# Patient Record
Sex: MALE | Race: WHITE | NOT HISPANIC OR LATINO | Employment: OTHER | URBAN - METROPOLITAN AREA
[De-identification: names, ages, dates, MRNs, and addresses within clinical notes are randomized per-mention and may not be internally consistent; named-entity substitution may affect disease eponyms.]

---

## 2018-01-17 NOTE — PROGRESS NOTES
Assessment    1  Well adult exam (V70 0) (Z00 00)    Plan  Encounter for screening colonoscopy    · 1 - Andra Riddle MD, Lyssa Acuna (Gastroenterology) Physician Referral  Consult  Status: Active   Requested for: [id number]  Care Summary provided  : Yes  Health Maintenance    · (1) COMPREHENSIVE METABOLIC PANEL; Status:Active; Requested for:[id number];    · (1) LIPID PANEL, FASTING; Status:Active; Requested for:[id number];     Yearly exam recommended No blood work needed since it was very good one year ago  Chief Complaint  CPE 71 yo      History of Present Illness  HPI: Yearly PE for pt   No new complaints  Pt is very active  Review of Systems    Constitutional: No fever or chills, feels well, no tiredness, no recent weight gain or weight loss  Eyes: No complaints of eye pain, no red eyes, no discharge from eyes, no itchy eyes  ENT: no complaints of earache, no hearing loss, no nosebleeds, no nasal discharge, no sore throat, no hoarseness  Cardiovascular: No complaints of slow heart rate, no fast heart rate, no chest pain, no palpitations, no leg claudication, no lower extremity  Respiratory: No complaints of shortness of breath, no wheezing, no cough, no SOB on exertion, no orthopnea or PND  Gastrointestinal: No complaints of abdominal pain, no constipation, no nausea or vomiting, no diarrhea or bloody stools  Genitourinary: No complaints of dysuria, no incontinence, no hesitancy, no nocturia, no genital lesion, no testicular pain  Musculoskeletal: No complaints of arthralgia, no myalgias, no joint swelling or stiffness, no limb pain or swelling  Neurological: No compliants of headache, no confusion, no convulsions, no numbness or tingling, no dizziness or fainting, no limb weakness, no difficulty walking  Psychiatric: Is not suicidal, no sleep disturbances, no anxiety or depression, no change in personality, no emotional problems  Active Problems    1  Bursitis (727 3) (M71 9)   2   Encounter for screening colonoscopy (V76 51) (Z12 11)   3  Need for influenza vaccination (V04 81) (Z23)   4  Need for prophylactic vaccination and inoculation against chickenpox (V05 4) (Z23)   5  Screening for cardiovascular condition (V81 2) (Z13 6)   6  Screening for diabetes mellitus (V77 1) (Z13 1)   7  Skin lesion (709 9) (L98 9)   8  Wears glasses (V49 89) (Z97 3)    Past Medical History    · History of anemia (V12 3) (Z86 2)   · History of fatigue (V13 89) (Z87 898)   · History of Internal Hemorrhoids (455 0)   · History of Laceration Of Nose (873 20)   · Left inguinal hernia (550 90) (K40 90)   · History of Skin lesion (709 9) (L98 9)    Family History  Mother    · Family history of Heart disease  Father    · No pertinent family history  Family History    · Family history of No Significant Family History    Social History    · Being A Social Drinker   ·    · Never A Smoker    Current Meds   1  Doxycycline Hyclate TABS; Therapy: (Recorded:07Qle2063) to Recorded    Allergies    1  No Known Drug Allergies    2  No Known Environmental Allergies   3  No Known Food Allergies    Vitals   Recorded: 80QEW8160 73:86OM   Systolic 204   Diastolic 62   Heart Rate 57   Temperature 96 1 F   O2 Saturation 100   Height 6 ft 2 in   Weight 201 lb    BMI Calculated 25 81   BSA Calculated 2 18     Physical Exam    Constitutional   General appearance: No acute distress, well appearing and well nourished  Head and Face   Head and face: Normal     Palpation of the face and sinuses: No sinus tenderness  Ears, Nose, Mouth, and Throat   External inspection of ears and nose: Normal     Otoscopic examination: Tympanic membranes translucent with normal light reflex  Canals patent without erythema  Hearing: Normal     Nasal mucosa, septum, and turbinates: Normal without edema or erythema  Lips, teeth, and gums: Normal, good dentition  Oropharynx: Normal with no erythema, edema, exudate or lesions      Neck   Neck: Supple, symmetric, trachea midline, no masses  Thyroid: Normal, no thyromegaly  Pulmonary   Respiratory effort: No increased work of breathing or signs of respiratory distress  Auscultation of lungs: Clear to auscultation  Cardiovascular   Auscultation of heart: Normal rate and rhythm, normal S1 and S2, no murmurs  Examination of extremities for edema and/or varicosities: Normal     Chest   Chest: Normal     Lymphatic   Palpation of lymph nodes in neck: No lymphadenopathy  Palpation of lymph nodes in axillae: No lymphadenopathy  Musculoskeletal   Gait and station: Normal     Inspection/palpation of digits and nails: Normal without clubbing or cyanosis  Inspection/palpation of joints, bones, and muscles: Normal     Range of motion: Normal     Stability: Normal     Skin   Skin and subcutaneous tissue: Normal without rashes or lesions  Palpation of skin and subcutaneous tissue: Normal turgor  Neurologic   Cranial nerves: Cranial nerves 2-12 intact  Cortical function: Normal mental status  Reflexes: 2+ and symmetric  Sensation: No sensory loss  Coordination: Normal finger to nose and heel to shin  Psychiatric   Judgment and insight: Normal     Orientation to person, place and time: Normal     Recent and remote memory: Intact      Mood and affect: Normal        Signatures   Electronically signed by : BARBIE Duncan ; Jul 17 2016  8:59PM EST                       (Author)

## 2018-04-05 ENCOUNTER — APPOINTMENT (OUTPATIENT)
Dept: RADIOLOGY | Facility: OTHER | Age: 73
End: 2018-04-05
Payer: MEDICARE

## 2018-04-05 VITALS
HEART RATE: 67 BPM | WEIGHT: 209 LBS | HEIGHT: 74 IN | DIASTOLIC BLOOD PRESSURE: 62 MMHG | SYSTOLIC BLOOD PRESSURE: 121 MMHG | BODY MASS INDEX: 26.82 KG/M2

## 2018-04-05 DIAGNOSIS — M54.5 LOW BACK PAIN, UNSPECIFIED BACK PAIN LATERALITY, UNSPECIFIED CHRONICITY, WITH SCIATICA PRESENCE UNSPECIFIED: ICD-10-CM

## 2018-04-05 DIAGNOSIS — M62.9 HAMSTRING TIGHTNESS OF BOTH LOWER EXTREMITIES: ICD-10-CM

## 2018-04-05 DIAGNOSIS — M54.5 LOW BACK PAIN, UNSPECIFIED BACK PAIN LATERALITY, UNSPECIFIED CHRONICITY, WITH SCIATICA PRESENCE UNSPECIFIED: Primary | ICD-10-CM

## 2018-04-05 DIAGNOSIS — M76.32 IT BAND SYNDROME, LEFT: ICD-10-CM

## 2018-04-05 PROCEDURE — 72114 X-RAY EXAM L-S SPINE BENDING: CPT

## 2018-04-05 PROCEDURE — 99203 OFFICE O/P NEW LOW 30 MIN: CPT | Performed by: INTERNAL MEDICINE

## 2018-04-05 RX ORDER — DOXYCYCLINE HYCLATE 150 MG/1
20 TABLET ORAL DAILY
COMMUNITY
End: 2018-12-07

## 2018-04-05 NOTE — PROGRESS NOTES
Assessment/Plan:  Assessment/Plan   Diagnoses and all orders for this visit:    Low back pain, unspecified back pain laterality, unspecified chronicity, with sciatica presence unspecified  -     XR spine lumbar complete w bending minimum 6 views; Future  -     Ambulatory referral to Physical Therapy; Future    Hamstring tightness of both lower extremities  -     Ambulatory referral to Physical Therapy; Future    It band syndrome, left  -     Ambulatory referral to Physical Therapy; Future      We discussed with Liya Wyatt that today's physical exam is significant for extreme bilateral hamstring tightness as well as significant left IT band tightness  His proximal lateral thigh pain is most likely the result of chronic TFL tightness and hamstring tightness putting pressure on the low back and lumbar spine  He is being provided a prescription for physical therapy to address the aforementioned issues  We have also 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 and provided them instruction on aggressive hamstring stretching techniques that he should perform regularly  He will be seen for follow-up in 2-3 months for re-evaluation  If for any reason his symptoms should fail to improve, or worsen, he can contact the office to schedule a sooner appointment  Subjective:   Patient ID: Blayne Mg is a 71 y o  male  Liya Wyatt is a pleasant 71-year-old male  from [place] who presents today for initial evaluation of left-sided hip and thigh pain x2 years  He reports that his pain is mostly lateral, achy/intense, 6/10 pain at its worst   The pain is most bothersome after approximately 10-15 minutes of standing, and is alleviated with sitting or kneeling  In the last 6 weeks, he reports that his pain has also begun to manifest in his left gluteal region when he is seated for long periods of time, as with the car ride  He denies any specific incident of injury   He denies any bruising, swelling, numbness, tingling, or instability  He has tried various OTC NSAID and acetaminophen based medications, none of which have provided significant relief  The following portions of the patient's history were reviewed and updated as appropriate: allergies, current medications, past family history, past medical history, past social history, past surgical history and problem list     History reviewed  No pertinent past medical history  Past Surgical History:   Procedure Laterality Date    HERNIA REPAIR         Family History   Problem Relation Age of Onset    No Known Problems Mother     No Known Problems Sister     No Known Problems Brother        Review of Systems   Constitutional: Negative  HENT: Negative  Eyes: Negative  Respiratory: Negative  Cardiovascular: Negative  Gastrointestinal: Negative  Endocrine: Negative  Genitourinary: Negative  Musculoskeletal:        As noted in HPI/PE   Skin: Negative  Allergic/Immunologic: Negative  Neurological: Negative  Hematological: Negative  Psychiatric/Behavioral: Negative  Objective:    Vitals:    04/05/18 0827   BP: 121/62   Patient Position: Sitting   Pulse: 67   Weight: 94 8 kg (209 lb)   Height: 6' 2" (1 88 m)       Left Hip Exam     Tenderness   The patient is experiencing no tenderness  Range of Motion   Extension: 10   Flexion: 120   Internal Rotation: 20   External Rotation: 80   Abduction: 35     Muscle Strength   Abduction: 5/5   Adduction: 5/5   Flexion: 5/5     Tests   AL: negative  Nani: positive    Other   Erythema: absent  Scars: absent  Sensation: normal  Pulse: present    Comments:  No obvious deformity noted  Presents with significant hamstring tightness, bilaterally      -circumduction  - FADIR  -logroll  -piriformis  -supine SLR      Back Exam     Range of Motion   Extension: 20 (Lumbar spine very rigid, does not reproduce pain)   Flexion: 50 (Hardly any motion from lumbar segment, does not reproduce any pain)   Lateral Bend Right: normal   Lateral Bend Left: normal   Back rotation right: Minimal    Back rotation left: Minimal      Muscle Strength   Back normal muscle strength: Significant right and left hamstring tightness  Right Quadriceps:  5/5   Left Quadriceps:  5/5   Right Hamstrings:  5/5   Left Hamstrings:  5/5     Tests   Right straight leg raise test: Patient could not reach full testing position  Left straight leg raise test: Patient could not reach full testing position  Reflexes   Patellar: normal  Achilles: normal    Other   Sensation: normal  Erythema: no back redness  Scars: absent    Comments:  Patient presents with no obvious deformity  Palpable muscle spasm and bilateral paralumbar musculature  Ranjanatamika Bloom  -lumbar extension              Physical Exam   Constitutional: He is oriented to person, place, and time  He appears well-developed and well-nourished  HENT:   Right Ear: External ear normal    Left Ear: External ear normal    Nose: Nose normal    Eyes: Conjunctivae and EOM are normal  Pupils are equal, round, and reactive to light  Neck: Normal range of motion  Cardiovascular: Intact distal pulses  Pulmonary/Chest: Effort normal    Musculoskeletal: Normal range of motion  Neurological: He is alert and oriented to person, place, and time  Skin: Skin is warm and dry  Psychiatric: He has a normal mood and affect  His behavior is normal  Judgment and thought content normal        Radiographic series taken 4/5/2018 of the lumbar spine is significant for L1-L2 vertebral auto fusion, as well as L5-S1 disc space narrowing  There are also other minor arthritic changes of the lumbar spine        Scribe Attestation    I,:   Omar Lomeli am acting as a scribe while in the presence of the attending physician :        I,:   Abimbola Le MD personally performed the services described in this documentation    as scribed in my presence :

## 2018-04-05 NOTE — PATIENT INSTRUCTIONS
Thank you for enrolling in [address] [facility]  Please follow the instructions below to securely access your online medical record  [employer] allows you to send messages to your doctor, view your test results, renew your prescriptions, schedule appointments, and more  How Do I Sign Up? 1  In your Internet browser, go to [email or web address]  REPLACE WITH REAL URL com   2  Click on the Sign Up Now link in the New User? box    3  Enter your [employer] Activation Code exactly as it appears below  You will not need to use this code after you have completed the sign-up process  If you do not sign up before the expiration date, you must request a new code  [employer] Activation Code: [email or web address]  Expires: 4/8/2018  9:06 AM    4  Enter the last four digits of your Social Security Number and your Date of Birth as indicated and click Next  You will be taken to the next sign-up page  5  Create a [employer] username  Think of one that is secure and easy to remember  6  Create a [employer] password  You can change your password at any time  7  Choose a security question, enter your answer, and click Next  This can be used to access [employer] if you forget your password  8  Select your communication preference  Enter a valid e-mail address if you would like to receive e-mail notifications when new information is available in [facility]  9  Click Sign In  You can now view your medical record  Additional Information  If you have [email or web address] [employer] or call [phone] to talk to our [address] staff  Remember, [employer] is NOT to be used for urgent needs  For medical emergencies, dial 911

## 2018-04-13 ENCOUNTER — EVALUATION (OUTPATIENT)
Dept: PHYSICAL THERAPY | Facility: REHABILITATION | Age: 73
End: 2018-04-13
Payer: MEDICARE

## 2018-04-13 DIAGNOSIS — M76.32 IT BAND SYNDROME, LEFT: ICD-10-CM

## 2018-04-13 DIAGNOSIS — M62.9 HAMSTRING TIGHTNESS OF BOTH LOWER EXTREMITIES: ICD-10-CM

## 2018-04-13 DIAGNOSIS — M54.5 CHRONIC LEFT-SIDED LOW BACK PAIN, WITH SCIATICA PRESENCE UNSPECIFIED: Primary | ICD-10-CM

## 2018-04-13 DIAGNOSIS — G89.29 CHRONIC LEFT-SIDED LOW BACK PAIN, WITH SCIATICA PRESENCE UNSPECIFIED: Primary | ICD-10-CM

## 2018-04-13 PROCEDURE — 97110 THERAPEUTIC EXERCISES: CPT | Performed by: PHYSICAL THERAPIST

## 2018-04-13 PROCEDURE — G8979 MOBILITY GOAL STATUS: HCPCS | Performed by: PHYSICAL THERAPIST

## 2018-04-13 PROCEDURE — 97161 PT EVAL LOW COMPLEX 20 MIN: CPT | Performed by: PHYSICAL THERAPIST

## 2018-04-13 PROCEDURE — G8978 MOBILITY CURRENT STATUS: HCPCS | Performed by: PHYSICAL THERAPIST

## 2018-04-13 NOTE — PROGRESS NOTES
PT Evaluation     Today's date: 2018  Patient name: Reginald Valencia  : 1945  MRN: [id number]  Referring provider: Sameer Chapman MD  Dx:   Encounter Diagnosis     ICD-10-CM    1  Chronic left-sided low back pain, with sciatica presence unspecified M54 5     G89 29    2  Hamstring tightness of both lower extremities M62 9 Ambulatory referral to Physical Therapy   3  It band syndrome, left M76 32 Ambulatory referral to Physical Therapy                  Assessment  Impairments: abnormal coordination, abnormal or restricted ROM, activity intolerance, impaired physical strength, lacks appropriate home exercise program and pain with function  Functional limitations: Patient reports to evaluation with the following functional impairments: walking up and down hills, prolonged standing, prolonged sitting  Assessment details: Reginald Valencia is a 71y o  year old male who presents to IE with:   Chronic left-sided low back pain, with sciatica presence unspecified  (primary encounter diagnosis)  Hamstring tightness of both lower extremities  It band syndrome, left    Cindy Fe presents with the impairments as listed above and would benefit from Physical Therapy to address these impairments and to maximize function  Understanding of Dx/Px/POC: good   Prognosis: good  Prognosis details: Cindy Miramontes prognosis may be affected by the following: chronic pain    Goals  Short-Term Goals:   1  Patient's ROM will increase by 25% in 4 weeks  2  Patient's hamstring will improve to 55 degrees in 4 weeks (currently at 40 degrees)    Long-Term Goals:   1  Patient will be able to ambulate up and down hills with minimal to no pain at time of discharge  2  Patient independent with HEP at time of discharge  3  Patient will be able to stand for prolonged periods of time with minimal to no pain at time of discharge       Plan  Patient would benefit from: PT eval and skilled PT  Planned modality interventions: electrical stimulation/Pakistani stimulation, cryotherapy and thermotherapy: hydrocollator packs  Planned therapy interventions: body mechanics training, breathing training, home exercise program, IADL retraining, joint mobilization, manual therapy, motor coordination training, neuromuscular re-education, patient education, postural training, strengthening, stretching, therapeutic activities, therapeutic exercise and work reintegration  Frequency: 2x week  Duration in visits: 12  Duration in weeks: 6  Treatment plan discussed with: patient  Plan details: Thank you for referring Will Bolivar to Physical Therapy at [place] [address] and for the opportunity to coordinate care  Subjective Evaluation    History of Present Illness  Mechanism of injury: Zenia Hair presents to  with low back pain  Patient reports pain began about 2 years ago while he was standing watching a parade  He reports pain "was killing me" in the left leg, "it hurt just so bad " He notes after he performed an isometric on L LE pain subsided  He reports "after awhile it just disappeared and it didn't bother me the whole summer or the fall " He reports pain came back with prolonged standing, and reported relief with sitting or kneeling  He reports relief of pain with walking or jogging when pain would come back  He noted this past winter the pain in L LE became worse, noting pain would increase with less time of standing  He denies any N/T at this time in LE , saddle anesthesia, or loss of control of bowel and bladder  He reports no pain at time when running or when doing yoga  He reports he began to feel pain into L lateral ankle as well  He also noticed pain when prolonged sitting  He reports increased pain with prolonged standing, walking up and down hills, and prolonged sitting while driving  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   He reports he has been working with the  at the high school, noticing pain has improved since ATC has begun working on him  Recurrent probem    Quality of life: good    Pain  Current pain ratin  At best pain ratin  At worst pain ratin  Location: L LE   Quality: dull ache, sharp and discomfort  Relieving factors: change in position  Aggravating factors: standing, sitting and walking  Progression: no change      Diagnostic Tests  Abnormal x-ray: "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  SI joints appear fused "   Treatments  Current treatment: physical therapy  Patient Goals  Patient goals for therapy: decreased pain, increased motion, increased strength, independence with ADLs/IADLs and return to sport/leisure activities          Objective     Special Questions  Negative for night pain, bladder dysfunction and bowel dysfunction    Postural Observations  Seated posture: fair  Standing posture: fair    Additional Postural Observation Details  Patient demonstrates following posture: Increased thoracic kyphosis   Cervical protrusion  Rounded shoulders, increased IR      Palpation     Additional Palpation Details  No palpable tenderness at this time during IE   He does report tenderness at times along L lateral ankle  Tenderness     Lumbar Spine  No tenderness in the spinous process  Left Hip   No tenderness in the PSIS  Neurological Testing     Sensation     Lumbar   Left   Intact: light touch    Right   Intact: light touch    Reflexes   Left   Patellar (L4): normal (2+)  Achilles (S1): normal (2+)    Right   Patellar (L4): normal (2+)  Achilles (S1): normal (2+)    Additional Neurological Details  Patient denies N/T in L LE and R LE at this time  Active Range of Motion     Additional Active Range of Motion Details  Lumbar flexion: 50%  Lumbar extension: 25%  L Lateral flexion: 50%  R lateral flexion: 50%  L lateral rotation: 50%  R lateral rotation: 50%  No pain with ROM at this time, significant tightness observed with all ROM, most limited with lumbar flexion and extension  Strength/Myotome Testing     Left Hip   Planes of Motion   Flexion: 5  Extension: 4+  Abduction: 5    Right Hip   Planes of Motion   Flexion: 5  Extension: 4+  Abduction: 5    Left Knee   Flexion: 5  Extension: 5    Right Knee   Flexion: 5  Extension: 5    Left Ankle/Foot   Dorsiflexion: 5  Plantar flexion: 5  Great toe extension: 5    Right Ankle/Foot   Dorsiflexion: 5  Plantar flexion: 5  Great toe extension: 5    Tests       Thoracic   Positive slump  Lumbar   Positive slumped  Left   Positive passive SLR  Right   Positive passive SLR  Left Knee   Positive peroneal nerve tension  Additional Tests Details  Patient demonstrating significant hamstring tightness bilaterally    + Tripod sign observed at this time with bilateral knee extension  + slump, patient unable to achieve full  Knee extension when performing slump bilaterally     General Comments     Lumbar Comments  Gait: unremarkable for antalgia at this time         Flowsheet Rows    Flowsheet Row Most Recent Value   PT/OT G-Codes   Current Score  49   Projected Score  59   FOTO information reviewed  Yes   Assessment Type  Evaluation   G code set  Mobility: Walking & Moving Around   Mobility: Walking and Moving Around Current Status ()  CK   Mobility: Walking and Moving Around Goal Status ()  CJ          Precautions: chronic pain  Access code: [email or web address]  * Indicates part of HEP     Daily Treatment Diary     Manual              Bilateral hamstring stretching             L fibular nerve glides             Bilateral hip ROM- flexion, ER , hamstring                                           Exercise Diary              Abdominal bracing toe taps             LTR             Clamshells              Bridges with marching              Hamstring stretch seated* :20x3 ea            DLS dead bugs             Lateral walking/ monster walking              Piriformis stretch* :20x3 ea            Seated fibular nerve glides* 10 ea ITB stretch             pball hamstring triple threat             Elliptical                                                                                                                         Modalities

## 2018-04-18 ENCOUNTER — OFFICE VISIT (OUTPATIENT)
Dept: PHYSICAL THERAPY | Facility: REHABILITATION | Age: 73
End: 2018-04-18
Payer: MEDICARE

## 2018-04-18 DIAGNOSIS — M54.5 CHRONIC LEFT-SIDED LOW BACK PAIN, WITH SCIATICA PRESENCE UNSPECIFIED: Primary | ICD-10-CM

## 2018-04-18 DIAGNOSIS — M76.32 IT BAND SYNDROME, LEFT: ICD-10-CM

## 2018-04-18 DIAGNOSIS — G89.29 CHRONIC LEFT-SIDED LOW BACK PAIN, WITH SCIATICA PRESENCE UNSPECIFIED: Primary | ICD-10-CM

## 2018-04-18 DIAGNOSIS — M62.9 HAMSTRING TIGHTNESS OF BOTH LOWER EXTREMITIES: ICD-10-CM

## 2018-04-18 PROCEDURE — 97110 THERAPEUTIC EXERCISES: CPT | Performed by: PHYSICAL THERAPIST

## 2018-04-18 PROCEDURE — 97112 NEUROMUSCULAR REEDUCATION: CPT | Performed by: PHYSICAL THERAPIST

## 2018-04-18 PROCEDURE — 97140 MANUAL THERAPY 1/> REGIONS: CPT | Performed by: PHYSICAL THERAPIST

## 2018-04-18 NOTE — PROGRESS NOTES
Daily Note     Today's date: 2018  Patient name: Reginald Valencia  : 1945  MRN: [id number]  Referring provider: Sameer Chapman MD  Dx:   Encounter Diagnosis     ICD-10-CM    1  Chronic left-sided low back pain, with sciatica presence unspecified M54 5     G89 29    2  It band syndrome, left M76 32    3  Hamstring tightness of both lower extremities M62 9                   Subjective: Cindy Miramontes reports his L LE "doing okay today " He reports increased pain in L LE previous day while at track and field meet  He verbalizes compliance with HEP at this time, denying any pain or problems  Objective: See treatment diary below  Precautions: chronic pain  Access code: [email or web address]  * Indicates part of HEP     Daily Treatment Diary     Manual              Bilateral hamstring stretching  18 min total           L fibular nerve glides  performed           Bilateral hip ROM- flexion, ER , hamstring  performed                                         Exercise Diary              Abdominal bracing toe taps             LTR             Clamshells   Side plank OTB 2x10           Bridges with marching   2x10           Hamstring stretch seated* :20x3 ea :20x5 ea           DLS dead bugs             Lateral walking/ monster walking   OTB 3 laps ea           Piriformis stretch* :20x3 ea :20x5 ea           Seated fibular nerve glides* 10 ea Strap :05x10 ea           ITB stretch             pball hamstring triple threat             Elliptical  5 min                                                                                                                       Modalities                                                         Assessment: Tolerated treatment fair  Patient demonstrated fatigue post treatment, exhibited good technique with therapeutic exercises and would benefit from continued PT  Patient continues to demonstrate significant tightness in bilateral hamstrings at this time   Patient needing frequent VC for proper technique with exercises and core stabilization while doing exercises  Plan: Continue per plan of care  Progress treatment as tolerated

## 2018-04-20 ENCOUNTER — OFFICE VISIT (OUTPATIENT)
Dept: PHYSICAL THERAPY | Facility: REHABILITATION | Age: 73
End: 2018-04-20
Payer: MEDICARE

## 2018-04-20 DIAGNOSIS — M76.32 IT BAND SYNDROME, LEFT: ICD-10-CM

## 2018-04-20 DIAGNOSIS — M62.9 HAMSTRING TIGHTNESS OF BOTH LOWER EXTREMITIES: ICD-10-CM

## 2018-04-20 DIAGNOSIS — G89.29 CHRONIC LEFT-SIDED LOW BACK PAIN, WITH SCIATICA PRESENCE UNSPECIFIED: Primary | ICD-10-CM

## 2018-04-20 DIAGNOSIS — M54.5 CHRONIC LEFT-SIDED LOW BACK PAIN, WITH SCIATICA PRESENCE UNSPECIFIED: Primary | ICD-10-CM

## 2018-04-20 PROCEDURE — 97112 NEUROMUSCULAR REEDUCATION: CPT

## 2018-04-20 PROCEDURE — 97140 MANUAL THERAPY 1/> REGIONS: CPT

## 2018-04-20 PROCEDURE — 97110 THERAPEUTIC EXERCISES: CPT

## 2018-04-20 NOTE — PROGRESS NOTES
Daily Note     Today's date: 2018  Patient name: Melvi Levin  : 1945  MRN: [id number]  Referring provider: Rigoberto Calixto MD  Dx:   Encounter Diagnosis     ICD-10-CM    1  Chronic left-sided low back pain, with sciatica presence unspecified M54 5     G89 29    2  It band syndrome, left M76 32    3  Hamstring tightness of both lower extremities M62 9                   Subjective: Patient denies any pain prior to session today and reports no complaints after previous session just minimal soreness  He reports discomfort yesterday while sitting at the dentist stating "my hamstrings were killing me " He reports compliance with HEP  Objective: See treatment diary below  Precautions: chronic pain  Access code: [email or web address]  * Indicates part of HEP     Daily Treatment Diary     Manual             Bilateral hamstring stretching  18 min total 18 min total          L fibular nerve glides  performed performed          Bilateral hip ROM- flexion, ER , hamstring  performed performed                                        Exercise Diary             Abdominal bracing toe taps             LTR             Clamshells   Side plank OTB 2x10 Side plank OTB 3x10          Bridges with marching   2x10 3x10          Hamstring stretch seated* :20x3 ea :20x5 ea :20x5 ea          DLS dead bugs             Lateral walking/ monster walking   OTB 3 laps ea OTB 3 laps ea          Piriformis stretch* :20x3 ea :20x5 ea :20x5          Seated fibular nerve glides* 10 ea Strap :05x10 ea Strap :05x10 ea          ITB stretch             pball hamstring triple threat   20 ea          Elliptical  5 min 5 min                                                                                                                      Modalities                                                           Assessment: Tolerated treatment well   Patient demonstrated fatigue post treatment, exhibited good technique with therapeutic exercises and would benefit from continued PT Trialed pball hamstring triple threat this session where patient tolerated well stating "can really feel that one!"      Plan: Continue per plan of care  Progress treatment as tolerated

## 2018-04-25 ENCOUNTER — OFFICE VISIT (OUTPATIENT)
Dept: PHYSICAL THERAPY | Facility: REHABILITATION | Age: 73
End: 2018-04-25
Payer: MEDICARE

## 2018-04-25 DIAGNOSIS — M62.9 HAMSTRING TIGHTNESS OF BOTH LOWER EXTREMITIES: ICD-10-CM

## 2018-04-25 DIAGNOSIS — M76.32 IT BAND SYNDROME, LEFT: ICD-10-CM

## 2018-04-25 DIAGNOSIS — G89.29 CHRONIC LEFT-SIDED LOW BACK PAIN, WITH SCIATICA PRESENCE UNSPECIFIED: Primary | ICD-10-CM

## 2018-04-25 DIAGNOSIS — M54.5 CHRONIC LEFT-SIDED LOW BACK PAIN, WITH SCIATICA PRESENCE UNSPECIFIED: Primary | ICD-10-CM

## 2018-04-25 PROCEDURE — 97140 MANUAL THERAPY 1/> REGIONS: CPT

## 2018-04-25 PROCEDURE — 97110 THERAPEUTIC EXERCISES: CPT

## 2018-04-25 PROCEDURE — 97112 NEUROMUSCULAR REEDUCATION: CPT

## 2018-04-25 NOTE — PROGRESS NOTES
Daily Note     Today's date: 2018  Patient name: Radha Husain  : 1945  MRN: [id number]  Referring provider: Joycie Phoenix, MD  Dx:   Encounter Diagnosis     ICD-10-CM    1  Chronic left-sided low back pain, with sciatica presence unspecified M54 5     G89 29    2  It band syndrome, left M76 32    3  Hamstring tightness of both lower extremities M62 9                   Subjective: Patient continues to report occasional hamstring discomfort when sitting or driving  Patient reports being able to move and walk around more at track meet yesterday stating "it felt really good, I had no pain, moving around a lot " He denies any complaints after previous session         Objective: See treatment diary below  Precautions: chronic pain  Access code: [email or web address]  * Indicates part of HEP     Daily Treatment Diary     Manual            Bilateral hamstring stretching  18 min total 18 min total 15 min total         L fibular nerve glides  performed performed performed         Bilateral hip ROM- flexion, ER , hamstring  performed performed performed                                       Exercise Diary            Abdominal bracing toe taps             LTR             Clamshells   Side plank OTB 2x10 Side plank OTB 3x10 Side plank GTB 3x10         Bridges with marching   2x10 3x10 3x12         Hamstring stretch seated* :20x3 ea :20x5 ea :20x5 ea :20x5 ea         DLS dead bugs             Lateral walking/ monster walking, sharkies  OTB 3 laps ea OTB 3 laps ea GTB 3 laps, 30x         Piriformis stretch* :20x3 ea :20x5 ea :20x5 :20x5          Seated fibular nerve glides* 10 ea Strap :05x10 ea Strap :05x10 ea Strap :05x10 ea         ITB stretch    :15x5         pball hamstring triple threat   20 ea 20 ea         Elliptical  5 min 5 min 5 min         DLS Mini squats    87Y                                                                                                        Modalities Assessment: Tolerated treatment well  Patient demonstrated fatigue post treatment, exhibited good technique with therapeutic exercises and would benefit from continued PT Patient requesting education on proper squat form, constant cues required for proper technique  Plan: Continue per plan of care  Progress treatment as tolerated

## 2018-04-27 ENCOUNTER — OFFICE VISIT (OUTPATIENT)
Dept: PHYSICAL THERAPY | Facility: REHABILITATION | Age: 73
End: 2018-04-27
Payer: MEDICARE

## 2018-04-27 DIAGNOSIS — M62.9 HAMSTRING TIGHTNESS OF BOTH LOWER EXTREMITIES: ICD-10-CM

## 2018-04-27 DIAGNOSIS — M76.32 IT BAND SYNDROME, LEFT: ICD-10-CM

## 2018-04-27 DIAGNOSIS — G89.29 CHRONIC LEFT-SIDED LOW BACK PAIN, WITH SCIATICA PRESENCE UNSPECIFIED: Primary | ICD-10-CM

## 2018-04-27 DIAGNOSIS — M54.5 CHRONIC LEFT-SIDED LOW BACK PAIN, WITH SCIATICA PRESENCE UNSPECIFIED: Primary | ICD-10-CM

## 2018-04-27 PROCEDURE — 97112 NEUROMUSCULAR REEDUCATION: CPT | Performed by: PHYSICAL THERAPIST

## 2018-04-27 PROCEDURE — 97110 THERAPEUTIC EXERCISES: CPT | Performed by: PHYSICAL THERAPIST

## 2018-04-27 PROCEDURE — 97140 MANUAL THERAPY 1/> REGIONS: CPT | Performed by: PHYSICAL THERAPIST

## 2018-04-27 NOTE — PROGRESS NOTES
Daily Note     Today's date: 2018  Patient name: Caesar Carrero  : 1945  MRN: [id number]  Referring provider: Yesica Hubbard MD  Dx:   Encounter Diagnosis     ICD-10-CM    1  Chronic left-sided low back pain, with sciatica presence unspecified M54 5     G89 29    2  It band syndrome, left M76 32    3   Hamstring tightness of both lower extremities M62 9                   Subjective: Hernandez Cook reports his L LE "feeling pretty good, still hurts when I sit for too long, but I feel like it's better "         Objective: See treatment diary below  Precautions: chronic pain  Access code: [email or web address]  * Indicates part of HEP     Daily Treatment Diary     Manual           Bilateral hamstring stretching  18 min total 18 min total 15 min total 15 min total        L fibular nerve glides  performed performed performed performed        Bilateral hip ROM- flexion, ER , hamstring  performed performed performed performed                                      Exercise Diary           Abdominal bracing toe taps             LTR             Clamshells   Side plank OTB 2x10 Side plank OTB 3x10 Side plank GTB 3x10 Side plank GTB 3x10        Bridges with marching   2x10 3x10 3x12 3x12        Hamstring stretch seated* :20x3 ea :20x5 ea :20x5 ea :20x5 ea :20x5 ea        DLS dead bugs             Lateral walking/ monster walking, sharkies  OTB 3 laps ea OTB 3 laps ea GTB 3 laps, 30x GTB 3 laps, 30x        Piriformis stretch* :20x3 ea :20x5 ea :20x5 :20x5  :20x5        Seated fibular nerve glides* 10 ea Strap :05x10 ea Strap :05x10 ea Strap :05x10 ea Strap :05x10 ea        ITB stretch    :15x5 :15x5        pball hamstring triple threat   20 ea 20 ea 20 ea        Elliptical  5 min 5 min 5 min 5 min        DLS Mini squats    89E         Step downs     2x10 ea 6"                                                                                          Modalities Assessment: Tolerated treatment fair  Patient demonstrated fatigue post treatment, exhibited good technique with therapeutic exercises and would benefit from continued PT  Patient demonstrating improvements with manuals, less tightness observed with all manuals, most limited with bilateral hamstrings at this time  Added step downs today with fair tolerance, no increase in pain reported  Plan: Continue per plan of care  Progress treatment as tolerated          Patient taken through exercises by WOLF CORTES from 8:30-8:45 AM

## 2018-05-02 ENCOUNTER — OFFICE VISIT (OUTPATIENT)
Dept: PHYSICAL THERAPY | Facility: REHABILITATION | Age: 73
End: 2018-05-02
Payer: MEDICARE

## 2018-05-02 DIAGNOSIS — M54.5 CHRONIC LEFT-SIDED LOW BACK PAIN, WITH SCIATICA PRESENCE UNSPECIFIED: Primary | ICD-10-CM

## 2018-05-02 DIAGNOSIS — G89.29 CHRONIC LEFT-SIDED LOW BACK PAIN, WITH SCIATICA PRESENCE UNSPECIFIED: Primary | ICD-10-CM

## 2018-05-02 DIAGNOSIS — M62.9 HAMSTRING TIGHTNESS OF BOTH LOWER EXTREMITIES: ICD-10-CM

## 2018-05-02 DIAGNOSIS — M76.32 IT BAND SYNDROME, LEFT: ICD-10-CM

## 2018-05-02 PROCEDURE — 97112 NEUROMUSCULAR REEDUCATION: CPT

## 2018-05-02 PROCEDURE — 97140 MANUAL THERAPY 1/> REGIONS: CPT

## 2018-05-02 PROCEDURE — 97110 THERAPEUTIC EXERCISES: CPT

## 2018-05-02 NOTE — PROGRESS NOTES
Daily Note     Today's date: 2018  Patient name: Jackie Calvin  : 1945  MRN: [id number]  Referring provider: Mary Shepard MD  Dx:   Encounter Diagnosis     ICD-10-CM    1  Chronic left-sided low back pain, with sciatica presence unspecified M54 5     G89 29    2  It band syndrome, left M76 32    3  Hamstring tightness of both lower extremities M62 9                   Subjective: Patient reports "definitely better than what I was, but not all the way there yet " He reports having to sit occasionally at yesterdays track meet  He denies any complaints after previous session         Objective: See treatment diary below  Precautions: chronic pain  Access code: [email or web address]  * Indicates part of HEP     Daily Treatment Diary     Manual          Bilateral hamstring stretching  18 min total 18 min total 15 min total 15 min total 15 min total       L fibular nerve glides  performed performed performed performed performed       Bilateral hip ROM- flexion, ER , hamstring  performed performed performed performed performed                                     Exercise Diary          Abdominal bracing toe taps             LTR             Clamshells   Side plank OTB 2x10 Side plank OTB 3x10 Side plank GTB 3x10 Side plank GTB 3x10 Side plank GTB 3x10       Bridges with marching   2x10 3x10 3x12 3x12 3x12       Hamstring stretch seated* :20x3 ea :20x5 ea :20x5 ea :20x5 ea :20x5 ea :20x5 ea       DLS dead bugs             Lateral walking/ monster walking, sharkies  OTB 3 laps ea OTB 3 laps ea GTB 3 laps, 30x GTB 3 laps, 30x GTB 3 laps, 30x       Piriformis stretch* :20x3 ea :20x5 ea :20x5 :20x5  :20x5 :20x5       Seated fibular nerve glides* 10 ea Strap :05x10 ea Strap :05x10 ea Strap :05x10 ea Strap :05x10 ea Strap :05x10 ea       ITB stretch    :15x5 :15x5 :15x5       pball hamstring triple threat   20 ea 20 ea 20 ea 30 ea       Elliptical  5 min 5 min 5 min 5 min 5 min DLS Mini squats    85S         Step downs     2x10 ea 6" 3x10 ea 6''                                                                                         Modalities                                                         Assessment: Tolerated treatment well  Patient demonstrated fatigue post treatment, exhibited good technique with therapeutic exercises and would benefit from continued PT Patient continues to demonstrate bilateral hamstring tightness, improvements noted  Patient tolerated all TE performed today well, no increased discomfort noted, most difficulty with pball triple threat  Plan: Continue per plan of care  Progress treatment as tolerated

## 2018-05-04 ENCOUNTER — OFFICE VISIT (OUTPATIENT)
Dept: PHYSICAL THERAPY | Facility: REHABILITATION | Age: 73
End: 2018-05-04
Payer: MEDICARE

## 2018-05-04 DIAGNOSIS — M62.9 HAMSTRING TIGHTNESS OF BOTH LOWER EXTREMITIES: ICD-10-CM

## 2018-05-04 DIAGNOSIS — G89.29 CHRONIC LEFT-SIDED LOW BACK PAIN, WITH SCIATICA PRESENCE UNSPECIFIED: Primary | ICD-10-CM

## 2018-05-04 DIAGNOSIS — M54.5 CHRONIC LEFT-SIDED LOW BACK PAIN, WITH SCIATICA PRESENCE UNSPECIFIED: Primary | ICD-10-CM

## 2018-05-04 DIAGNOSIS — M76.32 IT BAND SYNDROME, LEFT: ICD-10-CM

## 2018-05-04 PROCEDURE — 97110 THERAPEUTIC EXERCISES: CPT | Performed by: PHYSICAL THERAPIST

## 2018-05-04 PROCEDURE — 97112 NEUROMUSCULAR REEDUCATION: CPT | Performed by: PHYSICAL THERAPIST

## 2018-05-04 PROCEDURE — 97140 MANUAL THERAPY 1/> REGIONS: CPT | Performed by: PHYSICAL THERAPIST

## 2018-05-04 NOTE — PROGRESS NOTES
Daily Note     Today's date: 2018  Patient name: Nava Jefferson  : 1945  MRN: [id number]  Referring provider: Yudith Lew MD  Dx:   Encounter Diagnosis     ICD-10-CM    1  Chronic left-sided low back pain, with sciatica presence unspecified M54 5     G89 29    2  It band syndrome, left M76 32    3  Hamstring tightness of both lower extremities M62 9                   Subjective: Luis Fernando Whitlock reports that his legs have been "getting looser" but continues to note pain left lower extremity  Objective: See treatment diary below      Assessment: Tolerated treatment well  Patient demonstrated fatigue post treatment, exhibited good technique with therapeutic exercises and would benefit from continued PT  Required cuing during monster walks to avoid hip IR, able to maintain 50% of time  Pt continues to be very restricted within HS flexibility  Plan: Continue per plan of care       Precautions: chronic pain  Access code: [email or web address]  * Indicates part of HEP     Daily Treatment Diary     Manual         Bilateral hamstring stretching  18 min total 18 min total 15 min total 15 min total 15 min total 15 min total      Bilateral              L fibular nerve glides  performed performed performed performed performed performed      Bilateral hip ROM- flexion, ER , hamstring  performed performed performed performed performed performed                                    Exercise Diary         Abdominal bracing toe taps             LTR             Clamshells   Side plank OTB 2x10 Side plank OTB 3x10 Side plank GTB 3x10 Side plank GTB 3x10 Side plank GTB 3x10 Side plank GTB 3x10      Bridges with marching   2x10 3x10 3x12 3x12 3x12 3x12      Hamstring stretch seated* :20x3 ea :20x5 ea :20x5 ea :20x5 ea :20x5 ea :20x5 ea :20x5ea      DLS dead bugs             Lateral walking/ monster walking, sharkies  OTB 3 laps ea OTB 3 laps ea GTB 3 laps, 30x GTB 3 laps, 30x GTB 3 laps, 30x GTB 3 laps, 30x      Piriformis stretch* :20x3 ea :20x5 ea :20x5 :20x5  :20x5 :20x5 :20x5      Seated fibular nerve glides* 10 ea Strap :05x10 ea Strap :05x10 ea Strap :05x10 ea Strap :05x10 ea Strap :05x10 ea Strap :05x10 ea      ITB stretch    :15x5 :15x5 :15x5 :15x5      pball hamstring triple threat   20 ea 20 ea 20 ea 30 ea 30ea      Elliptical  5 min 5 min 5 min 5 min 5 min 5 min      DLS Mini squats    25L         Step downs     2x10 ea 6" 3x10 ea 6'' np                                                                                        Modalities

## 2018-05-09 ENCOUNTER — OFFICE VISIT (OUTPATIENT)
Dept: PHYSICAL THERAPY | Facility: REHABILITATION | Age: 73
End: 2018-05-09
Payer: MEDICARE

## 2018-05-09 DIAGNOSIS — M76.32 IT BAND SYNDROME, LEFT: ICD-10-CM

## 2018-05-09 DIAGNOSIS — G89.29 CHRONIC LEFT-SIDED LOW BACK PAIN, WITH SCIATICA PRESENCE UNSPECIFIED: Primary | ICD-10-CM

## 2018-05-09 DIAGNOSIS — M54.5 CHRONIC LEFT-SIDED LOW BACK PAIN, WITH SCIATICA PRESENCE UNSPECIFIED: Primary | ICD-10-CM

## 2018-05-09 DIAGNOSIS — M62.9 HAMSTRING TIGHTNESS OF BOTH LOWER EXTREMITIES: ICD-10-CM

## 2018-05-09 PROCEDURE — 97110 THERAPEUTIC EXERCISES: CPT | Performed by: PHYSICAL THERAPIST

## 2018-05-09 PROCEDURE — 97112 NEUROMUSCULAR REEDUCATION: CPT | Performed by: PHYSICAL THERAPIST

## 2018-05-09 PROCEDURE — G8978 MOBILITY CURRENT STATUS: HCPCS | Performed by: PHYSICAL THERAPIST

## 2018-05-09 PROCEDURE — G8979 MOBILITY GOAL STATUS: HCPCS | Performed by: PHYSICAL THERAPIST

## 2018-05-09 PROCEDURE — 97140 MANUAL THERAPY 1/> REGIONS: CPT | Performed by: PHYSICAL THERAPIST

## 2018-05-09 NOTE — PROGRESS NOTES
PT Re-Evaluation     Today's date: 2018  Patient name: Nat Bourgeois  : 1945  MRN: [id number]  Referring provider: Rafael Cabrales MD  Dx:   Encounter Diagnosis     ICD-10-CM    1  Chronic left-sided low back pain, with sciatica presence unspecified M54 5     G89 29    2  It band syndrome, left M76 32    3  Hamstring tightness of both lower extremities M62 9        Start Time: 0755  Stop Time: 271  Total time in clinic (min): 55 minutes    Assessment  Impairments: activity intolerance and lacks appropriate home exercise program  Functional limitations: Patient reports to evaluation with the following functional impairments: walking up and down hills, prolonged standing, prolonged sitting  Assessment details: Nat Bourgeois is a 71y o  year old male who presents to IE with:   Chronic left-sided low back pain, with sciatica presence unspecified  (primary encounter diagnosis)  Hamstring tightness of both lower extremities  It band syndrome, left     Benjamin Abrams has made the following improvements since beginning PT:  decreased pain, increased ROM, increased strength, increased tolerance to activities  and increased core stabilization   Benjamin Abrams continues to present with the impairments as listed above  Patient would continue to benefit from skilled PT to address these deficits and to maximize function  Understanding of Dx/Px/POC: good   Prognosis: good  Prognosis details: Benjamin Abrams prognosis may be affected by the following: chronic pain    Goals  Short-Term Goals:   1  Patient's ROM will increase by 25% in 4 weeks- Met  2  Patient's hamstring will improve to 55 degrees in 4 weeks (currently at 40 degrees)- Met    Long-Term Goals:   1  Patient will be able to ambulate up and down hills with minimal to no pain at time of discharge- Partially Met  2  Patient independent with HEP at time of discharge- Partially Met  3   Patient will be able to stand for prolonged periods of time with minimal to no pain at time of discharge- Partially Met    Plan  Patient would benefit from: skilled PT  Planned modality interventions: electrical stimulation/Russian stimulation, cryotherapy and thermotherapy: hydrocollator packs  Planned therapy interventions: body mechanics training, breathing training, home exercise program, IADL retraining, joint mobilization, manual therapy, motor coordination training, neuromuscular re-education, patient education, postural training, strengthening, stretching, therapeutic activities, therapeutic exercise and work reintegration  Frequency: 2x week  Duration in visits: 1  Duration in weeks: 1  Treatment plan discussed with: patient  Plan details: Thank you for referring Yuri Steen to Physical Therapy at [place] [address] and for the opportunity to coordinate care  Subjective Evaluation    History of Present Illness  Mechanism of injury: Sushil Lane has been seen for total of 8 visits for OP PT for L hamstring strain, L ITB syndrome   Patient's global rating of change is " Quite a bit better (5) " Patient reports improvements with prolonged standing, sitting tolerance  Patient reports most difficulty with prolonged standing, prolonged driving in the car  Patient to be seen for OP PT for 1 more visit and will be discharged to [facility] on 18  He reports overall less pain and frequency of L LE pain  Recurrent probem    Quality of life: good    Pain  Current pain ratin  At best pain ratin  At worst pain ratin  Location: L LE   Quality: discomfort  Relieving factors: change in position  Aggravating factors: standing and sitting  Progression: no change      Diagnostic Tests  Abnormal x-ray: "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   SI joints appear fused "   Treatments  Current treatment: physical therapy  Patient Goals  Patient goals for therapy: decreased pain, increased motion, increased strength, independence with ADLs/IADLs and return to sport/leisure activities          Objective Special Questions  Negative for night pain, bladder dysfunction and bowel dysfunction    Postural Observations  Seated posture: fair  Standing posture: fair    Additional Postural Observation Details  Patient demonstrates following posture: Increased thoracic kyphosis   Cervical protrusion  Rounded shoulders, increased IR      Palpation     Additional Palpation Details  No palpable tenderness at this time during IE   He does report tenderness at times along L lateral ankle  Tenderness     Lumbar Spine  No tenderness in the spinous process  Left Hip   No tenderness in the PSIS  Neurological Testing     Sensation     Lumbar   Left   Intact: light touch    Right   Intact: light touch    Reflexes   Left   Patellar (L4): normal (2+)  Achilles (S1): normal (2+)    Right   Patellar (L4): normal (2+)  Achilles (S1): normal (2+)    Additional Neurological Details  Patient denies N/T in L LE and R LE at this time  Active Range of Motion     Additional Active Range of Motion Details  Lumbar flexion: 75%  Lumbar extension: 50%  L Lateral flexion: 75%  R lateral flexion: 75%  L lateral rotation: 75%  R lateral rotation: 75%  No pain with ROM at this time, significant tightness observed with all ROM, most limited with lumbar flexion and extension  Strength/Myotome Testing     Left Hip   Planes of Motion   Flexion: 5  Extension: 5  Abduction: 5    Right Hip   Planes of Motion   Flexion: 5  Extension: 5  Abduction: 5    Left Knee   Flexion: 5  Extension: 5    Right Knee   Flexion: 5  Extension: 5    Left Ankle/Foot   Dorsiflexion: 5  Plantar flexion: 5  Great toe extension: 5    Right Ankle/Foot   Dorsiflexion: 5  Plantar flexion: 5  Great toe extension: 5    Tests       Thoracic   Positive slump  Lumbar   Positive slumped  Left   Positive passive SLR  Right   Positive passive SLR  Left Knee   Positive peroneal nerve tension       Additional Tests Details  Patient demonstrating significant hamstring tightness bilaterally    + Tripod sign observed at this time with bilateral knee extension  General Comments     Lumbar Comments  Gait: unremarkable for antalgia at this time         Flowsheet Rows      Most Recent Value   PT/OT G-Codes   Current Score  62   Projected Score  64   FOTO information reviewed  Yes   Assessment Type  Re-evaluation   G code set  Mobility: Walking & Moving Around   Mobility: Walking and Moving Around Current Status ()  CJ   Mobility: Walking and Moving Around Goal Status ()  CJ          Precautions: chronic pain  Access code: [email or web address]  * Indicates part of HEP     Daily Treatment Diary     Manual   4/18 4/20 4/25 4/27 5/2 5/4 5/9     Bilateral hamstring stretching  18 min total 18 min total 15 min total 15 min total 15 min total 15 min total 15 min total     Bilateral              L fibular nerve glides  performed performed performed performed performed performed performed     Bilateral hip ROM- flexion, ER , hamstring  performed performed performed performed performed performed performed                                   Exercise Diary   4/18 4/20 4/25 4/27 5/2 5/4 5/9     Abdominal bracing toe taps             LTR             Clamshells   Side plank OTB 2x10 Side plank OTB 3x10 Side plank GTB 3x10 Side plank GTB 3x10 Side plank GTB 3x10 Side plank GTB 3x10 Side plank BTB 3x10     Bridges with marching   2x10 3x10 3x12 3x12 3x12 3x12 3x12     Hamstring stretch seated* :20x3 ea :20x5 ea :20x5 ea :20x5 ea :20x5 ea :20x5 ea :20x5ea :20x5 ea     DLS dead bugs             Lateral walking/ monster walking, sharkies  OTB 3 laps ea OTB 3 laps ea GTB 3 laps, 30x GTB 3 laps, 30x GTB 3 laps, 30x GTB 3 laps, 30x BTB 3 laps     Piriformis stretch* :20x3 ea :20x5 ea :20x5 :20x5  :20x5 :20x5 :20x5 :20x5     Seated fibular nerve glides* 10 ea Strap :05x10 ea Strap :05x10 ea Strap :05x10 ea Strap :05x10 ea Strap :05x10 ea Strap :05x10 ea Strap :05x10 ea     ITB stretch :15x5 :15x5 :15x5 :15x5 :20x5     pball hamstring triple threat   20 ea 20 ea 20 ea 30 ea 30ea 30 ea     Elliptical  5 min 5 min 5 min 5 min 5 min 5 min 5 min     DLS Mini squats    25W         Step downs     2x10 ea 6" 3x10 ea 6'' np                                                                                        Modalities

## 2018-05-11 ENCOUNTER — OFFICE VISIT (OUTPATIENT)
Dept: PHYSICAL THERAPY | Facility: REHABILITATION | Age: 73
End: 2018-05-11
Payer: MEDICARE

## 2018-05-11 DIAGNOSIS — M54.5 CHRONIC LEFT-SIDED LOW BACK PAIN, WITH SCIATICA PRESENCE UNSPECIFIED: Primary | ICD-10-CM

## 2018-05-11 DIAGNOSIS — M76.32 IT BAND SYNDROME, LEFT: ICD-10-CM

## 2018-05-11 DIAGNOSIS — G89.29 CHRONIC LEFT-SIDED LOW BACK PAIN, WITH SCIATICA PRESENCE UNSPECIFIED: Primary | ICD-10-CM

## 2018-05-11 PROCEDURE — 97112 NEUROMUSCULAR REEDUCATION: CPT

## 2018-05-11 PROCEDURE — G8979 MOBILITY GOAL STATUS: HCPCS | Performed by: PHYSICAL THERAPIST

## 2018-05-11 PROCEDURE — 97110 THERAPEUTIC EXERCISES: CPT

## 2018-05-11 PROCEDURE — 97140 MANUAL THERAPY 1/> REGIONS: CPT

## 2018-05-11 PROCEDURE — G8980 MOBILITY D/C STATUS: HCPCS | Performed by: PHYSICAL THERAPIST

## 2018-05-11 NOTE — PROGRESS NOTES
Daily Note     Today's date: 2018  Patient name: Salena Mendiola  : 1945  MRN: [id number]  Referring provider: Clare Dan MD  Dx:   Encounter Diagnosis     ICD-10-CM    1  Chronic left-sided low back pain, with sciatica presence unspecified M54 5     G89 29    2  It band syndrome, left M76 32                   Subjective:  Patient reports no discomfort prior to session today   He reports while sitting in the car yesterday he experienced hamstring discomfort stating "it's weird it's only when I sit " Patient reports no complaints after previous session stating "felt real good leaving here "       Objective: See treatment diary below  Precautions: chronic pain  Access code: [email or web address]  * Indicates part of HEP     Daily Treatment Diary     Manual       Bilateral hamstring stretching  18 min total 18 min total 15 min total 15 min total 15 min total 15 min total 15 min total 15 min total    Bilateral              L fibular nerve glides  performed performed performed performed performed performed performed     Bilateral hip ROM- flexion, ER , hamstring  performed performed performed performed performed performed performed performed                                  Exercise Diary       Abdominal bracing toe taps             LTR             Clamshells   Side plank OTB 2x10 Side plank OTB 3x10 Side plank GTB 3x10 Side plank GTB 3x10 Side plank GTB 3x10 Side plank GTB 3x10 Side plank BTB 3x10 Side plank BTB 3x12    Bridges with marching   2x10 3x10 3x12 3x12 3x12 3x12 3x12 3x12    Hamstring stretch seated* :20x3 ea :20x5 ea :20x5 ea :20x5 ea :20x5 ea :20x5 ea :20x5ea :20x5 ea :20x5 ea    DLS dead bugs             Lateral walking/ monster walking, sharkies  OTB 3 laps ea OTB 3 laps ea GTB 3 laps, 30x GTB 3 laps, 30x GTB 3 laps, 30x GTB 3 laps, 30x BTB 3 laps BTB 3 laps, 30x    Piriformis stretch* :20x3 ea :20x5 ea :20x5 :20x5  :20x5 :20x5 :20x5 :20x5 :20x5    Seated fibular nerve glides* 10 ea Strap :05x10 ea Strap :05x10 ea Strap :05x10 ea Strap :05x10 ea Strap :05x10 ea Strap :05x10 ea Strap :05x10 ea Strap :05x10 ea    ITB stretch    :15x5 :15x5 :15x5 :15x5 :20x5 :20x5    pball hamstring triple threat   20 ea 20 ea 20 ea 30 ea 30ea 30 ea 30 ea    Elliptical  5 min 5 min 5 min 5 min 5 min 5 min 5 min 5 min    DLS Mini squats    09T         Step downs     2x10 ea 6" 3x10 ea 6'' np                                                                                        Modalities                                                           Assessment: Tolerated treatment well  Patient demonstrated fatigue post treatment and exhibited good technique with therapeutic exercises Patient reports relief with all manuals and stretching  Plan: Patient to transition to HEP  Patient given updated HEP this session with patient reporting understanding

## 2018-06-11 ENCOUNTER — OFFICE VISIT (OUTPATIENT)
Dept: OBGYN CLINIC | Facility: CLINIC | Age: 73
End: 2018-06-11
Payer: MEDICARE

## 2018-06-11 ENCOUNTER — APPOINTMENT (OUTPATIENT)
Dept: RADIOLOGY | Facility: CLINIC | Age: 73
End: 2018-06-11
Payer: MEDICARE

## 2018-06-11 VITALS
DIASTOLIC BLOOD PRESSURE: 67 MMHG | HEART RATE: 58 BPM | WEIGHT: 204 LBS | BODY MASS INDEX: 26.18 KG/M2 | HEIGHT: 74 IN | SYSTOLIC BLOOD PRESSURE: 163 MMHG

## 2018-06-11 DIAGNOSIS — M79.652 PAIN IN LEFT THIGH: ICD-10-CM

## 2018-06-11 DIAGNOSIS — M76.32 IT BAND SYNDROME, LEFT: ICD-10-CM

## 2018-06-11 DIAGNOSIS — M62.9 HAMSTRING TIGHTNESS OF BOTH LOWER EXTREMITIES: Primary | ICD-10-CM

## 2018-06-11 PROCEDURE — 73552 X-RAY EXAM OF FEMUR 2/>: CPT

## 2018-06-11 PROCEDURE — 99213 OFFICE O/P EST LOW 20 MIN: CPT | Performed by: INTERNAL MEDICINE

## 2018-06-11 RX ORDER — CHLORHEXIDINE GLUCONATE 0.12 MG/ML
RINSE ORAL
Refills: 3 | COMMUNITY
Start: 2018-05-15 | End: 2020-01-08 | Stop reason: HOSPADM

## 2018-06-11 NOTE — PROGRESS NOTES
Assessment/Plan:  Assessment/Plan   Diagnoses and all orders for this visit:    Hamstring tightness of both lower extremities    It band syndrome, left    Pain in left thigh  -     XR femur 2 vw left; Future        Mr  Vania Busch physical examination is normal today with the exception of his improved but residual hamstring tightness  With regards to the pressure induced left lateral thigh pain  Left thigh x-ray done today is negative for any fracture, lesion, or sclerosis  Other differential diagnosis such as vascular claudication or neurogenic claudication cannot be ruled out at this time  Exertional compartment syndrome is very less likely especially since he does not have any symptom exacerbation with high-intensity activities such as running  Given that his physical examination is totally normal today, I have recommended observation at this time  However, I counseled patient that if he develops pain again to notify our office so that he can return for re-evaluation while he is actively having any symptoms  Patient was advised to call and return to the clinic sooner or go to the closest emergency room if he develops any symptom exacerbation  This document was recorded using voice recognition software and errors may be noted  Subjective:   Patient ID: Jose Willingham is a 71 y o  male  HPI    Mr Mariela Johnson presents for follow-up re-evaluation of his left lower extremity pain radiation  Of note is that when we initially saw him back on 04/05, his primary complaint was left lower extremity pain radiation with associated left hip pain  His physical examination at that time was significant for positive straight leg raise, significant hamstring tightness, and IT band tightness  He has been doing physical therapy rehabilitation as well as structured home exercise program since that encounter and his baseline pain has subsided    However, he  Has been experiencing a focal achy pain that is primarily located in the mid posterior lateral aspect of his thigh  He only experiences pain when he is sitting down such as  when driving or sitting on a regular chair  He does not feel this pain when he is in the lounge chair  He also reports some occasional discomfort in the lateral aspect of his left calf muscle which he often feels during activities or prolonged standing  He describes the pain as "achy"  The pain usually subsides within a couple of minutes after he stops the inciting activities I the standing, walking  He does not get pain with jogging or running activities  Otherwise, no other complaints  The following portions of the patient's history were reviewed and updated as appropriate: allergies, current medications, past family history, past medical history, past social history, past surgical history and problem list     Review of Systems   Constitutional: Negative  HENT: Negative  Eyes: Negative  Respiratory: Negative  Cardiovascular: Negative  Musculoskeletal:        As per history of present illness   Skin: Negative  Neurological:   Negative   Psychiatric/Behavioral: Negative  Objective:  Vitals:    06/11/18 0959   BP: 163/67   Pulse: 58   Weight: 92 5 kg (204 lb)   Height: 6' 2 02" (1 88 m)       Right Hip Exam     Comments:  Normal left thigh examination  Normal left leg examination  No palpable tenderness noted throughout the left 5th hamstring, thigh, and calf/soleus  Back Exam     Range of Motion   The patient has normal back ROM  Other   Gait: normal   Erythema: no back redness            Physical Exam  Constitutional: Oriented to person, place, and time  Well-developed and well-nourished  HENT:   Head: Normocephalic and atraumatic  Eyes: Conjunctivae are normal    Cardiovascular: Normal rate  Pulmonary/Chest: Effort normal    Neurological: Alert and oriented to person, place, and time  Skin: Skin is warm and dry     Psychiatric: Normal mood and affect  I have personally reviewed pertinent films in PACS and my interpretation is Left femur x-ray done today is negative for any overt fracture, sclerosis, or lesion  However, there is an area approximately 20 cm from the proximal tip of the left greater trochanter) which appears to have a very small subtle faint lateral cortical translucency seen on image 1 which appears to be clinically insignificant  Bess Hair

## 2018-06-19 NOTE — PROGRESS NOTES
PT Discharge    Today's date: 2018  Patient name: Princess Combs  : 1945  MRN: [id number]  Referring provider: Zachary Hilton MD  Dx:   Encounter Diagnosis     ICD-10-CM    1  Chronic left-sided low back pain, with sciatica presence unspecified M54 5     G89 29    2  It band syndrome, left M76 32        Start Time: 0755  Stop Time: 0855  Total time in clinic (min): 60 minutes    Assessment  Impairments: activity intolerance and lacks appropriate home exercise program  Functional limitations: Patient reports to evaluation with the following functional impairments: walking up and down hills, prolonged standing, prolonged sitting  Assessment details: Princess Combs is a 71y o  year old male who presents to IE with:   Chronic left-sided low back pain, with sciatica presence unspecified  (primary encounter diagnosis)  Hamstring tightness of both lower extremities  It band syndrome, left    Jim Thomas has made the following improvements since beginning PT: decreased pain, increased ROM, increased strength and increased tolerance to activities   Jim Thomas and PT mutually agree to transition to HEP at this time secondary to gains made in PT  he has been given updated HEP with verbalized understanding and compliance  Jim Thomas is encouraged to contact PT with any questions or concerns in the future  Understanding of Dx/Px/POC: good   Prognosis: good  Prognosis details: Jim Thomas prognosis may be affected by the following: chronic pain    Goals  Short-Term Goals:   1  Patient's ROM will increase by 25% in 4 weeks- Met  2  Patient's hamstring will improve to 55 degrees in 4 weeks (currently at 40 degrees)- Met    Long-Term Goals:   1  Patient will be able to ambulate up and down hills with minimal to no pain at time of discharge- Partially Met  2  Patient independent with HEP at time of discharge- Met  3   Patient will be able to stand for prolonged periods of time with minimal to no pain at time of discharge- Partially Met    Plan  Treatment plan discussed with: patient  Plan details: Thank you for referring Wilbert Harmon to Physical Therapy at [place] [address] and for the opportunity to coordinate care  Subjective Evaluation    History of Present Illness  Mechanism of injury: Aurelia Briseno has been seen for total of 8 visits for OP PT for L hamstring strain, L ITB syndrome   Patient's global rating of change is " Quite a bit better (5) " Patient reports improvements with prolonged standing, sitting tolerance  Patient reports most difficulty with prolonged standing, prolonged driving in the car  Patient to be seen for OP PT for 1 more visit and will be discharged to [facility] on 18  He reports overall less pain and frequency of L LE pain  Recurrent probem    Quality of life: good    Pain  Current pain ratin  At best pain ratin  At worst pain ratin  Location: L LE   Quality: discomfort  Relieving factors: change in position  Aggravating factors: standing and sitting  Progression: no change      Diagnostic Tests  Abnormal x-ray: "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  SI joints appear fused "   Treatments  Current treatment: physical therapy  Patient Goals  Patient goals for therapy: decreased pain, increased motion, increased strength, independence with ADLs/IADLs and return to sport/leisure activities          Objective     Special Questions  Negative for night pain, bladder dysfunction and bowel dysfunction    Postural Observations  Seated posture: fair  Standing posture: fair    Additional Postural Observation Details  Patient demonstrates following posture: Increased thoracic kyphosis   Cervical protrusion  Rounded shoulders, increased IR      Palpation     Additional Palpation Details  No palpable tenderness at this time during IE   He does report tenderness at times along L lateral ankle  Tenderness     Lumbar Spine  No tenderness in the spinous process       Left Hip   No tenderness in the PSIS      Neurological Testing     Sensation     Lumbar   Left   Intact: light touch    Right   Intact: light touch    Reflexes   Left   Patellar (L4): normal (2+)  Achilles (S1): normal (2+)    Right   Patellar (L4): normal (2+)  Achilles (S1): normal (2+)    Additional Neurological Details  Patient denies N/T in L LE and R LE at this time  Active Range of Motion     Additional Active Range of Motion Details  Lumbar flexion: 75%  Lumbar extension: 50%  L Lateral flexion: 75%  R lateral flexion: 75%  L lateral rotation: 75%  R lateral rotation: 75%  No pain with ROM at this time, significant tightness observed with all ROM, most limited with lumbar flexion and extension  Strength/Myotome Testing     Left Hip   Planes of Motion   Flexion: 5  Extension: 5  Abduction: 5    Right Hip   Planes of Motion   Flexion: 5  Extension: 5  Abduction: 5    Left Knee   Flexion: 5  Extension: 5    Right Knee   Flexion: 5  Extension: 5    Left Ankle/Foot   Dorsiflexion: 5  Plantar flexion: 5  Great toe extension: 5    Right Ankle/Foot   Dorsiflexion: 5  Plantar flexion: 5  Great toe extension: 5    Tests       Thoracic   Positive slump  Lumbar   Positive slumped  Left   Positive passive SLR  Right   Positive passive SLR  Left Knee   Positive peroneal nerve tension  Additional Tests Details  Patient demonstrating significant hamstring tightness bilaterally    + Tripod sign observed at this time with bilateral knee extension  General Comments     Lumbar Comments  Gait: unremarkable for antalgia at this time         Flowsheet Rows      Most Recent Value   PT/OT G-Codes   Current Score  62   Projected Score  64   Assessment Type  Discharge   G code set  Mobility: Walking & Moving Around   Mobility: Walking and Moving Around Goal Status ()  CJ   Mobility: Walking and Moving Around Discharge Status ()  Mary Slim

## 2018-11-07 ENCOUNTER — APPOINTMENT (EMERGENCY)
Dept: RADIOLOGY | Facility: HOSPITAL | Age: 73
End: 2018-11-07
Payer: MEDICARE

## 2018-11-07 ENCOUNTER — APPOINTMENT (OUTPATIENT)
Dept: NON INVASIVE DIAGNOSTICS | Facility: HOSPITAL | Age: 73
End: 2018-11-07
Payer: MEDICARE

## 2018-11-07 ENCOUNTER — APPOINTMENT (EMERGENCY)
Dept: CT IMAGING | Facility: HOSPITAL | Age: 73
End: 2018-11-07
Payer: MEDICARE

## 2018-11-07 ENCOUNTER — APPOINTMENT (OUTPATIENT)
Dept: MRI IMAGING | Facility: HOSPITAL | Age: 73
End: 2018-11-07
Payer: MEDICARE

## 2018-11-07 ENCOUNTER — HOSPITAL ENCOUNTER (OUTPATIENT)
Facility: HOSPITAL | Age: 73
Setting detail: OBSERVATION
End: 2018-11-08
Attending: EMERGENCY MEDICINE | Admitting: INTERNAL MEDICINE
Payer: MEDICARE

## 2018-11-07 DIAGNOSIS — M21.372 FOOT DROP, LEFT: Primary | ICD-10-CM

## 2018-11-07 DIAGNOSIS — R29.898 WEAKNESS OF LEFT FOOT: ICD-10-CM

## 2018-11-07 LAB
ABO GROUP BLD: NORMAL
ANION GAP BLD CALC-SCNC: 14 MMOL/L (ref 4–13)
ANION GAP SERPL CALCULATED.3IONS-SCNC: 7 MMOL/L (ref 4–13)
APTT PPP: 33 SECONDS (ref 24–36)
BLD GP AB SCN SERPL QL: NEGATIVE
BUN BLD-MCNC: 17 MG/DL (ref 5–25)
BUN SERPL-MCNC: 14 MG/DL (ref 5–25)
CA-I BLD-SCNC: 1.16 MMOL/L (ref 1.12–1.32)
CALCIUM SERPL-MCNC: 9 MG/DL (ref 8.3–10.1)
CHLORIDE BLD-SCNC: 103 MMOL/L (ref 100–108)
CHLORIDE SERPL-SCNC: 105 MMOL/L (ref 100–108)
CO2 SERPL-SCNC: 29 MMOL/L (ref 21–32)
CREAT BLD-MCNC: 0.9 MG/DL (ref 0.6–1.3)
CREAT SERPL-MCNC: 1.03 MG/DL (ref 0.6–1.3)
ERYTHROCYTE [DISTWIDTH] IN BLOOD BY AUTOMATED COUNT: 13.5 % (ref 11.6–15.1)
GFR SERPL CREATININE-BSD FRML MDRD: 72 ML/MIN/1.73SQ M
GFR SERPL CREATININE-BSD FRML MDRD: 85 ML/MIN/1.73SQ M
GLUCOSE SERPL-MCNC: 109 MG/DL (ref 65–140)
GLUCOSE SERPL-MCNC: 114 MG/DL (ref 65–140)
HCT VFR BLD AUTO: 39.7 % (ref 36.5–49.3)
HCT VFR BLD CALC: 37 % (ref 36.5–49.3)
HGB BLD-MCNC: 13.3 G/DL (ref 12–17)
HGB BLDA-MCNC: 12.6 G/DL (ref 12–17)
INR PPP: 0.95 (ref 0.86–1.17)
MCH RBC QN AUTO: 31.7 PG (ref 26.8–34.3)
MCHC RBC AUTO-ENTMCNC: 33.5 G/DL (ref 31.4–37.4)
MCV RBC AUTO: 95 FL (ref 82–98)
PCO2 BLD: 29 MMOL/L (ref 21–32)
PLATELET # BLD AUTO: 233 THOUSANDS/UL (ref 149–390)
PMV BLD AUTO: 10.1 FL (ref 8.9–12.7)
POTASSIUM BLD-SCNC: 3.9 MMOL/L (ref 3.5–5.3)
POTASSIUM SERPL-SCNC: 3.9 MMOL/L (ref 3.5–5.3)
PROTHROMBIN TIME: 12.4 SECONDS (ref 11.8–14.2)
RBC # BLD AUTO: 4.19 MILLION/UL (ref 3.88–5.62)
RH BLD: POSITIVE
SODIUM BLD-SCNC: 141 MMOL/L (ref 136–145)
SODIUM SERPL-SCNC: 141 MMOL/L (ref 136–145)
SPECIMEN EXPIRATION DATE: NORMAL
SPECIMEN SOURCE: ABNORMAL
SPECIMEN SOURCE: NORMAL
TROPONIN I BLD-MCNC: 0 NG/ML (ref 0–0.08)
WBC # BLD AUTO: 7.3 THOUSAND/UL (ref 4.31–10.16)

## 2018-11-07 PROCEDURE — 70496 CT ANGIOGRAPHY HEAD: CPT

## 2018-11-07 PROCEDURE — 70498 CT ANGIOGRAPHY NECK: CPT

## 2018-11-07 PROCEDURE — G0009 ADMIN PNEUMOCOCCAL VACCINE: HCPCS | Performed by: INTERNAL MEDICINE

## 2018-11-07 PROCEDURE — G8979 MOBILITY GOAL STATUS: HCPCS

## 2018-11-07 PROCEDURE — 90662 IIV NO PRSV INCREASED AG IM: CPT | Performed by: INTERNAL MEDICINE

## 2018-11-07 PROCEDURE — G0008 ADMIN INFLUENZA VIRUS VAC: HCPCS | Performed by: INTERNAL MEDICINE

## 2018-11-07 PROCEDURE — 85014 HEMATOCRIT: CPT

## 2018-11-07 PROCEDURE — 97163 PT EVAL HIGH COMPLEX 45 MIN: CPT

## 2018-11-07 PROCEDURE — 36415 COLL VENOUS BLD VENIPUNCTURE: CPT | Performed by: PHYSICIAN ASSISTANT

## 2018-11-07 PROCEDURE — G8978 MOBILITY CURRENT STATUS: HCPCS

## 2018-11-07 PROCEDURE — 85027 COMPLETE CBC AUTOMATED: CPT | Performed by: PHYSICIAN ASSISTANT

## 2018-11-07 PROCEDURE — 71045 X-RAY EXAM CHEST 1 VIEW: CPT

## 2018-11-07 PROCEDURE — 90670 PCV13 VACCINE IM: CPT | Performed by: INTERNAL MEDICINE

## 2018-11-07 PROCEDURE — 80047 BASIC METABLC PNL IONIZED CA: CPT

## 2018-11-07 PROCEDURE — 99204 OFFICE O/P NEW MOD 45 MIN: CPT | Performed by: PSYCHIATRY & NEUROLOGY

## 2018-11-07 PROCEDURE — 86900 BLOOD TYPING SEROLOGIC ABO: CPT | Performed by: PHYSICIAN ASSISTANT

## 2018-11-07 PROCEDURE — 86850 RBC ANTIBODY SCREEN: CPT | Performed by: PHYSICIAN ASSISTANT

## 2018-11-07 PROCEDURE — 85610 PROTHROMBIN TIME: CPT | Performed by: PHYSICIAN ASSISTANT

## 2018-11-07 PROCEDURE — 99236 HOSP IP/OBS SAME DATE HI 85: CPT | Performed by: INTERNAL MEDICINE

## 2018-11-07 PROCEDURE — 97116 GAIT TRAINING THERAPY: CPT

## 2018-11-07 PROCEDURE — 86901 BLOOD TYPING SEROLOGIC RH(D): CPT | Performed by: PHYSICIAN ASSISTANT

## 2018-11-07 PROCEDURE — 85730 THROMBOPLASTIN TIME PARTIAL: CPT | Performed by: PHYSICIAN ASSISTANT

## 2018-11-07 PROCEDURE — 84484 ASSAY OF TROPONIN QUANT: CPT

## 2018-11-07 PROCEDURE — 93005 ELECTROCARDIOGRAM TRACING: CPT

## 2018-11-07 PROCEDURE — 72148 MRI LUMBAR SPINE W/O DYE: CPT

## 2018-11-07 PROCEDURE — 99285 EMERGENCY DEPT VISIT HI MDM: CPT

## 2018-11-07 PROCEDURE — 80048 BASIC METABOLIC PNL TOTAL CA: CPT | Performed by: PHYSICIAN ASSISTANT

## 2018-11-07 RX ORDER — ASPIRIN 81 MG/1
81 TABLET, CHEWABLE ORAL DAILY
Status: DISCONTINUED | OUTPATIENT
Start: 2018-11-07 | End: 2018-11-08

## 2018-11-07 RX ORDER — IBUPROFEN 400 MG/1
400 TABLET ORAL ONCE
Status: COMPLETED | OUTPATIENT
Start: 2018-11-07 | End: 2018-11-07

## 2018-11-07 RX ORDER — ACETAMINOPHEN 325 MG/1
650 TABLET ORAL EVERY 6 HOURS PRN
Status: DISCONTINUED | OUTPATIENT
Start: 2018-11-07 | End: 2018-11-08 | Stop reason: HOSPADM

## 2018-11-07 RX ORDER — ATORVASTATIN CALCIUM 40 MG/1
40 TABLET, FILM COATED ORAL EVERY EVENING
Status: DISCONTINUED | OUTPATIENT
Start: 2018-11-07 | End: 2018-11-08

## 2018-11-07 RX ADMIN — ACETAMINOPHEN 650 MG: 325 TABLET, FILM COATED ORAL at 15:20

## 2018-11-07 RX ADMIN — IOHEXOL 85 ML: 350 INJECTION, SOLUTION INTRAVENOUS at 06:05

## 2018-11-07 RX ADMIN — IBUPROFEN 400 MG: 400 TABLET ORAL at 05:35

## 2018-11-07 RX ADMIN — PNEUMOCOCCAL 13-VALENT CONJUGATE VACCINE 0.5 ML: 2.2; 2.2; 2.2; 2.2; 2.2; 4.4; 2.2; 2.2; 2.2; 2.2; 2.2; 2.2; 2.2 INJECTION, SUSPENSION INTRAMUSCULAR at 17:24

## 2018-11-07 RX ADMIN — ASPIRIN 81 MG 81 MG: 81 TABLET ORAL at 09:54

## 2018-11-07 RX ADMIN — INFLUENZA A VIRUS A/MICHIGAN/45/2015 X-275 (H1N1) ANTIGEN (FORMALDEHYDE INACTIVATED), INFLUENZA A VIRUS A/SINGAPORE/INFIMH-16-0019/2016 IVR-186 (H3N2) ANTIGEN (FORMALDEHYDE INACTIVATED), AND INFLUENZA B VIRUS B/MARYLAND/15/2016 BX-69A (A B/COLORADO/6/2017-LIKE VIRUS) ANTIGEN (FORMALDEHYDE INACTIVATED) 0.5 ML: 60; 60; 60 INJECTION, SUSPENSION INTRAMUSCULAR at 17:23

## 2018-11-07 NOTE — QUICK NOTE
Stroke alert   Time of alert 5:45 am  Neurology call back 5:45 am  NIH SS 1 pt sensory   Pt out of therapeutic window for iV tpa    Last normal around 5 pm yesterday, about 20 min after workout noticed unable to dorsiflex foot and it was numb  He did his usual routine, thinks did lunges towards the end of workout  He saw a chiropractor yesterday morning for leg issues unrelated to presenting symptoms  He woke up around 3 am symptoms much worse  Per conversation with ED staff no hx of afib and remote hx of sciatica  Patient had weakness with both inversion and eversion, worse with inversion along with inability to dorsiflex  Differential includes acute embolic cva, given unlikely vessel to vessel per review of cta, cannot exclude cardioembolic  Also L5 radiculopathy although no back pain, although predominant symptom along with dorsiflexion weakness is inversion weakness which pt has  Peroneal neuropathies present with worsening of eversion  At this point I would recommend admitting on stroke pathway, start aspirin/statin  Obtain mri brain w/o contrast   If negative then mri L spine w/o contrast   If this is also negative then outpt neuro eval in a month with emg around that time

## 2018-11-07 NOTE — PLAN OF CARE
Problem: PHYSICAL THERAPY ADULT  Goal: Performs mobility at highest level of function for planned discharge setting  See evaluation for individualized goals  Treatment/Interventions: Functional transfer training, LE strengthening/ROM, Elevations, Therapeutic exercise, Endurance training, Patient/family training, Equipment eval/education, Bed mobility, Gait training          See flowsheet documentation for full assessment, interventions and recommendations  Outcome: Progressing  Prognosis: Good  Problem List: Decreased strength, Decreased range of motion, Decreased endurance, Impaired balance, Decreased mobility, Decreased coordination, Decreased safety awareness, Impaired sensation, Pain  Assessment: Therapist introduced use of single point cane during ambulation to improve mobility safety and address impairments noted during evaluation  Pt required same level of assistance to maintain safety as w/o device  Trialed use of roller walker and pt needed only supervision to maintain safety  Ambulation was limited due to fatigue  Pt continued to need standby assist and cues for mobility technique and safety  Pt continues to be at risk for falling  Continued PT intervention is needed to maximize level of functional independence  Recommendation: Outpatient PT, Other (Comment) (orthotist referral for left AFO (custom may be needed))          See flowsheet documentation for full assessment

## 2018-11-07 NOTE — PHYSICAL THERAPY NOTE
PHYSICAL THERAPY EVALUATION NOTE    Patient Name: Russ Sullivan  HCSSN'Y Date: 11/7/2018  AGE:   68 y o  Mrn:   [id number]  ADMIT DX:  Foot drop, left [M21 372]  Lower extremity weakness [R29 898]  Weakness of left foot [M21 42]    Past Medical History:   Diagnosis Date    Anemia     Left inguinal hernia     Managed By Dora Sender / last assessed 3/27/15     Skin lesion      Length Of Stay: 0  PHYSICAL THERAPY EVALUATION :    11/07/18 1340   Pain Assessment   Pain Assessment 0-10   Pain Score 2   Pain Location (left anterior thigh)   Home Living   Type of Home House   Home Layout Two level; Able to live on main level with bedroom/bathroom; Other (Comment)  (no ANGELICA)   Additional Comments lives w/ wife  ambulates w/o assistive device  no DME  1 fall in last 6 months  Prior Function   Comments pt seen sitting out of bed  agreed to PT eval  c/o intermittent left anterior thigh pain  pt wants to go home  Restrictions/Precautions   Other Precautions Fall Risk;Pain   General   Additional Pertinent History 11/7/18 at 6:50, blood pressure was 181/79     Family/Caregiver Present No   Cognition   Overall Cognitive Status WFL   Arousal/Participation Alert   Orientation Level Oriented X4;Other (Comment)  (pt was identified w/ full name and birth date)   Following Commands Follows one step commands with increased time or repetition   Comments localized edema over left lateral malleolus   RUE Assessment   RUE Assessment WFL  (4/5)   LUE Assessment   LUE Assessment WFL  (4/5)   RLE Assessment   RLE Assessment WFL  (4/5)   LLE Assessment   LLE Assessment X  (hip and knee 4-/5, ankle inversion 0/5 eversion 2/5)   Strength LLE   L Ankle Plantar Flexion 2+/5   L Ankle Dorsiflexion 0/5   Coordination   Movements are Fluid and Coordinated 0   Coordination and Movement Description impaired coordination L LE   Sensation X  (light touch impaired left medial foot and ankle)   Transfers   Sit to Stand 5  Supervision   Additional items Increased time required   Stand to Sit 5  Supervision   Additional items Verbal cues  (for body positioning)   Ambulation/Elevation   Gait pattern L Foot drag;Decreased L stance; Inconsistent marlena  (L LE steppage)   Gait Assistance 4  Minimal assist   Additional items Assist x 1;Verbal cues; Tactile cues  (for full step length)   Assistive Device None   Distance 15 feet  (additional not possible due to loss of balance)   Stair Management Assistance 3  Moderate assist   Additional items Assist x 1;Verbal cues; Tactile cues; Increased time required  (for left foot clearance, step use)   Stair Management Technique One rail R;Step to pattern   Number of Stairs 4   Balance   Static Sitting Normal   Dynamic Sitting Good   Static Standing Fair -   Dynamic Standing Poor +   Ambulatory Poor +   Activity Tolerance   Activity Tolerance Patient limited by fatigue;Patient limited by pain   Nurse Made Aware spoke to Dr Clarisa Manuel CM   Assessment   Prognosis Good   Problem List Decreased strength;Decreased range of motion;Decreased endurance; Impaired balance;Decreased mobility; Decreased coordination;Decreased safety awareness; Impaired sensation;Pain   Assessment Pt presents with left foot weakness/numbness  Dx:left foot weakness  order placed for PT eval and tx, w/ activity order of up w/ A  pt presents w/ comorbidity of anemia and personal factors of living in 2 story house and positive fall history  pt presents w/ pain, weakness, decreased ROM, decreased endurance, impaired balance, gait deviations, altered sensation, impaired coordination, decreased safety awareness, fall risk and LE edema   these impairments are evident in findings from physical examination (weakness, decreased ROM, altered sensation, impaired coordination and L LE edema), mobility assessment (need for standby to mod assist w/ all phases of mobility when usually mobilizing independently, tolerance to only 15 feet of ambulation and need for cueing for mobility technique), and Barthel Index: 75/100  pt needed input for mobility technique and safety  pt is at risk for falls due to physical and safety awareness deficits  pt's clinical presentation is unstable/unpredictable (evident in poor blood pressure control, need for assist w/ all phases of mobility when usually mobilizing independently, tolerance to only 15 feet of ambulation, pain impacting overall mobility status and need for input for mobility technique/safety)  pt needs inpatient PT tx to improve mobility deficits  discharge recommendation is for outpatient PT to reduce fall risk and maximize level of functional independence  Pt would benefit from left AFO to address ankle weakness - outpatient follow up w/ orthotist is required (may require custom AFO due to sensation changes and edema)  Goals   Patient Goals go home   STG Expiration Date 11/17/18   Short Term Goal #1 pt will: Increase L LE strength 1/2 grade to facilitate independent mobility, Perform all transfers independently to improve independence, Ambulate 500 ft  with least restrictive assistive device independently w/o LOB to expedite return to leisure activities like running, Navigate 12 stairs independently with unilateral handrail to facilitate return to previous living environment, Increase ambulatory balance 1 grade to decrease risk for falls, Complete exercise program independently to increase overall activity tolerance, Tolerate 3 hr OOB to faciliate upright tolerance and Improve Barthel Index score to 95 or greater to facilitate independence   Plan   Treatment/Interventions Functional transfer training;LE strengthening/ROM; Elevations; Therapeutic exercise; Endurance training;Patient/family training;Equipment eval/education; Bed mobility;Gait training   PT Frequency Other (Comment)  (3 to 5x/week)   Recommendation   Recommendation Outpatient PT; Other (Comment)  (orthotist referral for left AFO (custom may be needed))   Barthel Index   Feeding 10   Bathing 5   Grooming Score 5   Dressing Score 10   Bladder Score 10   Bowels Score 10   Toilet Use Score 10   Transfers (Bed/Chair) Score 10   Mobility (Level Surface) Score 0   Stairs Score 5   Barthel Index Score 75     Skilled PT recommended while in hospital and upon DC to progress pt toward treatment goals       Clydene Signs, PT

## 2018-11-07 NOTE — UTILIZATION REVIEW
[address]  Utilization Review Department  Phone: [phone]; Fax [phone]  [email or web address]  ATTENTION: Please call with any questions or concerns to [phone]  and carefully listen to the prompts so that you are directed to the right person  Send all requests for admission clinical reviews, approved or denied determinations and any other requests to fax [phone]  All voicemails are confidential   Initial Clinical Review    Admission: Date/Time/Statement: OBSERVATION ADMISSION 11/07/18 0707    Orders Placed This Encounter   Procedures    Place in Observation (expected length of stay for this patient is less than two midnights)     Standing Status:   Standing     Number of Occurrences:   1     Order Specific Question:   Admitting Physician     Answer:   Wale Craft [[id number]]     Order Specific Question:   Level of Care     Answer:   Med Surg [16]         ED: Date/Time/Mode of Arrival:   ED Arrival Information     Expected Arrival Acuity Means of Arrival Escorted By Service Admission Type    - 11/7/2018 04:23 Urgent Walk-In Self General Medicine Urgent    Arrival Complaint    Left foot numbness          Chief Complaint:   Chief Complaint   Patient presents with    Extremity Weakness     Pt comes from home c/o left foot weakness starting at 530pm yesturday  Avis Perry pt was on the elpitical then 20 minutes later his left foot became numb and pain in his quad  Pt feel walking into the emergency room twice on to his knees  Pt is able to push me away with his foot but not able to bring his toes toward himeslf  Pt also states he went to the chiropractor yesturday  History of Illness: 41-year-old male presents emergency department for evaluation of left foot problem  Patient states that around 530 yesterday after working out he notices foot was not working correctly    He states he was trying to walk downstairs and cannot properly walk secondary to his foot not moving  He states that associated with this and also his foot had fallen asleep  He states that he notices foot does not move towards him  He states that he was working out, using the elliptical, doing different exercises he has been doing for months  ED Vital Signs:   ED Triage Vitals   Temperature Pulse Respirations Blood Pressure SpO2   11/07/18 0458 11/07/18 0444 11/07/18 0444 11/07/18 0444 11/07/18 0444   98 3 °F (36 8 °C) 69 16 133/62 97 %      Temp Source Heart Rate Source Patient Position - Orthostatic VS BP Location FiO2 (%)   11/07/18 0458 11/07/18 0444 11/07/18 0444 11/07/18 0444 --   Oral Monitor Sitting Right arm       Pain Score       11/07/18 0444       5        Wt Readings from Last 1 Encounters:   11/07/18 93 kg (205 lb 0 4 oz)       Vital Signs (abnormal):   07/18 06:20:03    172/77    11/07/18 06:19:50   --    11/07/18 06:16:10   --    11/07/18 0615    189/79    11/07/18 0600    190/79          Abnormal Labs/Diagnostic Test Results: 11/7 anion gap 14  CT neck & brain:The distal right vertebral artery is extremely diminutive  The left posterior communicating artery is diminutive   The right posterior communicating artery is not definitely identified  ED Treatment:   Medication Administration from 11/07/2018 0423 to 11/07/2018 7513       Date/Time Order Dose Route Action Action by Comments     11/07/2018 0535 ibuprofen (MOTRIN) tablet 400 mg 400 mg Oral Given Lidia Arenas RN           Past Medical/Surgical History:    Active Ambulatory Problems     Diagnosis Date Noted    Pain in left thigh 06/11/2018    Hamstring tightness of both lower extremities 06/11/2018       Past Medical History:   Diagnosis Date    Anemia     Left inguinal hernia     Skin lesion        Admitting Diagnosis: Foot drop, left [M21 372]  Lower extremity weakness [R29 898]  Weakness of left foot [M21 42]    Age/Sex: 68 y o  male    Assessment/Plan: 11/7 attending MD:  Weakness of left foot Assessment & Plan     · Suspect radiculopathy although CVA could not be ruled out  ? Stroke alert called in ED, not a candidate for tPA as onset >4 hr prior to presentation  ? CTA head and neck remarkable only for: "The distal right vertebral artery is extremely diminutive  The left posterior communicating artery is diminutive   The right posterior communicating artery is not definitely identified" Otherwise unremarkable  ? Electrolytes wnl, trop negative  ? WBC without leukocytosis, afebrile  · ED provider d/w Neurology, advised stroke pathway  ? Obtain echo, MRI brain, monitor on tele  ? Neuro checks  ? Statin, asa  · Consult Neurology  · MRI lumbar spine if above negative            Admission Orders:  Scheduled Meds:   Current Facility-Administered Medications:  acetaminophen 650 mg Oral Q6H PRN Ranjeet NORIS West   aspirin 81 mg Oral Daily Anamaria Maciel PA-C   atorvastatin 40 mg Oral QPM Anamaria Maciel PA-C     Continuous Infusions:    PRN Meds:   Peripheral IV  48 hr telemetry  Assess NIH stroke scale  Nursing dysphagia assessment  Neuro checks & vital signs Q 1 hrx4; Q 2hr x4hr;  Q 4hr x 72 hr  Vitals Q 8 if stable  ECHO  MRI inpatient order  HGBA1c  lipid panel  OT/PT eval & treat  Inpatient to Neurology, physical medicine

## 2018-11-07 NOTE — PHYSICIAN ADVISOR
Current patient class: Observation  The patient is currently on Hospital Day: 1 at [facility]        The patient was admitted to the hospital  on N/A at N/A for the following diagnosis:  Foot drop, left [M21 372]  Lower extremity weakness [R29 898]  Weakness of left foot [M21 42]     After review of the relevant documentation, labs, vital signs and test results, the patient is most appropriate for OBSERVATION STATUS  The patient was admitted to the hospital without an expected length of stay of at least 2 midnights  Given that the patient is subject to the 2 midnight benchmark as a CMS patient, they are appropriate for observation status at this time  Should the patient remain hospitalized for a second midnight the status should be reevaluated for medical necessity  Rationale is as follows: The patient is a 68 yrs   Male who presented to the ED at 11/7/2018  4:31 AM with a chief complaint of Extremity Weakness (Pt comes from home c/o left foot weakness starting at 530pm yesturday  Manuel Francisco pt was on the elpitical then 20 minutes later his left foot became numb and pain in his quad  Pt feel walking into the emergency room twice on to his knees  Pt is able to push me away with his foot but not able to bring his toes toward himeslf  Pt also states he went to the chiropractor yesturday  )     Patient admitted with a report of weakness to his left foot  He states this came on rather suddenly  However, it is noted that he has been having difficulties with his left leg for some time  He also states he was working out prior to the onset of this symptom and attempted to stretch his hamstring  He further notes that he was "stretched" by his chiropractor prior to onset of symptoms  A stroke pathway was initiated  A CT of the head and CTA of the H/N did not reveal acute pathology  He was seen in consult by Neurology and they believed he had a mechanical stretching injury    It would be appropriate to consider an OBSERVATION status for this patient      The patients vitals on arrival were ED Triage Vitals   Temperature Pulse Respirations Blood Pressure SpO2   11/07/18 0458 11/07/18 0444 11/07/18 0444 11/07/18 0444 11/07/18 0444   98 3 °F (36 8 °C) 69 16 133/62 97 %      Temp Source Heart Rate Source Patient Position - Orthostatic VS BP Location FiO2 (%)   11/07/18 0458 11/07/18 0444 11/07/18 0444 11/07/18 0444 --   Oral Monitor Sitting Right arm       Pain Score       11/07/18 0444       5           Past Medical History:   Diagnosis Date    Anemia     Left inguinal hernia     Managed By No Johns / last assessed 3/27/15     Skin lesion      Past Surgical History:   Procedure Laterality Date    HERNIA REPAIR             Consults have been placed to:   IP CONSULT TO NEUROLOGY  IP CONSULT TO PHYSICAL MEDICINE REHAB  IP CONSULT TO NEUROLOGY  IP CONSULT TO CASE MANAGEMENT    Vitals:    11/07/18 0800 11/07/18 0900 11/07/18 1000 11/07/18 1100   BP: 142/65 154/68 145/67 142/66   BP Location: Right arm Right arm Right arm Left arm   Pulse: 64 59 61 71   Resp: 16 16 16 16   Temp: 98 1 °F (36 7 °C) 97 8 °F (36 6 °C) 97 6 °F (36 4 °C) 98 °F (36 7 °C)   TempSrc: Oral Oral Oral Oral   SpO2: 96% 96% 96% 96%   Weight:       Height:           Most recent labs:    Recent Labs      11/07/18   0546  11/07/18   0601   WBC  7 30   --    HGB  13 3  12 6   HCT  39 7  37   PLT  233   --    K  3 9   --    CALCIUM  9 0   --    BUN  14   --    CREATININE  1 03   --    INR  0 95   --        Scheduled Meds:  Current Facility-Administered Medications:  acetaminophen 650 mg Oral Q6H PRN Andrew Villanueva PA-C   aspirin 81 mg Oral Daily Andrew Villanueva PA-C   atorvastatin 40 mg Oral QPM Anamaria Maciel PA-C   influenza vaccine 0 5 mL Intramuscular Prior to discharge Kathy Mccoy MD     Continuous Infusions:   PRN Meds:   acetaminophen    influenza vaccine    Surgical procedures (if appropriate):

## 2018-11-07 NOTE — H&P
H&P- Angelita Gardner 1945, 68 y o  male MRN: [id number]    Unit/Bed#: -01 Encounter: [id number]    Primary Care Provider: Ritchie Enriquez DO   Date and time admitted to hospital: 11/7/2018  4:31 AM    * Weakness of left foot   Assessment & Plan    · Suspect radiculopathy although CVA could not be ruled out  · Stroke alert called in ED, not a candidate for tPA as onset >4 hr prior to presentation  · CTA head and neck remarkable only for: "The distal right vertebral artery is extremely diminutive  The left posterior communicating artery is diminutive  The right posterior communicating artery is not definitely identified" Otherwise unremarkable  · Electrolytes wnl, trop negative  · WBC without leukocytosis, afebrile  · ED provider d/w Neurology, advised stroke pathway  · Obtain echo, MRI brain, monitor on tele  · Neuro checks  · Statin, asa  · Consult Neurology  · MRI lumbar spine if above negative       VTE Prophylaxis: low risk VTE, not indicated  / sequential compression device   Code Status: FULL  POLST: POLST form is not discussed and not completed at this time  Discussion with family: none    Anticipated Length of Stay:  Patient will be admitted on an Observation basis with an anticipated length of stay of  < 2 midnights  Justification for Hospital Stay: per plan above    Total Time for Visit, including Counseling / Coordination of Care: 30 minutes  Greater than 50% of this total time spent on direct patient counseling and coordination of care  Chief Complaint:   Left foot weakness    History of Present Illness:    Angelita Gardner is a 68 y o  male who presents with left foot weakness/numbness  Patient states he had issues history for 2 years now and that it is always tight causes posterior leg pain  He says recently he has had increasing tightness over the past couple weeks in his left hamstring  He states that he has been aggressively stretching his left hamstring   He notes that he noticed some discomfort in the left ankle about two days ago  He was able to go on the elliptical yesterday morning without issue  However he states later in the evening he was stretching his hamstring and as he tried to ambulate to dinner he noticed he could not feel the bottom of his left foot  He also felt as if his left foot was "not tracking right"  Also of note was that pt had increasing left thigh pain at that time (states he felt a pull while doing a lunge earlier that day)  He thought he could sleep it off, however when he got up to go to the bathroom around 3 AM he still had weakness of the left foot  He decided at this point to drive to the ED  Patient states he fell twice in the parking lot on his way into the ED due to this weakness  He denies any facial droop, slurred speech, HA, syncope, dizziness, HA or visual disturbance at any point  He denies chest pain, SOB, palpitations  Admits to recent URI that is resolving  Denies weakness or paraesthesias other than the affected limb  Denies dysphagia  Denies urinary symptoms, n/v/diarrhea or abdominal pain  Denies trauma to lumbar spine or to limb itself  Denies swelling or erythema  Pt has been seeing Orthopedist for two years for his hamstring pain and says it is felt to be that his hamstring is so tight that is "pressing on a nerve"  He says he recently saw the chiropractor who taught him new stretches and this is what he has been doing more recently to control the hamstring pain  Review of Systems:    Review of Systems   HENT: Negative  Eyes: Negative  Respiratory: Negative  Cardiovascular: Negative  Gastrointestinal: Negative  Endocrine: Negative  Genitourinary: Negative  Musculoskeletal: Positive for gait problem and myalgias  Allergic/Immunologic: Negative  Neurological: Positive for weakness and numbness  Hematological: Negative  Psychiatric/Behavioral: Negative          Past Medical and Surgical History:     Past Medical History:   Diagnosis Date    Anemia     Left inguinal hernia     Managed By Brook Prater / last assessed 3/27/15     Skin lesion        Past Surgical History:   Procedure Laterality Date    HERNIA REPAIR         Meds/Allergies:    Prior to Admission medications    Medication Sig Start Date End Date Taking? Authorizing Provider   Calcium-Magnesium-Vitamin D (CALCIUM MAGNESIUM PO) Take by mouth   Yes Historical Provider, MD   Cholecalciferol (VITAMIN D PO) Take by mouth   Yes Historical Provider, MD   Cyanocobalamin (VITAMIN B12 PO) Take by mouth   Yes Historical Provider, MD   chlorhexidine (PERIDEX) 0 12 % solution RINSE WITH 1/2 OZ TWO TIMES DAILY FOLLOWING BRUSHING AND FLOSSING 5/15/18   Historical Provider, MD   Doxycycline Hyclate 150 MG TABS Take 20 mg by mouth daily      Historical Provider, MD     I have reviewed home medications with patient personally  Allergies: No Known Allergies    Social History:     Marital Status: /Civil Union   Occupation: not discussed  Patient Pre-hospital Living Situation: home with wife  Patient Pre-hospital Level of Mobility: independent at baseline  Patient Pre-hospital Diet Restrictions: none  Substance Use History:   History   Alcohol Use    Yes     Comment: social      History   Smoking Status    Never Smoker   Smokeless Tobacco    Never Used     History   Drug Use No       Family History:    non-contributory    Physical Exam:     Vitals:   Blood Pressure: (!) 172/76 (11/07/18 0700)  Pulse: 64 (11/07/18 0700)  Temperature: 98 3 °F (36 8 °C) (11/07/18 0458)  Temp Source: Oral (11/07/18 0458)  Respirations: 16 (11/07/18 0645)  Height: 6' 2 5" (189 2 cm) (11/07/18 0444)  Weight - Scale: 93 kg (205 lb 0 4 oz) (11/07/18 0444)  SpO2: 98 % (11/07/18 0700)    Physical Exam   Constitutional: He is oriented to person, place, and time  He appears well-developed and well-nourished  No distress  HENT:   Head: Normocephalic     Mouth/Throat: Oropharynx is clear and moist    Eyes: Pupils are equal, round, and reactive to light  Conjunctivae and EOM are normal  Right eye exhibits no discharge  Left eye exhibits no discharge  No scleral icterus  Neck: Normal range of motion  Cardiovascular: Normal rate, regular rhythm, normal heart sounds and intact distal pulses  Exam reveals no gallop and no friction rub  No murmur heard  Pulmonary/Chest: Effort normal and breath sounds normal  No respiratory distress  He has no wheezes  He has no rales  He exhibits no tenderness  Abdominal: Soft  Bowel sounds are normal  He exhibits no distension and no mass  There is no tenderness  There is no rebound and no guarding  Musculoskeletal: He exhibits no edema or tenderness  Neurological: He is alert and oriented to person, place, and time  He is not disoriented  He displays no atrophy and no tremor  A sensory deficit is present  No cranial nerve deficit  He exhibits normal muscle tone  He displays no seizure activity  4/5 sensation of plantar surface of left foot, otherwise full and symmetric sensation  1/5 strength of left foot with dorsiflexion and plantar flexion  5/5 strength with abduction and adduction/flexion extension of hip as well as 5/5 strength with extension and flexion of left knee  5/5 strength bilateral upper extremities  Negative pronator drift, finger to nose and heel to shin intact bilaterally  Rapid alternating movements intact  Skin: Skin is warm and dry  No rash noted  He is not diaphoretic  No erythema  No pallor  Psychiatric: He has a normal mood and affect  His behavior is normal    Nursing note and vitals reviewed  Additional Data:     Lab Results: I have personally reviewed pertinent reports          Results from last 7 days  Lab Units 11/07/18  0601 11/07/18  0546   WBC Thousand/uL  --  7 30   HEMOGLOBIN g/dL  --  13 3   I STAT HEMOGLOBIN g/dl 12 6  --    HEMATOCRIT % 37 39 7   PLATELETS Thousands/uL  --  233 Results from last 7 days  Lab Units 11/07/18  0601 11/07/18  0546   POTASSIUM mmol/L  --  3 9   CHLORIDE mmol/L  --  105   CO2 mmol/L  --  29   CO2, I-STAT mmol/L 29  --    BUN mg/dL  --  14   CREATININE mg/dL  --  1 03   AGAP mmol/L 14*  --    ANION GAP mmol/L  --  7   CALCIUM mg/dL  --  9 0   GLUCOSE, ISTAT mg/dl 109  --        Results from last 7 days  Lab Units 11/07/18  0546   INR  0 95                   Imaging: I have personally reviewed pertinent reports  X-ray chest 1 view portable   Final Result by Chris Soto MD (11/07 0703)      No acute cardiopulmonary disease  Workstation performed: [id number]         CT stroke alert brain   Final Result by Daysi Borjas MD (11/07 6864)      No evidence of acute intracranial hemorrhage  No definite CT evidence of acute intracranial abnormality  If clinically appropriate, MRI with diffusion-weighted imaging could be performed for further evaluation, if there are no contraindications  Findings were directly discussed with [facility] on 11/7/2018 6:04 AM       Workstation performed: [id number]         CTA stroke alert (head/neck)   Final Result by Daysi Borjas MD (11/07 8548)      The distal right vertebral artery is extremely diminutive  The left posterior communicating artery is diminutive  The right posterior communicating artery is not definitely identified  No evidence of hemodynamically significant stenosis involving the common carotid arteries or internal carotid arteries bilaterally  Findings were directly discussed with Julisa Reilly on November 7, 2018 at 6:35 AM                   Workstation performed: [id number]         MRI Inpatient Order    (Results Pending)       EKG, Pathology, and Other Studies Reviewed on Admission:   · EKG: SR    Allscripts / Epic Records Reviewed: Yes     ** Please Note: This note has been constructed using a voice recognition system   **

## 2018-11-07 NOTE — PLAN OF CARE
Problem: DISCHARGE PLANNING - CARE MANAGEMENT  Goal: Discharge to post-acute care or home with appropriate resources  INTERVENTIONS:  - Conduct assessment to determine patient/family and health care team treatment goals, and need for post-acute services based on payer coverage, community resources, and patient preferences, and barriers to discharge  - Address psychosocial, clinical, and financial barriers to discharge as identified in assessment in conjunction with the patient/family and health care team  - Arrange appropriate level of post-acute services according to patients   needs and preference and payer coverage in collaboration with the physician and health care team  - Communicate with and update the patient/family, physician, and health care team regarding progress on the discharge plan  - Arrange appropriate transportation to post-acute venues  Outcome: Completed Date Met: 11/07/18  OBS;  NOT A READMISSION; NOT A BUNDLE  PT saw pt and stated he would benefit from a RW  Pt is agreeable to this and referral has been sent to [facility]       CM name and role reviewed  Discharge Checklist reviewed and CM will continue to monitor for progress toward discharge goals in nursing and provider rounds

## 2018-11-07 NOTE — CONSULTS
Neurology Consult- Arlette Branch 1945, 68 y o  male   MRN: [id number] Unit/Bed#: -01 Encounter: [id number]      Inpatient consult to Neurology  Consult performed by: Cuauhtemoc Bragg  Consult ordered by: Claudetta Herring          * Isolated Weakness of left foot   Assessment & Plan    This patient's acute onset left foot drop likely may be the result of a mechanical stretching injury  Although the majority of his exam was consistent with that of an isolated left foot drop, we will ask that he undergo MRI testing of his L-spine to rule out any involvement at that level given that he had some inconsistencies with his sensory exam above that level  We discussed with the patient that physical therapy can concentrate on the use of a cane with him for his ambulation in the meantime  He may benefit from an isolated MAFO, in approximately a 3 4 weeks as an outpatient if this does not improve EMG testing may be beneficial in the office  HPI: Arlette Branch is a right handed  68 y o  male who neurology was asked to see patient for complaints of L leg weakness and intermittent co sciatic numbness  He has been seeing orthopedic service for hamstring tightness and last seen 6-18 with improved exam and discharged from practice  Over summer patient reports he has been active and has been deliberately stretching his hamstrings to maintain mobility, was at chiropractor yesterday, was stretched "moderatety", and then patient did more manipulative manual stretching on his own at home and he was able to use the elliptical  as well    Later he was walking down stairs and noted the foot was not moving well  He fell 2 or 3 times because of weakness in the foot as he just attempted to stand and ambulate on it prior to his presentation  At this point today in the room he reports he has no other complaints        ROS: 12 system cued query: no visual distortion or headaches, no facial changes, no upper limb weakness, no CP no SOB or palp, no systemic symptoms  Weakness related to L leg only  Remainder of query negative  Historical Information     Past Medical History:   Diagnosis Date    Anemia     Left inguinal hernia     Skin lesion      Past Surgical History:   Procedure Laterality Date    HERNIA REPAIR         Social History :, no smoking no drinking no recreational is  He has a active runner, 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 [employer]  Family History:   Family History   Problem Relation Age of Onset    Heart disease Mother     No Known Problems Father     No Known Problems Sister     No Known Problems Brother     No Known Problems Family          No Known Allergies  Meds:all current active meds have been reviewed    Scheduled Meds:  Current Facility-Administered Medications:  acetaminophen 650 mg Oral Q6H PRN   aspirin 81 mg Oral Daily   atorvastatin 40 mg Oral QPM     PRN Meds:   acetaminophen      Physical Exam:   Objective   Vitals:Blood pressure 145/67, pulse 61, temperature 97 6 °F (36 4 °C), temperature source Oral, resp  rate 16, height 6' 2 5" (1 892 m), weight 93 kg (205 lb 0 4 oz), SpO2 96 %  ,Body mass index is 25 97 kg/m²  Patient was examined in bed no family at bedside     General: alert, thin gentleman appears younger than stated age and cooperative  Head: Normocephalic, without obvious abnormality, atraumatic  Oral exam: lips, mucosa, and tongue moist;   Neck: no carotid bruit,   Lungs: clear to auscultation ant  bilaterally  Heart: regular rate and rhythm, S1, S2 normal, no murmur appreciated,   Abdomen: soft, +BS    Extremities: atraumatic, no cyanosis or edema    Neurologic:   Mental status: Alert, oriented x4, thought content appropriate, spont conversation intact w/out dysarthria or aphasia  CN Exam:  Nonfocal nonlateralizing, VAMSI, EOM's I, VF full, Gaze conjugate No sensory (w PP on face) or motor lateralizations, Hearing I B, CNIX-XII I B  Motor: full power, age appropriate x 4 ext with the exception of L 1/5 dorsi flexion and 2/5 ev and inversion, and 1/5 grt toe movement  Sensory: intact  X 4 limbs, 4 mod inc lt, temp, vib and PP tested w absence below ankle medially and laterally and on forefoot   Cerebellar: no past pointing or drift from modified Romberg position, no ataxia w maneuvers, Fine motor & finger taps, age appropriate, WNL  DTR's: +2 uppers and knees B, diminshed at ankles B; Plantars: downgoing B   Gait: Needs L leg elevation during gait for foot clearance  Good safety awareness       Lab Results:   I have personally reviewed pertinent reports  , CBC:   Results from last 7 days  Lab Units 11/07/18  0601 11/07/18  0546   WBC Thousand/uL  --  7 30   RBC Million/uL  --  4 19   HEMOGLOBIN g/dL  --  13 3   I STAT HEMOGLOBIN g/dl 12 6  --    HEMATOCRIT % 37 39 7   MCV fL  --  95   PLATELETS Thousands/uL  --  233   , BMP/CMP:   Results from last 7 days  Lab Units 11/07/18  0601 11/07/18  0546   SODIUM mmol/L  --  141   POTASSIUM mmol/L  --  3 9   CHLORIDE mmol/L  --  105   CO2 mmol/L  --  29   CO2, I-STAT mmol/L 29  --    BUN mg/dL  --  14   CREATININE mg/dL  --  1 03   GLUCOSE, ISTAT mg/dl 109  --    CALCIUM mg/dL  --  9 0   EGFR ml/min/1 73sq m 85 72   , Vitamin B12:   , HgBA1C:   , TSH:   , Coagulation:   Results from last 7 days  Lab Units 11/07/18  0546   INR  0 95   , Lipid Profile:        Imaging Studies: CTA results: The distal right vertebral artery is extremely diminutive  The left posterior communicating artery is diminutive   The right posterior communicating artery is not definitely identified  No evidence of hemodynamically significant stenosis involving the common carotid arteries or internal carotid arteries bilaterally  Counseling / Coordination of Care  Total time spent today 45 minutes  Greater than 50% of total time was spent with the patient and / or family counseling and / or coordination of care   A description of the counseling / coordination of care: All of the above was discussed with the patient at the bedside  He will be following up with us in the office in approximately 1 month or so  Dictation voice to text software has been used in the creation of this document  Please consider this in light of any contextual or grammatical errors

## 2018-11-07 NOTE — ASSESSMENT & PLAN NOTE
· Suspect radiculopathy although CVA could not be ruled out  · Stroke alert called in ED, not a candidate for tPA as onset >4 hr prior to presentation  · CTA head and neck remarkable only for: "The distal right vertebral artery is extremely diminutive  The left posterior communicating artery is diminutive  The right posterior communicating artery is not definitely identified" Otherwise unremarkable     · Electrolytes wnl, trop negative  · WBC without leukocytosis, afebrile  · ED provider d/w Neurology, advised stroke pathway  · Obtain echo, MRI brain, monitor on tele  · Neuro checks  · Statin, asa  · Consult Neurology  · MRI lumbar spine if above negative

## 2018-11-07 NOTE — PLAN OF CARE
Activity Intolerance/Impaired Mobility     Mobility/activity is maintained at optimum level for patient Progressing        Communication Impairment     Ability to express needs and understand communication [address] Discharge to home or other facility with appropriate resources Progressing        Knowledge Deficit     Patient/family/caregiver demonstrates understanding of disease process, treatment plan, medications, and discharge instructions Progressing        Neurological Deficit     Neurological status is stable or improving Progressing        Nutrition     Nutrition/Hydration status is improving Progressing        Potential for Aspiration     Non-ventilated patient's risk of aspiration is minimized Progressing        Potential for Falls     Patient will remain free of falls Progressing        SAFETY ADULT     Maintain or return to baseline ADL function Progressing     Maintain or return mobility status to optimal level Progressing

## 2018-11-07 NOTE — SOCIAL WORK
OBS;  NOT A READMISSION; NOT A BUNDLE  PT saw pt and stated he would benefit from OP PT/OT and  a RW  Pt is agreeable to this and referral has been sent to [facility]   OP PT/OT list has been provided to pt by therapy  CM name and role reviewed  Discharge Checklist reviewed and CM will continue to monitor for progress toward discharge goals in nursing and provider rounds

## 2018-11-07 NOTE — DISCHARGE SUMMARY
Discharge Summary - [address] [facility]    Patient Information: Ankita William 68 y o  male MRN: [id number]  Unit/Bed#: -01 Encounter: [id number]    Discharging Physician / Practitioner: Teresa Herbert MD  PCP: Eli Patino DO  Admission Date: 11/7/2018  Discharge Date: 11/07/18    Reason for Admission:  Left foot weakness    Discharge Diagnoses:     Principal Problem:    Weakness of left foot    Consultations During Hospital Stay:  · Neurology - discussed with Neurology AP regarding discharge plans    Procedures Performed:   · None    Hospital Course:   Ankita William is a 68 y o  male patient who originally presented to the hospital on 11/7/2018 due to left lower extremity weakness  Patient was on to have a isolated left foot drop which following evaluation was felt likely as a result of mechanical stretching injury the  Patient recently had been aggressively stretching his left hamstring due to posterior leg pain with increasing tightness over his left hamstring muscle  Evaluation revealed evidence of left foot drop  He was initially admitted under the stroke pathway however following evaluation, stroke was felt to be unlikely  He had no back pain bowel or urinary symptoms  Was seen by the neurology team and recommended to obtain an MRI of his lumbar spine prior to discharge  He will follow up the Neurology team outpatient for continued management and for possible EMG if symptoms do not improve with physical therapy  He was recommended for outpatient PT  He will obtain an MRI on the evening of discharge and if this is unremarkable, plan will be for discharge home with outpatient follow-up and outpatient PT  Condition at Discharge: stable     Discharge Day Visit / Exam:     Subjective:  No new complaints or acute overnight events  Left foot weakness persists      Vitals: Blood Pressure: 142/66 (11/07/18 1100)  Pulse: 71 (11/07/18 1100)  Temperature: 98 °F (36 7 °C) (11/07/18 1100)  Temp Source: Oral (11/07/18 1100)  Respirations: 16 (11/07/18 1100)  Height: 6' 2 5" (189 2 cm) (11/07/18 0444)  Weight - Scale: 93 kg (205 lb 0 4 oz) (11/07/18 0444)  SpO2: 96 % (11/07/18 1100)    General Appearance:    Alert, cooperative, no distress, appropriately responsive    Head:    Normocephalic, without obvious abnormality, atraumatic, mucous membranes moist    Eyes:    Conjunctiva/corneas clear, EOM's intact   Neck:   Supple   Lungs:     Clear to auscultation bilaterally, respirations unlabored, no crackles or wheeze     Heart:    Regular rate and rhythm, S1 and S2    Abdomen:     Soft, non-tender, bowel sounds active all four quadrants,     no masses, no organomegaly   Extremities:   Nontender swelling noted over lateral aspect of left ankle  He has full range of motion to the left ankle   Neurologic:  Left foot drop, otherwise nonfocal exam      Discharge instructions/Information to patient and family:   See after visit summary for information provided to patient and family  Provisions for Follow-Up Care:  See after visit summary for information related to follow-up care and any pertinent home health orders  Disposition: Home    Discharge Statement:  I spent >30 minutes discharging the patient  This time was spent on the day of discharge  I had direct contact with the patient on the day of discharge  Greater than 50% of the total time was spent examining patient, answering all patient questions, arranging and discussing plan of care with patient as well as directly providing post-discharge instructions  Additional time then spent on discharge activities  Discharge Medications:  See after visit summary for reconciled discharge medications provided to patient and family  ** Please Note: Dragon 360 Dictation voice to text software may have been used in the creation of this document   **

## 2018-11-07 NOTE — ED PROVIDER NOTES
History  Chief Complaint   Patient presents with    Extremity Weakness     Pt comes from home c/o left foot weakness starting at 530pm yesturday  Britta Turner pt was on the elpitical then 20 minutes later his left foot became numb and pain in his quad  Pt feel walking into the emergency room twice on to his knees  Pt is able to push me away with his foot but not able to bring his toes toward himeslf  Pt also states he went to the chiropractor yesturday  Patient is a 52-year-old male presents emergency department for evaluation of left foot problem  Patient states that around 530 yesterday after working out he notices foot was not working correctly  He states he was trying to walk downstairs and cannot properly walk secondary to his foot not moving  He states that associated with this and also his foot had fallen asleep  He states that he notices foot does not move towards him  He states that he was working out, using the elliptical, doing different exercises he has been doing for months  No changes exercise routine  No falls prior to this  Patient states that he tried resting throughout the night  We woke up overnight and tripped trying to go to the bathroom secondary to the his foot not moving properly he made decision to come to the emergency department  No other stroke symptoms  No other unilateral numbness tingling or weakness  Patient with no significant past medical history  Not on any blood thinners currently  Patient no dizziness, headaches, visual changes  No chest pain shortness of breath  No nausea vomiting  No leg cramping  Patient noticed notice color of his foot or ankle bilaterally          Extremity Weakness   Associated symptoms: no abdominal pain, no chest pain, no cough, no diarrhea, no ear pain, no fatigue, no fever, no headaches, no nausea, no rash, no shortness of breath, no sore throat and no vomiting        Prior to Admission Medications   Prescriptions Last Dose Informant Patient Reported? Taking? Calcium-Magnesium-Vitamin D (CALCIUM MAGNESIUM PO)   Yes Yes   Sig: Take by mouth   Cholecalciferol (VITAMIN D PO)   Yes Yes   Sig: Take by mouth   Cyanocobalamin (VITAMIN B12 PO)   Yes Yes   Sig: Take by mouth   Doxycycline Hyclate 150 MG TABS   Yes No   Sig: Take 20 mg by mouth daily     chlorhexidine (PERIDEX) 0 12 % solution   Yes No   Sig: RINSE WITH 1/2 OZ TWO TIMES DAILY FOLLOWING BRUSHING AND FLOSSING      Facility-Administered Medications: None       Past Medical History:   Diagnosis Date    Anemia     Left inguinal hernia     Managed By Jarad Adan / last assessed 3/27/15     Skin lesion        Past Surgical History:   Procedure Laterality Date    HERNIA REPAIR         Family History   Problem Relation Age of Onset    Heart disease Mother     No Known Problems Father     No Known Problems Sister     No Known Problems Brother     No Known Problems Family      I have reviewed and agree with the history as documented  Social History   Substance Use Topics    Smoking status: Never Smoker    Smokeless tobacco: Never Used    Alcohol use Yes      Comment: social         Review of Systems   Constitutional: Negative for activity change, appetite change, chills, fatigue and fever  HENT: Negative for ear pain, sneezing and sore throat  Eyes: Negative for pain and visual disturbance  Respiratory: Negative for cough and shortness of breath  Cardiovascular: Negative for chest pain and palpitations  Gastrointestinal: Negative for abdominal pain, blood in stool, constipation, diarrhea, nausea and vomiting  Genitourinary: Negative for dysuria and hematuria  Musculoskeletal: Positive for extremity weakness and gait problem  Negative for arthralgias, neck pain and neck stiffness  Not able to move foot towards body   Skin: Negative for rash and wound  Neurological: Positive for numbness   Negative for dizziness, weakness, light-headedness and headaches  All other systems reviewed and are negative  Physical Exam  Physical Exam   Constitutional: He is oriented to person, place, and time  He appears well-developed and well-nourished  No distress  HENT:   Head: Normocephalic and atraumatic  Nose: Nose normal    Eyes: Pupils are equal, round, and reactive to light  Conjunctivae and EOM are normal    Neck: Normal range of motion  Neck supple  Cardiovascular: Normal rate, regular rhythm, normal heart sounds and intact distal pulses  Exam reveals no gallop and no friction rub  No murmur heard  Pulmonary/Chest: Effort normal and breath sounds normal  No respiratory distress  He has no wheezes  He has no rales  Abdominal: Soft  He exhibits no distension  There is no tenderness  Musculoskeletal: Normal range of motion  He exhibits no edema, tenderness or deformity  No midline CT or L-spine tenderness  Lymphadenopathy:     He has no cervical adenopathy  Neurological: He is alert and oriented to person, place, and time  He displays normal reflexes  A sensory deficit is present  No cranial nerve deficit  He exhibits normal muscle tone  No sharp sensation from a cm above left ankle to proximal phalanxes of all toes  Full sharp sensation to sole of left foot  Sharp sensation normal at tips of all left toes  Full sharp sensation in left leg otherwise  Full range of motion of left hip and knee  5/5 strength at left hip and knee in all planes of motion  Patient unable to dorsiflex left foot at the ankle  Musculature does not even appear to being gauge  Patient with difficulty with inversion of left foot as well  Right hip, knee, ankle normal   No other bony or muscular tenderness  Skin: Skin is warm and dry  Capillary refill takes less than 2 seconds  He is not diaphoretic  No erythema  No pallor  Nursing note and vitals reviewed        Vital Signs  ED Triage Vitals   Temperature Pulse Respirations Blood Pressure SpO2   11/07/18 0458 11/07/18 0444 11/07/18 0444 11/07/18 0444 11/07/18 0444   98 3 °F (36 8 °C) 69 16 133/62 97 %      Temp Source Heart Rate Source Patient Position - Orthostatic VS BP Location FiO2 (%)   11/07/18 0458 11/07/18 0444 11/07/18 0444 11/07/18 0444 --   Oral Monitor Sitting Right arm       Pain Score       11/07/18 0444       5           Vitals:    11/07/18 0645 11/07/18 0649 11/07/18 0650 11/07/18 0700   BP:   (!) 181/77 (!) 172/76   Pulse: 64 70  64   Patient Position - Orthostatic VS:    Lying       Visual Acuity  Visual Acuity      Most Recent Value   L Pupil Size (mm)  3   R Pupil Size (mm)  3          ED Medications  Medications   ibuprofen (MOTRIN) tablet 400 mg (400 mg Oral Given 11/7/18 0535)   iohexol (OMNIPAQUE) 350 MG/ML injection (SINGLE-DOSE) 85 mL (85 mL Intravenous Given 11/7/18 0605)       Diagnostic Studies  Results Reviewed     Procedure Component Value Units Date/Time    POCT troponin [[id number]] Collected:  11/07/18 0559    Lab Status:  Final result Updated:  11/07/18 0614     POC Troponin I 0 00 ng/ml      Specimen Type VENOUS    Narrative:         [employer] i-Stat handheld analyzer 99% cutoff is > 0 08ng/mL in [facility] Emergency Departments    o cTnI 99% cutoff is useful only when applied to patients in the clinical setting of myocardial ischemia  o cTnI 99% cutoff should be interpreted in the context of clinical history, ECG findings and possibly cardiac imaging to establish correct diagnosis  o cTnI 99% cutoff may be suggestive but clearly not indicative of a coronary event without the clinical setting of myocardial ischemia      APTT [[id number]]  (Normal) Collected:  11/07/18 0546    Lab Status:  Final result Specimen:  Blood from Arm, Right Updated:  11/07/18 0612     PTT 33 seconds     Protime-INR [[id number]]  (Normal) Collected:  11/07/18 0546    Lab Status:  Final result Specimen:  Blood from Arm, Right Updated:  11/07/18 0612     Protime 12 4 seconds      INR 4 84    Basic metabolic panel [[id number]] Collected:  11/07/18 0546    Lab Status:  Final result Specimen:  Blood from Arm, Right Updated:  11/07/18 6282     Sodium 141 mmol/L      Potassium 3 9 mmol/L      Chloride 105 mmol/L      CO2 29 mmol/L      ANION GAP 7 mmol/L      BUN 14 mg/dL      Creatinine 1 03 mg/dL      Glucose 114 mg/dL      Calcium 9 0 mg/dL      eGFR 72 ml/min/1 73sq m     Narrative:         National Kidney Disease Education Program recommendations are as follows:  GFR calculation is accurate only with a steady state creatinine  Chronic Kidney disease less than 60 ml/min/1 73 sq  meters  Kidney failure less than 15 ml/min/1 73 sq  meters  POCT Chem 8+ [[id number]]  (Abnormal) Collected:  11/07/18 0601    Lab Status:  Final result Updated:  11/07/18 0605     SODIUM, I-STAT 141 mmol/l      Potassium, i-STAT 3 9 mmol/L      Chloride, istat 103 mmol/L      CO2, i-STAT 29 mmol/L      Anion Gap, i-STAT 14 (H) mmol/L      Calcium, Ionized i-STAT 1 16 mmol/L      BUN, I-STAT 17 mg/dl      Creatinine, i-STAT 0 9 mg/dl      eGFR 85 ml/min/1 73sq m      Glucose, i-STAT 109 mg/dl      Hct, i-STAT 37 %      Hgb, i-STAT 12 6 g/dl      Specimen Type VENOUS    CBC [[id number]]  (Normal) Collected:  11/07/18 0546    Lab Status:  Final result Specimen:  Blood from Arm, Right Updated:  11/07/18 0600     WBC 7 30 Thousand/uL      RBC 4 19 Million/uL      Hemoglobin 13 3 g/dL      Hematocrit 39 7 %      MCV 95 fL      MCH 31 7 pg      MCHC 33 5 g/dL      RDW 13 5 %      Platelets 517 Thousands/uL      MPV 10 1 fL                  X-ray chest 1 view portable   Final Result by Radhames Love MD (11/07 0703)      No acute cardiopulmonary disease  Workstation performed: [id number]         CT stroke alert brain   Final Result by Kaitlynn David MD (11/07 7114)      No evidence of acute intracranial hemorrhage  No definite CT evidence of acute intracranial abnormality    If clinically appropriate, MRI with diffusion-weighted imaging could be performed for further evaluation, if there are no contraindications  Findings were directly discussed with [facility] on 11/7/2018 6:04 AM       Workstation performed: [id number]         CTA stroke alert (head/neck)   Final Result by Rochelle Membreno MD (11/07 5390)      The distal right vertebral artery is extremely diminutive  The left posterior communicating artery is diminutive  The right posterior communicating artery is not definitely identified  No evidence of hemodynamically significant stenosis involving the common carotid arteries or internal carotid arteries bilaterally  Findings were directly discussed with Armando Chance on November 7, 2018 at 6:35 AM                   Workstation performed: [id number]                    Procedures  Procedures       Phone Contacts  ED Phone Contact    ED Course  ED Course as of Nov 07 0758   Wed Nov 07, 2018   0545 Pt assessed and after discussion with attending about questionable symptoms, peripheral nerve vs central lesion, decision was made to call stroke alert  1994 Patient discussed Dr Kayode Weaver  Low suspicion for CVA however will continue Ct and CTA and report results to attending     Farhan Harkins Discussed CT results no bleed with radiologist, she will call back with CTA results  3888 Discussed patient with radiologist who reports no significant findings to suggestive thrombotic or embolic source  Recommends MRI to rule out lacunar lesion  0142 Discussed findings with Dr Kanu Mcmahon who recommends admission for MRI  IF MRI normal, plan will be MRI lumbar to look for peripheral source of foot drop                                   MDM  Number of Diagnoses or Management Options  Foot drop, left: new and requires workup  Diagnosis management comments: Differential diagnosis to include not limited to:  Lumbar radiculopathy, CVA, nerve palsy, arterial insufficiency    Patient is a 81-year-old male presents emergency department for evaluation acute onset of drop foot  And started with drop foot symptoms approximately 12 hours prior to evaluation  It started about 20 minutes after exercising  Patient was not performing any new exercises for him  Also reports sudden onset of numbness to the dorsal aspect of his left foot  No other unilateral numbness tingling weakness  No history of stroke  Patient with persistent symptoms 8 hours later which brought him to the emergency department for evaluation  Patient has a left-sided drop foot noted  Unable to dorsiflex left foot  No sharp sensation from about a cm above ankle to the entire dorsal aspect of left foot  With acute onset of symptoms plan will be to call stroke alert and evaluate patient  Amount and/or Complexity of Data Reviewed  Tests in the radiology section of CPT®: ordered and reviewed    Risk of Complications, Morbidity, and/or Mortality  Presenting problems: moderate  Diagnostic procedures: high  Management options: moderate    Patient Progress  Patient progress: stable    CritCare Time    Disposition  Final diagnoses:    Foot drop, left     Time reflects when diagnosis was documented in both MDM as applicable and the Disposition within this note     Time User Action Codes Description Comment    11/7/2018  5:45 AM Delpome, Roxy Skiff Add [M21 372] Foot drop, left     11/7/2018  7:37 AM Jeaneth Wong Add [M21 42] Weakness of left foot     11/7/2018  7:37 AM Jeaneth Wong Modify [M21 42] Weakness of left foot       ED Disposition     None      Follow-up Information    None         Current Discharge Medication List      CONTINUE these medications which have NOT CHANGED    Details   Calcium-Magnesium-Vitamin D (CALCIUM MAGNESIUM PO) Take by mouth      Cholecalciferol (VITAMIN D PO) Take by mouth      Cyanocobalamin (VITAMIN B12 PO) Take by mouth      chlorhexidine (PERIDEX) 0 12 % solution RINSE WITH 1/2 OZ TWO TIMES DAILY FOLLOWING BRUSHING AND FLOSSING  Refills: 3      Doxycycline Hyclate 150 MG TABS Take 20 mg by mouth daily             No discharge procedures on file      ED Provider  Electronically Signed by           Carlita Haines PA-C  11/07/18 1326

## 2018-11-07 NOTE — PLAN OF CARE
Problem: PHYSICAL THERAPY ADULT  Goal: Performs mobility at highest level of function for planned discharge setting  See evaluation for individualized goals  Treatment/Interventions: Functional transfer training, LE strengthening/ROM, Elevations, Therapeutic exercise, Endurance training, Patient/family training, Equipment eval/education, Bed mobility, Gait training          See flowsheet documentation for full assessment, interventions and recommendations  Prognosis: Good  Problem List: Decreased strength, Decreased range of motion, Decreased endurance, Impaired balance, Decreased mobility, Decreased coordination, Decreased safety awareness, Impaired sensation, Pain  Assessment: Pt presents with left foot weakness/numbness  Dx:left foot weakness  order placed for PT eval and tx, w/ activity order of up w/ A  pt presents w/ comorbidity of anemia and personal factors of living in 2 story house and positive fall history  pt presents w/ pain, weakness, decreased ROM, decreased endurance, impaired balance, gait deviations, altered sensation, impaired coordination, decreased safety awareness, fall risk and LE edema  these impairments are evident in findings from physical examination (weakness, decreased ROM, altered sensation, impaired coordination and L LE edema), mobility assessment (need for standby to mod assist w/ all phases of mobility when usually mobilizing independently, tolerance to only 15 feet of ambulation and need for cueing for mobility technique), and Barthel Index: 75/100  pt needed input for mobility technique and safety  pt is at risk for falls due to physical and safety awareness deficits  pt's clinical presentation is unstable/unpredictable (evident in poor blood pressure control, need for assist w/ all phases of mobility when usually mobilizing independently, tolerance to only 15 feet of ambulation, pain impacting overall mobility status and need for input for mobility technique/safety)   pt needs inpatient PT tx to improve mobility deficits  discharge recommendation is for outpatient PT to reduce fall risk and maximize level of functional independence  Pt would benefit from left AFO to address ankle weakness - outpatient follow up w/ orthotist is required (may require custom AFO due to sensation changes and edema)  Recommendation: Outpatient PT, Other (Comment) (orthotist referral for left AFO (custom may be needed))          See flowsheet documentation for full assessment

## 2018-11-07 NOTE — ED NOTES
P[facility] Husam Meza aware of pt symptoms per RN notification at this time        Nan Felix, RN  11/07/18 [address], RN  11/07/18 [address], RN  11/07/18 9338

## 2018-11-07 NOTE — PHYSICAL THERAPY NOTE
PHYSICAL THERAPY TREATMENT NOTE    Patient Name: Lexi Valenzuela Date: 11/7/2018 11/07/18 1406   Pain Assessment   Pain Assessment 0-10   Pain Score 2   Pain Location (left anterior thigh)   Restrictions/Precautions   Other Precautions Fall Risk;Pain   General   Chart Reviewed Yes   Family/Caregiver Present No   Cognition   Overall Cognitive Status WFL   Arousal/Participation Alert   Attention Attends with cues to redirect   Orientation Level Oriented X4;Other (Comment)  (pt was identified w/ full name and birth date)   Following Commands Follows one step commands with increased time or repetition   Subjective   Subjective tp agreed to participate in PT intervention  Transfers   Sit to Stand 5  Supervision   Additional items Verbal cues  (for hand placement)   Stand to Sit 5  Supervision   Additional items Verbal cues  (for body positioning, hand placement)   Ambulation/Elevation   Gait pattern L Foot drag;Decreased L stance  (left foot steppage w/ cane, none noted w/ roller walker)   Gait Assistance 4  Minimal assist  (w/ cane  supervision w/ roller walker)   Additional items Assist x 1;Verbal cues; Tactile cues  (for device placement)   Assistive Device SPC;Rolling walker   Distance 40 feet w/ cane  standing rest break x 30 seconds  100 feet, 160 feet w/ roller walker w/ seated rest break x 3 minutes  (additional not possible due to fatigue )   Stair Management Assistance 5  Supervision   Additional items Verbal cues; Tactile cues  (for cane placemet, left foot clearance)   Stair Management Technique One rail R;With cane; Step to pattern   Number of Stairs 4   Balance   Static Sitting Normal   Dynamic Sitting Good   Static Standing Fair   Dynamic Standing Fair   Ambulatory Fair -  (w/ roller walker)   Activity Tolerance   Activity Tolerance Patient limited by fatigue;Patient limited by pain   Nurse Made Aware spoke to Sandra Palmer, Dr Rafa Fields CM   Assessment   Prognosis Good   Problem List Decreased strength;Decreased range of motion;Decreased endurance; Impaired balance;Decreased mobility; Decreased coordination;Decreased safety awareness; Impaired sensation;Pain   Assessment Therapist introduced use of single point cane during ambulation to improve mobility safety and address impairments noted during evaluation  Pt required same level of assistance to maintain safety as w/o device  Trialed use of roller walker and pt needed only supervision to maintain safety  Ambulation was limited due to fatigue  Pt continued to need standby assist and cues for mobility technique and safety  Pt continues to be at risk for falling  Continued PT intervention is needed to maximize level of functional independence  Goals   Patient Goals go home   STG Expiration Date 11/17/18   Short Term Goal #1 pt will: Increase L LE strength 1/2 grade to facilitate independent mobility, Perform all transfers independently to improve independence, Ambulate 500 ft  with least restrictive assistive device independently w/o LOB to expedite return to leisure activities like running, Navigate 12 stairs independently with unilateral handrail to facilitate return to previous living environment, Increase ambulatory balance 1 grade to decrease risk for falls, Complete exercise program independently to increase overall activity tolerance, Tolerate 3 hr OOB to faciliate upright tolerance and Improve Barthel Index score to 95 or greater to facilitate independence   Treatment Day 1   Plan   Treatment/Interventions Functional transfer training;LE strengthening/ROM; Elevations; Therapeutic exercise; Endurance training;Patient/family training;Equipment eval/education; Bed mobility;Gait training   Progress Progressing toward goals   PT Frequency Other (Comment)  (3 to 5x/week as able)   Recommendation   Recommendation Outpatient PT; Other (Comment)  (orthotist referral for left AFO (custom may be needed))   Equipment Recommended Other (Comment)  (roller walker)     Skilled inpatient PT recommended while in hospital to progress pt toward treatment goals      Arabella Phelps, PT

## 2018-11-08 ENCOUNTER — APPOINTMENT (OUTPATIENT)
Dept: NON INVASIVE DIAGNOSTICS | Facility: HOSPITAL | Age: 73
End: 2018-11-08
Payer: MEDICARE

## 2018-11-08 ENCOUNTER — APPOINTMENT (OUTPATIENT)
Dept: RADIOLOGY | Facility: HOSPITAL | Age: 73
End: 2018-11-08
Payer: MEDICARE

## 2018-11-08 ENCOUNTER — HOSPITAL ENCOUNTER (INPATIENT)
Facility: HOSPITAL | Age: 73
LOS: 3 days | Discharge: HOME | DRG: 520 | End: 2018-11-11
Attending: FAMILY MEDICINE | Admitting: FAMILY MEDICINE
Payer: MEDICARE

## 2018-11-08 VITALS
WEIGHT: 205.03 LBS | TEMPERATURE: 98.2 F | HEART RATE: 84 BPM | SYSTOLIC BLOOD PRESSURE: 122 MMHG | OXYGEN SATURATION: 99 % | BODY MASS INDEX: 25.49 KG/M2 | RESPIRATION RATE: 16 BRPM | HEIGHT: 75 IN | DIASTOLIC BLOOD PRESSURE: 56 MMHG

## 2018-11-08 DIAGNOSIS — M21.372 FOOT DROP, LEFT: ICD-10-CM

## 2018-11-08 DIAGNOSIS — S92.309A: ICD-10-CM

## 2018-11-08 DIAGNOSIS — M79.652 PAIN IN LEFT THIGH: ICD-10-CM

## 2018-11-08 DIAGNOSIS — M21.372 LEFT FOOT DROP: Primary | ICD-10-CM

## 2018-11-08 DIAGNOSIS — R29.898 WEAKNESS OF LEFT FOOT: ICD-10-CM

## 2018-11-08 DIAGNOSIS — S92.325S CLOSED NONDISPLACED FRACTURE OF SECOND METATARSAL BONE OF LEFT FOOT, SEQUELA: ICD-10-CM

## 2018-11-08 LAB
ATRIAL RATE: 65 BPM
CHOLEST SERPL-MCNC: 174 MG/DL (ref 50–200)
EST. AVERAGE GLUCOSE BLD GHB EST-MCNC: 134 MG/DL
HBA1C MFR BLD: 6.3 % (ref 4.2–6.3)
HDLC SERPL-MCNC: 43 MG/DL (ref 40–60)
LDLC SERPL CALC-MCNC: 114 MG/DL (ref 0–100)
P AXIS: 75 DEGREES
PR INTERVAL: 152 MS
QRS AXIS: -5 DEGREES
QRSD INTERVAL: 94 MS
QT INTERVAL: 436 MS
QTC INTERVAL: 453 MS
T WAVE AXIS: 48 DEGREES
TRIGL SERPL-MCNC: 85 MG/DL
VENTRICULAR RATE: 65 BPM

## 2018-11-08 PROCEDURE — 99225 PR SBSQ OBSERVATION CARE/DAY 25 MINUTES: CPT | Performed by: NURSE PRACTITIONER

## 2018-11-08 PROCEDURE — 99223 1ST HOSP IP/OBS HIGH 75: CPT | Performed by: NEUROLOGICAL SURGERY

## 2018-11-08 PROCEDURE — 99222 1ST HOSP IP/OBS MODERATE 55: CPT | Performed by: PHYSICAL MEDICINE & REHABILITATION

## 2018-11-08 PROCEDURE — 83036 HEMOGLOBIN GLYCOSYLATED A1C: CPT | Performed by: PHYSICIAN ASSISTANT

## 2018-11-08 PROCEDURE — 80061 LIPID PANEL: CPT | Performed by: PHYSICIAN ASSISTANT

## 2018-11-08 PROCEDURE — 97530 THERAPEUTIC ACTIVITIES: CPT

## 2018-11-08 PROCEDURE — 93010 ELECTROCARDIOGRAM REPORT: CPT | Performed by: INTERNAL MEDICINE

## 2018-11-08 PROCEDURE — 97116 GAIT TRAINING THERAPY: CPT

## 2018-11-08 PROCEDURE — 99222 1ST HOSP IP/OBS MODERATE 55: CPT | Performed by: FAMILY MEDICINE

## 2018-11-08 RX ORDER — ACETAMINOPHEN 325 MG/1
650 TABLET ORAL EVERY 6 HOURS PRN
Status: CANCELLED | OUTPATIENT
Start: 2018-11-08

## 2018-11-08 RX ORDER — ACETAMINOPHEN 325 MG/1
650 TABLET ORAL EVERY 6 HOURS PRN
Status: DISCONTINUED | OUTPATIENT
Start: 2018-11-08 | End: 2018-11-09

## 2018-11-08 RX ADMIN — ASPIRIN 81 MG 81 MG: 81 TABLET ORAL at 09:24

## 2018-11-08 NOTE — ASSESSMENT & PLAN NOTE
Continues today with the left foot drop and the somewhat atypical pattern of dysesthesia and diminished pinprick sensation  His MRI although not back at the time of my exam with the patient on my review does show some tightness at the L4-L5 region it appears on the left which would be consistent with his deficit  I have initiated discussion with Neurosurgery over at [facility] and the patient will be transferred to [place] for decompression

## 2018-11-08 NOTE — PROGRESS NOTES
Neurology Progress Note - Elsa Garcia 1945, 68 y o  male   MRN: [id number] Unit/Bed#: -01 Encounter: [id number]        * Weakness of left foot   Assessment & Plan    Continues today with the left foot drop and the somewhat atypical pattern of dysesthesia and diminished pinprick sensation  His MRI although not back at the time of my exam with the patient on my review does show some tightness at the L4-L5 region it appears on the left which would be consistent with his deficit  I have initiated discussion with Neurosurgery over at [facility] and the patient will be transferred to [place] for decompression  It was discussed with Internal Medicine as well as Neurosurgery  Subjective/Objective     Subjective: It is no better overnight    ROS:  Patient continues to report complaints of weakness at the left foot  He continues with the dysesthesia also in the left lower extremity compared with the right  The remainder of his query is negative  Current Facility-Administered Medications:  acetaminophen 650 mg Oral Q6H PRN   aspirin 81 mg Oral Daily   atorvastatin 40 mg Oral QPM       acetaminophen    Vitals: Blood pressure 122/56, pulse 84, temperature 98 2 °F (36 8 °C), temperature source Oral, resp  rate 16, height 6' 2 5" (1 892 m), weight 93 kg (205 lb 0 4 oz), SpO2 99 %  ,Body mass index is 25 97 kg/m²  Physical Exam:     Jose Purdy seen in:  In the bedside chair  He was gaited and return to the chair at his request   Was also noted to be doing stairs with the assistance of a single-point cane with the physical therapy assistant  General appearance: alert,   Neck, Lungs, Heart, & abdomen: WNL  Extremities: atraumatic, no cyanosis or edema    Neurologic:   Mental status: Alert, oriented, thought content appropriate  CN: exam nonlateralizing within normal limits EOM's I, Gaze conjugate  Non lateralizing sensory & motor exam, (PP not tested on face)   Reminder CNVIII-XII normal  Motor: full power age appropriate x 4 limbs with the exception of his left foot drop  Unchanged from yesterday's initial exam   Sensory: grossly intact  X 4 limbs, PP continues to be diminished in the region of his left foot at the forefoot compared with the right  Cerebellar:  No cerebellar findings  Gait: Fluid smooth, with the use of a single-point cane  Without the cane he needs to do exaggerated high leg lift for foot clearance  Lab Results:   I have personally reviewed pertinent reports  , CBC:   Results from last 7 days  Lab Units 11/07/18  0601 11/07/18  0546   WBC Thousand/uL  --  7 30   RBC Million/uL  --  4 19   HEMOGLOBIN g/dL  --  13 3   I STAT HEMOGLOBIN g/dl 12 6  --    HEMATOCRIT % 37 39 7   MCV fL  --  95   PLATELETS Thousands/uL  --  233   , BMP/CMP:   Results from last 7 days  Lab Units 11/07/18  0601 11/07/18  0546   SODIUM mmol/L  --  141   POTASSIUM mmol/L  --  3 9   CHLORIDE mmol/L  --  105   CO2 mmol/L  --  29   CO2, I-STAT mmol/L 29  --    BUN mg/dL  --  14   CREATININE mg/dL  --  1 03   GLUCOSE, ISTAT mg/dl 109  --    CALCIUM mg/dL  --  9 0   EGFR ml/min/1 73sq m 85 72   , Vitamin B12:   , HgBA1C:   Results from last 7 days  Lab Units 11/08/18  0440   HEMOGLOBIN A1C % 6 3   , TSH:   , Coagulation:   Results from last 7 days  Lab Units 11/07/18  0546   INR  0 95   , Lipid Profile:   Results from last 7 days  Lab Units 11/08/18  0440   HDL mg/dL 43   LDL CALC mg/dL 114*   TRIGLYCERIDES mg/dL 85        Imaging Studies: I have personally reviewed pertinent films in PACS and On my review he does seem to have a compression at the L4-L5 level  Radiology has not yet read the film  I have called and asked the reading room to facilitate his film being read  Counseling / Coordination of Care  Total time spent today 40 minutes  Greater than 50% of total time was spent with the patient and / or family counseling and / or coordination of care   A description of the counseling / coordination of care: All of the above was discussed with the patient in detail  He had several questions all of which were I believe answered to satisfaction at this time

## 2018-11-08 NOTE — PLAN OF CARE
Problem: PHYSICAL THERAPY ADULT  Goal: Performs mobility at highest level of function for planned discharge setting  See evaluation for individualized goals  Treatment/Interventions: Functional transfer training, LE strengthening/ROM, Elevations, Therapeutic exercise, Endurance training, Patient/family training, Equipment eval/education, Bed mobility, Gait training          See flowsheet documentation for full assessment, interventions and recommendations  Outcome: Progressing  Prognosis: Good  Problem List: Decreased strength, Decreased range of motion, Decreased endurance, Impaired balance, Decreased mobility, Decreased coordination, Decreased safety awareness, Impaired sensation, Pain  Assessment: Patient eager and willing to participate in therapy session  Demonstrated ability to transfer supine to sit with modified independence  Sit to stand transfers remain consistent with supervision and vebral instruction for safe body positioning and hand placement  Patient ability to ambulate increased gait distance with supervision using roller walker  [place] ambulation trial performed with use of SPC requiring min a x 1 and instruciton for step sequencing and cane placement  Patient expressed increased fatigue with ambulation using SPC limiting gait distance  Demonstrated ability to ascend and descend full flight stairs with L handrail and SPC requiring close supervision for safety  Patient with independent recall of same stepping pattern however required cuing initally for cane placement on stair  Continue to focus on gait and stair progression with reinforcement of use of roller walker and cane as appropriate  Recommendation: Outpatient PT, Other (Comment) (orthotist referral for left AFO (custom may be needed))          See flowsheet documentation for full assessment

## 2018-11-08 NOTE — UTILIZATION REVIEW
[address]  Utilization Review Department  Phone: [phone]; Fax [phone]  [email or web address]  ATTENTION: Please call with any questions or concerns to [phone]  and carefully listen to the prompts so that you are directed to the right person  Send all requests for admission clinical reviews, approved or denied determinations and any other requests to fax [phone]  All voicemails are confidential   Continued Stay Review    Date: 11/8/2018    Vital Signs: /56 (BP Location: Left arm)   Pulse 84   Temp 98 2 °F (36 8 °C) (Oral)   Resp 16   Ht 6' 2 5" (1 892 m)   Wt 93 kg (205 lb 0 4 oz)   SpO2 99%   BMI 25 97 kg/m²     Medications:   Scheduled Meds:   Current Facility-Administered Medications:  acetaminophen 650 mg Oral Q6H PRN   aspirin 81 mg Oral Daily   atorvastatin 40 mg Oral QPM     Continuous Infusions:    PRN Meds:   acetaminophen    Abnormal Labs/Diagnostic Results: 11/8   11/8 MRI spine wo contrast:  Disc herniations at L5-S1 and L4-5 causing moderate to severe central canal and neural foraminal stenosis    Age/Sex: 68 y o  male     Assessment/Plan: 11/8/2018 Neurology note:  Weakness of left foot   Assessment & Plan     Continues today with the left foot drop and the somewhat atypical pattern of dysesthesia and diminished pinprick sensation  His MRI although not back at the time of my exam with the patient on my review does show some tightness at the L4-L5 region it appears on the left which would be consistent with his deficit  I have initiated discussion with Neurosurgery over at [facility] and the patient will be transferred to [place] for decompression  It was discussed with Internal Medicine as well as Neurosurgery         Discharge Plan: tbd

## 2018-11-08 NOTE — CONSULTS
PHYSICAL MEDICINE AND REHABILITATION CONSULT NOTE  Allyson Rivas 68 y o  male MRN: [id number]  Unit/Bed#: -01 Encounter: [id number]    Requested by (Physician/Service): Taylor Wu MD  Reason for Consultation:  Assessment of rehabilitation needs  Reason for Hospitalization:  Foot drop, left [M21 372]  Lower extremity weakness [R29 898]  Weakness of left foot [M21 42]    History of Present Illness:  71-year-old male with no significant past medical history presented to [facility] on 11/7/18 with left lower extremity weakness and numbness  He has been seeing orthopedic service for hamstring tightness  Over the summer he has been deliberately stretching his hamstrings to maintain mobility  He saw the chiropractor on 11/6 who moderately stretched  his hamstrings and then patient did more manipulative manual stretching on his own at home and was able to use the elliptical  as well  Later he was walking downstairs and noted his foot was not moving well  Evaluation revealed a left foot drop  Initially was admitted to the hospital on stroke pathway but  later on stroke was felt to be unlikely after evaluation  Neurology recommended MRI of the spine which was abnormal and hence neurosurgery was consulted  Neurosurgery recommended surgical intervention hence patient is being transferred to [place] today  Restrictions include:  none    Hospital Complications/Comorbidities:   Complications: As above  Comorbidities: As above    Morbid Obesity (BMI > 40) No     Last Weight Last BMI   Wt Readings from Last 1 Encounters:   11/07/18 93 kg (205 lb 0 4 oz)    Body mass index is 25 97 kg/m²  Functional History:     Prior to Admission:  Independent with ADLs and ambulates with assistive device         Present:  Physical Therapy Occupational Therapy Speech Therapy   Modified independent with bed mobility and supervision with transfers, ambulated 200 feet with rolling walker and supervision Evaluation is pending      Past Medical History:   Past Surgical History:   Family History:     Past Medical History:   Diagnosis Date    Anemia     Left inguinal hernia     Managed By Danial Brennan / last assessed 3/27/15     Skin lesion     Past Surgical History:   Procedure Laterality Date    HERNIA REPAIR       Family History   Problem Relation Age of Onset    Heart disease Mother     No Known Problems Father     No Known Problems Sister     No Known Problems Brother     No Known Problems Family      - for today are still alive tone to worry No family history of neurologic diseases  Medications:    Current Facility-Administered Medications:     acetaminophen (TYLENOL) tablet 650 mg, 650 mg, Oral, Q6H PRN, Emery Armando PA-C, 650 mg at 11/07/18 1520    Allergies:   No Known Allergies     Social History:    Social History     Social History    Marital status: /Civil Union     Spouse name: N/A    Number of children: N/A    Years of education: N/A     Social History Main Topics    Smoking status: Never Smoker    Smokeless tobacco: Never Used    Alcohol use Yes      Comment: social     Drug use: No    Sexual activity: Not Asked     Other Topics Concern    None     Social History Narrative    None        Sherwin Martin Lives with: lives with their spouse  He lives in [facility] Two-level house with no steps to enter      Review of Systems: A 10-point review of systems was performed  Negative except as listed above       Physical Exam:  Vital Signs:      Temp:  [98 °F (36 7 °C)-98 2 °F (36 8 °C)] 98 2 °F (36 8 °C)  HR:  [84] 84  Resp:  [16] 16  BP: (122-138)/(56-71) 122/56 No intake or output data in the 24 hours ending 11/08/18 1225     Laboratory:      Lab Results   Component Value Date    HGB 12 6 11/07/2018    HGB 13 3 11/07/2018    HCT 37 11/07/2018    HCT 39 7 11/07/2018    WBC 7 30 11/07/2018     Lab Results   Component Value Date    BUN 14 11/07/2018    K 3 9 11/07/2018     11/07/2018    GLUCOSE 109 11/07/2018    CREATININE 1 03 11/07/2018     Lab Results   Component Value Date    PROTIME 12 4 11/07/2018    INR 0 95 11/07/2018        General: alert, no apparent distress, cooperative and comfortable  Head: Normal, normocephalic, atraumatic  Eye: Normal external eye, conjunctiva, lids   Ears: Normal external ears  Nose: Normal external nose, mucus membranes  Pharynx: Dental Hygiene adequate  Normal buccal mucosa  Normal pharynx  Neck / Thyroid: Supple, no masses, nodes, nodules or enlargement  Pulmonary: clear to auscultation bilaterally and no crackles, no wheezes, chest expansion normal  Cardiovascular: normal rate, regular rhythm, normal S1, S2, no murmurs, rubs, clicks or gallops  Abdomen: soft, nontender, nondistended, no masses or organomegaly  Skin/Extremity:  Left ankle swelling  Neurologic:  Cranial nerves 2-12 are intact, higher functions are intact, normal tone and bulk on motor examination, deep tendon reflexes are 2+ in the upper extremities and bilateral knees, 1+ at the bilateral ankles, plantars are downgoing bilaterally  Sensations are intact to light touch in the right lower extremity but diminished in the left toe and dorsum of foot  Finger-to-nose is intact bilaterally  Psych: Appropriate affect, alert and oriented to person, place and time   Musculoskeletal - Strength:   Right  Left  Site  Right  Left  Site    5 5  S Ab: Shoulder Abductors  5  5  HF: Hip Flexors    5 5  EF: Elbow Flexors  5 5 KF: Knee Flexors    5  5  EE: Elbow Extensors  5  5  KE: Knee Extensors    5  5  WE: Wrist Extensors  5  0 DR: Dorsi Flexors    5  5  FF: Finger Flexors  5  2+  PF: Plantar Flexors    5  5  HI: Hand Intrinsics  5  1+  EHL: Extensor Hallucis Longus         Imaging: Reviewed  X-ray Chest 1 View Portable    Result Date: 11/7/2018  Impression: No acute cardiopulmonary disease   Workstation performed: [id number]     Mri Lumbar Spine Wo Contrast    Result Date: 11/8/2018  Impression: Disc herniations at L5-S1 and L4-5 causing moderate to severe central canal and neural foraminal stenosis Workstation performed: [id number]     Ct Stroke Alert Brain    Result Date: 11/7/2018  Impression: No evidence of acute intracranial hemorrhage  No definite CT evidence of acute intracranial abnormality  If clinically appropriate, MRI with diffusion-weighted imaging could be performed for further evaluation, if there are no contraindications  Findings were directly discussed with [facility] on 11/7/2018 6:04 AM  Workstation performed: [id number]     Cta Stroke Alert (head/neck)    Result Date: 11/7/2018  Impression: The distal right vertebral artery is extremely diminutive  The left posterior communicating artery is diminutive  The right posterior communicating artery is not definitely identified  No evidence of hemodynamically significant stenosis involving the common carotid arteries or internal carotid arteries bilaterally  Findings were directly discussed with Julisa Reilly on November 7, 2018 at 6:35 AM  Workstation performed: [id number]       Assessment and Recommendations:  Left foot drop  secondary to L4-5 and L5-S1 disc herniation, moderate to severe central canal and neural foraminal stenosis  Impairments:  Impaired functional mobility and ability to perform ADL's      Recommendations:  - Chart reviewed  - Imaging reviewed   - Continue PT/OT while inpatient  - Pt is unable to safely return home until he is at the supervision/contact-guard functional level (25% assistance level with or without a device)  modified-independent functional level (0% assistance with a device) independent functional level (0% assistance without a device)  -Patient is being transferred to [facility] today for lumbar decompression surgery  Will benefit acute inpatient rehabilitation postoperatively when medically stable     -         Thank you for allowing the PM&R service to participate in the care of this patient  We will continue to follow [facility] Shorting progress with you  Please do not hesitate to call with questions or concerns    ** Please Note: Fluency Direct voice to text software may have been used in the creation of this document   **

## 2018-11-08 NOTE — H&P
H&P- Angelita Gardner 1945, 68 y o  male MRN: [id number]    Unit/Bed#: [facility] 920-01 Encounter: [id number]    Primary Care Provider: Ritchie Enriquez DO   Date and time admitted to hospital: 11/8/2018  2:12 PM        Weakness of left foot   Assessment & Plan    Patient reports left foot weakness of 2 days  History of discomfort in left lower limb for the past 2 5 years  Patient is an active runner and sports   He has been having pain in the left thigh and hamstring and has followed with Orthopedic surgery in the past    Patient is said to have been evaluated by a chiropractor and had some stretching done in the past couple of weeks  He reports discomfort in the left ankle with standing for more than 15 min  Patient also did report a fall twice at the [facility] [employer] car parked, injuring his left ankle  On physical exam, left ankle swollen but not tender to palpation  Inability to dorsiflex the left ankle  Plantar of flexion intact  Muscle strength 5/5 bilateral lower extremities  Decreased sensation to pinprick dorsum of left foot  Stroke workup- negative    MRI-Disc herniations at L5-S1 and L4-5 causing moderate to severe central canal and neural foraminal stenosis    Per Neurosurgery ( Dr Shelia Cotter), patient will require surgery   -Keep patient NPO  -Uncertain about exact surgery date and time, will discuss with Neurosurgery team   -Appreciate neurosurgery input         PPX:   Cirilo  Diet:   NPO  Code Status:   Full  Dispo: Inpatient    Discussed Plan with Dr Jacob Davila and [facility] team who agree with the assessment and plan  History of Present Illness     HPI:  Angelita Gardner is a 68 y o  male who presents with left foot weakness of 2 days  Patient reports he has been have been discomfort in the left leg for the past 2 5 years she describes as tightness in the hamstring and calf discomfort     He reports he has been aggressively doing stretching exercises of his hamstring and also notice some discomfort in the left ankle about 2 days ago  Karrie Nissen He reports he was doing his regular exercise 2 days ago by stretching his hamstrings and later noticed shortly after that he could not feel the bottom of his left foot  While going down the stairs at his home for dinner, he noticed left foot weakness and left foot was not" working right" and reported to the [facility] ED  He does report having noticed tightness in his hamstrings for sometimes and did follow with Orthopedic surgery in April and June of this year  Patient reports he was advised to continue doing stretching exercises  He however denies back pain, right leg numbness or weakness, urinary or bowel incontinence, facial droop, slurred speech, headaches and visual disturbance  ED Management:    -Labs were within normal limits   -Head CT showed no evidence of acute intracranial hemorrhage  -CTA neck and brain with and without contrast no intracranial mass, mass effect or midline shift    -Chest x-ray showed no acute cardiopulmonary disease  -MRI lumbar spine without contrast showed disc herniations at L5-S1 and L4-5 causing moderate to severe central canal and neural foraminal stenosis        Review of Systems  Review of Systems   Constitutional: Negative for appetite change, chills, fever and unexpected weight change  HENT: Negative for congestion  Eyes: Negative for visual disturbance  Respiratory: Negative for chest tightness and shortness of breath  Cardiovascular: Negative for chest pain and palpitations  Gastrointestinal: Negative for abdominal distention, constipation, diarrhea and vomiting  Genitourinary: Negative for dysuria and frequency  Musculoskeletal: Positive for arthralgias and joint swelling (Left ankle)  Skin: Negative for color change  Neurological: Positive for weakness  Negative for dizziness, seizures, syncope, facial asymmetry, light-headedness, numbness and headaches     Psychiatric/Behavioral: Negative for agitation  Historical Information   Past Medical History:   Diagnosis Date    Anemia     Left inguinal hernia     Managed By Rey Celestin / last assessed 3/27/15     Skin lesion      Past Surgical History:   Procedure Laterality Date    HERNIA REPAIR       Social History   History   Alcohol Use    Yes     Comment: social      History   Drug Use No     History   Smoking Status    Never Smoker   Smokeless Tobacco    Never Used     Family History: non-contributory    Meds/Allergies   all medications and allergies reviewed  No Known Allergies    Objective   Vitals: Blood pressure 130/66, pulse 60, temperature 98 4 °F (36 9 °C), temperature source Oral, resp  rate 18, SpO2 100 %  No intake or output data in the 24 hours ending 11/08/18 1620    Invasive Devices     Peripheral Intravenous Line            Peripheral IV 11/07/18 Left Antecubital 1 day    Peripheral IV 11/07/18 Right Antecubital 1 day                Physical Exam:  Physical Exam   Constitutional: He is oriented to person, place, and time  He appears well-developed and well-nourished  HENT:   Head: Normocephalic and atraumatic  Eyes: Conjunctivae and EOM are normal    Neck: Normal range of motion  Neck supple  Cardiovascular: Normal rate, regular rhythm, normal heart sounds and intact distal pulses  No murmur heard  Pulmonary/Chest: Effort normal and breath sounds normal  No respiratory distress  He has no wheezes  He has no rales  Abdominal: Soft  Bowel sounds are normal    Musculoskeletal:   Left ankle swelling compared to the right   Neurological: He is alert and oriented to person, place, and time  Scab wound on the lateral left knee and anterior upper leg  Anterior left lower limb has pinpoint lesions  Pt reports those are from multiple neuro exams  Decreased dorsiflexion of left foot  (left foot drop)   Plantar flexion intact  Intact sensation to touch but decreased sensation to pinprick on dorsum of left foot  Vitals reviewed  Lab Results: I have personally reviewed pertinent reports  Imaging: I have personally reviewed pertinent reports  EKG, Pathology, and Other Studies: I have personally reviewed pertinent reports        Code Status: Level 1 - Full Code      Ana Maria Poon MD  [facility] PGY1  11/8/2018  4:20 PM

## 2018-11-08 NOTE — ASSESSMENT & PLAN NOTE
Patient reports left foot weakness of 2 days  History of discomfort in left lower limb for the past 2 5 years  Patient is an active runner and sports   He has been having pain in the left thigh and hamstring and has followed with Orthopedic surgery in the past    Patient is said to have been evaluated by a chiropractor and had some stretching done in the past couple of weeks  He reports discomfort in the left ankle with standing for more than 15 min  Patient also did report a fall twice at the [facility] [employer] car parked, injuring his left ankle  On physical exam, left ankle swollen but not tender to palpation  Inability to dorsiflex the left ankle  Plantar of flexion intact  Muscle strength 5/5 bilateral lower extremities  Decreased sensation to pinprick dorsum of left foot      Stroke workup- negative    MRI-Disc herniations at L5-S1 and L4-5 causing moderate to severe central canal and neural foraminal stenosis    Per Neurosurgery ( Dr George Mckee), patient will require surgery   -Keep patient NPO  -Uncertain about exact surgery date and time, will discuss with Neurosurgery team   -Appreciate neurosurgery input

## 2018-11-08 NOTE — DISCHARGE SUMMARY
Discharge Summary - [facility] [facility]    Patient Information: Theresa Todd 68 y o  male MRN: [id number]  Unit/Bed#: -01 Encounter: [id number]    Discharging Physician / Practitioner: Daniel Ramirez MD  PCP: Marino Douglas DO  Admission Date: 11/7/2018  Discharge Date: 11/08/18    Reason for Admission:  Left foot weakness    Discharge Diagnoses:     Principal Problem:    Weakness of left foot      Consultations During Hospital Stay:  · Neurology    Procedures Performed:   · None    Significant findings:  · MRI lumbar spine - Disc herniations at L5-S1 and L4-5 causing moderate to severe central canal and neural foraminal stenosis    Hospital Course:   Theresa Todd is a 68 y o  male patient who originally presented to the hospital on 11/7/2018 due to left foot weakness  For full details on hospital course, please refer to prior dictated discharge summary on 11/7/18  Patient's discharge was canceled due to abnormal MRI showing findings as indicated above  Presenting history and imaging studies were reviewed by the neurosurgeon and patient was recommended for transfer to [facility] for possible surgical intervention  He remained in stable condition overnight with no worsening weakness  He had no bowel or urinary symptoms  I discussed with family practice provider Dr Monico Gillis regarding transfer  The patient will be admitted under the service of Dr Jameson Nunez at [place] [address]  I also discussed with Dr Marilu Carney with Neurosurgery who recommends surgical intervention  He is currently pending x-ray of the left foot to evaluate for fractures status post fall with ankle swelling      Condition at Discharge: stable     Discharge Day Visit / Exam:     Subjective:  No new complaints or acute overnight events    Vitals: Blood Pressure: 122/56 (11/08/18 0709)  Pulse: 84 (11/08/18 0709)  Temperature: 98 2 °F (36 8 °C) (11/08/18 0709)  Temp Source: Oral (11/08/18 0709)  Respirations: 16 (11/08/18 0531)  Height: 6' 2 5" (189 2 cm) (11/07/18 0444)  Weight - Scale: 93 kg (205 lb 0 4 oz) (11/07/18 0444)  SpO2: 99 % (11/08/18 0709)    General Appearance:    Alert, cooperative, no distress, appropriately responsive    Head:    Normocephalic, without obvious abnormality, atraumatic, mucous membranes moist    Eyes:    Conjunctiva/corneas clear, EOM's intact   Neck:   Supple   Lungs:     Clear to auscultation bilaterally, respirations unlabored, no crackles or wheeze     Heart:    Regular rate and rhythm, S1 and S2    Abdomen:     Soft, non-tender, bowel sounds active all four quadrants,     no masses, no organomegaly   Extremities:   Nontender swelling noted to lateral aspect left ankle, full range of motion, no edema   Neurologic:  Left foot drop, otherwise nonfocal      Discharge instructions/Information to patient and family:   See after visit summary for information provided to patient and family  Provisions for Follow-Up Care:  See after visit summary for information related to follow-up care and any pertinent home health orders  Disposition: [facility]      Discharge Statement:  I spent >30 minutes discharging the patient  This time was spent on the day of discharge  I had direct contact with the patient on the day of discharge  Greater than 50% of the total time was spent examining patient, answering all patient questions, arranging and discussing plan of care with patient as well as directly providing post-discharge instructions  Additional time then spent on discharge activities  Discharge Medications:  See after visit summary for reconciled discharge medications provided to patient and family  ** Please Note: Dragon 360 Dictation voice to text software may have been used in the creation of this document   **

## 2018-11-08 NOTE — CONSULTS
Consultation - Neurosurgery   Sherwin Martin 68 y o  male MRN: [id number]  Unit/Bed#: [facility] 920-01 Encounter: [id number]  Consult completed on 11/08/2018 at 3:45pm    Consults    Assessment/Plan     Assessment:  1  Disc herniations at L5-S1 and L4-5 with moderate to severe central canal and neural foraminal stenosis  2  Left foot drop    Plan:  · Exam:  Alert and oriented x3, strength 5/5 except for dorsiflexion on left foot, diminished sensation in partial S1 distribution, no Mainor, no clonus, no drift, reflexes 1+ and symmetrical  · Imaging reviewed personally and with attending  Results are as follows:  · MRI lumbar spine (11/07/2018): Disc herniations at L5-S1 and L4-5 causing moderate to severe central canal and neural foraminal stenosis  · CT stroke alert brain (11/07/2018): No evidence of acute intracranial hemorrhage  No definite CT evidence of acute intracranial abnormality  · CTA stroke alert (11/07/2018): The distal right vertebral artery is extremely diminutive  The left posterior communicating artery is diminutive  The right posterior communicating artery is not definitely identified  No evidence of hemodynamically significant stenosis involving the common carotid arteries or internal carotid arteries bilaterally  · Patient may have a diet but will make NPO after midnight in case intervention is scheduled for tomorrow  · Hold AC / AP  · Patient does not use blood thinners regularly  · Medical management per primary team  · DVT ppx: SCDs  · Mobilize as tolerated with assistance  · PT / OT  · Discussed imaging results with the patient  Explained that surgical intervention is recommended  Patient is agreeable and understands  · Neurosurgery will continue to follow  Surgical intervention is recommended, tentatively for tomorrow  Nose to toes protocol and pre op labs ordered, NPO after midnight    Please call with questions or concerns    History of Present Illness   HPI: Sherwin Martin is a 68 y o  year old male with no significant PMH who presented as a transfer from [facility] for left foot drop  MRI lumbar spine significant for disc herniation at L5-S1 and L4-5 causing moderate to severe central canal and neural foraminal stenosis  Patient reports he has had problems with left leg for 2 years  He has intermittent left lateral thigh pain  One month ago he developed left posterior calf pain  In the past patient was able to walk for a period of time and symptoms would relieve themselves however recently this has not been the case  On Tuesday night while working out at home, patient reports he was unable to lift left foot off ground  He noted that left foot deviated outward and his foot felt like it was asleep  He ambulated by holding on to nearby objects to steady himself  He went to the ED the next day and admits to falling twice in the parking lot due to wet ground and feeling as though his left foot was giving out  He notes that his left ankle is swollen but reports no pain  Currently patient denies pain  He denies back or neck pain, numbness, tingling, bowel or bladder problems, headache, dizziness, confusion  He denies blood thinner use at home  Patient is very active and is currently a   Review of Systems   Constitutional: Negative for chills and fever  HENT: Negative for hearing loss, tinnitus and trouble swallowing  Eyes: Negative for visual disturbance  Respiratory: Negative for chest tightness and shortness of breath  Cardiovascular: Negative for chest pain  Gastrointestinal: Negative for abdominal pain, constipation, diarrhea, nausea and vomiting  Genitourinary: Negative for difficulty urinating  Musculoskeletal: Negative for back pain and neck pain  Left ankle swelling   Allergic/Immunologic: Negative for environmental allergies and food allergies  Neurological: Positive for weakness (left foot drop)   Negative for dizziness, numbness and headaches  Hematological: Does not bruise/bleed easily  Psychiatric/Behavioral: Negative for confusion  Historical Information   Past Medical History:   Diagnosis Date    Anemia     Left inguinal hernia     Managed By Kezia Weems / last assessed 3/27/15     Skin lesion      Past Surgical History:   Procedure Laterality Date    HERNIA REPAIR       History   Alcohol Use    Yes     Comment: social      History   Drug Use No     History   Smoking Status    Never Smoker   Smokeless Tobacco    Never Used     Family History   Problem Relation Age of Onset    Heart disease Mother     No Known Problems Father     No Known Problems Sister     No Known Problems Brother     No Known Problems Family        Meds/Allergies   all current active meds have been reviewed, current meds:   Current Facility-Administered Medications   Medication Dose Route Frequency    acetaminophen (TYLENOL) tablet 650 mg  650 mg Oral Q6H PRN    and PTA meds:   Prior to Admission Medications   Prescriptions Last Dose Informant Patient Reported? Taking? Calcium-Magnesium-Vitamin D (CALCIUM MAGNESIUM PO)   Yes No   Sig: Take by mouth   Cholecalciferol (VITAMIN D PO)   Yes No   Sig: Take by mouth   Cyanocobalamin (VITAMIN B12 PO)   Yes No   Sig: Take by mouth   Doxycycline Hyclate 150 MG TABS   Yes No   Sig: Take 20 mg by mouth daily     chlorhexidine (PERIDEX) 0 12 % solution   Yes No   Sig: RINSE WITH 1/2 OZ TWO TIMES DAILY FOLLOWING BRUSHING AND FLOSSING      Facility-Administered Medications: None     No Known Allergies    Objective   I/O     None          Physical Exam   Constitutional: He is oriented to person, place, and time  Vital signs are normal  He appears well-developed and well-nourished  He is cooperative  HENT:   Head: Normocephalic and atraumatic  Eyes: Pupils are equal, round, and reactive to light  Conjunctivae and EOM are normal    Neck: No spinous process tenderness and no muscular tenderness present  Cardiovascular: Normal rate  Pulmonary/Chest: Effort normal  No respiratory distress  Musculoskeletal:        Left ankle: He exhibits swelling  Cervical back: He exhibits no tenderness  Thoracic back: He exhibits no tenderness  Lumbar back: He exhibits no tenderness  Neurological: He is alert and oriented to person, place, and time  He has normal strength  He has a normal Finger-Nose-Finger Test    Reflex Scores:       Tricep reflexes are 1+ on the right side and 1+ on the left side  Bicep reflexes are 1+ on the right side and 1+ on the left side  Brachioradialis reflexes are 1+ on the right side and 1+ on the left side  Patellar reflexes are 1+ on the right side and 1+ on the left side  Achilles reflexes are 1+ on the right side and 1+ on the left side  Skin: Skin is warm, dry and intact  Psychiatric: He has a normal mood and affect  His speech is normal and behavior is normal  Judgment and thought content normal  Cognition and memory are normal      Neurologic Exam     Mental Status   Oriented to person, place, and time  Follows 1 step commands  Attention: normal  Concentration: normal    Speech: speech is normal   Level of consciousness: alert  Knowledge: good  Able to perform simple calculations  Able to repeat  Normal comprehension  Cranial Nerves     CN II   Visual fields full to confrontation  CN III, IV, VI   Pupils are equal, round, and reactive to light    Extraocular motions are normal    CN III: no CN III palsy  CN VI: no CN VI palsy  Nystagmus: none   Diplopia: none  Ophthalmoparesis: none  Upgaze: normal  Downgaze: normal  Conjugate gaze: present    CN V   Right facial sensation deficit: none  Left facial sensation deficit: none    CN VII   Right facial weakness: none  Left facial weakness: none    CN VIII   Hearing: intact    CN IX, X   CN IX normal    CN X normal      CN XI   Right trapezius strength: normal  Left trapezius strength: normal    CN XII   CN XII normal      Motor Exam   Muscle bulk: normal  Overall muscle tone: normal  Right arm pronator drift: absent  Left arm pronator drift: absent    Strength   Strength 5/5 throughout  LLE:  0/5 DF     Sensory Exam   Light touch normal    Proprioception normal    Right arm pinprick: normal  Left arm pinprick: normal  Right leg pinprick: normal  Left leg pinprick: decreased from toes (S1 pattern, big toe and corresponding dorsum of foot)  DST:  Unable to identify tapping on left foot     Gait, Coordination, and Reflexes     Coordination   Finger to nose coordination: normal    Tremor   Resting tremor: absent  Intention tremor: absent  Action tremor: absent    Reflexes   Right brachioradialis: 1+  Left brachioradialis: 1+  Right biceps: 1+  Left biceps: 1+  Right triceps: 1+  Left triceps: 1+  Right patellar: 1+  Left patellar: 1+  Right achilles: 1+  Left achilles: 1+  Right : 1+  Left : 1+  Right Marcus: absent  Left Marcus: absent  Right ankle clonus: absent  Left ankle clonus: absent      Vitals:Blood pressure 130/66, pulse 60, temperature 98 4 °F (36 9 °C), temperature source Oral, resp  rate 18, SpO2 100 %  ,There is no height or weight on file to calculate BMI       Lab Results:     Results from last 7 days  Lab Units 11/07/18  0601 11/07/18  0546   WBC Thousand/uL  --  7 30   HEMOGLOBIN g/dL  --  13 3   I STAT HEMOGLOBIN g/dl 12 6  --    HEMATOCRIT % 37 39 7   PLATELETS Thousands/uL  --  233       Results from last 7 days  Lab Units 11/07/18  0601 11/07/18  0546   POTASSIUM mmol/L  --  3 9   CHLORIDE mmol/L  --  105   CO2 mmol/L  --  29   CO2, I-STAT mmol/L 29  --    BUN mg/dL  --  14   CREATININE mg/dL  --  1 03   CALCIUM mg/dL  --  9 0   GLUCOSE, ISTAT mg/dl 109  --                Results from last 7 days  Lab Units 11/07/18  0546   INR  0 95   PTT seconds 33     No results found for: TROPONINT  ABG:No results found for: PHART, NKO4JYY, PO2ART, RSP1GPM, M7IDSFJE, BEART, SOURCE    Imaging Studies: I have personally reviewed pertinent reports  and I have personally reviewed pertinent films in PACS    EKG, Pathology, and Other Studies: I have personally reviewed pertinent reports  VTE Prophylaxis: Sequential compression device Gabriel Wild)     Code Status: Level 1 - Full Code  Advance Directive and Living Will:      Power of :    POLST:      Counseling / Coordination of Care  I spent 45 minutes with the patient

## 2018-11-08 NOTE — PHYSICAL THERAPY NOTE
PHYSICAL THERAPY NOTE    Patient Name: Jacinto Cabello Date: 18 0944   Pain Assessment   Pain Assessment 0-10   Pain Score 3   Pain Location Leg   Pain Orientation Left   Restrictions/Precautions   Other Precautions Fall Risk;Pain   General   Family/Caregiver Present No   Subjective   Subjective Patient supine in bed and is agreeable to therapy session  Patient identifers obtained from name &   Bed Mobility   Supine to Sit 6  Modified independent   Additional items Assist x 1;HOB elevated   Sit to Supine Unable to assess   Transfers   Sit to Stand 5  Supervision   Additional items Assist x 1;Verbal cues   Stand to Sit 5  Supervision   Additional items Assist x 1;Verbal cues   Stand pivot 5  Supervision   Additional items Assist x 1; Armrests; Increased time required   Ambulation/Elevation   Gait pattern L Foot drag;Decreased L stance  (left foot steppage w/ cane, none noted w/ roller walker)   Gait Assistance 5  Supervision  (supervision RW/min a [facility])   Additional items Assist x 1   Assistive Device SPC;Rolling walker   Distance 200' x1 with Rolling walker, 39' x2 with [facility]   Stair Management Assistance 5  Supervision  (close)   Additional items Assist x 1;Verbal cues; Increased time required   Stair Management Technique One rail R;Foreward; Step to pattern; With cane   Number of Stairs 13   Balance   Static Sitting Normal   Dynamic Sitting Good   Static Standing Fair   Dynamic Standing [address] -  (with roller walker)   Activity Tolerance   Activity Tolerance Patient tolerated treatment well   Nurse Made Aware Spoke to Marlee Llamas RN    Assessment   Prognosis Good   Problem List Decreased strength;Decreased range of motion;Decreased endurance; Impaired balance;Decreased mobility; Decreased coordination;Decreased safety awareness; Impaired sensation;Pain   Assessment Patient eager and willing to participate in therapy session  Demonstrated ability to transfer supine to sit with modified independence  Sit to stand transfers remain consistent with supervision and vebral instruction for safe body positioning and hand placement  Patient ability to ambulate increased gait distance with supervision using roller walker  [place] ambulation trial performed with use of SPC requiring min a x 1 and instruciton for step sequencing and cane placement  Patient expressed increased fatigue with ambulation using SPC limiting gait distance  Demonstrated ability to ascend and descend full flight stairs with L handrail and SPC requiring close supervision for safety  Patient with independent recall of same stepping pattern however required cuing initally for cane placement on stair  Continue to focus on gait and stair progression with reinforcement of use of roller walker and cane as appropriate  Goals   Patient Goals to go home and be able to exercise   STG Expiration Date 11/17/18   Treatment Day 2   Plan   Treatment/Interventions Functional transfer training;LE strengthening/ROM; Elevations; Therapeutic exercise; Endurance training;Patient/family training;Equipment eval/education; Bed mobility;Gait training   Progress Progressing toward goals   PT Frequency Other (Comment)  (3 to 5x/week as able)   Recommendation   Recommendation Outpatient PT; Other (Comment)  (orthotist referral for left AFO (custom may be needed))   Equipment Recommended Other (Comment)  (roller walker)       Ann Busch, PTA

## 2018-11-09 ENCOUNTER — ANESTHESIA EVENT (INPATIENT)
Dept: PERIOP | Facility: HOSPITAL | Age: 73
DRG: 520 | End: 2018-11-09
Payer: MEDICARE

## 2018-11-09 ENCOUNTER — ANESTHESIA (INPATIENT)
Dept: PERIOP | Facility: HOSPITAL | Age: 73
DRG: 520 | End: 2018-11-09
Payer: MEDICARE

## 2018-11-09 ENCOUNTER — APPOINTMENT (INPATIENT)
Dept: RADIOLOGY | Facility: HOSPITAL | Age: 73
DRG: 520 | End: 2018-11-09
Payer: MEDICARE

## 2018-11-09 PROBLEM — M21.372 LEFT FOOT DROP: Status: ACTIVE | Noted: 2018-11-08

## 2018-11-09 LAB
GLUCOSE SERPL-MCNC: 117 MG/DL (ref 65–140)
PLATELET # BLD AUTO: 209 THOUSANDS/UL (ref 149–390)
PMV BLD AUTO: 10.3 FL (ref 8.9–12.7)

## 2018-11-09 PROCEDURE — 0SB20ZZ EXCISION OF LUMBAR VERTEBRAL DISC, OPEN APPROACH: ICD-10-PCS | Performed by: NEUROLOGICAL SURGERY

## 2018-11-09 PROCEDURE — 82948 REAGENT STRIP/BLOOD GLUCOSE: CPT

## 2018-11-09 PROCEDURE — 01NB0ZZ RELEASE LUMBAR NERVE, OPEN APPROACH: ICD-10-PCS | Performed by: NEUROLOGICAL SURGERY

## 2018-11-09 PROCEDURE — 85049 AUTOMATED PLATELET COUNT: CPT | Performed by: NEUROLOGICAL SURGERY

## 2018-11-09 PROCEDURE — 63047 LAM FACETEC & FORAMOT LUMBAR: CPT | Performed by: NEUROLOGICAL SURGERY

## 2018-11-09 PROCEDURE — 72020 X-RAY EXAM OF SPINE 1 VIEW: CPT

## 2018-11-09 PROCEDURE — 63030 LAMOT DCMPRN NRV RT 1 LMBR: CPT | Performed by: NEUROLOGICAL SURGERY

## 2018-11-09 PROCEDURE — 99222 1ST HOSP IP/OBS MODERATE 55: CPT | Performed by: PHYSICAL MEDICINE & REHABILITATION

## 2018-11-09 RX ORDER — LIDOCAINE HYDROCHLORIDE 10 MG/ML
INJECTION, SOLUTION INFILTRATION; PERINEURAL AS NEEDED
Status: DISCONTINUED | OUTPATIENT
Start: 2018-11-09 | End: 2018-11-09 | Stop reason: SURG

## 2018-11-09 RX ORDER — ONDANSETRON 2 MG/ML
4 INJECTION INTRAMUSCULAR; INTRAVENOUS ONCE AS NEEDED
Status: DISCONTINUED | OUTPATIENT
Start: 2018-11-09 | End: 2018-11-09 | Stop reason: HOSPADM

## 2018-11-09 RX ORDER — CHLORHEXIDINE GLUCONATE 0.12 MG/ML
15 RINSE ORAL ONCE
Status: COMPLETED | OUTPATIENT
Start: 2018-11-09 | End: 2018-11-09

## 2018-11-09 RX ORDER — METHYLPREDNISOLONE ACETATE 40 MG/ML
INJECTION, SUSPENSION INTRA-ARTICULAR; INTRALESIONAL; INTRAMUSCULAR; SOFT TISSUE AS NEEDED
Status: DISCONTINUED | OUTPATIENT
Start: 2018-11-09 | End: 2018-11-09 | Stop reason: HOSPADM

## 2018-11-09 RX ORDER — CHLORHEXIDINE GLUCONATE 0.12 MG/ML
15 RINSE ORAL EVERY 12 HOURS SCHEDULED
Status: DISCONTINUED | OUTPATIENT
Start: 2018-11-09 | End: 2018-11-11 | Stop reason: HOSPADM

## 2018-11-09 RX ORDER — SUCCINYLCHOLINE/SOD CL,ISO/PF 100 MG/5ML
SYRINGE (ML) INTRAVENOUS AS NEEDED
Status: DISCONTINUED | OUTPATIENT
Start: 2018-11-09 | End: 2018-11-09 | Stop reason: SURG

## 2018-11-09 RX ORDER — ACETAMINOPHEN 325 MG/1
650 TABLET ORAL EVERY 4 HOURS PRN
Status: DISCONTINUED | OUTPATIENT
Start: 2018-11-09 | End: 2018-11-10

## 2018-11-09 RX ORDER — OXYCODONE HYDROCHLORIDE 5 MG/1
5 TABLET ORAL EVERY 4 HOURS PRN
Status: DISCONTINUED | OUTPATIENT
Start: 2018-11-09 | End: 2018-11-10

## 2018-11-09 RX ORDER — FENTANYL CITRATE 50 UG/ML
25 INJECTION, SOLUTION INTRAMUSCULAR; INTRAVENOUS
Status: DISCONTINUED | OUTPATIENT
Start: 2018-11-09 | End: 2018-11-10

## 2018-11-09 RX ORDER — HEPARIN SODIUM 5000 [USP'U]/ML
5000 INJECTION, SOLUTION INTRAVENOUS; SUBCUTANEOUS EVERY 8 HOURS SCHEDULED
Status: DISCONTINUED | OUTPATIENT
Start: 2018-11-10 | End: 2018-11-11 | Stop reason: HOSPADM

## 2018-11-09 RX ORDER — ALBUMIN, HUMAN INJ 5% 5 %
SOLUTION INTRAVENOUS CONTINUOUS PRN
Status: DISCONTINUED | OUTPATIENT
Start: 2018-11-09 | End: 2018-11-09

## 2018-11-09 RX ORDER — DOXYCYCLINE HYCLATE 150 MG/1
20 TABLET ORAL DAILY
Status: DISCONTINUED | OUTPATIENT
Start: 2018-11-10 | End: 2018-11-09

## 2018-11-09 RX ORDER — DEXAMETHASONE SODIUM PHOSPHATE 4 MG/ML
INJECTION, SOLUTION INTRA-ARTICULAR; INTRALESIONAL; INTRAMUSCULAR; INTRAVENOUS; SOFT TISSUE AS NEEDED
Status: DISCONTINUED | OUTPATIENT
Start: 2018-11-09 | End: 2018-11-09 | Stop reason: SURG

## 2018-11-09 RX ORDER — FENTANYL CITRATE 50 UG/ML
INJECTION, SOLUTION INTRAMUSCULAR; INTRAVENOUS AS NEEDED
Status: DISCONTINUED | OUTPATIENT
Start: 2018-11-09 | End: 2018-11-09 | Stop reason: SURG

## 2018-11-09 RX ORDER — GABAPENTIN 300 MG/1
300 CAPSULE ORAL ONCE
Status: COMPLETED | OUTPATIENT
Start: 2018-11-09 | End: 2018-11-09

## 2018-11-09 RX ORDER — HYDROMORPHONE HCL/PF 1 MG/ML
SYRINGE (ML) INJECTION AS NEEDED
Status: DISCONTINUED | OUTPATIENT
Start: 2018-11-09 | End: 2018-11-09 | Stop reason: SURG

## 2018-11-09 RX ORDER — OXYCODONE HYDROCHLORIDE 10 MG/1
10 TABLET ORAL EVERY 4 HOURS PRN
Status: DISCONTINUED | OUTPATIENT
Start: 2018-11-09 | End: 2018-11-10

## 2018-11-09 RX ORDER — SODIUM CHLORIDE, SODIUM LACTATE, POTASSIUM CHLORIDE, CALCIUM CHLORIDE 600; 310; 30; 20 MG/100ML; MG/100ML; MG/100ML; MG/100ML
75 INJECTION, SOLUTION INTRAVENOUS CONTINUOUS
Status: DISCONTINUED | OUTPATIENT
Start: 2018-11-09 | End: 2018-11-10

## 2018-11-09 RX ORDER — GLYCOPYRROLATE 0.2 MG/ML
INJECTION INTRAMUSCULAR; INTRAVENOUS AS NEEDED
Status: DISCONTINUED | OUTPATIENT
Start: 2018-11-09 | End: 2018-11-09 | Stop reason: SURG

## 2018-11-09 RX ORDER — PROPOFOL 10 MG/ML
INJECTION, EMULSION INTRAVENOUS AS NEEDED
Status: DISCONTINUED | OUTPATIENT
Start: 2018-11-09 | End: 2018-11-09 | Stop reason: SURG

## 2018-11-09 RX ORDER — MAGNESIUM HYDROXIDE 1200 MG/15ML
LIQUID ORAL AS NEEDED
Status: DISCONTINUED | OUTPATIENT
Start: 2018-11-09 | End: 2018-11-09 | Stop reason: HOSPADM

## 2018-11-09 RX ORDER — ROCURONIUM BROMIDE 10 MG/ML
INJECTION, SOLUTION INTRAVENOUS AS NEEDED
Status: DISCONTINUED | OUTPATIENT
Start: 2018-11-09 | End: 2018-11-09 | Stop reason: SURG

## 2018-11-09 RX ORDER — ACETAMINOPHEN 325 MG/1
975 TABLET ORAL ONCE
Status: COMPLETED | OUTPATIENT
Start: 2018-11-09 | End: 2018-11-09

## 2018-11-09 RX ORDER — ONDANSETRON 2 MG/ML
INJECTION INTRAMUSCULAR; INTRAVENOUS AS NEEDED
Status: DISCONTINUED | OUTPATIENT
Start: 2018-11-09 | End: 2018-11-09 | Stop reason: SURG

## 2018-11-09 RX ORDER — BUPIVACAINE HYDROCHLORIDE AND EPINEPHRINE 5; 5 MG/ML; UG/ML
INJECTION, SOLUTION EPIDURAL; INTRACAUDAL; PERINEURAL AS NEEDED
Status: DISCONTINUED | OUTPATIENT
Start: 2018-11-09 | End: 2018-11-09 | Stop reason: HOSPADM

## 2018-11-09 RX ORDER — SODIUM CHLORIDE, SODIUM LACTATE, POTASSIUM CHLORIDE, CALCIUM CHLORIDE 600; 310; 30; 20 MG/100ML; MG/100ML; MG/100ML; MG/100ML
INJECTION, SOLUTION INTRAVENOUS CONTINUOUS PRN
Status: DISCONTINUED | OUTPATIENT
Start: 2018-11-09 | End: 2018-11-09

## 2018-11-09 RX ORDER — LIDOCAINE HYDROCHLORIDE AND EPINEPHRINE 10; 10 MG/ML; UG/ML
INJECTION, SOLUTION INFILTRATION; PERINEURAL AS NEEDED
Status: DISCONTINUED | OUTPATIENT
Start: 2018-11-09 | End: 2018-11-09 | Stop reason: HOSPADM

## 2018-11-09 RX ORDER — SODIUM CHLORIDE 9 MG/ML
125 INJECTION, SOLUTION INTRAVENOUS CONTINUOUS
Status: DISCONTINUED | OUTPATIENT
Start: 2018-11-09 | End: 2018-11-10

## 2018-11-09 RX ORDER — FENTANYL CITRATE/PF 50 MCG/ML
50 SYRINGE (ML) INJECTION
Status: DISCONTINUED | OUTPATIENT
Start: 2018-11-09 | End: 2018-11-09 | Stop reason: HOSPADM

## 2018-11-09 RX ADMIN — CHLORHEXIDINE GLUCONATE 15 ML: 1.2 RINSE ORAL at 16:02

## 2018-11-09 RX ADMIN — HYDROMORPHONE HYDROCHLORIDE 1 MG: 1 INJECTION, SOLUTION INTRAMUSCULAR; INTRAVENOUS; SUBCUTANEOUS at 17:30

## 2018-11-09 RX ADMIN — DEXAMETHASONE SODIUM PHOSPHATE 10 MG: 4 INJECTION, SOLUTION INTRAMUSCULAR; INTRAVENOUS at 16:27

## 2018-11-09 RX ADMIN — SODIUM CHLORIDE 125 ML/HR: 0.9 INJECTION, SOLUTION INTRAVENOUS at 18:40

## 2018-11-09 RX ADMIN — GABAPENTIN 300 MG: 300 CAPSULE ORAL at 16:00

## 2018-11-09 RX ADMIN — NEOSTIGMINE METHYLSULFATE 2 MG: 1 INJECTION, SOLUTION INTRAMUSCULAR; INTRAVENOUS; SUBCUTANEOUS at 17:46

## 2018-11-09 RX ADMIN — Medication 100 MG: at 16:21

## 2018-11-09 RX ADMIN — PROPOFOL 200 MG: 10 INJECTION, EMULSION INTRAVENOUS at 16:21

## 2018-11-09 RX ADMIN — PHENYLEPHRINE HYDROCHLORIDE 50 MCG/MIN: 10 INJECTION INTRAVENOUS at 16:43

## 2018-11-09 RX ADMIN — ONDANSETRON 4 MG: 2 INJECTION INTRAMUSCULAR; INTRAVENOUS at 16:27

## 2018-11-09 RX ADMIN — ROCURONIUM BROMIDE 10 MG: 10 INJECTION INTRAVENOUS at 16:21

## 2018-11-09 RX ADMIN — CEFAZOLIN SODIUM 2000 MG: 2 SOLUTION INTRAVENOUS at 16:15

## 2018-11-09 RX ADMIN — SODIUM CHLORIDE, SODIUM LACTATE, POTASSIUM CHLORIDE, AND CALCIUM CHLORIDE: .6; .31; .03; .02 INJECTION, SOLUTION INTRAVENOUS at 18:00

## 2018-11-09 RX ADMIN — ACETAMINOPHEN 975 MG: 325 TABLET, FILM COATED ORAL at 16:00

## 2018-11-09 RX ADMIN — SODIUM CHLORIDE, SODIUM LACTATE, POTASSIUM CHLORIDE, AND CALCIUM CHLORIDE: .6; .31; .03; .02 INJECTION, SOLUTION INTRAVENOUS at 16:16

## 2018-11-09 RX ADMIN — Medication 45 MG: at 16:26

## 2018-11-09 RX ADMIN — LIDOCAINE HYDROCHLORIDE 50 MG: 10 INJECTION, SOLUTION INFILTRATION; PERINEURAL at 16:21

## 2018-11-09 RX ADMIN — GLYCOPYRROLATE 0.4 MG: 0.2 INJECTION, SOLUTION INTRAMUSCULAR; INTRAVENOUS at 17:46

## 2018-11-09 RX ADMIN — ALBUMIN (HUMAN): 12.5 SOLUTION INTRAVENOUS at 16:27

## 2018-11-09 RX ADMIN — FENTANYL CITRATE 100 MCG: 50 INJECTION, SOLUTION INTRAMUSCULAR; INTRAVENOUS at 16:21

## 2018-11-09 NOTE — PROGRESS NOTES
Progress Note - Neurosurgery   Giovanna Tapia 68 y o  male MRN: [id number]  Unit/Bed#: [facility] 920-01 Encounter: [id number]    Assessment:  1  Disc herniations at L5-S1 and L4-5 with moderate to severe central canal and neural foraminal stenosis  2  Left foot drop     Plan:  · Exam:  Alert and oriented x 3, strength 5/5 except for dorsiflexion on left foot, diminished sensation to LT and PP in partial S1 distribution, no Mainor, no clonus, no drift, reflexes 1+ and symmetrical  · Imaging reviewed personally and with attending  Results are as follows:  ? MRI lumbar spine (11/07/2018): Disc herniations at L5-S1 and L4-5 causing moderate to severe central canal and neural foraminal stenosis  ? CT stroke alert brain (11/07/2018): No evidence of acute intracranial hemorrhage  No definite CT evidence of acute intracranial abnormality  ? CTA stroke alert (11/07/2018): The distal right vertebral artery is extremely diminutive  The left posterior communicating artery is diminutive  The right posterior communicating artery is not definitely identified  No evidence of hemodynamically significant stenosis involving the common carotid arteries or internal carotid arteries bilaterally  · Patient currently NPO  · Hold AC / AP  ? Patient does not use blood thinners regularly  · Medical management per primary team  · DVT ppx: SCDs  ? Hold pharm ppx due to possible surgical intervention today  · Mobilize as tolerated with assistance  ? PT / OT  · Neurosurgery will continue to follow  Patient is scheduled as an add on case for later today for a Metrx L4-5 left hemilaminectomy and bilateral foraminotomy, Metrx L5-S1 left hemilaminectomy and microdiscectomy with Dr Mota Case  Nose to toes protocol and pre op labs ordered, NPO currently    Please call with questions or concerns    Subjective/Objective   Chief Complaint: "I feel okay " / Disc herniations at L5-S1 and L4-5 with moderate to severe central canal and neural foraminal stenosis and left foot drop    Subjective: Patient reports no change in numbness and motor function of left foot  He is having some 4/10 left thigh soreness from recent quad injury  He is still unable to dorsiflex his left foot  He is urinating and having BM per baseline  He denies back or neck pain, headaches, dizziness, CP, SOB, N/V, abdominal pain, weakness  Objective: sitting in chair, NAD  I/O       11/07 0701 - 11/08 0700 11/08 0701 - 11/09 0700 11/09 0701 - 11/10 0700    P  O   240 0    Total Intake(mL/kg)  240 0 (0)    Net   +240 0           Unmeasured Urine Occurrence  2 x     Unmeasured Stool Occurrence  1 x           Invasive Devices     Peripheral Intravenous Line            Peripheral IV 11/07/18 Left Antecubital 2 days    Peripheral IV 11/07/18 Right Antecubital 2 days                Physical Exam:  Vitals: Blood pressure 128/51, pulse 80, temperature 97 5 °F (36 4 °C), temperature source Oral, resp  rate 18, height 6' 2" (1 88 m), weight 90 7 kg (200 lb), SpO2 96 %  ,Body mass index is 25 68 kg/m²      General appearance: alert, appears stated age, cooperative and no distress  Head: Normocephalic, without obvious abnormality, atraumatic  Eyes: EOMI, PERRL  Neck: supple, symmetrical, trachea midline and NT  Back: no kyphosis present, no tenderness to percussion or palpation  Lungs: non labored breathing  Heart: regular heart rate  Neurologic:   Mental status: Alert, oriented x 3, thought content appropriate  Cranial nerves: grossly intact (Cranial nerves II-XII)  Sensory:  Slightly diminished light touch sensation on the dorsal aspect of left foot and S1 distribution, diminished sensation to pinprick on dorsum of foot and lateral ankle in S1 distribution, DST intact, JPS 3/3 right toe and left ankle  Motor: moving all extremities with focal weakness with DF in left foot, strength 5/5 except for 0/5 DF left foot  Reflexes: 1+ and symmetric, no winters or clonus noted  Coordination: finger to nose normal bilaterally, no drift bilaterally    Lab Results:    Results from last 7 days  Lab Units 11/07/18  0601 11/07/18  0546   WBC Thousand/uL  --  7 30   HEMOGLOBIN g/dL  --  13 3   I STAT HEMOGLOBIN g/dl 12 6  --    HEMATOCRIT % 37 39 7   PLATELETS Thousands/uL  --  233       Results from last 7 days  Lab Units 11/07/18  0601 11/07/18  0546   POTASSIUM mmol/L  --  3 9   CHLORIDE mmol/L  --  105   CO2 mmol/L  --  29   CO2, I-STAT mmol/L 29  --    BUN mg/dL  --  14   CREATININE mg/dL  --  1 03   CALCIUM mg/dL  --  9 0   GLUCOSE, ISTAT mg/dl 109  --                Results from last 7 days  Lab Units 11/07/18  0546   INR  0 95   PTT seconds 33     No results found for: TROPONINT  ABG:No results found for: PHART, MOV8NHC, PO2ART, IQZ5QQP, I6VOFZOB, BEART, SOURCE    Imaging Studies: I have personally reviewed pertinent reports  and I have personally reviewed pertinent films in PACS    EKG, Pathology, and Other Studies: I have personally reviewed pertinent reports        VTE Pharmacologic Prophylaxis: Reason for no pharmacologic prophylaxis - possible surgical intervention today    VTE Mechanical Prophylaxis: sequential compression device

## 2018-11-09 NOTE — OP NOTE
OPERATIVE REPORT  PATIENT NAME: Angelita Gardner    :  1945  MRN: [id number]  Pt Location: BE OR ROOM 17    SURGERY DATE: 2018    Surgeon(s) and Role:     * Sarai Larkin MD - Primary    Preop Diagnosis:  Left foot drop [M21 372]  Pain in left thigh [M79 652]    Post-Op Diagnosis Codes:     * Left foot drop [M21 372]     * Pain in left thigh [M79 652]    Procedure(s) (LRB):  Metrx L4-5 left hemilaminectomy and bilateral foraminotomy, Metrx L5-S1 left hemilaminectomy and microdiskectomy (Left)    Specimen(s):  * No specimens in log *    Estimated Blood Loss:   Minimal    Drains:       Anesthesia Type:   General    Operative Indications:  Left foot drop [M21 372]  Pain in left thigh [M79 652]      Operative Findings:  Herniated disc at the L5-S1 region and ligamentous hypertrophy at L4-5 and V1-D4    Complications:   None    Procedure and Technique:  After adequate general endotracheal anesthesia the patient was placed prone on the operating room table  The back was prepped with Betadine soap and then DuraPrep double layer drapes placed normal fashion and 3 and Betadine impregnated sticky drape was placed over these  A timeout was called all parameters the timeout were followed  The K wire was placed to the facet overlying the L4-5 interspace on the left side  Intraoperative fluoroscopy was used throughout the case to either identification of level  There was radiology over-read for level verification  The skin was then infiltrated with 1% lidocaine with 1 100,000 epinephrine  A #15 blade was used to incise the skin  Bovie and bipolar were used throughout the procedure to maintain operative exposure  Intraoperative microscope was used at various degrees of magnification to aid in the Identification, illumination, and microdissection necessary to perform this procedure  Progressive dilators were placed, the sheath was placed and this was affixed to the bedside connector   The [place] drill was utilized to perform a hemilaminotomy  A medial facetectomy and decompressive foraminotomy were performed with the use of Kerrison rongeurs, foraminotomy rongeurs and curved curettes  A complete decompressive foraminotomy was performed on the left and right side utilizing the foraminotomy rongeurs and Kerrison rongeurs  Copious quantities of irrigation were used to cleanse out the region  There were no CSF leaks  40 mg of Depo-Medrol was placed a small piece of Gelfoam was placed over this  The K wire was then placed to the facet overlying the L5-S1 interspace on the left side  Intraoperative fluoroscopy was used throughout the case to either identification of level  There was radiology over-read for level verification  The skin was then infiltrated with 1% lidocaine with 1 100,000 epinephrine  A #15 blade was used to incise the skin  Bovie and bipolar were used throughout the procedure to maintain operative exposure  Intraoperative microscope was used at various degrees of magnification to aid in the Identification, illumination, and microdissection necessary to perform this procedure  Progressive dilators were placed, the sheath was placed and this was affixed to the bedside connector  The [place]o drill was utilized to perform a hemilaminotomy on the left-sided L5-S1  A large disc herniation was identified partially in the subligamentous region and partially in the epidural space  The disk space was not entered  There were no CSF leaks  40 mg of Depo-Medrol was placed a small piece of Gelfoam was placed over this  The retractors were removed  The fascia was approximated with interrupted 3-0 Vicryl suture  The skin was closed with interrupted inverted 3-0 Vicryl suture  0 5% bupivacaine with 1: 200,000 epinephrine was infiltrated into the surrounding tissues  Benzoin and Steri-Strips are placed and sterile dressing was placed    The patient was taken to the recovery room having tolerated procedure well   I was present for the entire procedure    Patient Disposition:  PACU     SIGNATURE: Sarai Larkin MD  DATE: November 9, 2018  TIME: 6:09 PM
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
## 2018-11-09 NOTE — PLAN OF CARE
DISCHARGE PLANNING     Discharge to home or other facility with appropriate resources Progressing        DISCHARGE PLANNING - CARE MANAGEMENT     Discharge to post-acute care or home with appropriate resources Progressing        INFECTION - ADULT     Absence or prevention of progression during hospitalization Progressing     Absence of fever/infection during neutropenic period Progressing        Knowledge Deficit     Patient/family/caregiver demonstrates understanding of disease process, treatment plan, medications, and discharge instructions Progressing        MUSCULOSKELETAL - ADULT     Maintain or return mobility to safest level of function Progressing     Maintain proper alignment of affected body part Progressing        NEUROSENSORY - ADULT     Achieves stable or improved neurological status Progressing     Remains free of injury related to seizures activity Progressing     Achieves maximal functionality and self care Progressing        PAIN - ADULT     Verbalizes/displays adequate comfort level or baseline comfort level Progressing        Potential for Falls     Patient will remain free of falls Progressing        SAFETY ADULT     Maintain or return to baseline ADL function Progressing     Maintain or return mobility status to optimal level Progressing        SKIN/TISSUE INTEGRITY - ADULT     Skin integrity remains intact Progressing     Incision(s), wounds(s) or drain site(s) healing without S/S of infection Progressing     Oral mucous membranes remain intact Progressing

## 2018-11-09 NOTE — ANESTHESIA PREPROCEDURE EVALUATION
Review of Systems/Medical History    Chart reviewed  No history of anesthetic complications     Cardiovascular  EKG reviewed, Exercise tolerance (METS): >4,     Pulmonary  Negative pulmonary ROS        GI/Hepatic  Negative GI/hepatic ROS          Negative  ROS        Endo/Other  Negative endo/other ROS      GYN       Hematology   Musculoskeletal       Neurology      Comment: Foot drop Psychology           Physical Exam    Airway    Mallampati score: II  TM Distance: >3 FB  Neck ROM: full     Dental   No notable dental hx     Cardiovascular      Pulmonary      Other Findings        Anesthesia Plan  ASA Score- 2     Anesthesia Type- general with ASA Monitors  Additional Monitors:   Airway Plan: ETT  Plan Factors-    Induction- intravenous  Postoperative Plan-     Informed Consent- Anesthetic plan and risks discussed with patient  I personally reviewed this patient with the CRNA  Discussed and agreed on the Anesthesia Plan with the CRNA  Kelly Rosa

## 2018-11-09 NOTE — SOCIAL WORK
Cm met with patient to review role of Cm  Patient presented as alert during conversation  Patient reported that he lives with wife Virginia Lucia, [phone] in a condo with a half step up with a loft that has 13-14 step leading up to loft  Patient reported that he was independent with ADLS PTA  Patient denied having any inpatient PT/OT, inpatient MH, substance abuse treatment or [facility]  Patient denied having any DME in the home  Patient reported that pharmacy of choice is [facility] [place]  Patient reported that PCP is through [facility]  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

## 2018-11-09 NOTE — UTILIZATION REVIEW
Initial Clinical Review    Admission: Date/Time/Statement: 11/8/18 @ 1530     Orders Placed This Encounter   Procedures    Inpatient Admission     Standing Status:   Standing     Number of Occurrences:   1     Order Specific Question:   Admitting Physician     Answer:   Ramiro Busch     Order Specific Question:   Level of Care     Answer:   Med Surg [16]     Order Specific Question:   Estimated length of stay     Answer:   More than 2 Midnights     Order Specific Question:   Certification     Answer:   I certify that inpatient services are medically necessary for this patient for a duration of greater than two midnights  See H&P and MD Progress Notes for additional information about the patient's course of treatment  Date/Time/Mode of Arrival: 11/8 Transfer from [address] [place] [address]  Chief Complaint: Left foot weakness    History of Illness: 68 y o  male who presents with left foot weakness of 2 days  Patient reports he has been have been discomfort in the left leg for the past 2 5 years he describes as tightness in the hamstring and calf discomfort  He reports he has been aggressively doing stretching exercises of his hamstring and also notice some discomfort in the left ankle about 2 days ago  He reports he was doing his regular exercise 2 days ago by stretching his hamstrings and later noticed shortly after that he could not feel the bottom of his left foot  While going down the stairs at his home for dinner, he noticed left foot weakness and left foot was not" working right" and reported to the [facility] ED  He does report having noticed tightness in his hamstrings for sometimes and did follow with Orthopedic surgery in April and June of this year  Patient reports he was advised to continue doing stretching exercises      Vital Signs:   Vitals   Temperature Pulse Respirations Blood Pressure SpO2   11/08/18 1300 11/08/18 1300 11/08/18 1300 11/08/18 1300 11/08/18 1300 98 4 °F (36 9 °C) 60 18 130/66 100 %      Temp Source Heart Rate Source Patient Position - Orthostatic VS BP Location FiO2 (%)   11/08/18 1300 11/08/18 2307 11/08/18 1300 11/08/18 1300 --   Oral Monitor Sitting Right arm       Pain Score       11/09/18 1125       No Pain        Wt Readings from Last 1 Encounters:   11/09/18 90 7 kg (200 lb)       Vital Signs (abnormal): WNL    Abnormal Labs: WNL    Diagnostic Test Results: MRI Lumbar - Disc herniations at L5-S1 and L4-5 causing moderate to severe central canal and neural foraminal stenosis       Past Medical/Surgical History: Active Ambulatory Problems     Diagnosis Date Noted    Pain in left thigh 06/11/2018    Hamstring tightness of both lower extremities 06/11/2018    Weakness of left foot 11/07/2018     Resolved Ambulatory Problems     Diagnosis Date Noted    No Resolved Ambulatory Problems     Past Medical History:   Diagnosis Date    Anemia     Left inguinal hernia     Skin lesion        Admitting Diagnosis: Foot drop [M21 379]    Age/Sex: 68 y o  male    Assessment/Plan:   80y Male to ED with Weakness of Left Foot  2 day history of left foot weakness and 2 falls injuring left ankle  Pt is an active runner and sports   He has been having pain in the left thigh and hamstring and has followed with Orthopedic surgery in the past  Decreased sensation to pinprick dorsum of left foot  MRI   Neurosurgery cons  NPO - per neurosurgery pt will require surgery  PT eval and treat    11/8 Neurosurgery cons:  Disc herniations at L5-S1 and L4-5 with moderate to severe central canal and neural foraminal stenosis  Left foot chetan  NPO after midnight in case intervention is scheduled for tomorrow      Admission Orders:  NPO  11/9 OR - Metrx L4-5 left hemilaminectomy and bilateral foraminotomy, Metrx L5-S1 left hemilaminectomy and microdiskectomy (Left Spine Lumbar)    Neurosurgery cons  PMR cons  PT/OT eval and treat    Scheduled Meds:   None    Continuous Infusions:    PRN Meds:   acetaminophen

## 2018-11-09 NOTE — ASSESSMENT & PLAN NOTE
Left foot weakness unchanged from yesterday  On physical exam strength 5/5 except for dorsiflexion of left foot with diminished sensation dorsum of left foot  Left ankle swelling unchanged  Non tender to palpation  Per neurosurgery, patient should be kept NPO for possible surgery tentatively today  Patient to be mobilized as tolerated with assistance   PT/OT eval   Appreciate neurosurgery input  Will continue to monitor

## 2018-11-09 NOTE — CONSULTS
PHYSICAL MEDICINE AND REHABILITATION CONSULT NOTE  Vinod Mcintosh 68 y o  male MRN: [id number]  Unit/Bed#: [facility] 920-01 Encounter: [id number]    Requested by (Physician/Service): Sherri Hernandez MD  Reason for Consultation:  Assessment of rehabilitation needs  Chief Complaint:  Left foot drop    Assessment and Recommendations:  Vinod Mcintosh is a 68 y o  male without significant medical history who is presenting now with acute onset L foot drop found to have lumbar central and foraminal stenosis due to disc herniations at L4-5 and L5-S1  PM&R consulted for rehabilitation recommendations  Lumbar stenosis  L dorsiflexion and EHL/FHL weakness - likely due to an L5-S1 radiculopathy  If sciatic, would be involving primarily peroneal branch, but given the patchy distribution of decreased pinprick sensation in the L5-S1 distribution and lack of more proximal peroneal sensory deficits, less suspicious for a peripheral neuropathy  Hamstring tightness bilaterally      Impairments:  Impaired functional mobility and ability to perform ADL's    Recommendations:  - Continue PT/OT  - Skin: Patient able to turn himself and perform weight shifts  - Bowel: No incontinence  Last BM 11/8/18  - Bladder: No louie, no incontinence  - Pt is unable to safely return home until he is at the supervision/contact-guard functional level (25% assistance level with or without a device)  He is fairly high functioning, so provided no complications, anticipate he may be able to go home with outpatient therapies  However, will follow his medical/functional progress after surgery to more fully realize his needs  Thank you for allowing the PM&R service to participate in the care of this patient  We will continue to follow Maria Victoria Ba progress with you  Please do not hesitate to call with questions or concerns    History of Present Illness:  Vinod Mcintosh is a 68 y o  male who  has a past medical history of Anemia;  Left inguinal hernia; and Skin lesion  and presented to the [address] at [facility] on 11/7/18 with left foot weakness/numbness  This was preceded by increasing tightness on his hamstring  His ambulatory dysfunction began 11/6 was quite sudden and he fell twice in the parking lot on the way into the ER resulting in L ankle swelling and pain  He was immediately able to bear partial weight and walk with assistance into the ER  He was seen by Neurology who felt this was less likely stroke  CTA Head/Neck showed distal R vertebral artery extremely diminutive and L PCA diminutive, but was otherwise unremarkable  His labs were unremarkable  MRI Lumbar Spine was done which showed disc herniations at L5-S1 and L4-5 causing moderate to severe central canal and neural foraminal stenosis  It was determined that he would be transferred to [facility] for evaluation and possible decompression with Neurosurgery  He denies any back pain, reports a long standing history of hamstring tightness, and relates a history of outer leg tightness and discomfort that is very intermittent  Previously used to improve with running and rest, but now only improves with rest  It is not constant  He has not tried acupuncture, seen a chiropractor, tried massage, received back injections  He's tried a [place] herbal medication, tylenol, ibuprofen, and aleve  These provide some measure of relief  He has had therapy to help stretch out his hamstrings last spring, and reports that helped significantly with his symptoms  He denies any bowel/bladder dysfunction, fevers, night sweats, and chills  He denies any neck pain or hand symptoms  Restrictions include:  none at this time  Will follow-up imaging of ankle, and will likely need spinal orthosis after surgery      Hospital Course/Comorbidities:   Comorbidities: None  Complications: None  Comorbidity Present   PVD No   PAD No   DM No   Diabetic neuropathy No   Diabetic retinopathy No   Diabetic nephropathy No   Morbid Obesity (BMI > 40) No     Last Weight:    Wt Readings from Last 1 Encounters:   11/07/18 93 kg (205 lb 0 4 oz)     Last BMI:  Estimated body mass index is 25 97 kg/m² as calculated from the following:    Height as of 11/7/18: 6' 2 5" (1 892 m)  Weight as of 11/7/18: 93 kg (205 lb 0 4 oz)  Functional History:     Prior to Admission:     Functional Status: Patient was independent with mobility/ambulation, transfers, ADL's, IADL's  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[employer]  Present:  Evals pending  Past Medical History:    Past Medical History:   Diagnosis Date    Anemia     Left inguinal hernia     Managed By Sakshi Muhammad / last assessed 3/27/15     Skin lesion         Past Surgical History:    Past Surgical History:   Procedure Laterality Date    HERNIA REPAIR          Medications:    Current Facility-Administered Medications:     acetaminophen (TYLENOL) tablet 650 mg, 650 mg, Oral, Q6H PRN, Loreto Kerr MD    Allergies:   No Known Allergies     Family History:    Family History   Problem Relation Age of Onset    Heart disease Mother     No Known Problems Father     No Known Problems Sister     No Known Problems Brother     No Known Problems Family        Social History:    Social History     Social History    Marital status: /Civil Union     Spouse name: N/A    Number of children: N/A    Years of education: N/A     Social History Main Topics    Smoking status: Never Smoker    Smokeless tobacco: Never Used    Alcohol use Yes      Comment: social     Drug use: No    Sexual activity: Not Asked     Other Topics Concern    None     Social History Narrative    None        River Jaimes is  and Lives with: lives with their spouse  He lives in [facility]) single family home  The living area: can live on one level  Equipment in home: None  There No steps to enter the home      Patient/family's goals: Return to previous home/apartment  The patient will have 24 hour ARC Supervision/physical assistance: supervision available upon discharge  Review of Systems: 10 point ROS negative except for what is noted in HPI    Physical Exam:  /51 (BP Location: Right arm)   Pulse 80   Temp 97 5 °F (36 4 °C) (Oral)   Resp 18   SpO2 96%        Intake/Output Summary (Last 24 hours) at 11/09/18 0825  Last data filed at 11/08/18 1900   Gross per 24 hour   Intake              240 ml   Output                0 ml   Net              240 ml       There is no height or weight on file to calculate BMI  Gen: No acute distress, Well-nourished, well-appearing  HEENT: Moist mucus membranes, Normocephalic/Atraumatic  Cardiovascular: Regular rate, rhythm, S1/S2  Distal pulses palpable  Heme/Extr: No edema/clubbing/cyanosis  Pulmonary: Non-labored breathing  Lungs CTAB  : No louie  GI: Soft, non-tender, non-distended  BS+  MSK: PROM is WFL in all extremities  No effusions or deformities  Bulk is symmetric without diane atrophy noted  Mild L ankle swelling, no tenderness on malleoli or navicular, or metatarsals  He has no pain or instability with anterior/posterior drawer or talar tilt  SLR/CSLR negative  He has hamstring tightness bilaterally  Hip ROM is full bilaterally without any discomfort  Integumentary: Skin is warm, dry  No rashes or ulcers  Neuro: AAOx3, CN 2-12 grossly intact  Sensation intact to light touch, except over the dorsum of his foot, and he has patchy pinprick lost in a primarily L5-S1 distribution  No proximal loss of sensation  Speech is intact  Appropriate to questioning  Tone is normal     DTRs: Diminished throughout patella, achilles, biceps, brachioradialis, triceps  Absent sawyer's  Plantar response is flexor bilaterally  No clonus  Coord/Gait: Difficulty with ambulation given foot drop   Wide based gait, affecting steppage due to instruction he has been given in the past few days to help with clearance of his left foot  MMT:   Strength:   Right  Left  Site  Right  Left  Site    5 5  S Ab: Shoulder Abductors  5  4+  HF: Hip Flexors    5 5  EF: Elbow Flexors  5  5 KF: Knee Flexors    5  5  EE: Elbow Extensors  5  5  KE: Knee Extensors    5  5  WE: Wrist Extensors  5  1  DR: Dorsi Flexors    5  5  FF: Finger Flexors  5  4+  PF: Plantar Flexors    5  5  HI: Hand Intrinsics  5  1 EHL: Extensor Hallucis Longus   L FHL is 3/5  Hip Ab 4+/5, Hip Ad 4+/5  More proximal muscles were limited due to pain in his quad  Psych: Normal mood and affect  Laboratory:    Lab Results   Component Value Date    HGB 12 6 11/07/2018    HGB 13 3 11/07/2018    HCT 37 11/07/2018    HCT 39 7 11/07/2018    WBC 7 30 11/07/2018      Lab Results   Component Value Date    BUN 14 11/07/2018    K 3 9 11/07/2018     11/07/2018    GLUCOSE 109 11/07/2018    CREATININE 1 03 11/07/2018      Lab Results   Component Value Date    PROTIME 12 4 11/07/2018    INR 0 95 11/07/2018       Imaging: Personally reviewed the MRI L-spine and agree with the report  Reviewed reports of CT/CTA  11/7/18 MRI L-Spine:  L3-L4:  There is no focal disk herniation, central canal or neural foraminal narrowing  Bilateral facet hypertrophy noted  L4-L5:  There is a left neural foraminal disc protrusion  Moderate left neural foraminal narrowing  Infolding of ligamentum flavum  Moderate to severe central canal narrowing  Moderate right neural foraminal narrowing  L5-S1:  There is bilateral facet hypertrophy  There is a left paracentral disc herniation, extrusion type  There is infolding of ligamentum flavum  Severe narrowing left subarticular recess  Severe central canal narrowing  Moderate right neural   foraminal narrowing  11/7/18 CTH:  No evidence of acute intracranial hemorrhage  No definite CT evidence of acute intracranial abnormality    If clinically appropriate, MRI with diffusion-weighted imaging could be performed for further evaluation, if there are no contraindications  11/7/18 CTA Head/Neck:  The distal right vertebral artery is extremely diminutive  The left posterior communicating artery is diminutive  The right posterior communicating artery is not definitely identified  No evidence of hemodynamically significant stenosis involving the common carotid arteries or internal carotid arteries bilaterally

## 2018-11-09 NOTE — PROGRESS NOTES
Patient seen and examined  He has a chronic left leg pain and weakness with an acute footdrop on the left  Imaging studies demonstrates spinal stenosis at the L4-5 level and a new disc herniation at the L5-S1 level eccentric to the left side  In a detailed way we spoke about the risks, possibility of complication, indications, and general nature of a metrics decompressive hemilaminectomy at L4-5 with bilateral foraminotomies followed by a left-sided L5-S1 microdiskectomy  He asked informed questions reflecting is understanding and wishes to proceed  The risks, benefits and complications of surgery were explained in detail to [facility]  including hemorrhage, infection, CSF leakage, wound problems, pain, weakness, numbness, dysesthesiae, paralysis, coma, and death  Also, the possibility  of further surgery being required was emphasized  Other potential medical complications were outlined, including deep venous thrombosis, pulmonary embolism, pneumonia, urinary tract infection, myocardial infarction,  and stroke  The need for physical therapy, occupational therapy, and rehabilitation was also mentioned  The alternatives to surgery were discussed  [facility] asked relevant questions and asked that we proceed with arrangements for surgery

## 2018-11-10 ENCOUNTER — APPOINTMENT (INPATIENT)
Dept: RADIOLOGY | Facility: HOSPITAL | Age: 73
DRG: 520 | End: 2018-11-10
Payer: MEDICARE

## 2018-11-10 LAB
ANION GAP SERPL CALCULATED.3IONS-SCNC: 3 MMOL/L (ref 4–13)
BUN SERPL-MCNC: 18 MG/DL (ref 5–25)
CALCIUM SERPL-MCNC: 8.9 MG/DL (ref 8.3–10.1)
CHLORIDE SERPL-SCNC: 103 MMOL/L (ref 100–108)
CO2 SERPL-SCNC: 28 MMOL/L (ref 21–32)
CREAT SERPL-MCNC: 1.14 MG/DL (ref 0.6–1.3)
ERYTHROCYTE [DISTWIDTH] IN BLOOD BY AUTOMATED COUNT: 13.4 % (ref 11.6–15.1)
GFR SERPL CREATININE-BSD FRML MDRD: 64 ML/MIN/1.73SQ M
GLUCOSE SERPL-MCNC: 186 MG/DL (ref 65–140)
HCT VFR BLD AUTO: 41 % (ref 36.5–49.3)
HGB BLD-MCNC: 13.3 G/DL (ref 12–17)
MCH RBC QN AUTO: 31.3 PG (ref 26.8–34.3)
MCHC RBC AUTO-ENTMCNC: 32.4 G/DL (ref 31.4–37.4)
MCV RBC AUTO: 97 FL (ref 82–98)
PLATELET # BLD AUTO: 231 THOUSANDS/UL (ref 149–390)
PMV BLD AUTO: 10.7 FL (ref 8.9–12.7)
POTASSIUM SERPL-SCNC: 4.8 MMOL/L (ref 3.5–5.3)
RBC # BLD AUTO: 4.25 MILLION/UL (ref 3.88–5.62)
SODIUM SERPL-SCNC: 134 MMOL/L (ref 136–145)
WBC # BLD AUTO: 6.86 THOUSAND/UL (ref 4.31–10.16)

## 2018-11-10 PROCEDURE — G8979 MOBILITY GOAL STATUS: HCPCS

## 2018-11-10 PROCEDURE — 73630 X-RAY EXAM OF FOOT: CPT

## 2018-11-10 PROCEDURE — 97163 PT EVAL HIGH COMPLEX 45 MIN: CPT

## 2018-11-10 PROCEDURE — 80048 BASIC METABOLIC PNL TOTAL CA: CPT | Performed by: FAMILY MEDICINE

## 2018-11-10 PROCEDURE — 99024 POSTOP FOLLOW-UP VISIT: CPT | Performed by: PHYSICIAN ASSISTANT

## 2018-11-10 PROCEDURE — 85027 COMPLETE CBC AUTOMATED: CPT | Performed by: NEUROLOGICAL SURGERY

## 2018-11-10 PROCEDURE — G8978 MOBILITY CURRENT STATUS: HCPCS

## 2018-11-10 PROCEDURE — 99232 SBSQ HOSP IP/OBS MODERATE 35: CPT | Performed by: FAMILY MEDICINE

## 2018-11-10 RX ORDER — ACETAMINOPHEN 325 MG/1
650 TABLET ORAL EVERY 6 HOURS PRN
Status: DISCONTINUED | OUTPATIENT
Start: 2018-11-10 | End: 2018-11-11 | Stop reason: HOSPADM

## 2018-11-10 RX ORDER — OXYCODONE HYDROCHLORIDE 5 MG/1
5 TABLET ORAL EVERY 4 HOURS PRN
Status: DISCONTINUED | OUTPATIENT
Start: 2018-11-10 | End: 2018-11-11 | Stop reason: HOSPADM

## 2018-11-10 RX ORDER — OXYCODONE HYDROCHLORIDE 5 MG/1
2.5 TABLET ORAL EVERY 4 HOURS PRN
Status: DISCONTINUED | OUTPATIENT
Start: 2018-11-10 | End: 2018-11-11 | Stop reason: HOSPADM

## 2018-11-10 RX ADMIN — CHLORHEXIDINE GLUCONATE 15 ML: 1.2 RINSE ORAL at 21:56

## 2018-11-10 RX ADMIN — SODIUM CHLORIDE 125 ML/HR: 0.9 INJECTION, SOLUTION INTRAVENOUS at 03:19

## 2018-11-10 RX ADMIN — CHLORHEXIDINE GLUCONATE 15 ML: 1.2 RINSE ORAL at 09:24

## 2018-11-10 RX ADMIN — HEPARIN SODIUM 5000 UNITS: 5000 INJECTION INTRAVENOUS; SUBCUTANEOUS at 21:55

## 2018-11-10 NOTE — PROGRESS NOTES
Patient examined at bedside s/p surgery  Patient is stable, he states he is doing well  Physical exam shows decreased dorsiflexion of left foot unchanged from previous exams with decreased sensation dorsum of left foot  Plantar flexion left foot intact  Will continue to monitor

## 2018-11-10 NOTE — ANESTHESIA POSTPROCEDURE EVALUATION
Post-Op Assessment Note      CV Status:  Stable    Mental Status:  Alert    Hydration Status:  Stable    PONV Controlled:  None    Airway Patency:  Patent    Post Op Vitals Reviewed: Yes          Staff: Anesthesiologist, CRNA       Comments: This note is written after the fact, however, the pt was evaluated immediately upon arrival to PACU and found to be stable and comfortable  Handoff given at that time             BP      Temp      Pulse     Resp      SpO2

## 2018-11-10 NOTE — PLAN OF CARE
Problem: PHYSICAL THERAPY ADULT  Goal: Performs mobility at highest level of function for planned discharge setting  See evaluation for individualized goals  Treatment/Interventions: Therapeutic exercise, LE strengthening/ROM, Equipment eval/education, Compensatory technique education  Equipment Recommended: Rolf Casillas (Pt reports owning DME)       See flowsheet documentation for full assessment, interventions and recommendations  Prognosis: Good  Problem List: Decreased strength, Decreased range of motion, Decreased mobility, Orthopedic restrictions  Assessment: Pt is a 68 y o  male seen for PT evaluation s/p L emeka laminectomy (L4-L5) + B/L foraminectomy L5-S1 hemilaminectomy and microdiscectomy  Pt recently noticed LLE foot drop/weakness and has had persistent LLE pain for 2 years  Pt has no other significant PMH  He lives in a 2 level house with his wife who is available to assist as needed and is able to live on the main level with bathroom access and 0 ANGELICA  Pt owns a walker and cane  Pt is highly motivated and denied any pain at the strat of the session  He was (S) with sit <--> stand transfers using RW and was able to ambulate 360' using RW with (S)  His gait was characterized by L foot drop and steppage gait which the pt remarks is intentional  He performed stairs with (S) and HR use alone with spc  Pt was educated on log roll technique to maintain precautions  He demonstrated recall 3/3 of spinal precautions  Pt initially expressed desire to remain in the hospital for additional therapy  DC process was explained to him and he expressed understanding  Pt requests OPPT upon DC to home  Given pt deficits in LLE strength, pt is appropriate for OPPT upon DC to home  He is appropriate to receive skilled therapy to address LLE strength deficits prior to DC    Barriers to Discharge: None     Recommendation: Outpatient PT, Home with family support, Other (Comment) (Possible referral for AFO)     PT - OK to Discharge: Yes    See flowsheet documentation for full assessment

## 2018-11-10 NOTE — PHYSICAL THERAPY NOTE
PHYSICAL THERAPY NOTE    Physical Therapy Evaluation     Patient's Name: Ashutosh Petty    Admitting Diagnosis  Foot drop [M21 [id number]]    Problem List  Patient Active Problem List   Diagnosis    Pain in left thigh    Hamstring tightness of both lower extremities    Weakness of left foot    Left foot drop       Past Medical History  Past Medical History:   Diagnosis Date    Anemia     Left inguinal hernia     Managed By Lorelei Mancini / last assessed 3/27/15     Skin lesion        Past Surgical History  Past Surgical History:   Procedure Laterality Date    HERNIA REPAIR         Daya Elizondo      Patient Name: Ashutosh Petty  WWIKN'C Date: 11/10/2018   11/10/18 1049   Note Type   Note type Eval only   Pain Assessment   Pain Assessment No/denies pain   Pain Score No Pain   Home Living   Type of [address] [place] [address] Two level; Able to live on main level with bedroom/bathroom; Other (Comment)  (0 ANGELICA)   Bathroom Shower/Tub Tub/shower unit   Bathroom Toilet Standard   Bathroom Accessibility Accessible   Home Equipment Walker;Cane   Prior Function   Level of [place] Independent with ADLs and functional mobility   Lives With Spouse   Receives Help From Family   ADL Assistance Independent   IADLs Independent   Falls in the last 6 months 1 to 4  (2)   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   Restrictions/Precautions   Wells [place] Bearing Precautions Per Order No   Other Precautions Fall Risk;Spinal precautions   General   Additional Pertinent History Pt is s/p L hemilaminectomy and b/l foraminectomy L4-L5 and L5-S1 hemilaminectomy and microdiscectomy   Family/Caregiver Present No   Cognition   Overall Cognitive Status WFL   Arousal/Participation Cooperative   Attention Within functional limits   Orientation Level Oriented X4   Memory Within functional limits   Following Commands Follows all commands and directions without difficulty   Comments Pt was pleasant and cooperative and able to converse beyond basic needs  He expressed the desire to remain in the hospital for additional rehab services  DC process was explained to him and he was amenable to DC   RUE Assessment   RUE Assessment WFL   LUE Assessment   LUE Assessment WFL   RLE Assessment   RLE Assessment WFL   LLE Assessment   LLE Assessment X  (L ankle dorsiflxion (2-/5))   Strength LLE   L Ankle Dorsiflexion 2-/5   L Ankle Plantar Flexion 2+/5   Coordination   Movements are Fluid and Coordinated 0   Coordination and Movement Description LLE movement uncoordinated   Sensation X   Light Touch   LLE Light Touch Impaired   LLE Light Touch Comments Impaired medially/distally (L5-S1)   Bed Mobility   Rolling R 6  Modified independent   Additional items Assist x 1;Verbal cues; Comment  (Pt taught how to log roll)   Supine to Sit 6  Modified independent   Additional items Assist x 1; Increased time required;Verbal cues  (Log roll to R and sidelying to sit)   Additional Comments Pt educated on log roll technique to observe precautions   Transfers   Sit to Stand 5  Supervision   Additional items Assist x 1   Stand to Sit 5  Supervision   Additional items Assist x 1   Stand pivot 5  Supervision   Additional items Assist x 1   Ambulation/Elevation   Gait pattern L Foot drag;Steppage  ((purposeful L seppage))   Gait Assistance 5  Supervision   Additional items Assist x 1   Assistive Device Rolling walker   Distance 360'   Stair Management Assistance 5  Supervision   Additional items Assist x 1   Stair Management Technique With cane; One rail R;Step to pattern   Number of Stairs 6   Balance   Static Sitting Normal   Dynamic Sitting Good   Static Standing Fair   Dynamic Standing Fair   Ambulatory Fair -  (Using RW)   Endurance Deficit   Endurance Deficit No   Activity Tolerance   Activity Tolerance Patient tolerated treatment well   Medical Staff Made Aware CM Nurse Made Aware Nurse present for start of session   Assessment   Prognosis Good   Problem List Decreased strength;Decreased range of motion;Decreased mobility;Orthopedic restrictions   Assessment Pt is a 68 y o  male seen for PT evaluation s/p L emeka laminectomy (L4-L5) + B/L foraminectomy L5-S1 hemilaminectomy and microdiscectomy  Pt recently noticed LLE foot drop/weakness and has had persistent LLE pain for 2 years  Pt has no other significant PMH  He lives in a 2 level house with his wife who is available to assist as needed and is able to live on the main level with bathroom access and 0 ANGELICA  Pt owns a walker and cane  Pt is highly motivated and denied any pain at the strat of the session  He was (S) with sit <--> stand transfers using RW and was able to ambulate 360' using RW with (S)  His gait was characterized by L foot drop and steppage gait which the pt remarks is intentional  He performed stairs with (S) and HR use alone with spc  Pt was educated on log roll technique to maintain precautions  He demonstrated recall 3/3 of spinal precautions  Pt initially expressed desire to remain in the hospital for additional therapy  DC process was explained to him and he expressed understanding  Pt requests OPPT upon DC to home  Given pt deficits in LLE strength, pt is appropriate for OPPT upon DC to home  He is appropriate to receive skilled therapy to address LLE strength deficits prior to DC     Barriers to Discharge None   Goals   Patient Goals To go home   STG Expiration Date 11/17/18   Short Term Goal #1 Increase L LE strength 1/2 grade to facilitate independent mobility, Perform all transfers independently while observing spinal precautions to improve independence, Ambulate 500 ft  with least restrictive assistive device independently w/o LOB to expedite return to leisure activities like running, Navigate 12 stairs independently with unilateral handrail to facilitate return to previous living environment, Increase ambulatory balance 1 grade to decrease risk for falls, Complete exercise program independently to increase overall activity tolerance, Tolerate 3 hr OOB to faciliate upright tolerance and Improve Barthel Index score to 95 or greater to facilitate independence   Treatment Day 3   Plan   Treatment/Interventions Therapeutic exercise;LE strengthening/ROM; Equipment eval/education; Compensatory technique education   PT Frequency Other (Comment)  (3-5x/wk)   Recommendation   Recommendation Outpatient PT; Home with family support; Other (Comment)  (Possible referral for AFO)   Equipment Recommended Walker;Cane  (Pt reports owning DME)   PT - OK to Discharge Yes   Additional Comments Pt is pleasant and cooperative   Modified [place] Scale   Modified [place] Scale 3   Barthel Index   Feeding 10   Bathing 5   Grooming Score 5   Dressing Score 10   Bladder Score 10   Bowels Score 10   Toilet Use Score 10   Transfers (Bed/Chair) Score 10   Mobility (Level Surface) Score 10   Stairs Score 10   Barthel Index Score 90     Leandro Pimentel, SPT

## 2018-11-10 NOTE — PROGRESS NOTES
[Addended: L foot XR official read updated as equivocal for fracture, and recommend clinical correlation  Additionally, per chart review ortho has evaluated patient and placed posterior slab splint, and planning for removal boot vs hard soled shoe in AM  Will discuss further with patient  He may be able to continue with physical therapy for ROM exercises for L foot drop as previously planned, if he remains asymptomatic and has no pain/tenderness/edema in the affected area]      Discussed results of L foot Xray with patient at bedside (i e  Nondisplaced fracture of the base of the 2nd metatarsal bone)  Informed the patient that I had spoken to Dr Ben Aleman of othropedic surgery who recommends proper immobilization of the foot prior to discharge via a boot or possibly cast, unfortunately not available today  Patient continues to deny pain in the affected area  Appears quite disappointed, however understanding and expresses agreement with plan of care  Patient was counseled on conflicting goals of immobilization due to fracture vs ROM exercises s/p neurosurgical decompression surgery for foot drop to regain function  Advised to avoid bearing weight before immobilization tomorrow, and follow up with physical therapy for further management  Orthopedic surgery consulted, appreciate their recs  Likely evaluation and discharge home tomorrow with immobilization of the L foot  L foot exam: unchanged from earlier this morning (continued weakness on dorsiflexion, decreased sensation on dorsum of foot, otherwise neurovascularly intact)

## 2018-11-10 NOTE — PROGRESS NOTES
Progress Note - Neurosurgery   Sixto Mathew 68 y o  male MRN: [id number]  Unit/Bed#: [facility] 920-01 Encounter: [id number]    Assessment:  1  POD1 Metrx L4-5 left hemilaminectomy and bilateral foraminotomy, Metrx L5-S1 left hemilaminectomy and microdiskectomy   2  Lumbar radiculopathy  3  Left foot drop    Plan:  · Exam:  Awake alert  Following commands in all extremities  Left foot drop - DF 1-2/5  Otherwise full strength  Small focal region of decreased PP left lateral foot  · Imaging reviewed personally and by attending  Final results as below  · MRI lumbar spine without contrast November 7, 2018:  Disc herniation at L5-S1 and L4-L5 causing moderate to severe canal stenosis with neural foraminal stenosis  · Pain control - no pain at this time  Change Tylenol to to 650 mg p o  Every 6 hours as needed  Will discontinue IV fentanyl  Will adjust oxycodone to 2 5 and 5 mg for moderate severe pain, respectively  · Mobilize with physical and occupational therapy  · DVT PPX:  Bilateral SCDs the head and subcutaneous heparin  · May discontinue IV fluids from neurosurgical standpoint  · Patient cleared for discharge from neurosurgical standpoint when medically appropriate and has been cleared by therapy  He will follow-up for a two week incision check along with a six week postoperative visit  Discharge instructions were included in the patient's chart  · We will see patient as needed remainder of hospitalization  Please call if any questions or concerns    Subjective/Objective   Chief Complaint: "I have no pain"/postoperative follow-up    Subjective:  Patient denies any back pain/incisional pain  Denies any new radicular pain  States overall sensation of left foot slightly improved  Minimal prove meant of dorsiflexion on left  No chest pain, shortness of breath, nausea or vomiting  Admits to bowel movement Friday morning  Voiding well  Objective:  Sitting up in bed  NAD      I/O       11/08 0701 - 11/09 0700 11/09 0701 - 11/10 0700 11/10 0701 - 11/11 0700    P  O  240 318 400    I V  (mL/kg)  2050 (22 6)     IV Piggyback  250     Total Intake(mL/kg) 240 2618 (28 9) 400 (4 4)    Urine (mL/kg/hr)  75 (0)     Total Output   75      Net +240 +2543 +400           Unmeasured Urine Occurrence 2 x      Unmeasured Stool Occurrence 1 x            Invasive Devices     Peripheral Intravenous Line            Peripheral IV 11/07/18 Left Antecubital 3 days    Peripheral IV 11/07/18 Right Antecubital 3 days    Peripheral IV 11/09/18 Right Hand less than 1 day                Physical Exam:  Vitals: Blood pressure 151/67, pulse 63, temperature 97 5 °F (36 4 °C), temperature source Oral, resp  rate 18, height 6' 2" (1 88 m), weight 90 7 kg (200 lb), SpO2 100 %  ,Body mass index is 25 68 kg/m²  General appearance: alert, appears stated age, cooperative and no distress  Head: Normocephalic, without obvious abnormality, atraumatic  Eyes:  Conjugate gaze and tracks appropriately  Neck: supple, symmetrical, trachea midline   Back:  Band-Aid intact  No surrounding ecchymosis, erythema or significant edema  Lungs: non labored breathing  Heart: regular heart rate  Neurologic:   Mental status: Alert, oriented, thought content appropriate  Sensory: normal to PP except focal lateral dorsal foot  Motor: moving all extremities   5/5 except left dorsiflexion 1-2/5  Reflexes: 1+ and symmetric    Lab Results:    Results from last 7 days  Lab Units 11/10/18  0457 11/09/18  1953 11/07/18  0601 11/07/18  0546   WBC Thousand/uL 6 86  --   --  7 30   HEMOGLOBIN g/dL 13 3  --   --  13 3   I STAT HEMOGLOBIN g/dl  --   --  12 6  --    HEMATOCRIT % 41 0  --  37 39 7   PLATELETS Thousands/uL 231 209  --  233       Results from last 7 days  Lab Units 11/10/18  0457 11/07/18  0601 11/07/18  0546   POTASSIUM mmol/L 4 8  --  3 9   CHLORIDE mmol/L 103  --  105   CO2 mmol/L 28  --  29   CO2, I-STAT mmol/L  --  29  --    BUN mg/dL 18  --  14   CREATININE mg/dL 1 14 --  1 03   CALCIUM mg/dL 8 9  --  9 0   GLUCOSE, ISTAT mg/dl  --  109  --                Results from last 7 days  Lab Units 11/07/18  0546   INR  0 95   PTT seconds 33     No results found for: TROPONINT  ABG:No results found for: PHART, NMB1QWT, PO2ART, YUJ8OTI, C6BSPDDB, BEART, SOURCE    Imaging Studies: I have personally reviewed pertinent reports  and I have personally reviewed pertinent films in PACS    EKG, Pathology, and Other Studies: I have personally reviewed pertinent reports        VTE Pharmacologic Prophylaxis: Heparin    VTE Mechanical Prophylaxis: sequential compression device

## 2018-11-10 NOTE — PROGRESS NOTES
Progress Note - Adult  Jose Mckee 68 y o  male MRN: [id number]  Unit/Bed#: [facility] 920-01 Encounter: [id number]    Date of Admission: 11/8/2018  2:12 PM  Chief Complaint: Left foot weakness    Principal Problem:    Weakness of left foot  Active Problems:    Pain in left thigh    Left foot drop    Summary:   Jose Mckee is a 68 y o  male, a runner, who presented with left foot weakness x 2 days, history of left leg discomfort/hamstring tightness x2 5 years  He noted left foot weakness suddenly worsening after stretching exercises and presented to [facility]  He also reported falling on way to ER and suffering direct trauma to left foot/ankle causing edema and erythema  Denies any other associated s/s  ER MRI lumbar spine w/o contrast showed dis herniations at L4/L5/S1 causing moderate to severe central canal and neural foraminal stenosis  Neurosurgery performed on 11/9/18: L4-5 left hemilaminectomy and bilateral foraminotomy, Metrx L5-S1 left hemilaminectomy and microdiskectomy    Assessment and Plan:   Jose Mckee is a 68y o  year old male with:    Weakness of left foot s/p neurosurgical decompression (as above)  Patient denies any pain in left foot or surgical site today, however L foot edema and weakness persist consistent with physical exam 2/5 weakness to dorsiflexion, nontender to palpation  L ankle ROM full in all other directions except dorsiflexion (passive L ankle dorsiflexion partially restricted, likely due to edema), otherwise no FND  Management per neurosurgery  q4h neurochecks  PT/OT eval pending  Will continue to monitor    Pain meds:  acetaminophen, 650 mg, Q4H PRN  fentanyl citrate (PF), 25 mcg, Q1H PRN  oxyCODONE, 10 mg, Q4H PRN  oxyCODONE, 5 mg, Q4H PRN    FEN:  cc/hr, Regular house diet  DVT PPx: Heparin and SCDs  Code Status: Level 1 - Full Code  Dispo: Pending neurosurgery clearance and PT/OT recommendations    Subjective:  No acute complaints   Denies pain, difficulty with eating, drinking, voiding, elimination  Overnight Events/RN Concerns: None    Objective: Body mass index is 25 68 kg/m²  Vitals:    18 2200 18 2257 11/10/18 0000 11/10/18 0248   BP: 156/70 143/68 123/68 112/56   BP Location: Right arm Right arm  Right arm   Pulse: 69 76  61   Resp: 18 17  16   Temp: 97 6 °F (36 4 °C) 97 5 °F (36 4 °C)  97 6 °F (36 4 °C)   TempSrc: Oral Oral  Oral   SpO2: 99%   99%   Weight:       Height:         Temp:  [97 5 °F (36 4 °C)-99 °F (37 2 °C)] 97 6 °F (36 4 °C)  HR:  [57-76] 61  Resp:  [13-20] 16  BP: (105-156)/(55-70) 112/56  SpO2:  [96 %-100 %] 99 %  Temp (48hrs), Av °F (36 7 °C), Min:97 5 °F (36 4 °C), Max:99 °F (37 2 °C)  Current: Temperature: 97 6 °F (36 4 °C)  Physical Exam:  Physical Exam   Constitutional: He is oriented to person, place, and time  He appears well-developed and well-nourished  No distress  HENT:   Head: Normocephalic and atraumatic  Eyes: Pupils are equal, round, and reactive to light  Conjunctivae and EOM are normal    Cardiovascular: Normal rate, regular rhythm, normal heart sounds and intact distal pulses  No murmur heard  Pulmonary/Chest: Effort normal and breath sounds normal  No respiratory distress  He has no wheezes  He has no rales  Abdominal: Soft  Normal appearance and bowel sounds are normal  There is no tenderness  Musculoskeletal:   Nonpitting edema of L foot and ankle, with slight overlying erythema, nontender to touch  2/5 weakness on dorsiflexion of left foot, decreased sensation on dorsum of left foot, otherwise no other FND  Strength and sensation intact in R foot   Neurological: He is alert and oriented to person, place, and time  No cranial nerve deficit  Skin: He is not diaphoretic  Psychiatric: He has a normal mood and affect  Thought content normal    Vitals reviewed        Results from last 7 days  Lab Units 11/10/18  0457 183 18  0601 18  0546   WBC Thousand/uL 6 86  --   --  7 30 HEMOGLOBIN g/dL 13 3  --   --  13 3   I STAT HEMOGLOBIN g/dl  --   --  12 6  --    HEMATOCRIT % 41 0  --  37 39 7   PLATELETS Thousands/uL 231 209  --  233       Results from last 7 days  Lab Units 11/10/18  0457 11/07/18  0601 11/07/18  0546   POTASSIUM mmol/L 4 8  --  3 9   CHLORIDE mmol/L 103  --  105   CO2 mmol/L 28  --  29   CO2, I-STAT mmol/L  --  29  --    BUN mg/dL 18  --  14   CREATININE mg/dL 1 14  --  1 03   CALCIUM mg/dL 8 9  --  9 0   GLUCOSE, ISTAT mg/dl  --  109  --          No results found for: PHOS     This case was discussed with attending physician, Dr John Parker

## 2018-11-10 NOTE — PROGRESS NOTES
Pt systolic bp >193  Per Caridad Henderson, Family Medicine, will consult with superior  Awaiting further action  Will continue to monitor

## 2018-11-10 NOTE — CONSULTS
Orthopedics   Giovanna Tapia 68 y o  male MRN: [id number]  Unit/Bed#: [facility] 920-01      Chief Complaint:   left foot pain    HPI:  68 y o male status post fall who just had L4-5 hemilaminectomy and bilateral foraminotomy and L5-S1 left hemilaminectomy and microdiskectomy for herniated disc, ligamentous hypertrophy, and foot drop presents with acute left foot pain since his fall  Other than his foot drop, he has had no other injury history to his foot   No other injuries at this point    Review Of Systems:   · Skin: Normal  · Neuro: See HPI  · Musculoskeletal: See HPI  · 14 point review of systems negative except as stated above     Past Medical History:   Past Medical History:   Diagnosis Date    Anemia     Left inguinal hernia     Managed By Ariela Mathews / last assessed 3/27/15     Skin lesion        Past Surgical History:   Past Surgical History:   Procedure Laterality Date    HERNIA REPAIR         Family History:  Family history reviewed and non-contributory  Family History   Problem Relation Age of Onset    Heart disease Mother     No Known Problems Father     No Known Problems Sister     No Known Problems Brother     No Known Problems Family        Social History:  Social History     Social History    Marital status: /Civil Union     Spouse name: N/A    Number of children: N/A    Years of education: N/A     Social History Main Topics    Smoking status: Never Smoker    Smokeless tobacco: Never Used    Alcohol use Yes      Comment: social     Drug use: No    Sexual activity: Not Asked     Other Topics Concern    None     Social History Narrative    None       Allergies:   No Known Allergies        Labs:    0  Lab Value Date/Time   HCT 41 0 11/10/2018 0457   HCT 37 11/07/2018 0601   HCT 39 7 11/07/2018 0546   HGB 13 3 11/10/2018 0457   HGB 12 6 11/07/2018 0601   HGB 13 3 11/07/2018 0546   INR 0 95 11/07/2018 0546   WBC 6 86 11/10/2018 0457   WBC 7 30 11/07/2018 0546       Meds:    Current Facility-Administered Medications:     acetaminophen (TYLENOL) tablet 650 mg, 650 mg, Oral, Q6H PRN, Tammy Sheriff PA-C    chlorhexidine (PERIDEX) 0 12 % oral rinse 15 mL, 15 mL, [employer] [place], Q12H [address], Danielle Hernandez MD, 15 mL at 11/10/18 0924    heparin (porcine) subcutaneous injection 5,000 Units, 5,000 Units, Subcutaneous, Q8H [address] **AND** Platelet count, , , Once, Danielle Hernandez MD    oxyCODONE (ROXICODONE) IR tablet 2 5 mg, 2 5 mg, Oral, Q4H PRN, Tammy Sheriff PA-C    oxyCODONE (ROXICODONE) IR tablet 5 mg, 5 mg, Oral, Q4H PRN, Tammy Sheriff PA-C    Blood Culture:   No results found for: BLOODCX    Wound Culture:   No results found for: WOUNDCULT    Ins and Outs:  I/O last 24 hours: In: 0482 [P O :718; I V :2050; IV Piggyback:250]  Out: 75 [Urine:75]          Physical Exam:   /62   Pulse 79   Temp 97 6 °F (36 4 °C) (Oral)   Resp 18   Ht 6' 2" (1 88 m)   Wt 90 7 kg (200 lb)   SpO2 95%   BMI 25 68 kg/m²   Gen: Alert and oriented to person, place, time  HEENT: EOMI, eyes clear, moist mucus membranes, hearing intact  Respiratory: Bilateral chest rise  No audible wheezing found  Cardiovascular: Regular Rate and Rhythm  Abdomen: soft nontender/nondistended  Musculoskeletal: left lower extremity  · Skin intact with swelling and minimal ecchymoses over dorsum of foot  · Tender to palpation over 2nd metatarsal   · 2+ DP/PT pulse  · Sensation intact distally  · Able to plantarflex ankle and flex toe but no dorsiflexion of ankle or toe extension    Radiology:   I personally reviewed the films    X-rays left foot shows non-displaced 2nd metatarsal base fracture    Assessment:  73 y o male status post fall with left nondisplaced metatarsal fracture    Plan:   · NWB LLE in posterior slab splint  · Analgesics for pain  · Will switch to removal boot or hard soled shoe in AM  · PT/OT  · Dispo: Ortho will follow    Oleksandr Cavanaugh MD

## 2018-11-10 NOTE — DISCHARGE SUMMARY
Discharge Summary - Geovanna Wisdom 68 y o  male MRN: [id number]    Unit/Bed#: [facility] 920-01 Encounter: [id number]    Admission Date: 11/8/2018   Discharge Date: 11/10/2018    Diagnosis:   Principal Problem:    Weakness of left foot  Active Problems:    Pain in left thigh    Left foot drop    Important Physician Related Follow Up:   · [facility], Call schedule appointment for two week postoperative visit with a nurse then six week postoperative visit with Dr Burke Love  [phone]   · Chapin Jack DO, [address] ,  follow-up   within 1 week   · Quan Sherwood MD- orthopedics, follow-up in 1 month      Procedures Performed: Metrx L4-5 left hemilaminectomy and bilateral foraminotomy, Metrx L5-S1 left hemilaminectomy and microdiskectomy (Left)     Significant Findings/Abnormal Results with this admission:  MRI showed disc herniation at L5-S1 and L4-5  X-ray left foot showed nondisplaced fracture of the base of the 2nd metatarsal bone    Hospital Course: Khadra Ann is a 68year old who presented with left lower extremity weakness and  foot drop with  decreased sensation to pinprick of the dorsum of the left foot  He also had swelling of his left foot from falling twice on his way to the ED  He had a 2 and half year history of left leg discomfort  Stroke workup was negative  MRI of the lumbar spine showed disc herniation at L5-S1 and L4-5 causing  severe central canal and neural foraminal stenosis  Neuro srugery and physical medicine and rehab were consulted  On 11/9/2018 Khadra Ann underwent a Metrx L4-5 left hemilaminectomy and bilateral foraminotomy, Metrx L5-S1 left hemilaminectomy and microdiskectomy (Left) without any complications  PMR recommended PT/OT  He was seen and cleared for discharge by neurosurgery, with outpatient follow up in 2 weeks  He was cleared for discharge to home with outpatient physical therapy  Patient left foot swelling and x-ray foot was ordered  It showed nondisplaced fracture of the base of the 2nd metatarsal bone  Ortho was consulted  Patient had splint for 1 day and was transition to CAM boot   Patient is able to tolerate PO well without any voiding or elimination difficulties  He is hemodynamically stable and has no acute complaints  He denies any pain  He is appropriate for discharge and he is discharged to his home for self care  Patient was given script for outpatient physical therapy  Complications: None    Discharge Diagnosis:   1  L4-L5 L5-S1 disc herniation   2  Nondisplaced fracture of the base of the 2nd metatarsal bone    Physical Exam   Constitutional: He is oriented to person, place, and time  He appears well-developed and well-nourished  HENT:   Head: Normocephalic  Eyes: Pupils are equal, round, and reactive to light  Conjunctivae and EOM are normal    Neck: Neck supple  Cardiovascular: Normal rate, regular rhythm and normal heart sounds  Pulmonary/Chest: Effort normal and breath sounds normal    Abdominal: Soft  Bowel sounds are normal    Musculoskeletal:   Wearing Cam boot on left lower extremity  Neurological: He is alert and oriented to person, place, and time  Skin: Skin is warm  Psychiatric: He has a normal mood and affect  His behavior is normal       Day of Discharge:  See today's progress note for physical exam details  Condition at Discharge: good     Discharge instructions/Information to patient and family:   See after visit summary for information provided to patient and family  Provisions for Follow-Up Care:  See after visit summary for information related to follow-up care and any pertinent home health orders  Disposition: Home    Planned Readmission: No    Discharge Statement   I spent 30 minutes discharging the patient  This time was spent on the day of discharge  I had direct contact with the patient on the day of discharge   Additional documentation is required if more than 30 minutes were spent on discharge  Discharge Medications:  See after visit summary for reconciled discharge medications provided to patient and family  Medication changes made with this admission:  · Start:  None  · Stop:  None  · Change:   None  · Continue:  · doxycylcine (patient reports taking 20 mg PO daily s/p "gum surgery" for chronic gum disease, until discontinued by prescribing provider)  · Peridex 1/2 oz rinse two times daily after brushing

## 2018-11-11 VITALS
WEIGHT: 200 LBS | OXYGEN SATURATION: 99 % | DIASTOLIC BLOOD PRESSURE: 62 MMHG | BODY MASS INDEX: 25.67 KG/M2 | TEMPERATURE: 97.9 F | RESPIRATION RATE: 18 BRPM | SYSTOLIC BLOOD PRESSURE: 134 MMHG | HEART RATE: 67 BPM | HEIGHT: 74 IN

## 2018-11-11 PROBLEM — S92.325A CLOSED NONDISPLACED FRACTURE OF SECOND METATARSAL BONE OF LEFT FOOT: Status: ACTIVE | Noted: 2018-11-11

## 2018-11-11 PROBLEM — M79.652 PAIN IN LEFT THIGH: Status: RESOLVED | Noted: 2018-06-11 | Resolved: 2018-11-11

## 2018-11-11 PROCEDURE — 28470 CLTX METATARSAL FX WO MNP EA: CPT | Performed by: ORTHOPAEDIC SURGERY

## 2018-11-11 PROCEDURE — 99222 1ST HOSP IP/OBS MODERATE 55: CPT | Performed by: ORTHOPAEDIC SURGERY

## 2018-11-11 RX ADMIN — CHLORHEXIDINE GLUCONATE 15 ML: 1.2 RINSE ORAL at 11:22

## 2018-11-11 RX ADMIN — HEPARIN SODIUM 5000 UNITS: 5000 INJECTION INTRAVENOUS; SUBCUTANEOUS at 05:45

## 2018-11-11 NOTE — ORTHOTIC NOTE
Orthotic Note            Date: 11/11/2018     Time: 8:00    Patient Name: Angelita Gardner     CAM boot ordered by Michell Driver PA-C         Reason for Consult:  Patient Active Problem List   Diagnosis    Pain in left thigh    Hamstring tightness of both lower extremities    Weakness of left foot    Left foot drop     Pt states he wears a size 14 shoe therefore an XL tall CAM boot was brought to the pt's room  Clarified with pt's nurse, Brando Simeon, that the ACE wrap and splint could be removed from the pt's left leg in order to don the CAM boot  Pt had his own socks present therefore the sock was donned on the left foot prior to donning the CAM boot  Informed pt a sock could be worn in the boot, but not a shoe or slipper  Pt able to place his left heel into the back of the boot; the padding was trimmed and the straps were tightened on the foot as well as the 4 straps on the lower leg  Pt was able to stand and with the use of the RW he was able to amb 10 feet - at that point he turned back to his chair as we decided to place his sneaker on his right foot to ambulate a further distance  Pt was able to ambulate down the lieberman and back with RW; instructions given to amb slowly and be aware of his foot placement as a couple of times his right foot sneaker rubbed against the CAM boot  No LOB was noted  Pt required assistance to don the boot, socks and sneakers due to a back surgery, but states his wife is at home and can assist him  Pt states he has stairs at home but does not need to use them  Pt is tentative to discharge today; if pt remains in the hospital we will continue to follow daily  Recommendations:  Please call the ortho tech, ext [phone], with any bracing adjustments and/or questions       General Titi, , BS, PTA, Restorative Technician

## 2018-11-11 NOTE — UTILIZATION REVIEW
Initial Clinical Review     Admission: Date/Time/Statement: 11/8/18 @ 1530            Orders Placed This Encounter   Procedures    Inpatient Admission       Standing Status:   Standing       Number of Occurrences:   1       Order Specific Question:   Admitting Physician       Answer:   Haydee Alexia       Order Specific Question:   Level of Care       Answer:   Med Surg [16]       Order Specific Question:   Estimated length of stay       Answer:   More than 2 Midnights       Order Specific Question:   Certification       Answer:   I certify that inpatient services are medically necessary for this patient for a duration of greater than two midnights  See H&P and MD Progress Notes for additional information about the patient's course of treatment          Date/Time/Mode of Arrival: 11/8 Transfer from [address] [place] [address]      Chief Complaint: Left foot weakness     History of Illness: 68 y  o  male who presents with left foot weakness of 2 days    Patient reports he has been have been discomfort in the left leg for the past 2 5 years he describes as tightness in the hamstring and calf discomfort    He reports he has been aggressively doing stretching exercises of his hamstring and also notice some discomfort in the left ankle about 2 days ago  [facility] reports he was doing his regular exercise 2 days ago by stretching his hamstrings and later noticed shortly after that he could not feel the bottom of his left foot   While going down the stairs at his home for dinner, he noticed left foot weakness and left foot was not" working right" and reported to the [facility] ED      He does report having noticed tightness in his hamstrings for sometimes and did follow with Orthopedic surgery in April and June of this year   Patient reports he was advised to continue doing stretching exercises      Vital Signs:            Vitals   Temperature Pulse Respirations Blood Pressure SpO2   11/08/18 1300 11/08/18 1300 11/08/18 1300 11/08/18 1300 11/08/18 1300   98 4 °F (36 9 °C) 60 18 130/66 100 %       Temp Source Heart Rate Source Patient Position - Orthostatic VS BP Location FiO2 (%)   11/08/18 1300 11/08/18 2307 11/08/18 1300 11/08/18 1300 --   Oral Monitor Sitting Right arm         Pain Score           11/09/18 1125           No Pain                Wt Readings from Last 1 Encounters:   11/09/18 90 7 kg (200 lb)         Vital Signs (abnormal): WNL     Abnormal Labs: WNL     Diagnostic Test Results: MRI Lumbar - Disc herniations at L5-S1 and L4-5 causing moderate to severe central canal and neural foraminal stenosis        Past Medical/Surgical History: Active Ambulatory Problems     Diagnosis Date Noted    Pain in left thigh 06/11/2018    Hamstring tightness of both lower extremities 06/11/2018    Weakness of left foot 11/07/2018            Past Medical History:   Diagnosis Date    Anemia      Left inguinal hernia      Skin lesion           Admitting Diagnosis: Foot drop [M21 [id number]]     Age/Sex: 68 y o  male     Assessment/Plan:   80y Male to ED with Weakness of Left Foot  2 day history of left foot weakness and 2 falls injuring left ankle  Pt is an active runner and sports   Debbieemelyn [place] has been having pain in the left thigh and hamstring and has followed with Orthopedic surgery in the past  Decreased sensation to pinprick dorsum of left foot  MRI   Neurosurgery cons  NPO - per neurosurgery pt will require surgery  PT eval and treat     11/8 Neurosurgery cons:  Disc herniations at L5-S1 and L4-5 with moderate to severe central canal and neural foraminal stenosis  Left foot drop  NPO after midnight in case intervention is scheduled for tomorrow        Admission Orders:  NPO  11/9 OR - Metrx L4-5 left hemilaminectomy and bilateral foraminotomy, Metrx L5-S1 left hemilaminectomy and microdiskectomy (Left Spine Lumbar)     Neurosurgery cons  PMR cons  PT/OT eval and treat     Scheduled Meds:   None     Continuous Infusions:    PRN Meds:   acetaminophen      Ortho consult 11/10 --  Assessment:  73 y o male status post fall with left nondisplaced metatarsal fracture     Plan:   · NWB LLE in posterior slab splint  · Analgesics for pain  · Will switch to removal boot or hard soled shoe in AM  · PT/OT  · Dispo: Ortho will follow

## 2018-11-11 NOTE — PROGRESS NOTES
Progress Note - Orthopedics   Yanely Lozano 68 y o  male MRN: [id number]  Unit/Bed#: [facility] 920-01      Subjective:    68 y o male with a fall s/p L4-5 hemilaminectomy and bilateral foraminotomy and L5-S1 left hamilaminectomy and microdiskectomy found to have a left nondisplaced metatarsal fracture  No acute events, no complaints  Pt doing well  Pain controlled  Denies fevers chills, CP, SOB    Labs:    0  Lab Value Date/Time   HCT 41 0 11/10/2018 0457   HCT 37 11/07/2018 0601   HCT 39 7 11/07/2018 0546   HGB 13 3 11/10/2018 0457   HGB 12 6 11/07/2018 0601   HGB 13 3 11/07/2018 0546   INR 0 95 11/07/2018 0546   WBC 6 86 11/10/2018 0457   WBC 7 30 11/07/2018 0546       Meds:    Current Facility-Administered Medications:     acetaminophen (TYLENOL) tablet 650 mg, 650 mg, Oral, Q6H PRN, Tammy Sheriff PA-C    chlorhexidine (PERIDEX) 0 12 % oral rinse 15 mL, 15 mL, [employer], Q12H [address], Quiana Mckeon MD, 15 mL at 11/10/18 2156    heparin (porcine) subcutaneous injection 5,000 Units, 5,000 Units, Subcutaneous, Q8H [address], 5,000 Units at 11/11/18 0545 **AND** Platelet count, , , Once, Quiana Mckeon MD    oxyCODONE (ROXICODONE) IR tablet 2 5 mg, 2 5 mg, Oral, Q4H PRN, Tammy Sheriff PA-C    oxyCODONE (ROXICODONE) IR tablet 5 mg, 5 mg, Oral, Q4H PRN, Tammy Sheriff PA-C    Blood Culture:   No results found for: BLOODCX    Wound Culture:   No results found for: WOUNDCULT    Ins and Outs:  I/O last 24 hours:   In: 400 [P O :400]  Out: 1750 [Urine:1750]          Physical:  Vitals:    11/10/18 2258   BP: 128/61   Pulse: 63   Resp: 18   Temp: 98 1 °F (36 7 °C)   SpO2: 97%     Musculoskeletal: left Lower Extremity  · Splint in place  · SILT s/s/sp/dp/t    · +fhl/ehl  · Toes warm and well perfused    _*_*_*_*_*_*_*_*_*_*_*_*_*_*_*_*_*_*_*_*_*_*_*_*_*_*_*_*_*_*_*_*_*_*_*_*_*_*_*_*_*    Assessment:    73 y o male with a left nondisplaced metatarsal fracture     Plan:  · WBAT to LLE in CAM boot  · PT/OT  · Pain control  · DVT ppx  · Dispo: Ortho signing off    Eladio Saavedra PA-C

## 2018-11-11 NOTE — PHYSICIAN ADVISOR
Current patient class: Inpatient  The patient is currently on Hospital Day: 4 at [facility]      The patient was admitted to the hospital at [phone] on 11/8/18 for the following diagnosis:  Foot drop [M21 [id number]]       There is documentation in the medical record of an expected length of stay of at least 2 midnights  The patient is therefore expected to satisfy the 2 midnight benchmark and given the 2 midnight presumption is appropriate for INPATIENT ADMISSION  Given this expectation of a satisfying stay, CMS instructs us that the patient is most often appropriate for inpatient admission under part A provided medical necessity is documented in the chart  After review of the relevant documentation, labs, vital signs and test results, the patient is appropriate for INPATIENT ADMISSION  Admission to the hospital as an inpatient is a complex decision making process which requires the practitioner to consider the patients presenting complaint, history and physical examination and all relevant testing  With this in mind, in this case, the patient was deemed appropriate for INPATIENT ADMISSION  After review of the documentation and testing available at the time of the admission I concur with this clinical determination of medical necessity  Rationale is as follows: The patient is a 68 yrs old Male who presented to the ED at 11/8/2018  2:12 PM with a chief complaint of No chief complaint on file  Given the need for further hospitalization, and along with the documentation of medical necessity present in the chart, the patient is appropriate for inpatient admission  The patient is expected to satisfy the 2 midnight benchmark, and will require further acute medical care  The patient does have comorbid conditions which increases the risk for significant adverse outcome  Given this the patient is appropriate for inpatient admission        The patients vitals on arrival were ED Triage Vitals   Temperature Pulse Respirations Blood Pressure SpO2   11/08/18 1300 11/08/18 1300 11/08/18 1300 11/08/18 1300 11/08/18 1300   98 4 °F (36 9 °C) 60 18 130/66 100 %      Temp Source Heart Rate Source Patient Position - Orthostatic VS BP Location FiO2 (%)   11/08/18 1300 11/08/18 2307 11/08/18 1300 11/08/18 1300 --   Oral Monitor Sitting Right arm       Pain Score       11/09/18 1125       No Pain           Past Medical History:   Diagnosis Date    Anemia     Closed nondisplaced fracture of second metatarsal bone of left foot 11/11/2018    Left inguinal hernia     Managed By Tatiana Kaur / last assessed 3/27/15     Skin lesion      Past Surgical History:   Procedure Laterality Date    HERNIA REPAIR             Consults have been placed to:   IP CONSULT TO NEUROSURGERY  IP CONSULT TO PHYSICAL MEDICINE REHAB  IP CONSULT TO ORTHOPEDIC SURGERY  IP CONSULT TO CASE MANAGEMENT    Vitals:    11/10/18 1500 11/10/18 2258 11/11/18 0815 11/11/18 0922   BP: 140/62 128/61 134/62    BP Location:  Right arm Right arm    Pulse: 79 63 67    Resp: 18 18 18    Temp: 97 6 °F (36 4 °C) 98 1 °F (36 7 °C) 97 9 °F (36 6 °C)    TempSrc: Oral Oral Oral    SpO2: 95% 97% 99% 99%   Weight:       Height:           Most recent labs:    Recent Labs      11/10/18   0457   WBC  6 86   HGB  13 3   HCT  41 0   PLT  231   K  4 8   CALCIUM  8 9   BUN  18   CREATININE  1 14       Scheduled Meds:  Current Facility-Administered Medications:  acetaminophen 650 mg Oral Q6H PRN Tammyletty Sheriff PA-C   chlorhexidine 15 mL Swish [employer] Spit Q12H [address] Eric Goins MD   heparin (porcine) 5,000 Units Subcutaneous [facility] Eric Gonis MD   oxyCODONE 2 5 mg Oral Q4H PRN Tammy Sheriff PA-C   oxyCODONE 5 mg Oral Q4H PRN Tammyletty Sheriff PA-C     Continuous Infusions:   PRN Meds:   acetaminophen    oxyCODONE    oxyCODONE    Surgical procedures (if appropriate):  Procedure(s):  Metrx L4-5 left hemilaminectomy and bilateral foraminotomy, Metrx L5-S1 left hemilaminectomy and microdiskectomy

## 2018-11-11 NOTE — DISCHARGE INSTRUCTIONS
Neurosurgical discharge instructions   Please keep incision clean and dry  Avoid applying creams, lotion or antiseptic to incision area   If you have steri-strips you may remove them after 14 days if they do not fall off   Check the wound daily  If the incision becomes red, swollen, tender, warm, or has increased drainage please notify MD immediately   May shower 3 days after surgery, but do not soak in a tub and no swimming   Pat incision dry after showering   No bending or heavy lifting greater than 10lbs   No prolonged sitting greater than 30 minutes, but may walk as tolerated   Please take pain medications to relieve incision pain, and muscle relaxants to prevent spasms as directed by MD or Extender  See Med  Rec  completed at the time of discharge   No driving for 2 weeks or longer if taking narcotics or muscle relaxers   OK to be passenger in car for short distances   If taking Coumadin, Aspirin, or Plavix, you may resume these medications when cleared by Neurosurgery   To avoid constipation while on narcotics increase your water and fiber intake   May use over the counter stool softeners such as colace and senna   Limit steps to 2-3 times a day   Continue use of Incentive spirometer   Return for your follow up appointment in 2 weeks for an incision check and staple/suture removal as scheduled  Follow-up 6 weeks after surgery as scheduled  **Please notify MD if you experience a fever 101  F, chills or have increased pain, numbness, or weakness in your legs**    Orthopedic Surgery Recommendations  Weight bear as tolerated in the the Cam boot

## 2018-11-12 ENCOUNTER — TELEPHONE (OUTPATIENT)
Dept: NEUROSURGERY | Facility: CLINIC | Age: 73
End: 2018-11-12

## 2018-11-12 NOTE — TELEPHONE ENCOUNTER
11/14/2018- CALLED PT, LEFT MESSAGE ON MACHINE CONFIRMING 11/23/2018 (2WK POV) APPT AND 12/20/2018 (6WK POV) APPT  11/12/2018-(SILVIA 11/11/2018) F/U 2 WK NURSE AND 6 WK DKO  CALLED PT AND LEFT MESSAGE ON MACHINE CONFIRMING 11/23/2018 (2WK POV)    NEED TO SCHEDULE 6 WK POV W/DKO

## 2018-11-13 ENCOUNTER — TELEPHONE (OUTPATIENT)
Dept: NEUROSURGERY | Facility: CLINIC | Age: 73
End: 2018-11-13

## 2018-11-13 ENCOUNTER — TELEPHONE (OUTPATIENT)
Dept: OBGYN CLINIC | Facility: MEDICAL CENTER | Age: 73
End: 2018-11-13

## 2018-11-13 NOTE — TELEPHONE ENCOUNTER
Patient  [phone]  Dr Madi Cavanaugh     Patient would like for someone to call him back  He would like to know if he can walk with the boot without the walker around his house?

## 2018-11-13 NOTE — DISCHARGE SUMMARY
Discharge Summary - Shubham Ramirez 68 y o  male MRN: [id number]     Unit/Bed#: [facility] 920-01 Encounter: [id number]     Admission Date: 11/8/2018   Discharge Date: 11/11/2018     Diagnosis:   Principal Problem:    Weakness of left foot  Active Problems:    Pain in left thigh    Left foot drop     Important Physician Related Follow Up:   · [facility], Call schedule appointment for two week postoperative visit with a nurse then six week postoperative visit with Dr Pablo Carr  [phone]        · Niesha Dumont DO, [facility] - [address] [place] [address] ,  follow-up   within 1 week   · Tesfaye Del Angel MD- orthopedics, follow-up in 1 month      Procedures Performed: Metrx L4-5 left hemilaminectomy and bilateral foraminotomy, Metrx L5-S1 left hemilaminectomy and microdiskectomy (Left)      Significant Findings/Abnormal Results with this admission:  MRI showed disc herniation at L5-S1 and L4-5  X-ray left foot showed nondisplaced fracture of the base of the 2nd metatarsal bone     Hospital Course: Les Christopher is a 68year old who presented with left lower extremity weakness and  foot drop with  decreased sensation to pinprick of the dorsum of the left foot  He also had swelling of his left foot from falling twice on his way to the ED  He had a 2 and half year history of left leg discomfort  Stroke workup was negative  MRI of the lumbar spine showed disc herniation at L5-S1 and L4-5 causing  severe central canal and neural foraminal stenosis  Neuro srugery and physical medicine and rehab were consulted  On 11/9/2018 Les Christopher underwent a Metrx L4-5 left hemilaminectomy and bilateral foraminotomy, Metrx L5-S1 left hemilaminectomy and microdiskectomy (Left) without any complications  PMR recommended PT/OT  He was seen and cleared for discharge by neurosurgery, with outpatient follow up in 2 weeks  He was cleared for discharge to home with outpatient physical therapy     Patient left foot swelling and x-ray foot was ordered  It showed nondisplaced fracture of the base of the 2nd metatarsal bone  Ortho was consulted  Patient had splint for 1 day and was transition to CAM boot   Patient is able to tolerate PO well without any voiding or elimination difficulties  He is hemodynamically stable and has no acute complaints  He denies any pain  He is appropriate for discharge and he is discharged to his home for self care  Patient was given script for outpatient physical therapy      Complications: None     Discharge Diagnosis:   1  L4-L5 L5-S1 disc herniation   2  Nondisplaced fracture of the base of the 2nd metatarsal bone     Physical Exam   Constitutional: He is oriented to person, place, and time  He appears well-developed and well-nourished  HENT:   Head: Normocephalic  Eyes: Pupils are equal, round, and reactive to light  Conjunctivae and EOM are normal    Neck: Neck supple  Cardiovascular: Normal rate, regular rhythm and normal heart sounds  Pulmonary/Chest: Effort normal and breath sounds normal    Abdominal: Soft  Bowel sounds are normal    Musculoskeletal:   Wearing Cam boot on left lower extremity  Neurological: He is alert and oriented to person, place, and time  Skin: Skin is warm  Psychiatric: He has a normal mood and affect  His behavior is normal       Day of Discharge:  See today's progress note for physical exam details      Condition at Discharge: good      Discharge instructions/Information to patient and family:   See after visit summary for information provided to patient and family        Provisions for Follow-Up Care:  See after visit summary for information related to follow-up care and any pertinent home health orders        Disposition: Home     Planned Readmission: No     Discharge Statement   I spent 30 minutes discharging the patient  This time was spent on the day of discharge  I had direct contact with the patient on the day of discharge   Additional documentation is required if more than 30 minutes were spent on discharge       Discharge Medications:  See after visit summary for reconciled discharge medications provided to patient and family  Medication changes made with this admission:  · Start:  None  · Stop:  None  · Change:   None  · Continue:  ? doxycylcine (patient reports taking 20 mg PO daily s/p "gum surgery" for chronic gum disease, until discontinued by prescribing provider)  ?  Peridex 1/2 oz rinse two times daily after brushing

## 2018-11-13 NOTE — TELEPHONE ENCOUNTER
Pt reports being given a walker while hospitalized  He was instructed to gt up and walk after sitting for 30 min  He has a cam walker for a metatarsal fx provided by ortho  He feels he walks well with the boot and prefers not to use the walker  Irvin Purdy was provided prior to even knowing about the fracture and prior to surgery most likely because of 2 falls prior to ED  Encouraged him to contiue use with any question of stability, and to notify Ortho as well  He was in agreement

## 2018-11-13 NOTE — TELEPHONE ENCOUNTER
Please advise this patient that when he walks he should wear his walking boot  Part of the reason why he fractured his foot is that he fell when walking with a footdrop without the protective walker  Please call patient and inform of above   Thank you

## 2018-11-19 NOTE — TELEPHONE ENCOUNTER
Is there a significant other, spouse or friend,  That can reiterate that this is to help him that this is to protect him and he can only be protected from falling if the boot is on  The message contains 2 many instances of the patient deciding his own function      An inadvertent fall will result because this    Please inform the patient          And I are in the operating room

## 2018-11-19 NOTE — TELEPHONE ENCOUNTER
Pt questions if at his 2 week post-op we will be evaluating his foot to determine removal of boot and start of therapy  Explained that the visit here is to check his incision and usually therapy is determined by the surgeon at 6 week f/u  Ortho will make the determination about the boot  He stated an understanding

## 2018-11-19 NOTE — TELEPHONE ENCOUNTER
I spoke with patient and gave him msg above  He reported to me that when showering without the boot he cannot get around a step or two to get in the shower  He is stepping on the heel  He also is out of the boot when sleeping, when going to the bathroom at night he does not put the boot on  He uses walker without the boot  I advised that Dr  Is against putting pressure on the foot without boot  Trouble shooted to put urinal at bedside or use bedside commode  Patient is unable to use this method  He will follow up on 12/7

## 2018-11-23 ENCOUNTER — CLINICAL SUPPORT (OUTPATIENT)
Dept: NEUROSURGERY | Facility: CLINIC | Age: 73
End: 2018-11-23

## 2018-11-23 VITALS — SYSTOLIC BLOOD PRESSURE: 140 MMHG | DIASTOLIC BLOOD PRESSURE: 66 MMHG | TEMPERATURE: 98.8 F

## 2018-11-23 DIAGNOSIS — Z98.890 POST-OPERATIVE STATE: Primary | ICD-10-CM

## 2018-11-23 PROCEDURE — 99024 POSTOP FOLLOW-UP VISIT: CPT

## 2018-11-23 NOTE — PROGRESS NOTES
Post-Op Visit-Neurosurgery    Ashutosh Petty 68 y o  male MRN: [id number]    Chief Complaint  Patient presents post: Metrx L4-5 left hemilaminectomy and bilateral foraminotomy, Metrx L5-S1 left hemilaminectomy and microdiskectomy (Left Spine Lumbar)      History of Present Illness  Patient presents for 2 week POV for incision check  Patient arrived unaccompanied and ambulated well with cane  Left foot had a Cam boot in place  Reports pain is a 0/10 in his back and that his left leg pain has subsided entirely  Cam boot in placed due to metatarsal fracture due to a fall  Had questions about PT  Assessment  Wound Exam: Incisions is clean, dry, and in tact, well approximated, without heat, redness, swelling, or drainage  Small soft lump noted under the surgical site  No tenderness with gentle palpation  Appears to be a well healed surgical site  Procedure  Staple/suture observation  location: Lumbar spine region  Procedure Note: surgical site observed, and cleaned with NSS and left TEQUILA  Patient Status:  the patient tolerated the procedure well  Complications: None  Incision TEQUILA  Discussion/Summary  Advised patient that usually Dr Rayshawn Jacobs will clear the patient for PT after 2 weeks with this type of surgery  He said he would like to wait until after his boot is discontinued before having order placed  He will call the office to request referral after his next appointment with ortho  Reviewed incision care with patient including daily observation for s/s infection including: increased erythema, edema, drainage, dehiscence of incision or fever >101  Should these be observed, he understands that he is to call and/or return immediately for reassessment  Advised patient to continue cleansing area with mild soap and water and pat dry   Not to apply any lotions, creams, or ointments, & not to submerge in any water for two more weeks  He is to maintain activity restrictions until cleared by the surgeon  Activity levels were also reviewed with the patient in detail, he is to lift no greater than 10 pounds, advised to limit bend/twisting at the waist and ambulation is encouraged as tolerated  Verified date/time/location of upcoming POV on 12/20/2018   He is to call the office with any further questions or concerns, or if any incisional issues or fevers would arise

## 2018-12-07 ENCOUNTER — OFFICE VISIT (OUTPATIENT)
Dept: OBGYN CLINIC | Facility: MEDICAL CENTER | Age: 73
End: 2018-12-07

## 2018-12-07 ENCOUNTER — TELEPHONE (OUTPATIENT)
Dept: NEUROSURGERY | Facility: CLINIC | Age: 73
End: 2018-12-07

## 2018-12-07 ENCOUNTER — APPOINTMENT (OUTPATIENT)
Dept: RADIOLOGY | Facility: MEDICAL CENTER | Age: 73
End: 2018-12-07
Payer: MEDICARE

## 2018-12-07 VITALS — WEIGHT: 200 LBS | BODY MASS INDEX: 25.67 KG/M2 | HEIGHT: 74 IN

## 2018-12-07 DIAGNOSIS — M21.372 LEFT FOOT DROP: Primary | ICD-10-CM

## 2018-12-07 DIAGNOSIS — M21.372 LEFT FOOT DROP: ICD-10-CM

## 2018-12-07 DIAGNOSIS — S92.325D CLOSED NONDISPLACED FRACTURE OF SECOND METATARSAL BONE OF LEFT FOOT WITH ROUTINE HEALING, SUBSEQUENT ENCOUNTER: ICD-10-CM

## 2018-12-07 PROCEDURE — 99024 POSTOP FOLLOW-UP VISIT: CPT | Performed by: ORTHOPAEDIC SURGERY

## 2018-12-07 PROCEDURE — 73630 X-RAY EXAM OF FOOT: CPT

## 2018-12-07 RX ORDER — DOXYCYCLINE HYCLATE 20 MG
20 TABLET ORAL 2 TIMES DAILY
Refills: 2 | COMMUNITY
Start: 2018-11-15 | End: 2019-03-03 | Stop reason: HOSPADM

## 2018-12-07 NOTE — TELEPHONE ENCOUNTER
The cam walker will take care of any need for an AFO    I think you meant non-healing fracture if they are continuing his Cam boot

## 2018-12-07 NOTE — PROGRESS NOTES
Orthopedics   Peggy Villasenor 68 y o  male MRN: [id number]    Chief Complaint:   left foot pain    HPI:  68 y o male 4 weeks status post fall with left 2nd metatarsal fracture  Patient has been compliant with wearing his Cam boot and feels no pain at this point  He does note swelling that comes and goes as well  He continues to have a foot drop status post lumbar spine surgery by Neurosurgery  Review Of Systems:   Review of Systems   Constitutional: Positive for activity change  HENT: Negative  Eyes: Negative  Respiratory: Negative  Cardiovascular: Negative  Gastrointestinal: Negative  Endocrine: Negative  Genitourinary: Negative  Musculoskeletal: Positive for gait problem and myalgias  Skin: Negative  Allergic/Immunologic: Negative  Neurological: Positive for weakness  Hematological: Negative  Psychiatric/Behavioral: Negative          Past Medical History:   Past Medical History:   Diagnosis Date    Anemia     Closed nondisplaced fracture of second metatarsal bone of left foot 11/11/2018    Left inguinal hernia     Managed By Jarad Adan / last assessed 3/27/15     Skin lesion        Past Surgical History:   Past Surgical History:   Procedure Laterality Date    HERNIA REPAIR      LUMBAR LAMINECTOMY Left 11/9/2018    Procedure: Metrx L4-5 left hemilaminectomy and bilateral foraminotomy, Metrx L5-S1 left hemilaminectomy and microdiskectomy;  Surgeon: Ana Burdick MD;  Location: [facility];  Service: Neurosurgery       Family History:  Family history reviewed and non-contributory  Family History   Problem Relation Age of Onset    Heart disease Mother     No Known Problems Father     No Known Problems Sister     No Known Problems Brother     No Known Problems Family        Social History:  Social History     Social History    Marital status: /Civil Union     Spouse name: N/A    Number of children: N/A    Years of education: N/A     Social History Main Topics  Smoking status: Never Smoker    Smokeless tobacco: Never Used    Alcohol use Yes      Comment: social     Drug use: No    Sexual activity: Not Asked     Other Topics Concern    None     Social History Narrative    None       Allergies:   No Known Allergies    Labs:    0  Lab Value Date/Time   HCT 41 0 11/10/2018 0457   HCT 37 11/07/2018 0601   HCT 39 7 11/07/2018 0546   HGB 13 3 11/10/2018 0457   HGB 12 6 11/07/2018 0601   HGB 13 3 11/07/2018 0546   INR 0 95 11/07/2018 0546   WBC 6 86 11/10/2018 0457   WBC 7 30 11/07/2018 0546       Meds:    Current Outpatient Prescriptions:     Calcium-Magnesium-Vitamin D (CALCIUM MAGNESIUM PO), Take by mouth, Disp: , Rfl:     chlorhexidine (PERIDEX) 0 12 % solution, RINSE WITH 1/2 OZ TWO TIMES DAILY FOLLOWING BRUSHING AND FLOSSING, Disp: , Rfl: 3    Cholecalciferol (VITAMIN D PO), Take by mouth, Disp: , Rfl:     Cyanocobalamin (VITAMIN B12 PO), Take by mouth, Disp: , Rfl:     doxycycline (PERIOSTAT) 20 MG tablet, Take 20 mg by mouth 2 (two) times a day, Disp: , Rfl: 2    Physical Exam:   Ht 6' 2" (1 88 m)   Wt 90 7 kg (200 lb)   BMI 25 68 kg/m²   Gen: Alert and oriented to person, place, time  HEENT: EOMI, eyes clear, moist mucus membranes, hearing intact  Respiratory: Bilateral chest rise  No audible wheezing found  Cardiovascular: No audible murmurs  Abdomen: soft  Musculoskeletal: left lower extremity  · Skin intact  · 1+ edema  · No TTP over 2nd metatarsal    Radiology:   I personally reviewed the films  X-rays left foot shows well-healing nondisplaced 2nd metatarsal fracture    Assessment:  73 y o male status post fall with left 2nd MT Fracture     Plan:   Weightbearing as tolerated left lower extremity  Patient can discontinue use of Cam boot from orthopedic standpoint, but he was advised to call neuro surgery to see what type of brace that would like him in for his drop foot   He can wear Cam boot for protection of the left foot  No therapy needed from orthopaedic standpoint  Patient understands and agrees with the treatment plan    Bradley Stark MD

## 2018-12-07 NOTE — TELEPHONE ENCOUNTER
Pt has 6 week post-op visit on 12/20  Was seen today by ortho and has a healed foot fracture  They recommend he continue Cam Walker and call for further orders for foot orthosis to manage foot drop  Please advise    Thank You

## 2018-12-10 NOTE — TELEPHONE ENCOUNTER
Called pt to explain previous response but to explain am waiting for clarification  Pt reports he is perfectly fine with continuing the boot when out of the house, and will discuss the need for something else at his f/u in 10 days

## 2018-12-10 NOTE — TELEPHONE ENCOUNTER
Just to clarify, fractures are healed, the cam walker was continued only until neurosurgery provided a suggestion of alternate appliance for foot drop, so he would not be without something  Should he just continue wearing this until seen 12/20?   Thank You

## 2018-12-20 ENCOUNTER — OFFICE VISIT (OUTPATIENT)
Dept: NEUROSURGERY | Facility: CLINIC | Age: 73
End: 2018-12-20

## 2018-12-20 VITALS
DIASTOLIC BLOOD PRESSURE: 61 MMHG | BODY MASS INDEX: 26.18 KG/M2 | HEIGHT: 74 IN | TEMPERATURE: 97.7 F | WEIGHT: 204 LBS | RESPIRATION RATE: 16 BRPM | HEART RATE: 81 BPM | SYSTOLIC BLOOD PRESSURE: 143 MMHG

## 2018-12-20 DIAGNOSIS — Z47.89 AFTERCARE FOLLOWING SURGERY OF THE MUSCULOSKELETAL SYSTEM: Primary | ICD-10-CM

## 2018-12-20 DIAGNOSIS — Z09 POSTOPERATIVE EXAMINATION: ICD-10-CM

## 2018-12-20 PROCEDURE — 99024 POSTOP FOLLOW-UP VISIT: CPT | Performed by: NEUROLOGICAL SURGERY

## 2018-12-20 RX ORDER — IBUPROFEN 200 MG
200 TABLET ORAL AS NEEDED
COMMUNITY
End: 2019-03-03 | Stop reason: HOSPADM

## 2018-12-20 NOTE — PROGRESS NOTES
Neurosurgery Office Note  Geovanna Wisdom is a 68 y o  male    Type of Visit: Post-op        Diagnoses and all orders for this visit:    Aftercare following surgery of the musculoskeletal system  -     Ambulatory referral to Physical Therapy; Future    Postoperative examination    Other orders  -     ibuprofen (MOTRIN) 200 mg tablet; Take 200 mg by mouth daily at bedtime          DISCUSSION SUMMARY  78-year-old  who had a very large disc herniation and is doing very well  He had a drop foot and a severe radiculopathy  He was later also identified to have a fracture of the 2nd metatarsal     His radiculopathic pain is completely gone  He has no back pain  His drop foot is now improving but he still has restriction of 3/5 power in the left foot  I have recommended physical therapy incorporation of physical therapy into his daily life      CHIEF COMPLAINT  Patient presents for 6 week pov no studies    [facility] NEUROSURGERY PROCEDURES  11/9/18 (DKO) Metrx L4-5 left hemilaminectomy and bilateral foraminotomy, Metrx L5-S1 left hemilaminectomy and microdiskectomy (Left Spine Lumbar)      HISTORY OF PRESENT ILLNESS  Geovanna Wisdom presents to the clinic 6 weeks following lumbar procedure as noted above  Eating a regular diet without difficulty  Bowel movements are Normal  Bladder is  Normal  Pain is not well controlled  Medications being used: ibuprofen (OTC)  Current treatment: nothing  Incision: healing well  REVIEW OF SYSTEMS  Review of Systems   Constitutional: Positive for activity change (using cane)  HENT: Negative  Eyes: Negative  Respiratory: Negative  Cardiovascular: Negative  Gastrointestinal: Negative  Endocrine: Negative  Genitourinary: Negative  Musculoskeletal: Positive for gait problem  Negative for back pain  Cramps in the left leg   Skin: Negative  Allergic/Immunologic: Negative      Neurological: Positive for weakness (generalized) and numbness (and tinglin in the left foot)  Hematological: Negative  Psychiatric/Behavioral: Negative  All other systems reviewed and are negative        The patient's ROS was reviewed by MD     Active Ambulatory Problems     Diagnosis Date Noted    Hamstring tightness of both lower extremities 06/11/2018    Weakness of left foot 11/07/2018    Left foot drop 11/08/2018    Closed nondisplaced fracture of second metatarsal bone of left foot 11/11/2018     Resolved Ambulatory Problems     Diagnosis Date Noted    Pain in left thigh 06/11/2018     Past Medical History:   Diagnosis Date    Anemia     Closed nondisplaced fracture of second metatarsal bone of left foot 11/11/2018    Left inguinal hernia     Skin lesion        Past Surgical History:   Procedure Laterality Date    HERNIA REPAIR      LUMBAR LAMINECTOMY Left 11/9/2018    Procedure: Metrx L4-5 left hemilaminectomy and bilateral foraminotomy, Metrx L5-S1 left hemilaminectomy and microdiskectomy;  Surgeon: Pierre Bedoya MD;  Location: [facility];  Service: Neurosurgery       History   Smoking Status    Never Smoker   Smokeless Tobacco    Never Used       History   Alcohol Use    Yes     Comment: social        History   Drug Use No       Vitals:    12/20/18 1301   BP: 143/61   BP Location: Right arm   Patient Position: Sitting   Cuff Size: Adult   Pulse: 81   Resp: 16   Temp: 97 7 °F (36 5 °C)   TempSrc: Tympanic   Weight: 92 5 kg (204 lb)   Height: 6' 2" (1 88 m)         Current Outpatient Prescriptions:     Calcium-Magnesium-Vitamin D (CALCIUM MAGNESIUM PO), Take by mouth, Disp: , Rfl:     chlorhexidine (PERIDEX) 0 12 % solution, RINSE WITH 1/2 OZ TWO TIMES DAILY FOLLOWING BRUSHING AND FLOSSING, Disp: , Rfl: 3    Cholecalciferol (VITAMIN D PO), Take by mouth, Disp: , Rfl:     Cyanocobalamin (VITAMIN B12 PO), Take by mouth, Disp: , Rfl:     doxycycline (PERIOSTAT) 20 MG tablet, Take 20 mg by mouth 2 (two) times a day, Disp: , Rfl: 2    ibuprofen (MOTRIN) 200 mg tablet, Take 200 mg by mouth daily at bedtime, Disp: , Rfl:     The following portions of the patient's history were reviewed by MD and updated by MA as appropriate: allergies, current medications, past family history, past medical history, past social history, past surgical history and problem list       Physical Exam  Incision has a bulge which is firm and related to the muscle  Sensation is intact  Power for dorsiflexion and plantar flexion are approximately 3/5 on the left and 5/5 on the right    RESULTS/DATA

## 2019-01-02 ENCOUNTER — EVALUATION (OUTPATIENT)
Dept: PHYSICAL THERAPY | Facility: REHABILITATION | Age: 74
End: 2019-01-02
Payer: COMMERCIAL

## 2019-01-02 DIAGNOSIS — Z47.89 AFTERCARE FOLLOWING SURGERY OF THE MUSCULOSKELETAL SYSTEM: ICD-10-CM

## 2019-01-02 DIAGNOSIS — M21.372 LEFT FOOT DROP: Primary | ICD-10-CM

## 2019-01-02 PROCEDURE — G8978 MOBILITY CURRENT STATUS: HCPCS | Performed by: PHYSICAL THERAPIST

## 2019-01-02 PROCEDURE — G8979 MOBILITY GOAL STATUS: HCPCS | Performed by: PHYSICAL THERAPIST

## 2019-01-02 PROCEDURE — 97162 PT EVAL MOD COMPLEX 30 MIN: CPT | Performed by: PHYSICAL THERAPIST

## 2019-01-02 NOTE — PROGRESS NOTES
PT Evaluation     Today's date: 2019  Patient name: Ramo Gurrola  : 1945  MRN: [id number]  Referring provider: Ami Lao MD  Dx:   Encounter Diagnosis     ICD-10-CM    1  Left foot drop M21 372    2  Aftercare following surgery of the musculoskeletal system Z47 89 Ambulatory referral to Physical Therapy       Start Time: 0900  Stop Time: 1000  Total time in clinic (min): 60 minutes    Assessment  Assessment details: Ramo Gurrola is a 68 y o  male present with:   Left foot drop  (primary encounter diagnosis)  Aftercare following surgery of the musculoskeletal system    Huang Celestin has the above listed impairments and will benefit from skilled Physical Therapist management to improve deficits to return to prior level of function  Will benefit from home NMES unit to increase strength within dorsiflexor musculature secondary to atrophy from foot drop     Impairments: abnormal gait, abnormal muscle firing, abnormal or restricted ROM, activity intolerance, impaired physical strength, lacks appropriate home exercise program and pain with function    Symptom irritability: lowBarriers to therapy: Chronic nature  Understanding of Dx/Px/POC: good   Prognosis: good    Goals  Impairment Goals  - Decrease pain 0/10  - Improve ROM to 10 degrees dorsiflexion  - Increase strength to 4+/5 throughout    Functional Goals  - Return to Prior Level of Function  - Increase Functional Status Measure to: 76  - Patient will be independent with HEP  -Patient will be able to return to walking without cane 8 weeks    Plan  Patient would benefit from: skilled PT  Planned modality interventions: electrical stimulation/Russian stimulation  Planned therapy interventions: joint mobilization, manual therapy, patient education, postural training, activity modification, abdominal trunk stabilization, body mechanics training, flexibility, functional ROM exercises, graded exercise, home exercise program, neuromuscular re-education, strengthening, stretching, therapeutic activities and therapeutic exercise  Frequency: 2x week  Duration in weeks: 8  Plan of Care beginning date: 2019  Plan of Care expiration date: 2019  Treatment plan discussed with: patient        Subjective Evaluation    History of Present Illness  Mechanism of injury: Bryan Lynn reports s/p L4-L5 micro discectomy and laminectomy 18 performed by Dr Nelly Lynn currently presenting with left drop foot  Notes some stiffness, and occasional nerve pain along L5 dermatome  Difficulty ambulating requiring use of SPC   Pain  Current pain ratin  Location: Left Ankle  Quality: needle-like and squeezing          Objective     Active Range of Motion   Left Ankle/Foot   Dorsiflexion (ke): -25 degrees   Dorsiflexion (kf): 50 degrees   Inversion: 0 degrees   Eversion: 5 degrees     Right Ankle/Foot   Dorsiflexion (ke): 10 degrees   Inversion: 25 degrees   Eversion: 25 degrees     Additional Active Range of Motion Details  Minimal contraction of anterior tibialis with active dorsiflexion, atrophy evident    Passive Range of Motion   Left Ankle/Foot    Dorsiflexion (ke): 10 degrees   Plantar flexion: WFL  Inversion: WFL  Eversion: WFL    Strength/Myotome Testing     Left Ankle/Foot   Dorsiflexion: 2-  Plantar flexion: 4+  Inversion: 2-  Eversion: 2-  Great toe flexion: 3  Great toe extension: 2-    Right Ankle/Foot   Dorsiflexion: WFL  Plantar flexion: WFL  Inversion: WFL  Eversion: WFL  Great toe flexion: WFL  Great toe extension: WFL         Flowsheet Rows      Most Recent Value   PT/OT G-Codes   Current Score  49   Projected Score  68   Assessment Type  Evaluation   G code set  Mobility: Walking & Moving Around   Mobility: Walking and Moving Around Current Status ()  CK   Mobility: Walking and Moving Around Goal Status ()  CJ          Precautions: s/p L hemilaminectomy L4-L5 bilateral foraminotomy and microdiscectomy L5-S1 18     Daily Treatment Diary   Ankle Exercises 1/2/2019        Ankle alphabet 2 nv        Ankle pumps 20        Ankle isometric inv/ev 10x5"                  Bridges nv        Active DF with NMES 10'        Active DF w strap assist nv        ROM         Gastroc stretch nv        Soleus stretch                           Flexibility         HS stretch nv                          TA/Closed Chain         Seated heel/toe raises         Heel/toe raises nv        TG Squats         Band walks eversion biased         Step ups (involved LE up, uninvolved down)                  Step overs hold        Mini squats w ue support hold        STS from chair/low mat hold        Squats w target hold        Goblet squats hold                 Balance/Proprio         BAPS board sitting nv        FTEO         Tandem nv        SL nv        Tandem ambulation         Marches                  SL skaters hold        SL ball toss hold        SL on foam hold        SL skaters on foam hold                 * = on hep

## 2019-01-03 ENCOUNTER — OFFICE VISIT (OUTPATIENT)
Dept: FAMILY MEDICINE CLINIC | Facility: CLINIC | Age: 74
End: 2019-01-03
Payer: COMMERCIAL

## 2019-01-03 VITALS
DIASTOLIC BLOOD PRESSURE: 52 MMHG | RESPIRATION RATE: 18 BRPM | WEIGHT: 214.7 LBS | BODY MASS INDEX: 27.57 KG/M2 | TEMPERATURE: 97.5 F | HEART RATE: 73 BPM | SYSTOLIC BLOOD PRESSURE: 130 MMHG | OXYGEN SATURATION: 98 %

## 2019-01-03 DIAGNOSIS — S92.325D CLOSED NONDISPLACED FRACTURE OF SECOND METATARSAL BONE OF LEFT FOOT WITH ROUTINE HEALING, SUBSEQUENT ENCOUNTER: ICD-10-CM

## 2019-01-03 DIAGNOSIS — Z98.890 HISTORY OF LUMBAR LAMINECTOMY: ICD-10-CM

## 2019-01-03 DIAGNOSIS — Z00.00 WELL ADULT EXAM: Primary | ICD-10-CM

## 2019-01-03 DIAGNOSIS — M21.372 LEFT FOOT DROP: ICD-10-CM

## 2019-01-03 PROCEDURE — G0439 PPPS, SUBSEQ VISIT: HCPCS | Performed by: FAMILY MEDICINE

## 2019-01-04 ENCOUNTER — OFFICE VISIT (OUTPATIENT)
Dept: PHYSICAL THERAPY | Facility: REHABILITATION | Age: 74
End: 2019-01-04
Payer: COMMERCIAL

## 2019-01-04 DIAGNOSIS — M21.372 LEFT FOOT DROP: ICD-10-CM

## 2019-01-04 DIAGNOSIS — Z47.89 AFTERCARE FOLLOWING SURGERY OF THE MUSCULOSKELETAL SYSTEM: Primary | ICD-10-CM

## 2019-01-04 PROCEDURE — 97110 THERAPEUTIC EXERCISES: CPT | Performed by: PHYSICAL THERAPIST

## 2019-01-04 PROCEDURE — 97112 NEUROMUSCULAR REEDUCATION: CPT | Performed by: PHYSICAL THERAPIST

## 2019-01-04 PROCEDURE — 97140 MANUAL THERAPY 1/> REGIONS: CPT | Performed by: PHYSICAL THERAPIST

## 2019-01-04 NOTE — PROGRESS NOTES
Daily Note     Today's date: 2019  Patient name: Gregg Lafleur  : 1945  MRN: [id number]  Referring provider: Holley Booker MD  Dx:   Encounter Diagnosis     ICD-10-CM    1  Aftercare following surgery of the musculoskeletal system Z47 89    2  Left foot drop M21 372                   Subjective: Grace Reed reports that his strength is improving slowly  He has been compliant with his exercises at home  He is going to invest in an ANTONIO brace from [employer]  PT RH discussed this with the patient and he was also provided with the information to purchase the correct brace  Objective: See treatment diary below      Assessment: Tolerated treatment well  Patient reports feeling more muscle firiing during exercises today  Unable to produce a tonic contraction with NMES, only small fasciculations visible  Patient tolerated treatment well overall  Plan: Continue per plan of care          Precautions: s/p L hemilaminectomy L4-L5 bilateral foraminotomy and microdiscectomy L5-S1 18     Daily Treatment Diary   Manual        Passive DF rom  8'                Ankle Exercises         Ankle alphabet 2 nv 2x       Ankle pumps 20 20       Ankle isometric inv/ev 10x5"  10x5"                Bridges nv 3x12       Active DF with NMES 10' 10'       Active DF w strap assist nv        ROM         Gastroc stretch nv 3x30"       Soleus stretch  3x30"                         Flexibility         HS stretch nv                          TA/Closed Chain         Seated heel/toe raises         Heel/toe raises nv        TG Squats         Band walks eversion biased         Step ups (involved LE up, uninvolved down)                  Step overs hold        Mini squats w ue support hold        STS from chair/low mat hold        Squats w target hold        Goblet squats hold                 Balance/Proprio         BAPS board sitting nv        FTEO         Tandem nv        SL nv        Tandem ambulation         [facility] SL skaters hold        SL ball toss hold        SL on foam hold        SL skaters on foam hold                 * = on hep

## 2019-01-05 PROBLEM — Z98.890 HISTORY OF LUMBAR LAMINECTOMY: Status: ACTIVE | Noted: 2019-01-05

## 2019-01-05 PROBLEM — Z00.00 WELL ADULT EXAM: Status: ACTIVE | Noted: 2019-01-05

## 2019-01-06 NOTE — PATIENT INSTRUCTIONS
PE WNL  Pt will continue with PT s/p laminectomy  He will continue with home exercises also  Pt is UTD with immunizations  Yearly exam recommended

## 2019-01-06 NOTE — PROGRESS NOTES
Assessment/Plan:    No problem-specific Assessment & Plan notes found for this encounter  There are no diagnoses linked to this encounter  Subjective:      Patient ID: Allan Wolff is a 68 y o  male  This is a 69 yo male who is scheduled for a well exam   The pt recently had laminectomy of L4-5 and L5-S1 for herniated discs  He also had a fracture of his left foot  He is now out of the left foot boot and has started PT for his back  He had a left foot drop secondary to the herniated discs  His foot drop has decreased with exercise  He denies any new problems  The following portions of the patient's history were reviewed and updated as appropriate: allergies, current medications, past family history, past medical history, past social history, past surgical history and problem list     Review of Systems   Constitutional: Negative  HENT: Negative  Eyes: Negative  Respiratory: Negative  Cardiovascular: Negative  Gastrointestinal: Negative  Endocrine: Negative  Musculoskeletal: Negative  Left foot drop   Allergic/Immunologic: Negative  Neurological: Negative  Hematological: Negative  Psychiatric/Behavioral: Negative  All other systems reviewed and are negative  Objective:      /52 (BP Location: Left arm, Patient Position: Sitting, Cuff Size: Large)   Pulse 73   Temp 97 5 °F (36 4 °C) (Tympanic)   Resp 18   Wt 97 4 kg (214 lb 11 2 oz)   SpO2 98%   BMI 27 57 kg/m²          Physical Exam   Constitutional: He is oriented to person, place, and time  He appears well-developed and well-nourished  Overweight with BMI of 27 57   HENT:   Head: Normocephalic  Left Ear: External ear normal    Mouth/Throat: Oropharynx is clear and moist    Eyes: EOM are normal    Neck: Normal range of motion  Neck supple  Cardiovascular: Normal rate, regular rhythm, normal heart sounds and intact distal pulses      Pulmonary/Chest: Effort normal and breath sounds normal    Abdominal: Soft  Bowel sounds are normal    Musculoskeletal: Normal range of motion  Mild left foot drop   Neurological: He is alert and oriented to person, place, and time  He has normal reflexes  Skin: Skin is warm and dry  Psychiatric: He has a normal mood and affect  His behavior is normal  Judgment and thought content normal    Vitals reviewed

## 2019-01-09 ENCOUNTER — OFFICE VISIT (OUTPATIENT)
Dept: PHYSICAL THERAPY | Facility: REHABILITATION | Age: 74
End: 2019-01-09
Payer: COMMERCIAL

## 2019-01-09 DIAGNOSIS — M21.372 LEFT FOOT DROP: ICD-10-CM

## 2019-01-09 DIAGNOSIS — Z47.89 AFTERCARE FOLLOWING SURGERY OF THE MUSCULOSKELETAL SYSTEM: Primary | ICD-10-CM

## 2019-01-09 PROCEDURE — 97112 NEUROMUSCULAR REEDUCATION: CPT | Performed by: PHYSICAL THERAPIST

## 2019-01-09 PROCEDURE — 97140 MANUAL THERAPY 1/> REGIONS: CPT | Performed by: PHYSICAL THERAPIST

## 2019-01-09 PROCEDURE — 97110 THERAPEUTIC EXERCISES: CPT | Performed by: PHYSICAL THERAPIST

## 2019-01-09 NOTE — PROGRESS NOTES
Daily Note     Today's date: 2019  Patient name: Yaima Parker  : 1945  MRN: [id number]  Referring provider: Stefan Castro MD  Dx:   Encounter Diagnosis     ICD-10-CM    1  Aftercare following surgery of the musculoskeletal system Z47 89    2  Left foot drop M21 372      1:1 with PT LX 8:30- 8:35  1:1 with PTA CR 8:35- 9:15  Subjective: Notices small improvements weekly  Objective: See treatment diary below      Assessment: Tolerated treatment well  Patient reports feeling more muscle firiing during exercises today  Cues to avoid compensating with hip during inv/ev iso's  Plan: Continue per plan of care          Precautions: s/p L hemilaminectomy L4-L5 bilateral foraminotomy and microdiscectomy L5-S1 18     Daily Treatment Diary   Manual       Passive DF rom  8' 8 mins               Ankle Exercises         Ankle alphabet 2 nv 2x x2      Ankle pumps 20 20 20      Ankle isometric inv/ev 10x5"  10x5" 5"x10               Bridges nv 3x12 3x12      Active DF with NMES 10' 10' 10 mins      Active DF w strap assist nv        ROM         Gastroc stretch nv 3x30" 30"x3      Soleus stretch  3x30" 30"x3                        Flexibility         HS stretch nv                          TA/Closed Chain         Seated heel/toe raises         Heel/toe raises nv        TG Squats         Band walks eversion biased         Step ups (involved LE up, uninvolved down)                  Step overs hold        Mini squats w ue support hold        STS from chair/low mat hold        Squats w target hold        Goblet squats hold                 Balance/Proprio         BAPS board sitting nv        FTEO         Tandem nv        SL nv        Tandem ambulation         Marches                  SL skaters hold        SL ball toss hold        SL on foam hold        SL skaters on foam hold                 * = on hep

## 2019-01-11 ENCOUNTER — OFFICE VISIT (OUTPATIENT)
Dept: PHYSICAL THERAPY | Facility: REHABILITATION | Age: 74
End: 2019-01-11
Payer: COMMERCIAL

## 2019-01-11 DIAGNOSIS — Z47.89 AFTERCARE FOLLOWING SURGERY OF THE MUSCULOSKELETAL SYSTEM: Primary | ICD-10-CM

## 2019-01-11 DIAGNOSIS — M21.372 LEFT FOOT DROP: ICD-10-CM

## 2019-01-11 PROCEDURE — 97112 NEUROMUSCULAR REEDUCATION: CPT

## 2019-01-11 PROCEDURE — 97140 MANUAL THERAPY 1/> REGIONS: CPT

## 2019-01-11 PROCEDURE — 97110 THERAPEUTIC EXERCISES: CPT

## 2019-01-11 NOTE — PROGRESS NOTES
Daily Note     Today's date: 2019  Patient name: Allan Wolff  : 1945  MRN: [id number]  Referring provider: Madhavi Arzola MD  Dx:   Encounter Diagnosis     ICD-10-CM    1  Aftercare following surgery of the musculoskeletal system Z47 89    2  Left foot drop M21 372        Start Time: 0830  Stop Time: 0915  Total time in clinic (min): 45 minutes    Subjective: Patient noted that he is getting back to his normal work out routine, however starting at low levels  Objective: See treatment diary below      Precautions: s/p L hemilaminectomy L4-L5 bilateral foraminotomy and microdiscectomy L5-S1 18     Daily Treatment Diary   Manual      Passive DF rom  8' 8 mins 8 mins              Ankle Exercises         Ankle alphabet 2 nv 2x x2 x2     Ankle pumps 20 20 20 20     Ankle isometric inv/ev 10x5"  10x5" 5"x10 10x5"              Bridges nv 3x12 3x12 3x12     Active DF with NMES 10' 10' 10 mins 10 mins     Active DF w strap assist nv   2x10     ROM         Gastroc stretch nv 3x30" 30"x3 30"x3     Soleus stretch  3x30" 30"x3 30"x3                       Flexibility         HS stretch nv                          TA/Closed Chain         Seated heel/toe raises         Heel/toe raises nv        TG Squats         Band walks eversion biased         Step ups (involved LE up, uninvolved down)                  Step overs hold        Mini squats w ue support hold        STS from chair/low mat hold        Squats w target hold        Goblet squats hold                 Balance/Proprio         BAPS board sitting nv        FTEO         Tandem nv        SL nv        Tandem ambulation         Marches                  SL skaters hold        SL ball toss hold        SL on foam hold        SL skaters on foam hold                 * = on hep        Assessment: Patient continues to demonstrate improvements with tib ant contraction during AROM and NMES  Patient required min VCs for ankle ABCs   Patient would benefit from continued PT to allow the patient to return to his PLOF  Plan: Continue per plan of care

## 2019-01-16 ENCOUNTER — OFFICE VISIT (OUTPATIENT)
Dept: PHYSICAL THERAPY | Facility: REHABILITATION | Age: 74
End: 2019-01-16
Payer: COMMERCIAL

## 2019-01-16 DIAGNOSIS — M21.372 LEFT FOOT DROP: ICD-10-CM

## 2019-01-16 DIAGNOSIS — Z47.89 AFTERCARE FOLLOWING SURGERY OF THE MUSCULOSKELETAL SYSTEM: Primary | ICD-10-CM

## 2019-01-16 PROCEDURE — 97110 THERAPEUTIC EXERCISES: CPT | Performed by: PHYSICAL THERAPIST

## 2019-01-16 PROCEDURE — 97112 NEUROMUSCULAR REEDUCATION: CPT | Performed by: PHYSICAL THERAPIST

## 2019-01-16 PROCEDURE — 97140 MANUAL THERAPY 1/> REGIONS: CPT | Performed by: PHYSICAL THERAPIST

## 2019-01-16 NOTE — PROGRESS NOTES
Daily Note     Today's date: 2019  Patient name: Giovanna Tapia  : 1945  MRN: [id number]  Referring provider: Isabel Gonzalez MD  Dx:   Encounter Diagnosis     ICD-10-CM    1  Aftercare following surgery of the musculoskeletal system Z47 89    2  Left foot drop M21 372        Start Time: 9932  Stop Time: 0930  Total time in clinic (min): 58 minutes    Subjective: Sunny Aguilar reports that he feels his ankle is moving more than prior  Objective: See treatment diary below      Assessment: Tolerated treatment fair  Patient demonstrated fatigue post treatment, exhibited good technique with therapeutic exercises and would benefit from continued PT  Continues to exhibit 1/5 strength of dorsiflexion musculature  Awaitiing confirmation from NMES rep, patient will greatly benefit from NMES due to current inability to actively contract dorsiflexor musculature and current level of atrophy  Plan: Continue per plan of care            Precautions: s/p L hemilaminectomy L4-L5 bilateral foraminotomy and microdiscectomy L5-S1 18     Daily Treatment Diary   Manual     Passive DF rom  8' 8 mins 8 mins 8 mins             Ankle Exercises         Ankle alphabet 2 nv 2x x2 x2 x2    Ankle pumps 20 20 20 20 20    Ankle isometric inv/ev 10x5"  10x5" 5"x10 10x5" 20x5"             Bridges nv 3x12 3x12 3x12 3x15    Active DF with NMES 10' 10' 10 mins 10 mins 10 mins    Active DF w strap assist nv   2x10 20x2    ROM         Gastroc stretch nv 3x30" 30"x3 30"x3 3x30"    Soleus stretch  3x30" 30"x3 30"x3 3x30"                      Flexibility         HS stretch seated nv    3x30"                       TA/Closed Chain         Seated heel/toe raises         Heel/toe raises         TG Squats         Band walks eversion biased         Step ups (involved LE up, uninvolved down)                  Step overs hold        Mini squats w ue support hold        STS from chair/low mat hold        Squats w target hold Goblet squats hold                 Balance/Proprio         BAPS board sitting nv        FTEO         Tandem nv        SL nv        Tandem ambulation         Marches                  SL skaters hold        SL ball toss hold        SL on foam hold        SL skaters on foam hold                 * = on hep

## 2019-01-17 ENCOUNTER — OFFICE VISIT (OUTPATIENT)
Dept: PHYSICAL THERAPY | Facility: REHABILITATION | Age: 74
End: 2019-01-17
Payer: COMMERCIAL

## 2019-01-17 DIAGNOSIS — Z47.89 AFTERCARE FOLLOWING SURGERY OF THE MUSCULOSKELETAL SYSTEM: Primary | ICD-10-CM

## 2019-01-17 DIAGNOSIS — M21.372 LEFT FOOT DROP: ICD-10-CM

## 2019-01-17 PROCEDURE — 97110 THERAPEUTIC EXERCISES: CPT | Performed by: PHYSICAL THERAPIST

## 2019-01-17 PROCEDURE — 97140 MANUAL THERAPY 1/> REGIONS: CPT | Performed by: PHYSICAL THERAPIST

## 2019-01-17 PROCEDURE — 97014 ELECTRIC STIMULATION THERAPY: CPT | Performed by: PHYSICAL THERAPIST

## 2019-01-17 PROCEDURE — 97112 NEUROMUSCULAR REEDUCATION: CPT | Performed by: PHYSICAL THERAPIST

## 2019-01-17 NOTE — PROGRESS NOTES
Daily Note     Today's date: 2019  Patient name: Yaima Parker  : 1945  MRN: [id number]  Referring provider: Stefna Castro MD  Dx:   Encounter Diagnosis     ICD-10-CM    1  Aftercare following surgery of the musculoskeletal system Z47 89    2  Left foot drop M21 372        Start Time: 0830  Stop Time: 09  Total time in clinic (min): 50 minutes    Subjective: Joycelyn Eason reports that his ankle is feeling stronger  Objective: See treatment diary below      Assessment: Tolerated treatment well  Patient demonstrated fatigue post treatment, exhibited good technique with therapeutic exercises and would benefit from continued PT  Provided patient with home STIM unit trial as well as education, may keep depending on insurance coverage  Plan: Continue per plan of care         Precautions: s/p L hemilaminectomy L4-L5 bilateral foraminotomy and microdiscectomy L5-S1 18     Daily Treatment Diary   Manual    Passive DF rom  8' 8 mins 8 mins 8 mins 8 mins            Ankle Exercises         Ankle alphabet 2 nv 2x x2 x2 x2 2x   Ankle pumps 20 20 20 20 20 20   Ankle isometric inv/ev 10x5"  10x5" 5"x10 10x5" 20x5" 20x5"            Bridges nv 3x12 3x12 3x12 3x15    Active DF with NMES 10' 10' 10 mins 10 mins 10 mins 10 mins   Active DF w strap assist nv   2x10 20x2 np   ROM         Gastroc stretch nv 3x30" 30"x3 30"x3 3x30" 3x30"   Soleus stretch  3x30" 30"x3 30"x3 3x30" 3x30"                     Flexibility         HS stretch seated nv    3x30"  np                     TA/Closed Chain         Seated heel/toe raises         Heel/toe raises         TG Squats      3x15 L22   Band walks eversion biased         Step ups (involved LE up, uninvolved down)      3x12 6"            Step overs hold        Mini squats w ue support hold        STS from chair/low mat hold        Squats w target hold        Goblet squats hold                 Balance/Proprio         BAPS board sitting nv FTEO         Tandem nv        SL nv        Tandem ambulation         [employer] skaters hold        SL ball toss hold        SL on foam hold        SL skaters on foam hold                 * = on hep

## 2019-01-18 ENCOUNTER — APPOINTMENT (OUTPATIENT)
Dept: PHYSICAL THERAPY | Facility: REHABILITATION | Age: 74
End: 2019-01-18
Payer: COMMERCIAL

## 2019-01-23 ENCOUNTER — OFFICE VISIT (OUTPATIENT)
Dept: PHYSICAL THERAPY | Facility: REHABILITATION | Age: 74
End: 2019-01-23
Payer: COMMERCIAL

## 2019-01-23 DIAGNOSIS — Z47.89 AFTERCARE FOLLOWING SURGERY OF THE MUSCULOSKELETAL SYSTEM: Primary | ICD-10-CM

## 2019-01-23 DIAGNOSIS — M21.372 LEFT FOOT DROP: ICD-10-CM

## 2019-01-23 PROCEDURE — 97110 THERAPEUTIC EXERCISES: CPT | Performed by: PHYSICAL THERAPIST

## 2019-01-23 PROCEDURE — 97112 NEUROMUSCULAR REEDUCATION: CPT | Performed by: PHYSICAL THERAPIST

## 2019-01-23 NOTE — PROGRESS NOTES
Daily Note     Today's date: 2019  Patient name: Isaac Wong  : 1945  MRN: [id number]  Referring provider: Sly Beltran MD  Dx:   Encounter Diagnosis     ICD-10-CM    1  Aftercare following surgery of the musculoskeletal system Z47 89    2  Left foot drop M21 372        Start Time: 08  Stop Time: 925  Total time in clinic (min): 50 minutes    Subjective: Jacquelin Martinez reports that his foot "feels like it's getting better "       Objective: See treatment diary below      Assessment: Tolerated treatment well  Patient demonstrated fatigue post treatment, exhibited good technique with therapeutic exercises and would benefit from continued PT  Difficulty maintaining left heel flat during TG squats due to inability to contract dorsiflexion musculature  Plan: Continue per plan of care          Precautions: s/p L hemilaminectomy L4-L5 bilateral foraminotomy and microdiscectomy L5-S1 18     Daily Treatment Diary   Manual    Passive DF rom 10' 8' 8 mins 8 mins 8 mins 8 mins            Ankle Exercises         Ankle alphabet 2x 2x x2 x2 x2 2x   Ankle pumps  20 20 20 20 20   Ankle isometric inv/ev 20x5" 10x5" 5"x10 10x5" 20x5" 20x5"            Bridges 3x15 3x12 3x12 3x12 3x15    Active DF with NMES 10mins 10' 10 mins 10 mins 10 mins 10 mins   Active DF w strap assist    2x10 20x2 np   ROM         Gastroc stretch 3x30" 3x30" 30"x3 30"x3 3x30" 3x30"   Soleus stretch 3x30" 3x30" 30"x3 30"x3 3x30" 3x30"                     Flexibility         HS stretch seated nv    3x30"  np                     TA/Closed Chain         Seated heel/toe raises         Heel/toe raises         TG Squats 3x15 L 22     3x15 L22   Band walks eversion biased         Step ups (involved LE up, uninvolved down) nv     3x12 6"            Step overs         Mini squats w ue support         STS from chair/low mat         Squats w target         Goblet squats                  Balance/Proprio         BAPS board sitting         FTEO         Tandem         SL         Tandem ambulation         [employer] skaters         SL ball toss         SL on foam         SL skaters on foam                  * = on hep

## 2019-01-25 ENCOUNTER — OFFICE VISIT (OUTPATIENT)
Dept: PHYSICAL THERAPY | Facility: REHABILITATION | Age: 74
End: 2019-01-25
Payer: COMMERCIAL

## 2019-01-25 DIAGNOSIS — Z47.89 AFTERCARE FOLLOWING SURGERY OF THE MUSCULOSKELETAL SYSTEM: Primary | ICD-10-CM

## 2019-01-25 DIAGNOSIS — M21.372 LEFT FOOT DROP: ICD-10-CM

## 2019-01-25 PROCEDURE — 97530 THERAPEUTIC ACTIVITIES: CPT | Performed by: PHYSICAL THERAPIST

## 2019-01-25 PROCEDURE — 97110 THERAPEUTIC EXERCISES: CPT | Performed by: PHYSICAL THERAPIST

## 2019-01-25 PROCEDURE — 97140 MANUAL THERAPY 1/> REGIONS: CPT | Performed by: PHYSICAL THERAPIST

## 2019-01-25 NOTE — PROGRESS NOTES
Daily Note     Today's date: 2019  Patient name: Peggy Villasenor  : 1945  MRN: [id number]  Referring provider: Dereje Perez MD  Dx:   Encounter Diagnosis     ICD-10-CM    1  Aftercare following surgery of the musculoskeletal system Z47 89    2  Left foot drop M21 372        Start Time: 0830  Stop Time: 928  Total time in clinic (min): 58 minutes    Subjective: Buddy Schroeedr reports that his bilateral proximal hamstrings and inferior gluteus muscles were sore for two days after last session due to TG squats  Objective: See treatment diary below      Assessment: Tolerated treatment fair  Patient demonstrated fatigue post treatment, exhibited good technique with therapeutic exercises and would benefit from continued PT  Unable to achieve any palpable active contraction of anterior tibialis during NMES, will continue to progress as able  Plan: Continue per plan of care            Precautions: s/p L hemilaminectomy L4-L5 bilateral foraminotomy and microdiscectomy L5-S1 18     Daily Treatment Diary   Manual    Passive DF rom 10' 8' 8 mins 8 mins 8 mins 8 mins            Ankle Exercises         Ankle alphabet 2x 2x x2 x2 x2 2x   Ankle pumps  20 20 20 20 20   Ankle isometric inv/ev 20x5" 10x5" 5"x10 10x5" 20x5" 20x5"            Bridges 3x15 3x12 OTB 3x12 3x12 3x15    Active DF with NMES 10mins 10' 10 mins 10 mins 10 mins 10 mins   Active DF w strap assist    2x10 20x2 np   ROM         Gastroc stretch 3x30" 3x30''  30"x3 3x30" 3x30"   Soleus stretch 3x30" 3x30''  30"x3 3x30" 3x30"                     Flexibility         HS stretch seated nv 3x30"   3x30"  np                     TA/Closed Chain         Seated heel/toe raises         Heel/toe raises         TG Squats 3x15 L 22 np    3x15 L22   Band walks eversion biased         Step ups (involved LE up, uninvolved down) nv 6'' 3x15     3x12 6"            Step overs         Mini squats w ue support         STS from chair/low mat         Squats w target         Goblet squats                  Balance/Proprio         BAPS board sitting         FTEO         Tandem         SL         Tandem ambulation         Marches                  SL skaters         SL ball toss         SL on foam         SL skaters on foam                  * = on hep

## 2019-01-28 ENCOUNTER — OFFICE VISIT (OUTPATIENT)
Dept: PHYSICAL THERAPY | Facility: REHABILITATION | Age: 74
End: 2019-01-28
Payer: COMMERCIAL

## 2019-01-28 DIAGNOSIS — Z47.89 AFTERCARE FOLLOWING SURGERY OF THE MUSCULOSKELETAL SYSTEM: Primary | ICD-10-CM

## 2019-01-28 DIAGNOSIS — M21.372 LEFT FOOT DROP: ICD-10-CM

## 2019-01-28 PROCEDURE — 97140 MANUAL THERAPY 1/> REGIONS: CPT

## 2019-01-28 PROCEDURE — 97110 THERAPEUTIC EXERCISES: CPT

## 2019-01-28 PROCEDURE — 97530 THERAPEUTIC ACTIVITIES: CPT

## 2019-01-28 NOTE — PROGRESS NOTES
Daily Note     Today's date: 2019  Patient name: Allan Wolff  : 1945  MRN: [id number]  Referring provider: Madhavi Arzola MD  Dx:   Encounter Diagnosis     ICD-10-CM    1  Aftercare following surgery of the musculoskeletal system Z47 89    2  Left foot drop M21 372                   Subjective: Patient reports increased bilateral hamstring discomfort prior to session today stating "they both hurt really bad " He reports moderate soreness after previous session  Objective: See treatment diary below  Precautions: s/p L hemilaminectomy L4-L5 bilateral foraminotomy and microdiscectomy L5-S1 18     Daily Treatment Diary   Manual    Passive DF rom 10' 8' 8'  8 mins 8 mins            Ankle Exercises         Ankle alphabet 2x 2x 2x  x2 2x   Ankle pumps  20 20  20 20   Ankle isometric inv/ev 20x5" 10x5" 10x5''  20x5" 20x5"            Bridges 3x15 3x12 OTB 3x15  OTB  3x15    Active DF with NMES 10mins 10' 10'  10 mins 10 mins   Active DF w strap assist     20x2 np   ROM         Gastroc stretch 3x30" 3x30'' 3x30''  3x30" 3x30"   Soleus stretch 3x30" 3x30'' 3x30''  3x30" 3x30"                     Flexibility         HS stretch seated nv 3x30" 3x30''  3x30"  np                     TA/Closed Chain         Seated heel/toe raises         Heel/toe raises         TG Squats 3x15 L 22 np L15 2x10   3x15 L22   Band walks eversion biased         Step ups (involved LE up, uninvolved down) nv 6'' 3x15  6'' 3x15 ea   3x12 6"            Step overs         Mini squats w ue support         STS from chair/low mat         Squats w target         Goblet squats                  Balance/Proprio         BAPS board sitting         FTEO         Tandem         SL         Tandem ambulation         Marches                  SL skaters         SL ball toss         SL on foam         SL skaters on foam                  * = on hep        Assessment: Tolerated treatment fair   Patient demonstrated fatigue post treatment and would benefit from continued PT Cues required for proper form with all stretching  Patient reports increased LE soreness with completion of step ups/lateral step ups  Plan: Continue per plan of care  Progress treatment as tolerated

## 2019-01-30 ENCOUNTER — APPOINTMENT (OUTPATIENT)
Dept: PHYSICAL THERAPY | Facility: REHABILITATION | Age: 74
End: 2019-01-30
Payer: COMMERCIAL

## 2019-02-01 ENCOUNTER — OFFICE VISIT (OUTPATIENT)
Dept: PHYSICAL THERAPY | Facility: REHABILITATION | Age: 74
End: 2019-02-01
Payer: COMMERCIAL

## 2019-02-01 DIAGNOSIS — M21.372 LEFT FOOT DROP: ICD-10-CM

## 2019-02-01 DIAGNOSIS — Z47.89 AFTERCARE FOLLOWING SURGERY OF THE MUSCULOSKELETAL SYSTEM: Primary | ICD-10-CM

## 2019-02-01 PROCEDURE — 97530 THERAPEUTIC ACTIVITIES: CPT

## 2019-02-01 PROCEDURE — G8979 MOBILITY GOAL STATUS: HCPCS | Performed by: PHYSICAL THERAPIST

## 2019-02-01 PROCEDURE — 97110 THERAPEUTIC EXERCISES: CPT

## 2019-02-01 PROCEDURE — G8978 MOBILITY CURRENT STATUS: HCPCS | Performed by: PHYSICAL THERAPIST

## 2019-02-01 PROCEDURE — 97140 MANUAL THERAPY 1/> REGIONS: CPT

## 2019-02-01 NOTE — PROGRESS NOTES
Daily Note     Today's date: 2019  Patient name: River Jaimes  : 1945  MRN: [id number]  Referring provider: Anthony Aldana MD  Dx:   Encounter Diagnosis     ICD-10-CM    1  Aftercare following surgery of the musculoskeletal system Z47 89    2  Left foot drop M21 372                   Subjective: Patient reports less hamstring soreness prior to session today stating "I feel much better" and no complaints reported after previous session  See re-eval        Objective: See treatment diary below  Precautions: s/p L hemilaminectomy L4-L5 bilateral foraminotomy and microdiscectomy L5-S1 18     Daily Treatment Diary   Manual      Passive DF rom 10' 8' 8' 8'              Ankle Exercises         Ankle alphabet 2x 2x 2x 2x     Ankle pumps  20 20 20     Ankle isometric inv/ev 20x5" 10x5" 10x5'' 20x5''              Bridges 3x15 3x12 OTB 3x15  OTB 3x15 OTB     Active DF with NMES 10mins 10' 10' 10'     Active DF w strap assist         ROM         Gastroc stretch 3x30" 3x30'' 3x30'' 3x30''     Soleus stretch 3x30" 3x30'' 3x30'' 3x30''                       Flexibility         HS stretch seated nv 3x30" 3x30''                        TA/Closed Chain         Seated heel/toe raises         Heel/toe raises         TG Squats 3x15 L 22 np L15 2x10 L15 3x10     Band walks eversion biased         Step ups (involved LE up, uninvolved down) nv 6'' 3x15  6'' 3x15 ea 6'' 3x15 ea              Step overs         Mini squats w ue support         STS from chair/low mat         Squats w target         Goblet squats                  Balance/Proprio         BAPS board sitting         FTEO         Tandem         SL         Tandem ambulation         Marches                  SL skaters         SL ball toss         SL on foam         SL skaters on foam                  * = on hep        Assessment: Tolerated treatment fair   Patient demonstrated fatigue post treatment and would benefit from continued PT See re-eval  Plan: Continue per plan of care  Progress treatment as tolerated

## 2019-02-01 NOTE — PROGRESS NOTES
PT Re-Evaluation     Today's date: 2019  Patient name: Allan Wolff  : 1945  MRN: [id number]  Referring provider: Madhavi Arzola MD  Dx:   Encounter Diagnosis     ICD-10-CM    1  Aftercare following surgery of the musculoskeletal system Z47 89    2  Left foot drop M21 372        Start Time: 830  Stop Time: 930  Total time in clinic (min): 60 minutes    Assessment  Assessment details: Allan Wolff has been compliant with attending PT and home exercise program since initial eval   Allan Ewrin has made improvements in objective data since initial eval but is still limited compared to prior level of function, exhibiting little to no contraction of ankle dorsiflexion musculature, unable to elicit quality contraction with NMES, is able to display minimal extensor digitorum strength  Allan Erwin will be fitted for an AFO brace to correct foot drop while ambulating  Allan Erwin continues with above listed impairments and would benefit from additional skilled PT to address these deficits to return to prior level of function     Impairments: abnormal gait, abnormal muscle firing, abnormal or restricted ROM, activity intolerance, impaired physical strength, lacks appropriate home exercise program and pain with function    Symptom irritability: lowBarriers to therapy: Chronic nature  Understanding of Dx/Px/POC: good   Prognosis: good    Goals  Impairment Goals  - Decrease pain 0/10  - Improve ROM to 10 degrees dorsiflexion  - Increase strength to 4+/5 throughout    Functional Goals  - Return to Prior Level of Function  - Increase Functional Status Measure to: 76  - Patient will be independent with HEP  -Patient will be able to return to walking without cane 8 weeks    Plan  Plan details: Patient will require 8 units of manual, 24 active units  Patient would benefit from: skilled PT  Planned modality interventions: electrical stimulation/Russian stimulation  Planned therapy interventions: joint mobilization, manual therapy, patient education, postural training, activity modification, abdominal trunk stabilization, body mechanics training, flexibility, functional ROM exercises, graded exercise, home exercise program, neuromuscular re-education, strengthening, stretching, therapeutic activities and therapeutic exercise  Frequency: 2x week  Duration in weeks: 4  Plan of Care beginning date: 2019  Plan of Care expiration date: 3/1/2019  Treatment plan discussed with: patient        Subjective Evaluation    History of Present Illness  Mechanism of injury: Buddy Schroeder has been seen for total of 10 visits for OP PT for left foot pain and foot drop  Patient rates overall improvement since beginning PT 40%  Patient's global rating of change is " Somewhat better (3) " Patient reports improvements with " the foot feels better, I'm no longer afraid to walk barefoot  I used to wear the boots and use the cane, now I don't need them "   "I find I'm still slapping my foot when I walk, I cant walk normal " Patient reports most difficulty with longer walks, strength, and endurance of his lower extremity  Pain  Current pain ratin  Location: Left Ankle  Quality: needle-like and squeezing          Objective     Neurological Testing     Sensation     Ankle/Foot   Left Ankle/Foot   Diminished: light touch    Comments   Left light touch: along L5 dermatome anterior shin   Active Range of Motion   Left Ankle/Foot   Dorsiflexion (ke): -25 degrees   Dorsiflexion (kf): 50 degrees   Inversion: 0 degrees   Eversion: 5 degrees     Right Ankle/Foot   Dorsiflexion (ke): 10 degrees   Inversion: 25 degrees   Eversion: 25 degrees     Additional Active Range of Motion Details  Minimal contraction of anterior tibialis as well as extensor digitorum musculature, more so digitorum with active dorsiflexion, atrophy evident        Passive Range of Motion   Left Ankle/Foot    Dorsiflexion (ke): 10 degrees   Plantar flexion: WFL  Inversion: WFL  Eversion: [facility]    Strength/Myotome Testing     Left Ankle/Foot   Dorsiflexion: 2-  Plantar flexion: 4+  Inversion: 2-  Eversion: 2-  Great toe flexion: 3  Great toe extension: 2-    Right Ankle/Foot   Dorsiflexion: WFL  Plantar flexion: WFL  Inversion: WFL  Eversion: WFL  Great toe flexion: WFL  Great toe extension: WFL       Additional Strength Details  Compensation for lack of inversion/eversion strength evident using plantar flexors to lock in extension and use upper leg to move entire leg      Flowsheet Rows      Most Recent Value   PT/OT G-Codes   Current Score  49   Projected Score  68   Assessment Type  Re-evaluation   G code set  Mobility: Walking & Moving Around   Mobility: Walking and Moving Around Current Status ()  CK   Mobility: Walking and Moving Around Goal Status ()  CJ          Daily Treatment Diary   See treatment note by KG PTA

## 2019-02-08 ENCOUNTER — OFFICE VISIT (OUTPATIENT)
Dept: PHYSICAL THERAPY | Facility: REHABILITATION | Age: 74
End: 2019-02-08
Payer: COMMERCIAL

## 2019-02-08 DIAGNOSIS — M21.379 DROP FOOT GAIT: Primary | ICD-10-CM

## 2019-02-08 DIAGNOSIS — Z47.89 AFTERCARE FOLLOWING SURGERY OF THE MUSCULOSKELETAL SYSTEM: Primary | ICD-10-CM

## 2019-02-08 DIAGNOSIS — M21.372 LEFT FOOT DROP: ICD-10-CM

## 2019-02-08 PROCEDURE — 97110 THERAPEUTIC EXERCISES: CPT

## 2019-02-08 PROCEDURE — 97140 MANUAL THERAPY 1/> REGIONS: CPT

## 2019-02-08 PROCEDURE — 97530 THERAPEUTIC ACTIVITIES: CPT

## 2019-02-08 NOTE — PROGRESS NOTES
Daily Note     Today's date: 2019  Patient name: Russ Sullivan  : 1945  MRN: [id number]  Referring provider: Jonah Rai MD  Dx:   Encounter Diagnosis     ICD-10-CM    1  Aftercare following surgery of the musculoskeletal system Z47 89    2  Left foot drop M21 372        Start Time: 0830  Stop Time: 09  Total time in clinic (min): 55 minutes    Subjective: Patient states," I'm so happy about the custom fitting yesterday  This opens up a whole new door of opportunity"      Objective: See treatment diary below  Precautions: s/p L hemilaminectomy L4-L5 bilateral foraminotomy and microdiscectomy L5-S1 18     Daily Treatment Diary   Manual    Passive DF rom 10' 8' 8' 8' 8' 8'            Ankle Exercises         Ankle alphabet 2x 2x 2x 2x 2x 2x   Ankle pumps  20 20 20 20 20   Ankle isometric inv/ev 20x5" 10x5" 10x5'' 20x5'' 20x5'' 20x5"            Bridges 3x15 3x12 OTB 3x15  OTB 3x15 OTB 3x12 GTB 3x12  GTB   Active DF with NMES 10mins 10' 10' 10' 10' 10'   Active DF w strap assist         ROM         Gastroc stretch 3x30" 3x30'' 3x30'' 3x30'' 3x30'' 3x30"   Soleus stretch 3x30" 3x30'' 3x30'' 3x30'' 3x30'' 3x30"   Bike      Lvl 1 5'            Flexibility         HS stretch seated nv 3x30" 3x30''                        TA/Closed Chain         Seated heel/toe raises         Heel/toe raises         TG Squats 3x15 L 22 np L15 2x10 L15 3x10 L16 3x10 L16  3x10   Band walks eversion biased         Step ups (involved LE up, uninvolved down) nv 6'' 3x15  6'' 3x15 ea 6'' 3x15 ea 6'' 3x15 ea  6"  3x15 ea            Step overs         Mini squats w ue support         STS from chair/low mat         Squats w target         Goblet squats                  Balance/Proprio         BAPS board sitting         FTEO         Tandem         SL         Tandem ambulation         Marches                  SL skaters         SL ball toss         SL on foam         SL skaters on foam                  * = on hep    Assessment: Tolerated treatment fair  Patient demonstrated fatigue post treatment, exhibited good technique with therapeutic exercises and would benefit from continued PT  Patient continues to complete fwd and lat step ups without rest breaks even though he is instructed to rest between sets  Plan: Continue per plan of care  Progress treatment as tolerated

## 2019-02-13 ENCOUNTER — OFFICE VISIT (OUTPATIENT)
Dept: PHYSICAL THERAPY | Facility: REHABILITATION | Age: 74
End: 2019-02-13
Payer: COMMERCIAL

## 2019-02-13 DIAGNOSIS — M21.372 LEFT FOOT DROP: ICD-10-CM

## 2019-02-13 DIAGNOSIS — Z47.89 AFTERCARE FOLLOWING SURGERY OF THE MUSCULOSKELETAL SYSTEM: Primary | ICD-10-CM

## 2019-02-13 PROCEDURE — 97140 MANUAL THERAPY 1/> REGIONS: CPT | Performed by: PHYSICAL THERAPIST

## 2019-02-13 PROCEDURE — 97530 THERAPEUTIC ACTIVITIES: CPT | Performed by: PHYSICAL THERAPIST

## 2019-02-13 PROCEDURE — 97110 THERAPEUTIC EXERCISES: CPT | Performed by: PHYSICAL THERAPIST

## 2019-02-13 NOTE — PROGRESS NOTES
Daily Note     Today's date: 2019  Patient name: Shubham Ramirez  : 1945  MRN: [id number]  Referring provider: Apple Beaver MD  Dx:   Encounter Diagnosis     ICD-10-CM    1  Aftercare following surgery of the musculoskeletal system Z47 89    2  Left foot drop M21 372        Start Time: 08  Stop Time: 09  Total time in clinic (min): 55 minutes    Subjective: Les Christopher reports that his foot is continuing to feel "like a normal foot  It's still weak though "       Objective: See treatment diary below      Assessment: Tolerated treatment well  Patient demonstrated fatigue post treatment, exhibited good technique with therapeutic exercises and would benefit from continued PT      Plan: Continue per plan of care         Precautions: s/p L hemilaminectomy L4-L5 bilateral foraminotomy and microdiscectomy L5-S1 18     Daily Treatment Diary   Manual    Passive DF rom 8' 8' 8' 8' 8' 8'            Ankle Exercises         Ankle alphabet 2x 2x 2x 2x 2x 2x   Ankle pumps 20 20 20 20 20 20   Ankle isometric inv/ev  10x5" 10x5'' 20x5'' 20x5'' 20x5"            Bridges 3x15 GTB 3x12 OTB 3x15  OTB 3x15 OTB 3x12 GTB 3x12  GTB   Active DF with NMES 10' 10' 10' 10' 10' 10'   Active DF w strap assist         ROM         Gastroc stretch  3x30'' 3x30'' 3x30'' 3x30'' 3x30"   Soleus stretch  3x30'' 3x30'' 3x30'' 3x30'' 3x30"   Bike      Lvl 1 5'            Flexibility         HS stretch seated 3x30" 3x30" 3x30''                        TA/Closed Chain         Seated heel/toe raises         Heel/toe raises         TG Squats L16 3x12 np L15 2x10 L15 3x10 L16 3x10 L16  3x10   Band walks eversion biased         Step ups (involved LE up, uninvolved down) 6'' 3x15 ea 6'' 3x15  6'' 3x15 ea 6'' 3x15 ea 6'' 3x15 ea  6"  3x15 ea            Step overs         Mini squats w ue support         STS from chair/low mat         Squats w target         Goblet squats                  Balance/Proprio         BAPS board sitting         FTEO         Tandem         SL         Tandem ambulation         [employer] skaters         SL ball toss         SL on foam         SL skaters on foam                  * = on hep  Treated by [id number]

## 2019-02-15 ENCOUNTER — TELEPHONE (OUTPATIENT)
Dept: FAMILY MEDICINE CLINIC | Facility: CLINIC | Age: 74
End: 2019-02-15

## 2019-02-15 DIAGNOSIS — R05.9 COUGH: Primary | ICD-10-CM

## 2019-02-15 RX ORDER — BENZONATATE 200 MG/1
200 CAPSULE ORAL 3 TIMES DAILY PRN
Qty: 20 CAPSULE | Refills: 0 | Status: SHIPPED | OUTPATIENT
Start: 2019-02-15 | End: 2019-02-26

## 2019-02-15 NOTE — TELEPHONE ENCOUNTER
DR Angelina Harris - PT HAS A COUGH AND WAS WONDERING IF YOU CAN ORDER COUGH MEDICINE FOR HIM  HE HAS TRIED OTC BUT IT'S NOT WORKING  PT REALLY DIDN'T WANT TO COME IN FOR APPT  HE WOULD LIKE CALL BACK

## 2019-02-19 ENCOUNTER — OFFICE VISIT (OUTPATIENT)
Dept: PHYSICAL THERAPY | Facility: REHABILITATION | Age: 74
End: 2019-02-19
Payer: COMMERCIAL

## 2019-02-19 DIAGNOSIS — Z47.89 AFTERCARE FOLLOWING SURGERY OF THE MUSCULOSKELETAL SYSTEM: Primary | ICD-10-CM

## 2019-02-19 DIAGNOSIS — M21.372 LEFT FOOT DROP: ICD-10-CM

## 2019-02-19 PROCEDURE — 97140 MANUAL THERAPY 1/> REGIONS: CPT | Performed by: PHYSICAL THERAPIST

## 2019-02-19 PROCEDURE — 97530 THERAPEUTIC ACTIVITIES: CPT | Performed by: PHYSICAL THERAPIST

## 2019-02-19 NOTE — PROGRESS NOTES
Daily Note     Today's date: 2019  Patient name: Skyler Juarez  : 1945  MRN: [id number]  Referring provider: Danielle Palencia MD  Dx:   Encounter Diagnosis     ICD-10-CM    1  Aftercare following surgery of the musculoskeletal system Z47 89    2  Left foot drop M21 372        Start Time: 1430  Stop Time: 1520  Total time in clinic (min): 50 minutes    Subjective: Dede Isbell reports that his ankle continues to feel better  Objective: See treatment diary below      Assessment: Tolerated treatment well  Patient demonstrated fatigue post treatment, exhibited good technique with therapeutic exercises and would benefit from continued PT  Palpated slight contraction of tibialis anterior musculature  Plan: Continue per plan of care          Precautions: s/p L hemilaminectomy L4-L5 bilateral foraminotomy and microdiscectomy L5-S1 18     Daily Treatment Diary   Manual    Passive DF rom 8' 8'  8' 8' 8'            Ankle Exercises         Ankle alphabet 2x 2x  2x 2x 2x   Ankle pumps 20   20 20 20   Ankle isometric inv/ev    20x5'' 20x5'' 20x5"            Bridges 3x15 GTB 3x20 GTB  3x15 OTB 3x12 GTB 3x12  GTB   Active DF with NMES 10' 10'  10' 10' 10'   Active DF w strap assist         ROM         Gastroc stretch  3x30"  3x30'' 3x30'' 3x30"   Soleus stretch  3x30"  3x30'' 3x30'' 3x30"   Bike  np    Lvl 1 5'            Flexibility         HS stretch seated 3x30" 3x30"                         TA/Closed Chain         Seated heel/toe raises         Heel/toe raises         TG Squats L16 3x12 L 18 3x10  L15 3x10 L16 3x10 L16  3x10   Band walks eversion biased         Step ups (involved LE up, uninvolved down) 6'' 3x15 ea 6" 3x15  6'' 3x15 ea 6'' 3x15 ea  6"  3x15 ea            Step overs         Mini squats w ue support         STS from chair/low mat         Wall slides  6x inc set nv       Goblet squats                  Balance/Proprio         BAPS board sitting         FTEO         Tandem SL         Tandem ambulation         [employer] skaters         SL ball toss         SL on foam         SL skaters on foam                  * = on hep

## 2019-02-20 ENCOUNTER — APPOINTMENT (OUTPATIENT)
Dept: PHYSICAL THERAPY | Facility: REHABILITATION | Age: 74
End: 2019-02-20
Payer: COMMERCIAL

## 2019-02-26 ENCOUNTER — APPOINTMENT (EMERGENCY)
Dept: CT IMAGING | Facility: HOSPITAL | Age: 74
DRG: 871 | End: 2019-02-26
Payer: COMMERCIAL

## 2019-02-26 ENCOUNTER — HOSPITAL ENCOUNTER (INPATIENT)
Facility: HOSPITAL | Age: 74
LOS: 5 days | Discharge: HOME | DRG: 871 | End: 2019-03-03
Attending: EMERGENCY MEDICINE | Admitting: INTERNAL MEDICINE
Payer: COMMERCIAL

## 2019-02-26 ENCOUNTER — APPOINTMENT (INPATIENT)
Dept: NON INVASIVE DIAGNOSTICS | Facility: HOSPITAL | Age: 74
DRG: 871 | End: 2019-02-26
Payer: COMMERCIAL

## 2019-02-26 ENCOUNTER — APPOINTMENT (EMERGENCY)
Dept: RADIOLOGY | Facility: HOSPITAL | Age: 74
DRG: 871 | End: 2019-02-26
Payer: COMMERCIAL

## 2019-02-26 DIAGNOSIS — I21.4 NSTEMI (NON-ST ELEVATED MYOCARDIAL INFARCTION) (HCC): ICD-10-CM

## 2019-02-26 DIAGNOSIS — A41.9 SEPSIS (HCC): ICD-10-CM

## 2019-02-26 DIAGNOSIS — J18.9 PNEUMONIA OF LEFT LOWER LOBE DUE TO INFECTIOUS ORGANISM: ICD-10-CM

## 2019-02-26 DIAGNOSIS — R00.0 TACHYCARDIA: Primary | ICD-10-CM

## 2019-02-26 DIAGNOSIS — I48.91 ATRIAL FIBRILLATION, UNSPECIFIED TYPE (HCC): ICD-10-CM

## 2019-02-26 DIAGNOSIS — J18.9 CAP (COMMUNITY ACQUIRED PNEUMONIA): ICD-10-CM

## 2019-02-26 DIAGNOSIS — J90 PLEURAL EFFUSION, LEFT: ICD-10-CM

## 2019-02-26 DIAGNOSIS — I48.0 PAROXYSMAL ATRIAL FIBRILLATION (HCC): ICD-10-CM

## 2019-02-26 DIAGNOSIS — R79.89 ELEVATED BRAIN NATRIURETIC PEPTIDE (BNP) LEVEL: ICD-10-CM

## 2019-02-26 LAB
ALBUMIN SERPL BCP-MCNC: 2.5 G/DL (ref 3.5–5)
ALP SERPL-CCNC: 108 U/L (ref 46–116)
ALT SERPL W P-5'-P-CCNC: 58 U/L (ref 12–78)
ANION GAP SERPL CALCULATED.3IONS-SCNC: 11 MMOL/L (ref 4–13)
APTT PPP: 38 SECONDS (ref 26–38)
AST SERPL W P-5'-P-CCNC: 53 U/L (ref 5–45)
BASOPHILS # BLD AUTO: 0.04 THOUSANDS/ΜL (ref 0–0.1)
BASOPHILS NFR BLD AUTO: 0 % (ref 0–1)
BILIRUB SERPL-MCNC: 0.7 MG/DL (ref 0.2–1)
BILIRUB UR QL STRIP: NEGATIVE
BILIRUB UR QL STRIP: NEGATIVE
BUN SERPL-MCNC: 9 MG/DL (ref 5–25)
CALCIUM SERPL-MCNC: 8.7 MG/DL (ref 8.3–10.1)
CHLORIDE SERPL-SCNC: 99 MMOL/L (ref 100–108)
CLARITY UR: CLEAR
CLARITY UR: CLEAR
CO2 SERPL-SCNC: 25 MMOL/L (ref 21–32)
COLOR UR: YELLOW
COLOR UR: YELLOW
CREAT SERPL-MCNC: 1.09 MG/DL (ref 0.6–1.3)
EOSINOPHIL # BLD AUTO: 0.1 THOUSAND/ΜL (ref 0–0.61)
EOSINOPHIL NFR BLD AUTO: 1 % (ref 0–6)
ERYTHROCYTE [DISTWIDTH] IN BLOOD BY AUTOMATED COUNT: 13 % (ref 11.6–15.1)
FLUAV AG SPEC QL IA: NEGATIVE
FLUAV AG SPEC QL: NOT DETECTED
FLUBV AG SPEC QL IA: NEGATIVE
FLUBV AG SPEC QL: NOT DETECTED
GFR SERPL CREATININE-BSD FRML MDRD: 67 ML/MIN/1.73SQ M
GLUCOSE SERPL-MCNC: 120 MG/DL (ref 65–140)
GLUCOSE UR STRIP-MCNC: NEGATIVE MG/DL
GLUCOSE UR STRIP-MCNC: NEGATIVE MG/DL
HCT VFR BLD AUTO: 40 % (ref 36.5–49.3)
HGB BLD-MCNC: 13 G/DL (ref 12–17)
HGB UR QL STRIP.AUTO: ABNORMAL
HGB UR QL STRIP.AUTO: NEGATIVE
IMM GRANULOCYTES # BLD AUTO: 0.07 THOUSAND/UL (ref 0–0.2)
IMM GRANULOCYTES NFR BLD AUTO: 0 % (ref 0–2)
INR PPP: 1.11 (ref 0.86–1.17)
KETONES UR STRIP-MCNC: NEGATIVE MG/DL
KETONES UR STRIP-MCNC: NEGATIVE MG/DL
L PNEUMO1 AG UR QL IA.RAPID: NEGATIVE
LACTATE SERPL-SCNC: 1.3 MMOL/L (ref 0.5–2)
LEUKOCYTE ESTERASE UR QL STRIP: NEGATIVE
LEUKOCYTE ESTERASE UR QL STRIP: NEGATIVE
LYMPHOCYTES # BLD AUTO: 1.53 THOUSANDS/ΜL (ref 0.6–4.47)
LYMPHOCYTES NFR BLD AUTO: 10 % (ref 14–44)
MAGNESIUM SERPL-MCNC: 1.8 MG/DL (ref 1.6–2.6)
MCH RBC QN AUTO: 31.3 PG (ref 26.8–34.3)
MCHC RBC AUTO-ENTMCNC: 32.5 G/DL (ref 31.4–37.4)
MCV RBC AUTO: 96 FL (ref 82–98)
MONOCYTES # BLD AUTO: 2.1 THOUSAND/ΜL (ref 0.17–1.22)
MONOCYTES NFR BLD AUTO: 13 % (ref 4–12)
NEUTROPHILS # BLD AUTO: 12.29 THOUSANDS/ΜL (ref 1.85–7.62)
NEUTS SEG NFR BLD AUTO: 76 % (ref 43–75)
NITRITE UR QL STRIP: NEGATIVE
NITRITE UR QL STRIP: NEGATIVE
NRBC BLD AUTO-RTO: 0 /100 WBCS
NT-PROBNP SERPL-MCNC: 3566 PG/ML
PH UR STRIP.AUTO: 6.5 [PH] (ref 4.5–8)
PH UR STRIP.AUTO: 6.5 [PH] (ref 4.5–8)
PLATELET # BLD AUTO: 328 THOUSANDS/UL (ref 149–390)
PLATELET # BLD AUTO: 364 THOUSANDS/UL (ref 149–390)
PMV BLD AUTO: 9.8 FL (ref 8.9–12.7)
PMV BLD AUTO: 9.8 FL (ref 8.9–12.7)
POTASSIUM SERPL-SCNC: 3.7 MMOL/L (ref 3.5–5.3)
PROCALCITONIN SERPL-MCNC: 0.16 NG/ML
PROCALCITONIN SERPL-MCNC: 0.17 NG/ML
PROT SERPL-MCNC: 6.9 G/DL (ref 6.4–8.2)
PROT UR STRIP-MCNC: NEGATIVE MG/DL
PROT UR STRIP-MCNC: NEGATIVE MG/DL
PROTHROMBIN TIME: 14 SECONDS (ref 11.8–14.2)
RBC # BLD AUTO: 4.16 MILLION/UL (ref 3.88–5.62)
RSV B RNA SPEC QL NAA+PROBE: NOT DETECTED
S PNEUM AG UR QL: NEGATIVE
SODIUM SERPL-SCNC: 135 MMOL/L (ref 136–145)
SP GR UR STRIP.AUTO: 1.01 (ref 1–1.03)
SP GR UR STRIP.AUTO: <=1.005 (ref 1–1.03)
TROPONIN I SERPL-MCNC: 0.04 NG/ML
TROPONIN I SERPL-MCNC: 0.04 NG/ML
TROPONIN I SERPL-MCNC: 0.05 NG/ML
UROBILINOGEN UR QL STRIP.AUTO: 0.2 E.U./DL
UROBILINOGEN UR QL STRIP.AUTO: 0.2 E.U./DL
WBC # BLD AUTO: 16.13 THOUSAND/UL (ref 4.31–10.16)

## 2019-02-26 PROCEDURE — 85049 AUTOMATED PLATELET COUNT: CPT | Performed by: PHYSICIAN ASSISTANT

## 2019-02-26 PROCEDURE — 94760 N-INVAS EAR/PLS OXIMETRY 1: CPT

## 2019-02-26 PROCEDURE — 99291 CRITICAL CARE FIRST HOUR: CPT

## 2019-02-26 PROCEDURE — 85730 THROMBOPLASTIN TIME PARTIAL: CPT | Performed by: EMERGENCY MEDICINE

## 2019-02-26 PROCEDURE — 85610 PROTHROMBIN TIME: CPT | Performed by: EMERGENCY MEDICINE

## 2019-02-26 PROCEDURE — 83880 ASSAY OF NATRIURETIC PEPTIDE: CPT | Performed by: EMERGENCY MEDICINE

## 2019-02-26 PROCEDURE — 87449 NOS EACH ORGANISM AG IA: CPT | Performed by: PHYSICIAN ASSISTANT

## 2019-02-26 PROCEDURE — 87040 BLOOD CULTURE FOR BACTERIA: CPT | Performed by: EMERGENCY MEDICINE

## 2019-02-26 PROCEDURE — 71275 CT ANGIOGRAPHY CHEST: CPT

## 2019-02-26 PROCEDURE — 71045 X-RAY EXAM CHEST 1 VIEW: CPT

## 2019-02-26 PROCEDURE — 80053 COMPREHEN METABOLIC PANEL: CPT | Performed by: EMERGENCY MEDICINE

## 2019-02-26 PROCEDURE — 84484 ASSAY OF TROPONIN QUANT: CPT | Performed by: PHYSICIAN ASSISTANT

## 2019-02-26 PROCEDURE — 99222 1ST HOSP IP/OBS MODERATE 55: CPT | Performed by: INTERNAL MEDICINE

## 2019-02-26 PROCEDURE — 84145 PROCALCITONIN (PCT): CPT | Performed by: EMERGENCY MEDICINE

## 2019-02-26 PROCEDURE — 74177 CT ABD & PELVIS W/CONTRAST: CPT

## 2019-02-26 PROCEDURE — 84484 ASSAY OF TROPONIN QUANT: CPT | Performed by: EMERGENCY MEDICINE

## 2019-02-26 PROCEDURE — 96365 THER/PROPH/DIAG IV INF INIT: CPT

## 2019-02-26 PROCEDURE — 99223 1ST HOSP IP/OBS HIGH 75: CPT | Performed by: INTERNAL MEDICINE

## 2019-02-26 PROCEDURE — 85025 COMPLETE CBC W/AUTO DIFF WBC: CPT | Performed by: EMERGENCY MEDICINE

## 2019-02-26 PROCEDURE — 84145 PROCALCITONIN (PCT): CPT | Performed by: PHYSICIAN ASSISTANT

## 2019-02-26 PROCEDURE — 94664 DEMO&/EVAL PT USE INHALER: CPT

## 2019-02-26 PROCEDURE — 81003 URINALYSIS AUTO W/O SCOPE: CPT

## 2019-02-26 PROCEDURE — 93005 ELECTROCARDIOGRAM TRACING: CPT

## 2019-02-26 PROCEDURE — 87631 RESP VIRUS 3-5 TARGETS: CPT | Performed by: EMERGENCY MEDICINE

## 2019-02-26 PROCEDURE — 36415 COLL VENOUS BLD VENIPUNCTURE: CPT | Performed by: EMERGENCY MEDICINE

## 2019-02-26 PROCEDURE — 81003 URINALYSIS AUTO W/O SCOPE: CPT | Performed by: EMERGENCY MEDICINE

## 2019-02-26 PROCEDURE — 83605 ASSAY OF LACTIC ACID: CPT | Performed by: EMERGENCY MEDICINE

## 2019-02-26 PROCEDURE — 83735 ASSAY OF MAGNESIUM: CPT | Performed by: EMERGENCY MEDICINE

## 2019-02-26 RX ORDER — ACETAMINOPHEN 325 MG/1
650 TABLET ORAL EVERY 6 HOURS PRN
Status: DISCONTINUED | OUTPATIENT
Start: 2019-02-26 | End: 2019-03-03 | Stop reason: HOSPADM

## 2019-02-26 RX ORDER — ACETAMINOPHEN 325 MG/1
975 TABLET ORAL ONCE
Status: COMPLETED | OUTPATIENT
Start: 2019-02-26 | End: 2019-02-26

## 2019-02-26 RX ORDER — AZITHROMYCIN 250 MG/1
500 TABLET, FILM COATED ORAL EVERY 24 HOURS
Status: COMPLETED | OUTPATIENT
Start: 2019-02-27 | End: 2019-02-28

## 2019-02-26 RX ORDER — 0.9 % SODIUM CHLORIDE 0.9 %
3 VIAL (ML) INJECTION AS NEEDED
Status: DISCONTINUED | OUTPATIENT
Start: 2019-02-26 | End: 2019-03-03 | Stop reason: HOSPADM

## 2019-02-26 RX ORDER — ONDANSETRON 2 MG/ML
4 INJECTION INTRAMUSCULAR; INTRAVENOUS EVERY 8 HOURS PRN
Status: DISCONTINUED | OUTPATIENT
Start: 2019-02-26 | End: 2019-03-03 | Stop reason: HOSPADM

## 2019-02-26 RX ORDER — ASPIRIN 81 MG/1
324 TABLET, CHEWABLE ORAL ONCE
Status: COMPLETED | OUTPATIENT
Start: 2019-02-26 | End: 2019-02-26

## 2019-02-26 RX ORDER — BENZONATATE 100 MG/1
100 CAPSULE ORAL 3 TIMES DAILY PRN
Status: DISCONTINUED | OUTPATIENT
Start: 2019-02-26 | End: 2019-03-03 | Stop reason: HOSPADM

## 2019-02-26 RX ORDER — GUAIFENESIN 600 MG
1200 TABLET, EXTENDED RELEASE 12 HR ORAL EVERY 12 HOURS SCHEDULED
Status: DISCONTINUED | OUTPATIENT
Start: 2019-02-26 | End: 2019-03-03 | Stop reason: HOSPADM

## 2019-02-26 RX ORDER — AZITHROMYCIN 250 MG/1
500 TABLET, FILM COATED ORAL EVERY 24 HOURS
Status: CANCELLED | OUTPATIENT
Start: 2019-02-26

## 2019-02-26 RX ADMIN — AZITHROMYCIN MONOHYDRATE 500 MG: 500 INJECTION, POWDER, LYOPHILIZED, FOR SOLUTION INTRAVENOUS at 05:10

## 2019-02-26 RX ADMIN — ACETAMINOPHEN 975 MG: 325 TABLET, FILM COATED ORAL at 03:55

## 2019-02-26 RX ADMIN — GUAIFENESIN 1200 MG: 600 TABLET, EXTENDED RELEASE ORAL at 21:53

## 2019-02-26 RX ADMIN — GUAIFENESIN 1200 MG: 600 TABLET, EXTENDED RELEASE ORAL at 15:16

## 2019-02-26 RX ADMIN — ASPIRIN 81 MG 324 MG: 81 TABLET ORAL at 04:58

## 2019-02-26 RX ADMIN — CEFTRIAXONE 1000 MG: 1 INJECTION, POWDER, FOR SOLUTION INTRAMUSCULAR; INTRAVENOUS at 04:24

## 2019-02-26 RX ADMIN — SODIUM CHLORIDE 1000 ML: 0.9 INJECTION, SOLUTION INTRAVENOUS at 03:55

## 2019-02-26 RX ADMIN — ENOXAPARIN SODIUM 40 MG: 40 INJECTION SUBCUTANEOUS at 09:46

## 2019-02-26 RX ADMIN — IOHEXOL 100 ML: 350 INJECTION, SOLUTION INTRAVENOUS at 05:32

## 2019-02-26 RX ADMIN — BENZONATATE 100 MG: 100 CAPSULE ORAL at 15:16

## 2019-02-26 RX ADMIN — SODIUM CHLORIDE 1000 ML: 0.9 INJECTION, SOLUTION INTRAVENOUS at 04:24

## 2019-02-26 NOTE — CONSULTS
Consultation - Infectious Disease   Greer Ambrose 68 y o  male MRN: [id number]  Unit/Bed#: -01 Encounter: [id number]      IMPRESSION & RECOMMENDATIONS:   Impression/Recommendations:  1  Sepsis, POA  Tachycardia and leukocytosis  Due to # 2  Blood cultures pending  UA negative  No abdominal symptoms and LFTs normal   Clinically stable and nontoxic  Rec:  · Continue antibiotics as below  · Follow up blood cultures from admission  · Follow temperatures closely  · Check CBC in a m  · Supportive care as per the primary service    2  Left lower lobe pneumonia  Consider postviral bacterial superinfection given biphasic illness  Consider pneumococcus versus other strep/Staph  Procalcitonin negative but clinical history suspicious for bacterial infection  Respiratory status stable without hypoxia  Rec:  · Continue ceftriaxone and azithromycin for now  · Follow up flu PCR  · Follow up pneumococcal and Legionella urinary antigen  · Check sputum sample if patient able to produce a sample  · Recheck procalcitonin in a m  · Follow respiratory status closely    3  Left pleural effusion  Suspect parapneumonic in etiology  Patient appears to clinically well for empyema  Rec:  · Continue antibiotics as above  · Serial lung exam  · If effusion worsens may need diagnostic thoracentesis    4   AFib with RVR  Likely due to pneumonia  Spontaneously improved  Antibiotics:  Ceftriaxone/azithromycin # 1    Thank you for this consultation  We will follow along with you  HISTORY OF PRESENT ILLNESS:  Reason for Consult: CAP    HPI: Greer Ambrose is a 68 y o  male previously healthy  Patient recently underwent laminectomy for herniated disc  He also has a history of foot drop managed nonsurgically with a CAM walker  He has been receiving outpatient physical therapy    He presented to the emergency department early this morning complaining of intermittent low back pain as well as shortness of breath at night and left rib pain  He states he was in his usual state until approximately 3 weeks ago when he started to developed fever associated with cough  After several days he initially felt better although had persistent cough  He was prescribed a cough suppressant by his primary care physician which did not help  Of note he did not receive antibiotics as an outpatient  Over the past several days he has had worsening shortness of breath as well as chest pain which he attributes to coughing  He presented to the ED early this morning for these complaints  Upon presentation he was noted to be afebrile but had tachycardia  He had AFib with RVR in the ED which spontaneously converted  His labs revealed a mild leukocytosis  His procalcitonin was negative  He underwent a CT of the chest which showed no PE  There were scattered patchy ground-glass opacities in left upper lobe  There is a more dense left lower lobe infiltrate associated with a moderate pleural effusion  He was given doses of ceftriaxone and azithromycin  We are asked to comment on further evaluation and management  REVIEW OF SYSTEMS:  Had some minor nasal congestion initially which has resolved  Subjective fevers at home  Denies nausea, vomiting, or diarrhea  A complete system-based review of systems is otherwise negative      PAST MEDICAL HISTORY:  Past Medical History:   Diagnosis Date    Anemia     Closed nondisplaced fracture of second metatarsal bone of left foot 11/11/2018    Foot drop     Left    Left inguinal hernia     Managed By Lizbeth Pratt / last assessed 3/27/15     Skin lesion      Past Surgical History:   Procedure Laterality Date    HERNIA REPAIR      LUMBAR LAMINECTOMY Left 11/9/2018    Procedure: Metrx L4-5 left hemilaminectomy and bilateral foraminotomy, Metrx L5-S1 left hemilaminectomy and microdiskectomy;  Surgeon: Jose Duran MD;  Location: [facility];  Service: Neurosurgery       FAMILY HISTORY:  Non-contributory    SOCIAL HISTORY:  Social History     Substance and Sexual Activity   Alcohol Use Yes    Comment: social      Social History     Substance and Sexual Activity   Drug Use No     Social History     Tobacco Use   Smoking Status Never Smoker   Smokeless Tobacco Never Used     Patient was formerly an avid runner and is currently a  at a  local high school    ALLERGIES:  No Known Allergies    MEDICATIONS:  All current active medications have been reviewed  PHYSICAL EXAM:  Vitals:  Temp:  [97 5 °F (36 4 °C)-98 4 °F (36 9 °C)] 97 5 °F (36 4 °C)  HR:  [] 74  Resp:  [16-20] 18  BP: ()/(50-81) 92/55  SpO2:  [93 %-98 %] 97 %  Temp (24hrs), Av °F (36 7 °C), Min:97 5 °F (36 4 °C), Max:98 4 °F (36 9 °C)  Current: Temperature: 97 5 °F (36 4 °C)     Physical Exam:  General:  Well-nourished, well-developed, in no acute distress  Eyes:  Conjunctive clear with no hemorrhages or effusions  Oropharynx:  No ulcers, no lesions  Neck:  Supple, no lymphadenopathy  Lungs:  Clear to auscultation bilaterally, no accessory muscle use  Cardiac:  Regular rate and rhythm, no murmurs  Abdomen:  Soft, non-tender, non-distended  Extremities:  No peripheral cyanosis, clubbing, or edema  Skin:  No rashes, no ulcers  Neurological:  Moves all four extremities spontaneously, sensation grossly intact    LABS, IMAGING, & OTHER STUDIES:  Lab Results:  I have personally reviewed pertinent labs  Results from last 7 days   Lab Units 19  0350   POTASSIUM mmol/L 3 7   CHLORIDE mmol/L 99*   CO2 mmol/L 25   BUN mg/dL 9   CREATININE mg/dL 1 09   EGFR ml/min/1 73sq m 67   CALCIUM mg/dL 8 7   AST U/L 53*   ALT U/L 58   ALK PHOS U/L 108     Results from last 7 days   Lab Units 19  0353   WBC Thousand/uL 16 13*   HEMOGLOBIN g/dL 13 0   PLATELETS Thousands/uL 364           Imaging Studies:   I have personally reviewed pertinent imaging study reports and images in PACS    CT chest left upper lobe ground-glass opacities  More dense left lower lobe infiltration with associated effusion    EKG, Pathology, and Other Studies:   I have personally reviewed pertinent reports

## 2019-02-26 NOTE — ASSESSMENT & PLAN NOTE
· POA as evidenced by tachycardia and leukocytosis  · WBC of 16, afebrile, lactic acid negative  · Blood cultures pending  · Likely secondary to pneumonia  · See below plan  · Given Cefepime and Vanc given failure on OP doxy course as well as loculation noted on CT  · Monitor CBC  · Follow-up blood cultures, appreciate ID input

## 2019-02-26 NOTE — H&P
H&P- Purvi Lepe 1945, 68 y o  male MRN: [id number]    Unit/Bed#: -01 Encounter: [id number]    Primary Care Provider: Rama Loco DO   Date and time admitted to hospital: 2/26/2019  3:41 AM    * Sepsis [facility])  Assessment & Plan  · POA as evidenced by tachycardia and leukocytosis  · WBC of 16, afebrile, lactic acid negative  · Blood cultures pending  · Likely secondary to pneumonia  · See below plan  · Given Cefepime and Vanc given failure on OP doxy course as well as loculation noted on CT  · Monitor CBC  · Follow-up blood cultures, appreciate ID input    Pneumonia  Assessment & Plan  · Presents with labored breathing and back/chest pain  · CT PE study negative for PE, however shows: "Patchy groundglass opacities in the left upper lobe with scattered groundglass and alveolar opacities in the mid and lower portions of the left lower lobe  In addition, there is a large ill-defined dense consolidation in the left lower lobe, suspected interstitial edema in the left lower lobe, and an associated small to moderate-sized left pleural effusion, a portion of which in the medial left lower lobe appears to be loculated    Consider infection/pneumonia in the appropriate clinical setting"  · CBC with leukocytosis, currently afebrile  · Initial procal negative  · Rapid flu negative, PCR pending  · Obtain urine strep and Legionella antigen, sputum culture and Gram stain  · Given IV Rocephin and Zithromax in the ED, will start Cefepime and Vanc in setting of failed OP abx and loculation on CT  · Daily Procal  · Respiratory protocol  · ID consult, consider Pulm consult if pleural effusion does not improve    NSTEMI (non-ST elevated myocardial infarction) ([facility] [address] )  Assessment & Plan  · Likely type 2 in the setting of sepsis and tachycardia  · Patient also had an hour long episode of AFib with RVR in the ED  · Converted to sinus prior to leaving the ED  · Also on the setting of elevated BNP  · Initial troponin 0 05  · Will continue to trend troponins  · Monitor on telemetry  · Serial EKGs  · Obtain echo  · Appreciate Cardiology input    Elevated brain natriuretic peptide (BNP) level  Assessment & Plan  · Presents with BNP of 3566  · No known history of heart failure  · CT chest showed    suspected interstitial edema in the left lower lobe, and an associated small to moderate-sized left pleural effusion, a portion of which in the medial left lower lobe appears to be loculated"  · Cardiomegaly appreciable on CT, evidence to suggest elevated right heart pressure  · Likely due to severe pneumonia, suspect improvement with resolution of pneumonia  · However will obtain echo  · Monitor daily weights, I/O's  · Appreciate Cardiology input  · Will hold off on diuresis until further cardiology evaluation    History of lumbar laminectomy  Assessment & Plan  · Status post laminectomy of L4-5 and L5-S1  · Left foot drop improving    VTE Prophylaxis: Enoxaparin (Lovenox)  / reason for no mechanical VTE prophylaxis : ambulate   Code Status: FULL  POLST: POLST form is not discussed and not completed at this time  Discussion with family: none    Anticipated Length of Stay:  Patient will be admitted on an Inpatient basis with an anticipated length of stay of  > 2 midnights  Justification for Hospital Stay:  Requiring IV antibiotics    Total Time for Visit, including Counseling / Coordination of Care: 30 minutes  Greater than 50% of this total time spent on direct patient counseling and coordination of care  Chief Complaint:   Chest pain, back pain    History of Present Illness:    Marlen Tavares is a 68 y o  male who presents with cough x3 weeks  Patient reports the 1st few days following the onset this cough 3 weeks ago, he did have a fever but eventually the fever resolved  He says following this he still had severe fatigue    He also says a dry cough persisted and eventually called his family doctor who did call in a prescription for him  He says he noted only mild improvement of his cough following completion of the full course of his antibiotic  He says the past 2 nights he could barely sleep as he felt extremely short of breath, not improved by any position he would like an  Finally he states he felt his heart racing and felt so short of breath that he decided to come to the ER  Reports some chills but denies noting any fever  He also reports some increased chest pain that radiated down into his abdomen as well as into his upper back worsening over the past few days  Denies nausea or vomiting but reports overall decreased appetite over the past week  Denies diarrhea  Denies dysuria, urgency, frequency, hematuria  Denies lower extremity edema, warmth or erythema  Denies headache, dizziness or myalgias  Patient states he has a history of valve prolapse and reports his last echo was 8 years ago  Review of Systems:    Review of Systems   Constitutional: Positive for activity change, appetite change, chills and fatigue  HENT: Negative  Eyes: Negative  Respiratory: Positive for cough and shortness of breath  Cardiovascular: Positive for chest pain  Gastrointestinal: Positive for abdominal pain  Endocrine: Negative  Genitourinary: Negative  Musculoskeletal: Positive for back pain  Skin: Negative  Allergic/Immunologic: Negative  Neurological: Negative  Hematological: Negative  Psychiatric/Behavioral: Negative          Past Medical and Surgical History:     Past Medical History:   Diagnosis Date    Anemia     Closed nondisplaced fracture of second metatarsal bone of left foot 11/11/2018    Foot drop     Left    Left inguinal hernia     Managed By Brisa Bell / last assessed 3/27/15     Skin lesion        Past Surgical History:   Procedure Laterality Date    HERNIA REPAIR      LUMBAR LAMINECTOMY Left 11/9/2018    Procedure: Metrx L4-5 left hemilaminectomy and bilateral foraminotomy, Metrx L5-S1 left hemilaminectomy and microdiskectomy;  Surgeon: Tino Donohue MD;  Location[facility];  Service: Neurosurgery       Meds/Allergies:    Prior to Admission medications    Medication Sig Start Date End Date Taking? Authorizing Provider   Calcium-Magnesium-Vitamin D (CALCIUM MAGNESIUM PO) Take by mouth   Yes Historical Provider, MD   chlorhexidine (PERIDEX) 0 12 % solution RINSE WITH 1/2 OZ TWO TIMES DAILY FOLLOWING BRUSHING AND FLOSSING 5/15/18  Yes Historical Provider, MD   Cholecalciferol (VITAMIN D PO) Take by mouth   Yes Historical Provider, MD   Cyanocobalamin (VITAMIN B12 PO) Take by mouth   Yes Historical Provider, MD   doxycycline (PERIOSTAT) 20 MG tablet Take 20 mg by mouth 2 (two) times a day 11/15/18  Yes Historical Provider, MD   ibuprofen (MOTRIN) 200 mg tablet Take 200 mg by mouth as needed    Yes Historical Provider, MD   benzonatate (TESSALON) 200 MG capsule Take 1 capsule (200 mg total) by mouth 3 (three) times a day as needed for cough 2/15/19 2/26/19  Page Beronica, DO     I have reviewed home medications with patient personally      Allergies: No Known Allergies    Social History:     Marital Status: /Civil Union   Occupation: not discussed  Patient Pre-hospital Living Situation: home  Patient Pre-hospital Level of Mobility: ambulates with cane  Patient Pre-hospital Diet Restrictions:  None  Substance Use History:   Social History     Substance and Sexual Activity   Alcohol Use Yes    Comment: social      Social History     Tobacco Use   Smoking Status Never Smoker   Smokeless Tobacco Never Used     Social History     Substance and Sexual Activity   Drug Use No       Family History:    non-contributory    Physical Exam:     Vitals:   Blood Pressure: 107/50 (02/26/19 0645)  Pulse: 74 (02/26/19 0645)  Temperature: 98 4 °F (36 9 °C) (02/26/19 0345)  Temp Source: Oral (02/26/19 0345)  Respirations: 16 (02/26/19 0645)  Weight - Scale: 101 kg (223 lb 5 2 oz) (02/26/19 0346)  SpO2: 97 % (02/26/19 0645)    Physical Exam   Constitutional: He is oriented to person, place, and time  He appears well-developed and well-nourished  No distress  HENT:   Head: Normocephalic and atraumatic  Mouth/Throat: Oropharynx is clear and moist    Eyes: Conjunctivae are normal    Cardiovascular: Normal rate, regular rhythm, normal heart sounds and intact distal pulses  Exam reveals no gallop and no friction rub  No murmur heard  Pulmonary/Chest: Effort normal  No stridor  No respiratory distress  He has no wheezes  He has no rales  He exhibits no tenderness  Decreased breath sounds over left mid to lower lobe   Abdominal: Soft  Bowel sounds are normal  He exhibits no distension and no mass  There is no tenderness  There is no rebound and no guarding  No hernia  Musculoskeletal: Normal range of motion  He exhibits no edema, tenderness or deformity  Neurological: He is alert and oriented to person, place, and time  Skin: Skin is warm and dry  Capillary refill takes less than 2 seconds  No rash noted  He is not diaphoretic  No erythema  No pallor  Psychiatric: He has a normal mood and affect  His behavior is normal    Nursing note and vitals reviewed  Additional Data:     Lab Results: I have personally reviewed pertinent reports        Results from last 7 days   Lab Units 02/26/19  0353   WBC Thousand/uL 16 13*   HEMOGLOBIN g/dL 13 0   HEMATOCRIT % 40 0   PLATELETS Thousands/uL 364   NEUTROS PCT % 76*   LYMPHS PCT % 10*   MONOS PCT % 13*   EOS PCT % 1     Results from last 7 days   Lab Units 02/26/19  0350   SODIUM mmol/L 135*   POTASSIUM mmol/L 3 7   CHLORIDE mmol/L 99*   CO2 mmol/L 25   BUN mg/dL 9   CREATININE mg/dL 1 09   ANION GAP mmol/L 11   CALCIUM mg/dL 8 7   ALBUMIN g/dL 2 5*   TOTAL BILIRUBIN mg/dL 0 70   ALK PHOS U/L 108   ALT U/L 58   AST U/L 53*   GLUCOSE RANDOM mg/dL 120     Results from last 7 days   Lab Units 02/26/19  0350   INR  1 11             Results from last 7 days   Lab Units 02/26/19  0350   LACTIC ACID mmol/L 1 3   PROCALCITONIN ng/ml 0 16       Imaging: I have personally reviewed pertinent reports  CT pe study w abdomen pelvis w contrast   Final Result by Ellis Flores DO (02/26 1859)      Respiratory motion precludes evaluation of some of the peripheral pulmonary arterial vessels but subject to this, no pulmonary embolism is seen  Patchy groundglass opacities in the left upper lobe with scattered groundglass and alveolar opacities in the mid and lower portions of the left lower lobe  In addition, there is a large ill-defined dense consolidation in the left lower lobe, suspected    interstitial edema in the left lower lobe, and an associated small to moderate-sized left pleural effusion, a portion of which in the medial left lower lobe appears to be loculated  Consider infection/pneumonia in the appropriate clinical setting  The heart appears enlarged and there is evidence to suggest increased right heart pressures  No acute process seen in the abdomen/pelvis  Other findings as above  Workstation performed: [id number]         XR chest portable    (Results Pending)       EKG, Pathology, and Other Studies Reviewed on Admission:   · EKG:  Sinus tach    Allscripts / Epic Records Reviewed: Yes     ** Please Note: This note has been constructed using a voice recognition system   **

## 2019-02-26 NOTE — ASSESSMENT & PLAN NOTE
· Presents with labored breathing and back/chest pain  · CT PE study negative for PE, however shows: "Patchy groundglass opacities in the left upper lobe with scattered groundglass and alveolar opacities in the mid and lower portions of the left lower lobe  In addition, there is a large ill-defined dense consolidation in the left lower lobe, suspected interstitial edema in the left lower lobe, and an associated small to moderate-sized left pleural effusion, a portion of which in the medial left lower lobe appears to be loculated    Consider infection/pneumonia in the appropriate clinical setting"  · CBC with leukocytosis, currently afebrile  · Initial procal negative  · Rapid flu negative, PCR pending  · Obtain urine strep and Legionella antigen, sputum culture and Gram stain  · Given IV Rocephin and Zithromax in the ED, will start Cefepime and Vanc in setting of failed OP abx and loculation on CT  · Daily Procal  · Respiratory protocol  · ID consult, consider Pulm consult if pleural effusion does not improve

## 2019-02-26 NOTE — ASSESSMENT & PLAN NOTE
· Likely type 2 in the setting of sepsis and tachycardia  · Patient also had an hour long episode of AFib with RVR in the ED  · Converted to sinus prior to leaving the ED  · Also on the setting of elevated BNP  · Initial troponin 0 05  · Will continue to trend troponins  · Monitor on telemetry  · Serial EKGs  · Obtain echo  · Appreciate Cardiology input

## 2019-02-26 NOTE — CONSULTS
Consultation - Cardiology   Yanely Lozano 68 y o  male MRN: [id number]  Unit/Bed#: -01 Encounter: [id number]    Assessment/Plan     Principal Problem:    Sepsis [facility])  Active Problems:    History of lumbar laminectomy    Pneumonia    NSTEMI (non-ST elevated myocardial infarction) ([facility] [address] )    Elevated brain natriuretic peptide (BNP) level      Assessment/Plan    1  CAP- Left sided pneumonia  Rapid influenza negative  Follow with with PCR  Procalcitonin negative but clinically high suspicion for bacterial infection  2  Sepsis - POA  Secondary to #1  F/U BC  Hemodynamically stable  3  Atrial fibrillation/flutter with RVR ( PAF) -likely secondary to acute illness  Monitor for recurrence  No history of paroxysmal atrial fibrillation  Due to low stroke risk would not anticoagulate at this time  Would also not initiate any ongoing AV keena blockers as he is bradycardic at baseline  Troponin 0 05 likely due to tachycardia  ProBNP 3, 566 also spoke suspect may be due to tachycardia  On exam it is noted appear to be volume overloaded  Will follow up with transthoracic echocardiogram     4  Abnormal CT the chest  Small to moderate left pleural effusion midportion appears to be loculated-suspected parapneumonic in etiology  Will need to monitor  Also mentioned enlarged heart and prominence of right heart chambers  Once again will follow up with echocardiogram       History of Present Illness   Physician Requesting Consult: Jean-Paul Douglas MD  Reason for Consult / Principal Problem:     HPI: Yanely Lozano is a 68y o  year old male who presents with cough for 3 weeks, intermittent fevers and fatigue  He reports the ED when he started becoming short of breath and developing lower left sided back pain  Presentation CT of the chest revealed no pulmonary embolism but suggested left sided pneumonia  Additionally there is a moderate left pleural effusion and a portion of this appears to be loculated  Heart appears enlarged with evidence to suggest increased right heart pressures  He was started on IV Rocephin and Zithromax which was changes cefepime and Vanco the setting of failed outpatient antibiotics and loculation on CT  Cardiology was consulted for AFib with RVR  along with troponin of 0 05 and BNP of 3 566  [place] [address]  Procalcitonin 0 16  Influenza negative  He is currently afebrile  Initially was tachycardic  Appears atrial flutter 2:1 , 2nd EKG atrial fibrillation with RVR  He then was sinus bradycardia  He is a runner and was running up to last October when he developed foot drop from 2 herniated discs  Inpatient consult to Cardiology  Consult performed by: ANNIE Manzanares  Consult ordered by: Bryan Gallagher PA-C          Review of Systems   Constitutional: Positive for activity change, fatigue and fever  HENT: Positive for congestion  Eyes: Negative  Respiratory: Positive for cough and shortness of breath  Cardiovascular: Positive for palpitations  Negative for chest pain and leg swelling  Gastrointestinal: Negative  Genitourinary: Negative  Musculoskeletal: Negative  Neurological: Negative  Hematological: Negative  Psychiatric/Behavioral: Negative          Historical Information   Past Medical History:   Diagnosis Date    Anemia     Closed nondisplaced fracture of second metatarsal bone of left foot 11/11/2018    Foot drop     Left    Left inguinal hernia     Managed By Sophia Andersen / last assessed 3/27/15     Skin lesion      Past Surgical History:   Procedure Laterality Date    HERNIA REPAIR      LUMBAR LAMINECTOMY Left 11/9/2018    Procedure: Metrx L4-5 left hemilaminectomy and bilateral foraminotomy, Metrx L5-S1 left hemilaminectomy and microdiskectomy;  Surgeon: Dominick Davey MD;  Location: [facility];  Service: Neurosurgery     Social History     Substance and Sexual Activity   Alcohol Use Yes    Comment: social      Social History     Substance and Sexual Activity   Drug Use No     Social History     Tobacco Use   Smoking Status Never Smoker   Smokeless Tobacco Never Used     Family History:   Family History   Problem Relation Age of Onset    Heart disease Mother     No Known Problems Father     No Known Problems Sister     No Known Problems Brother     No Known Problems Family        Meds/Allergies   current meds:   Current Facility-Administered Medications   Medication Dose Route Frequency    cefepime (MAXIPIME) 2 g/50 mL dextrose IVPB  2,000 mg Intravenous Q12H    enoxaparin (LOVENOX) subcutaneous injection 40 mg  40 mg Subcutaneous Daily    ondansetron (ZOFRAN) injection 4 mg  4 mg Intravenous Q8H PRN    sodium chloride (PF) 0 9 % injection 3 mL  3 mL Intravenous PRN    vancomycin (VANCOCIN) 1,500 mg in sodium chloride 0 9 % 250 mL IVPB  15 mg/kg Intravenous Q12H    and PTA meds:    Medications Prior to Admission   Medication    Calcium-Magnesium-Vitamin D (CALCIUM MAGNESIUM PO)    chlorhexidine (PERIDEX) 0 12 % solution    Cholecalciferol (VITAMIN D PO)    Cyanocobalamin (VITAMIN B12 PO)    doxycycline (PERIOSTAT) 20 MG tablet    ibuprofen (MOTRIN) 200 mg tablet     No Known Allergies    Objective   Vitals: Blood pressure 107/50, pulse 74, temperature 98 4 °F (36 9 °C), temperature source Oral, resp  rate 16, weight 101 kg (223 lb 5 2 oz), SpO2 97 %    Orthostatic Blood Pressures      Most Recent Value   Blood Pressure  107/50 filed at 02/26/2019 0645   Patient Position - Orthostatic VS  Lying filed at 02/26/2019 0645            Intake/Output Summary (Last 24 hours) at 2/26/2019 0817  Last data filed at 2/26/2019 1427  Gross per 24 hour   Intake 2900 ml   Output    Net 2900 ml       Invasive Devices     Peripheral Intravenous Line            Peripheral IV 02/26/19 Right Antecubital less than 1 day    Peripheral IV 02/26/19 Right Forearm less than 1 day                Physical Exam: /50 (BP Location: Left arm)   Pulse 74   Temp 98 4 °F (36 9 °C) (Oral)   Resp 16   Wt 101 kg (223 lb 5 2 oz)   SpO2 97%   BMI 28 67 kg/m²   General Appearance:    Alert, cooperative, no distress, appears stated age   Head:    Normocephalic, no scleral icterus   Eyes:    PERRL   Nose:   Nares normal, septum midline, mucosa normal, no drainage    Throat:   Lips, mucosa, and tongue normal   Neck:   Supple, symmetrical, trachea midline     no JVD   Back:     Symmetric   Lungs:     Decreased left lower lobe to auscultation bilaterally, respirations unlabored   Chest Wall:    No tenderness or deformity    Heart:    Regular rate and rhythm, S1 and S2 normal, no murmur, rub   or gallop   Abdomen:     Soft, non-tender, bowel sounds active all four quadrants,     no masses, no organomegaly   Extremities:   Extremities normal, atraumatic, no cyanosis or edema   Pulses:   2+ and symmetric all extremities   Skin:   Skin color, texture, turgor normal, no rashes or lesions   Neurologic:   Alert and oriented to person place and time   No focal deficits       Lab Results:   Recent Results (from the past 72 hour(s))   Comprehensive metabolic panel    Collection Time: 02/26/19  3:50 AM   Result Value Ref Range    Sodium 135 (L) 136 - 145 mmol/L    Potassium 3 7 3 5 - 5 3 mmol/L    Chloride 99 (L) 100 - 108 mmol/L    CO2 25 21 - 32 mmol/L    ANION GAP 11 4 - 13 mmol/L    BUN 9 5 - 25 mg/dL    Creatinine 1 09 0 60 - 1 30 mg/dL    Glucose 120 65 - 140 mg/dL    Calcium 8 7 8 3 - 10 1 mg/dL    AST 53 (H) 5 - 45 U/L    ALT 58 12 - 78 U/L    Alkaline Phosphatase 108 46 - 116 U/L    Total Protein 6 9 6 4 - 8 2 g/dL    Albumin 2 5 (L) 3 5 - 5 0 g/dL    Total Bilirubin 0 70 0 20 - 1 00 mg/dL    eGFR 67 ml/min/1 73sq m   Lactic acid x2    Collection Time: 02/26/19  3:50 AM   Result Value Ref Range    LACTIC ACID 1 3 0 5 - 2 0 mmol/L   Procalcitonin    Collection Time: 02/26/19  3:50 AM   Result Value Ref Range    Procalcitonin 0 16 <=0 25 ng/ml   Protime-INR Collection Time: 02/26/19  3:50 AM   Result Value Ref Range    Protime 14 0 11 8 - 14 2 seconds    INR 1 11 0 86 - 1 17   APTT    Collection Time: 02/26/19  3:50 AM   Result Value Ref Range    PTT 38 26 - 38 seconds   Magnesium    Collection Time: 02/26/19  3:50 AM   Result Value Ref Range    Magnesium 1 8 1 6 - 2 6 mg/dL   NT-BNP PRO (BNP for [place], [facility], [facility], [place], [place], [facility], [facility], [place] campuses)    Collection Time: 02/26/19  3:50 AM   Result Value Ref Range    NT-proBNP 3,566 (H) <125 pg/mL   CBC and differential    Collection Time: 02/26/19  3:53 AM   Result Value Ref Range    WBC 16 13 (H) 4 31 - 10 16 Thousand/uL    RBC 4 16 3 88 - 5 62 Million/uL    Hemoglobin 13 0 12 0 - 17 0 g/dL    Hematocrit 40 0 36 5 - 49 3 %    MCV 96 82 - 98 fL    MCH 31 3 26 8 - 34 3 pg    MCHC 32 5 31 4 - 37 4 g/dL    RDW 13 0 11 6 - 15 1 %    MPV 9 8 8 9 - 12 7 fL    Platelets 706 305 - 951 Thousands/uL    nRBC 0 /100 WBCs    Neutrophils Relative 76 (H) 43 - 75 %    Immat GRANS % 0 0 - 2 %    Lymphocytes Relative 10 (L) 14 - 44 %    Monocytes Relative 13 (H) 4 - 12 %    Eosinophils Relative 1 0 - 6 %    Basophils Relative 0 0 - 1 %    Neutrophils Absolute 12 29 (H) 1 85 - 7 62 Thousands/µL    Immature Grans Absolute 0 07 0 00 - 0 20 Thousand/uL    Lymphocytes Absolute 1 53 0 60 - 4 47 Thousands/µL    Monocytes Absolute 2 10 (H) 0 17 - 1 22 Thousand/µL    Eosinophils Absolute 0 10 0 00 - 0 61 Thousand/µL    Basophils Absolute 0 04 0 00 - 0 10 Thousands/µL   Rapid Influenza Screen with Reflex PCR    Collection Time: 02/26/19  3:53 AM   Result Value Ref Range    Rapid Influenza A Ag Negative Negative, Indeterminate    Rapid Influenza B Ag Negative Negative, Indeterminate   Troponin I    Collection Time: 02/26/19  4:25 AM   Result Value Ref Range    Troponin I 0 05 (H) <=0 04 ng/mL   UA w Reflex to Microscopic w Reflex to Culture    Collection Time: 02/26/19  6:29 AM   Result Value Ref Range    Color, UA Yellow     Clarity, UA Clear     Specific Gravity, UA <=1 005 1 003 - 1 030    pH, UA 6 5 4 5 - 8 0    Leukocytes, UA Negative Negative    Nitrite, UA Negative Negative    Protein, UA Negative Negative mg/dl    Glucose, UA Negative Negative mg/dl    Ketones, UA Negative Negative mg/dl    Urobilinogen, UA 0 2 0 2, 1 0 E U /dl E U /dl    Bilirubin, UA Negative Negative    Blood, UA Negative Negative   ED Urine Macroscopic    Collection Time: 19  6:32 AM   Result Value Ref Range    Color, UA Yellow     Clarity, UA Clear     pH, UA 6 5 4 5 - 8 0    Leukocytes, UA Negative Negative    Nitrite, UA Negative Negative    Protein, UA Negative Negative mg/dl    Glucose, UA Negative Negative mg/dl    Ketones, UA Negative Negative mg/dl    Urobilinogen, UA 0 2 0 2, 1 0 E U /dl E U /dl    Bilirubin, UA Negative Negative    Blood, UA Trace (A) Negative    Specific Gravity, UA 1 010 1 003 - 1 030     Imaging: I have personally reviewed pertinent reports  EKst EKG appears to be rapid atrial flutter  Second rapid atrial fibrillation and 3rd sinus bradycardia  VTE Prophylaxis: Enoxaparin (Lovenox)    Code Status: Level 1 - Full Code  Advance Directive and Living Will:      Power of :    POLST:      Counseling / Coordination of Care  Total floor / unit time spent today 45 minutes  Greater than 50% of total time was spent with the patient and / or family counseling and / or coordination of care

## 2019-02-26 NOTE — PLAN OF CARE
Problem: PAIN - ADULT  Goal: Verbalizes/displays adequate comfort level or baseline comfort level  Description  Interventions:  - Encourage patient to monitor pain and request assistance  - Assess pain using appropriate pain scale  - Administer analgesics based on type and severity of pain and evaluate response  - Implement non-pharmacological measures as appropriate and evaluate response  - Consider cultural and social influences on pain and pain management  - Notify physician/advanced practitioner if interventions unsuccessful or patient reports new pain  Outcome: Progressing     Problem: INFECTION - ADULT  Goal: Absence or prevention of progression during hospitalization  Description  INTERVENTIONS:  - Assess and monitor for signs and symptoms of infection  - Monitor lab/diagnostic results  - Monitor all insertion sites, i e  indwelling lines, tubes, and drains  - Monitor endotracheal (as able) and nasal secretions for changes in amount and color  - [place] appropriate cooling/warming therapies per order  - Administer medications as ordered  - Instruct and encourage patient and family to use good hand hygiene technique  - Identify and instruct in appropriate isolation precautions for identified infection/condition  Outcome: Progressing     Problem: SAFETY ADULT  Goal: Patient will remain free of falls  Description  INTERVENTIONS:  - Assess patient frequently for physical needs  -  Identify cognitive and physical deficits and behaviors that affect risk of falls    -  [place] fall precautions as indicated by assessment   - Educate patient/family on patient safety including physical limitations  - Instruct patient to call for assistance with activity based on assessment  - Modify environment to reduce risk of injury  - Consider OT/PT consult to assist with strengthening/mobility  Outcome: Progressing

## 2019-02-26 NOTE — ED NOTES
Pt refusing to urinate sitting in bed  Pt stood at bedside with RN  Pt denies any complaints will standing  Upon sitting pt in stretcher this RN took notice to pts HR decrease and presence of p waves  EKG performed, Dr Jane Busch aware        Elia Clemente RN  02/26/19 9518

## 2019-02-26 NOTE — ED PROVIDER NOTES
History  Chief Complaint   Patient presents with    Back Pain     Pt c/o intermittent lower back pain x4 days  (-) Urinary sxs  Pt states "the past two nights I feel like I was breathing really heavy " (-) SOB at this time  (+) Cough for 3 weeks  HPI     Patient is an otherwise healthy 71-year-old man who presents emergency department via EMS for evaluation of shortness of breath, cough  Cough has been present for 3 weeks but has worsened over the past 2 nights  A tonight, patient states that he had trouble getting a deep breath in and was having left-sided lower chest pain  He describes shortness of breath earlier but denies at this time  He denies any nausea, vomiting, diarrhea  Patient states fever intermittently  No aggravating or alleviating symptoms  Prior to Admission Medications   Prescriptions Last Dose Informant Patient Reported? Taking?    Calcium-Magnesium-Vitamin D (CALCIUM MAGNESIUM PO)  Self Yes Yes   Sig: Take by mouth   Cholecalciferol (VITAMIN D PO)  Self Yes Yes   Sig: Take by mouth   Cyanocobalamin (VITAMIN B12 PO)  Self Yes Yes   Sig: Take by mouth   chlorhexidine (PERIDEX) 0 12 % solution  Self Yes Yes   Sig: RINSE WITH 1/2 OZ TWO TIMES DAILY FOLLOWING BRUSHING AND FLOSSING   doxycycline (PERIOSTAT) 20 MG tablet  Self Yes Yes   Sig: Take 20 mg by mouth 2 (two) times a day   ibuprofen (MOTRIN) 200 mg tablet   Yes Yes   Sig: Take 200 mg by mouth as needed       Facility-Administered Medications: None       Past Medical History:   Diagnosis Date    Anemia     Closed nondisplaced fracture of second metatarsal bone of left foot 11/11/2018    Foot drop     Left    Left inguinal hernia     Managed By Stevie Reid / last assessed 3/27/15     Skin lesion        Past Surgical History:   Procedure Laterality Date    HERNIA REPAIR      LUMBAR LAMINECTOMY Left 11/9/2018    Procedure: Metrx L4-5 left hemilaminectomy and bilateral foraminotomy, Metrx L5-S1 left hemilaminectomy and microdiskectomy;  Surgeon: Quiana Mckeon MD;  Location: [facility];  Service: Neurosurgery       Family History   Problem Relation Age of Onset    Heart disease Mother     No Known Problems Father     No Known Problems Sister     No Known Problems Brother     No Known Problems Family      I have reviewed and agree with the history as documented  Social History     Tobacco Use    Smoking status: Never Smoker    Smokeless tobacco: Never Used   Substance Use Topics    Alcohol use: Yes     Comment: social     Drug use: No        Review of Systems   Constitutional: Positive for activity change, appetite change, chills and fever  Negative for fatigue  HENT: Negative for congestion, dental problem, facial swelling, sore throat, tinnitus and trouble swallowing  Eyes: Negative for pain, discharge and itching  Respiratory: Positive for cough and shortness of breath  Negative for apnea, chest tightness and wheezing  Cardiovascular: Positive for chest pain and palpitations  Negative for leg swelling  Gastrointestinal: Negative for abdominal pain and nausea  Genitourinary: Negative for difficulty urinating, dysuria and flank pain  Musculoskeletal: Negative for arthralgias, back pain, gait problem, joint swelling and neck pain  Skin: Negative for color change, rash and wound  Neurological: Negative for dizziness and facial asymmetry  Psychiatric/Behavioral: Negative for agitation and behavioral problems  The patient is not nervous/anxious  All other systems reviewed and are negative  Physical Exam  Physical Exam   Constitutional: He is oriented to person, place, and time  He appears well-developed and well-nourished  No distress  HENT:   Head: Normocephalic and atraumatic  Right Ear: External ear normal    Left Ear: External ear normal    Eyes: Pupils are equal, round, and reactive to light  EOM are normal  Right eye exhibits no discharge  Left eye exhibits no discharge     Neck: Normal range of motion  Neck supple  No tracheal deviation present  No thyromegaly present  Cardiovascular: An irregularly irregular rhythm present  Tachycardia present  No murmur heard  Pulmonary/Chest: Effort normal and breath sounds normal    Tachypneic, coarse breath sounds worse on left   Abdominal: Soft  Bowel sounds are normal  He exhibits no distension  There is no tenderness  Musculoskeletal: Normal range of motion  He exhibits no edema or deformity  Neurological: He is alert and oriented to person, place, and time  No cranial nerve deficit  He exhibits normal muscle tone  Skin: Skin is warm  Capillary refill takes less than 2 seconds  He is not diaphoretic  Psychiatric: He has a normal mood and affect  His behavior is normal    Nursing note and vitals reviewed        Vital Signs  ED Triage Vitals [02/26/19 0345]   Temperature Pulse Respirations Blood Pressure SpO2   98 4 °F (36 9 °C) (!) 156 20 145/81 94 %      Temp Source Heart Rate Source Patient Position - Orthostatic VS BP Location FiO2 (%)   Oral Monitor Sitting Right arm --      Pain Score       No Pain           Vitals:    02/26/19 0515 02/26/19 0600 02/26/19 0618 02/26/19 0645   BP: 104/51 95/54 122/57 107/50   Pulse: (!) 138 (!) 128 80 74   Patient Position - Orthostatic VS: Lying Lying Standing Lying       Visual Acuity      ED Medications  Medications   sodium chloride (PF) 0 9 % injection 3 mL (has no administration in time range)   acetaminophen (TYLENOL) tablet 975 mg (975 mg Oral Given 2/26/19 0355)   sodium chloride 0 9 % bolus 1,000 mL (0 mL Intravenous Stopped 2/26/19 0424)   ceftriaxone (ROCEPHIN) 1 g/50 mL in dextrose IVPB (0 mg Intravenous Stopped 2/26/19 0500)   azithromycin (ZITHROMAX) 500 mg in sodium chloride 0 9% 250mL IVPB 500 mg (0 mg Intravenous Stopped 2/26/19 0623)   sodium chloride 0 9 % bolus 1,000 mL (0 mL Intravenous Stopped 2/26/19 0500)   aspirin chewable tablet 324 mg (324 mg Oral Given 2/26/19 0458) iohexol (OMNIPAQUE) 350 MG/ML injection (SINGLE-DOSE) 100 mL (100 mL Intravenous Given 2/26/19 0532)       Diagnostic Studies  Results Reviewed     Procedure Component Value Units Date/Time    ED Urine Macroscopic [[id number]]  (Abnormal) Collected:  02/26/19 0996    Lab Status:  Final result Specimen:  Urine Updated:  02/26/19 0634     Color, UA Yellow     Clarity, UA Clear     pH, UA 6 5     Leukocytes, UA Negative     Nitrite, UA Negative     Protein, UA Negative mg/dl      Glucose, UA Negative mg/dl      Ketones, UA Negative mg/dl      Urobilinogen, UA 0 2 E U /dl      Bilirubin, UA Negative     Blood, UA Trace     Specific [place], UA 1 010    Narrative:       CLINITEK RESULT    UA w Reflex to Microscopic w Reflex to Culture [[id number]] Collected:  02/26/19 0629    Lab Status:  Final result Specimen:  Urine, Clean Catch Updated:  02/26/19 7786     Color, UA Yellow     Clarity, UA Clear     Specific Gravity, UA <=1 005     pH, UA 6 5     Leukocytes, UA Negative     Nitrite, UA Negative     Protein, UA Negative mg/dl      Glucose, UA Negative mg/dl      Ketones, UA Negative mg/dl      Urobilinogen, UA 0 2 E U /dl      Bilirubin, UA Negative     Blood, UA Negative    Urine Microscopic [[id number]] Collected:  02/26/19 0632    Lab Status:  No result Specimen:  Urine     Procalcitonin [[id number]]  (Normal) Collected:  02/26/19 0350    Lab Status:  Final result Specimen:  Blood from Arm, Right Updated:  02/26/19 0551     Procalcitonin 0 16 ng/ml     NT-BNP PRO (BNP for [facility], [facility], [facility], [place], [place], [facility], [facility], [place] campuses) [[id number]]  (Abnormal) Collected:  02/26/19 0350    Lab Status:  Final result Specimen:  Blood from Arm, Right Updated:  02/26/19 0505     NT-proBNP 3,566 pg/mL     Troponin I [[id number]]  (Abnormal) Collected:  02/26/19 0425    Lab Status:  Final result Specimen:  Blood from Arm, Right Updated:  02/26/19 0450     Troponin I 0 05 ng/mL     Lactic acid x2 [[id number]]  (Normal) Collected:  02/26/19 0350    Lab Status:  Final result Specimen:  Blood from Arm, Right Updated:  02/26/19 0421     LACTIC ACID 1 3 mmol/L     Narrative:       Result may be elevated if tourniquet was used during collection  Rapid Influenza Screen with Reflex PCR [[id number]]  (Normal) Collected:  02/26/19 0353    Lab Status:  Final result Specimen:  Nasopharyngeal Swab Updated:  02/26/19 0419     Rapid Influenza A Ag Negative     Rapid Influenza B Ag Negative    INFLUENZA A/B AND RSV, PCR [[id number]] Collected:  02/26/19 0353    Lab Status: In process Specimen:  Nasopharyngeal Swab Updated:  02/26/19 0418    Comprehensive metabolic panel [[id number]]  (Abnormal) Collected:  02/26/19 0350    Lab Status:  Final result Specimen:  Blood from Arm, Right Updated:  02/26/19 0418     Sodium 135 mmol/L      Potassium 3 7 mmol/L      Chloride 99 mmol/L      CO2 25 mmol/L      ANION GAP 11 mmol/L      BUN 9 mg/dL      Creatinine 1 09 mg/dL      Glucose 120 mg/dL      Calcium 8 7 mg/dL      AST 53 U/L      ALT 58 U/L      Alkaline Phosphatase 108 U/L      Total Protein 6 9 g/dL      Albumin 2 5 g/dL      Total Bilirubin 0 70 mg/dL      eGFR 67 ml/min/1 73sq m     Narrative:       National Kidney Disease Education Program recommendations are as follows:  GFR calculation is accurate only with a steady state creatinine  Chronic Kidney disease less than 60 ml/min/1 73 sq  meters  Kidney failure less than 15 ml/min/1 73 sq  meters      Magnesium [[id number]]  (Normal) Collected:  02/26/19 0350    Lab Status:  Final result Specimen:  Blood from Arm, Right Updated:  02/26/19 0418     Magnesium 1 8 mg/dL     Protime-INR [[id number]]  (Normal) Collected:  02/26/19 0350    Lab Status:  Final result Specimen:  Blood from Arm, Right Updated:  02/26/19 0412     Protime 14 0 seconds      INR 1 11    APTT [[id number]]  (Normal) Collected:  02/26/19 0350    Lab Status:  Final result Specimen:  Blood from Arm, Right Updated:  02/26/19 0412     PTT 38 seconds     Blood culture #2 [[id number]] Collected:  02/26/19 0400    Lab Status: In process Specimen:  Blood from Hand, Right Updated:  02/26/19 0404    CBC and differential [[id number]]  (Abnormal) Collected:  02/26/19 0353    Lab Status:  Final result Specimen:  Blood from Arm, Right Updated:  02/26/19 0401     WBC 16 13 Thousand/uL      RBC 4 16 Million/uL      Hemoglobin 13 0 g/dL      Hematocrit 40 0 %      MCV 96 fL      MCH 31 3 pg      MCHC 32 5 g/dL      RDW 13 0 %      MPV 9 8 fL      Platelets 988 Thousands/uL      nRBC 0 /100 WBCs      Neutrophils Relative 76 %      Immat GRANS % 0 %      Lymphocytes Relative 10 %      Monocytes Relative 13 %      Eosinophils Relative 1 %      Basophils Relative 0 %      Neutrophils Absolute 12 29 Thousands/µL      Immature Grans Absolute 0 07 Thousand/uL      Lymphocytes Absolute 1 53 Thousands/µL      Monocytes Absolute 2 10 Thousand/µL      Eosinophils Absolute 0 10 Thousand/µL      Basophils Absolute 0 04 Thousands/µL     Blood culture #1 [[id number]] Collected:  02/26/19 0350    Lab Status: In process Specimen:  Blood from Arm, Right Updated:  02/26/19 0358                 CT pe study w abdomen pelvis w contrast   Final Result by Julio C Acuna DO (02/26 3083)      Respiratory motion precludes evaluation of some of the peripheral pulmonary arterial vessels but subject to this, no pulmonary embolism is seen  Patchy groundglass opacities in the left upper lobe with scattered groundglass and alveolar opacities in the mid and lower portions of the left lower lobe  In addition, there is a large ill-defined dense consolidation in the left lower lobe, suspected    interstitial edema in the left lower lobe, and an associated small to moderate-sized left pleural effusion, a portion of which in the medial left lower lobe appears to be loculated  Consider infection/pneumonia in the appropriate clinical setting        The heart appears enlarged and there is evidence to suggest increased right heart pressures  No acute process seen in the abdomen/pelvis  Other findings as above  Workstation performed: [id number]         XR chest portable    (Results Pending)              Procedures  ECG 12 Lead Documentation  Date/Time: 2/26/2019 4:25 AM  Performed by: Brigitte Rodríguez MD  Authorized by: Brigitte Rodríguez MD     Indications / Diagnosis:  Tachycardia  ECG reviewed by me, the ED Provider: yes    Rate:     ECG rate:  154    ECG rate assessment: tachycardic    Rhythm:     Rhythm: sinus rhythm    Ectopy:     Ectopy: none    QRS:     QRS axis:  Left    QRS intervals:  Normal  Conduction:     Conduction: normal    ST segments:     ST segments:  Normal  T waves:     T waves: normal    Comments:      Do not agree with computer reading of acute MI    ECG 12 Lead Documentation  Date/Time: 2/26/2019 4:59 AM  Performed by: Brigitte Rodríguez MD  Authorized by: Brigitte Rodríguez MD     Indications / Diagnosis:  133  Patient location:  ED  Previous ECG:     Previous ECG:  Compared to current    Comparison ECG info:   Now with atrial fibrillation with RVR    Similarity:  Changes noted    Comparison to cardiac monitor: Yes    Interpretation:     Interpretation: abnormal    Rate:     ECG rate:  133    ECG rate assessment: tachycardic    Rhythm:     Rhythm: atrial fibrillation    Ectopy:     Ectopy: none    QRS:     QRS axis:  Left    QRS intervals:  Normal  Conduction:     Conduction: normal    ST segments:     ST segments:  Normal  T waves:     T waves: normal    ECG 12 Lead Documentation  Date/Time: 2/26/2019 6:17 AM  Performed by: Brigitte Rodríguez MD  Authorized by: Brigitte Rodríguez MD     Indications / Diagnosis:  7708  ECG reviewed by me, the ED Provider: yes    Previous ECG:     Previous ECG:  Compared to current    Comparison ECG info:  Now normal sinus rhythm    Similarity:  Changes noted  Interpretation:     Interpretation: normal    Rate:     ECG rate:  79    ECG rate assessment: normal Rhythm:     Rhythm: sinus rhythm    Ectopy:     Ectopy: none    QRS:     QRS axis:  Left    QRS intervals:  Normal  Conduction:     Conduction: normal    ST segments:     ST segments:  Normal  T waves:     T waves: normal             Phone Contacts  ED Phone Contact    ED Course                               MDM  Number of Diagnoses or Management Options  CAP (community acquired pneumonia): new and requires workup  Sepsis [facility]): new and requires workup  Tachycardia: new and requires workup  Diagnosis management comments: Otherwise healthy male presents emergency department for evaluation of shortness of breath, cough, fever, left-sided chest and back pain  Upon arrival patient tachycardic to 150-160  Initial EKG sinus rhythm versus atrial flutter at rate 154  Blood pressure stable  Patient on room air  Report of fever for EMS and patient reports fever at home  Broad laboratory studies ordered for sepsis, chest x-ray concerning for left lower lobe pneumonia  Patient given Rocephin/azithromycin  Elevated troponin 0 05, likely secondary to demand  Aspirin given  Shortly after IV fluids were started, patient converted to an atrial fibrillation rhythm  Additional IV bolus was ordered and given to patient for lower blood pressures  Lactic acid normal, BNP elevated  Patient with persistent tachycardia in setting of known infection however afebrile here  CT PE study obtained which shows a left-sided fluid collection concerning for infection  In patient with a downward trending blood pressures, initially discussed with ICU PA  Shortly after this discussion, patient converted to sinus rhythm with stabilization of his blood pressure  At this time, discussed with ICU PA  I do not feel patient needs a step-down or ICU bed at this time so I did not feel they need to see patient at this time  Discussed with hospitalist team who will admit to telemetry    Patient admitted in stable condition on 2 L of oxygen  Amount and/or Complexity of Data Reviewed  Clinical lab tests: ordered and reviewed  Tests in the radiology section of CPT®: ordered and reviewed  Tests in the medicine section of CPT®: ordered and reviewed  Discuss the patient with other providers: yes  Independent visualization of images, tracings, or specimens: yes    Risk of Complications, Morbidity, and/or Mortality  Presenting problems: high  Diagnostic procedures: high  Management options: high    Patient Progress  Patient progress: improved      Disposition  Final diagnoses:   Tachycardia   Sepsis ([facility] [address] )   CAP (community acquired pneumonia)     Time reflects when diagnosis was documented in both MDM as applicable and the Disposition within this note     Time User Action Codes Description Comment    2/26/2019  6:29 AM Titi Blare Add [R00 0] Tachycardia     2/26/2019  6:29 AM Titi Blare Add [A41 9] Sepsis ([facility] [address] )     2/26/2019  6:29 AM [place] Blare Add [J18 9] CAP (community acquired pneumonia)       ED Disposition     ED Disposition Condition Date/Time Comment    Admit Stable Tue Feb 26, 2019  6:29 AM Case was discussed with NATASHA Hunter and the patient's admission status was agreed to be Admission Status: inpatient status to the service of Dr Surendra Hurst   Follow-up Information    None         Patient's Medications   Discharge Prescriptions    No medications on file     No discharge procedures on file      ED Provider  Electronically Signed by           Valentino Solomons, MD  02/26/19 3056

## 2019-02-26 NOTE — ED NOTES
Pt states he is unable to provide a urine sample at this time        Sarah Mendoaz RN  02/26/19 6562

## 2019-02-27 ENCOUNTER — TELEPHONE (OUTPATIENT)
Dept: FAMILY MEDICINE CLINIC | Facility: CLINIC | Age: 74
End: 2019-02-27

## 2019-02-27 ENCOUNTER — APPOINTMENT (INPATIENT)
Dept: NON INVASIVE DIAGNOSTICS | Facility: HOSPITAL | Age: 74
DRG: 871 | End: 2019-02-27
Payer: COMMERCIAL

## 2019-02-27 ENCOUNTER — APPOINTMENT (OUTPATIENT)
Dept: PHYSICAL THERAPY | Facility: REHABILITATION | Age: 74
End: 2019-02-27
Payer: COMMERCIAL

## 2019-02-27 PROBLEM — I48.91 ATRIAL FIBRILLATION (HCC): Status: ACTIVE | Noted: 2019-02-27

## 2019-02-27 PROBLEM — J90 PLEURAL EFFUSION, LEFT: Status: ACTIVE | Noted: 2019-02-27

## 2019-02-27 LAB
ANION GAP SERPL CALCULATED.3IONS-SCNC: 10 MMOL/L (ref 4–13)
ATRIAL RATE: 79 BPM
BASOPHILS # BLD AUTO: 0.03 THOUSANDS/ΜL (ref 0–0.1)
BASOPHILS NFR BLD AUTO: 0 % (ref 0–1)
BUN SERPL-MCNC: 7 MG/DL (ref 5–25)
CALCIUM SERPL-MCNC: 8.4 MG/DL (ref 8.3–10.1)
CHLORIDE SERPL-SCNC: 104 MMOL/L (ref 100–108)
CO2 SERPL-SCNC: 24 MMOL/L (ref 21–32)
CREAT SERPL-MCNC: 0.97 MG/DL (ref 0.6–1.3)
EOSINOPHIL # BLD AUTO: 0.34 THOUSAND/ΜL (ref 0–0.61)
EOSINOPHIL NFR BLD AUTO: 3 % (ref 0–6)
ERYTHROCYTE [DISTWIDTH] IN BLOOD BY AUTOMATED COUNT: 13.2 % (ref 11.6–15.1)
GFR SERPL CREATININE-BSD FRML MDRD: 78 ML/MIN/1.73SQ M
GLUCOSE SERPL-MCNC: 105 MG/DL (ref 65–140)
HCT VFR BLD AUTO: 34.7 % (ref 36.5–49.3)
HGB BLD-MCNC: 11.2 G/DL (ref 12–17)
IMM GRANULOCYTES # BLD AUTO: 0.04 THOUSAND/UL (ref 0–0.2)
IMM GRANULOCYTES NFR BLD AUTO: 0 % (ref 0–2)
LYMPHOCYTES # BLD AUTO: 1.19 THOUSANDS/ΜL (ref 0.6–4.47)
LYMPHOCYTES NFR BLD AUTO: 11 % (ref 14–44)
MCH RBC QN AUTO: 30.8 PG (ref 26.8–34.3)
MCHC RBC AUTO-ENTMCNC: 32.3 G/DL (ref 31.4–37.4)
MCV RBC AUTO: 95 FL (ref 82–98)
MONOCYTES # BLD AUTO: 1.47 THOUSAND/ΜL (ref 0.17–1.22)
MONOCYTES NFR BLD AUTO: 13 % (ref 4–12)
NEUTROPHILS # BLD AUTO: 8.06 THOUSANDS/ΜL (ref 1.85–7.62)
NEUTS SEG NFR BLD AUTO: 73 % (ref 43–75)
NRBC BLD AUTO-RTO: 0 /100 WBCS
P AXIS: 70 DEGREES
PLATELET # BLD AUTO: 319 THOUSANDS/UL (ref 149–390)
PMV BLD AUTO: 9.8 FL (ref 8.9–12.7)
POTASSIUM SERPL-SCNC: 3.7 MMOL/L (ref 3.5–5.3)
PR INTERVAL: 134 MS
PROCALCITONIN SERPL-MCNC: 0.22 NG/ML
QRS AXIS: -39 DEGREES
QRSD INTERVAL: 90 MS
QT INTERVAL: 376 MS
QTC INTERVAL: 431 MS
RBC # BLD AUTO: 3.64 MILLION/UL (ref 3.88–5.62)
SODIUM SERPL-SCNC: 138 MMOL/L (ref 136–145)
T WAVE AXIS: 39 DEGREES
VENTRICULAR RATE: 79 BPM
WBC # BLD AUTO: 11.13 THOUSAND/UL (ref 4.31–10.16)

## 2019-02-27 PROCEDURE — 80048 BASIC METABOLIC PNL TOTAL CA: CPT | Performed by: PHYSICIAN ASSISTANT

## 2019-02-27 PROCEDURE — 84145 PROCALCITONIN (PCT): CPT | Performed by: INTERNAL MEDICINE

## 2019-02-27 PROCEDURE — 93306 TTE W/DOPPLER COMPLETE: CPT

## 2019-02-27 PROCEDURE — 99232 SBSQ HOSP IP/OBS MODERATE 35: CPT | Performed by: INTERNAL MEDICINE

## 2019-02-27 PROCEDURE — 85025 COMPLETE CBC W/AUTO DIFF WBC: CPT | Performed by: PHYSICIAN ASSISTANT

## 2019-02-27 PROCEDURE — 93005 ELECTROCARDIOGRAM TRACING: CPT

## 2019-02-27 PROCEDURE — 93010 ELECTROCARDIOGRAM REPORT: CPT | Performed by: INTERNAL MEDICINE

## 2019-02-27 RX ORDER — METOPROLOL TARTRATE 5 MG/5ML
2.5 INJECTION INTRAVENOUS ONCE
Status: COMPLETED | OUTPATIENT
Start: 2019-02-28 | End: 2019-02-27

## 2019-02-27 RX ADMIN — AZITHROMYCIN 500 MG: 250 TABLET, FILM COATED ORAL at 06:21

## 2019-02-27 RX ADMIN — METOPROLOL TARTRATE 2.5 MG: 5 INJECTION, SOLUTION INTRAVENOUS at 23:56

## 2019-02-27 RX ADMIN — GUAIFENESIN 1200 MG: 600 TABLET, EXTENDED RELEASE ORAL at 21:37

## 2019-02-27 RX ADMIN — GUAIFENESIN 1200 MG: 600 TABLET, EXTENDED RELEASE ORAL at 08:43

## 2019-02-27 RX ADMIN — CEFTRIAXONE 1000 MG: 1 INJECTION, POWDER, FOR SOLUTION INTRAMUSCULAR; INTRAVENOUS at 06:21

## 2019-02-27 RX ADMIN — ENOXAPARIN SODIUM 40 MG: 40 INJECTION SUBCUTANEOUS at 08:43

## 2019-02-27 NOTE — ASSESSMENT & PLAN NOTE
Left pleural effusion  Less likely empyema  If persistent or worsens may need thoracocentesis  Monitor

## 2019-02-27 NOTE — PROGRESS NOTES
Progress Note - Cardiology   Ramo Nunesr 68 y o  male MRN: [id number]  Unit/Bed#: -01 Encounter: [id number]        Principal Problem:    Sepsis ([facility] 75 )  Active Problems:    History of lumbar laminectomy    Pneumonia    NSTEMI (non-ST elevated myocardial infarction) ([facility] )    Elevated brain natriuretic peptide (BNP) level           Assessment/Plan     1  CAP- Left sided pneumonia  Rapid influenza negative  Influenza and RSV PCR negative  Legionella/pneumococcal antigen negative  Procalcitonin negative but clinically high suspicion for bacterial infection  On IV ceftriaxone and azithromycin/ ID following       2  Sepsis - POA  Secondary to #1  F/U BC- negative thus far  Hemodynamically stable       3  Atrial fibrillation/flutter with RVR ( PAF) -likely secondary to acute illness  Monitor for recurrence  Non thus far  No history of paroxysmal atrial fibrillation  Due to low stroke risk would not anticoagulate at this time  Would also not initiate any ongoing AV keena blockers as he is bradycardic at baseline  Troponin 0 05 likely due to tachycardia  ProBNP 3, 566 also  suspect may be due to tachycardia  On exam it is noted appear to be volume overloaded  Will follow up with transthoracic echocardiogram      4  Abnormal CT the chest  Small to moderate left pleural effusion midportion appears to be loculated-suspected parapneumonic in etiology  Will need to monitor  Also mentioned enlarged heart and prominence of right heart chambers  Once again will follow up with echocardiogram         Subjective/Objective   Chief Complaint/Subjective  Feeling improved but still tired   No cp  Wants to shower        Vitals: /62 (BP Location: Left arm)   Pulse 80   Temp 98 2 °F (36 8 °C) (Oral)   Resp 18   Wt 99 4 kg (219 lb 2 2 oz)   SpO2 96%   BMI 28 14 kg/m²     Vitals:    02/26/19 0744 02/27/19 0532   Weight: 102 kg (224 lb 6 9 oz) 99 4 kg (219 lb 2 2 oz)     Orthostatic Blood Pressures      Most Recent Value   Blood Pressure  131/62 filed at 02/27/2019 0700   Patient Position - Orthostatic VS  Lying filed at 02/27/2019 0700            Intake/Output Summary (Last 24 hours) at 2/27/2019 0934  Last data filed at 2/27/2019 0851  Gross per 24 hour   Intake 580 ml   Output 1775 ml   Net -1195 ml       Invasive Devices     Peripheral Intravenous Line            Peripheral IV 02/26/19 Right Antecubital 1 day                Current Facility-Administered Medications   Medication Dose Route Frequency    acetaminophen (TYLENOL) tablet 650 mg  650 mg Oral Q6H PRN    azithromycin (ZITHROMAX) tablet 500 mg  500 mg Oral Q24H    benzonatate (TESSALON PERLES) capsule 100 mg  100 mg Oral TID PRN    cefTRIAXone (ROCEPHIN) 1,000 mg in dextrose 5 % 50 mL IVPB  1,000 mg Intravenous Q24H    enoxaparin (LOVENOX) subcutaneous injection 40 mg  40 mg Subcutaneous Daily    guaiFENesin (MUCINEX) 12 hr tablet 1,200 mg  1,200 mg Oral Q12H ROSEANN    ondansetron (ZOFRAN) injection 4 mg  4 mg Intravenous Q8H PRN    sodium chloride (PF) 0 9 % injection 3 mL  3 mL Intravenous PRN         Physical Exam: /62 (BP Location: Left arm)   Pulse 80   Temp 98 2 °F (36 8 °C) (Oral)   Resp 18   Wt 99 4 kg (219 lb 2 2 oz)   SpO2 96%   BMI 28 14 kg/m²     General Appearance:    Alert, cooperative, no distress, appears stated age   Head:    Normocephalic, no scleral icterus   Eyes:    PERRL   Nose:   Nares normal, septum midline, no drainage    Throat:   Lips, mucosa, and tongue normal   Neck:   Supple, symmetrical, trachea midline,       no JVD       Lungs:     Clear to auscultation bilaterally, respirations unlabored   Chest Wall:    No tenderness or deformity    Heart:    Regular rate and rhythm, S1 and S2 normal, no murmur, rub   or gallop   Abdomen:     Soft, non-tender, bowel sounds active all four quadrants,     no masses, no organomegaly   Extremities:   Extremities normal, atraumatic, no cyanosis or edema   Pulses:   2+ and symmetric all extremities   Skin:   Skin color, texture, turgor normal, no rashes or lesions   Neurologic:   Alert and oriented to person place and time, no focal deficits                 Lab Results:   Recent Results (from the past 72 hour(s))   Comprehensive metabolic panel    Collection Time: 02/26/19  3:50 AM   Result Value Ref Range    Sodium 135 (L) 136 - 145 mmol/L    Potassium 3 7 3 5 - 5 3 mmol/L    Chloride 99 (L) 100 - 108 mmol/L    CO2 25 21 - 32 mmol/L    ANION GAP 11 4 - 13 mmol/L    BUN 9 5 - 25 mg/dL    Creatinine 1 09 0 60 - 1 30 mg/dL    Glucose 120 65 - 140 mg/dL    Calcium 8 7 8 3 - 10 1 mg/dL    AST 53 (H) 5 - 45 U/L    ALT 58 12 - 78 U/L    Alkaline Phosphatase 108 46 - 116 U/L    Total Protein 6 9 6 4 - 8 2 g/dL    Albumin 2 5 (L) 3 5 - 5 0 g/dL    Total Bilirubin 0 70 0 20 - 1 00 mg/dL    eGFR 67 ml/min/1 73sq m   Lactic acid x2    Collection Time: 02/26/19  3:50 AM   Result Value Ref Range    LACTIC ACID 1 3 0 5 - 2 0 mmol/L   Procalcitonin    Collection Time: 02/26/19  3:50 AM   Result Value Ref Range    Procalcitonin 0 16 <=0 25 ng/ml   Protime-INR    Collection Time: 02/26/19  3:50 AM   Result Value Ref Range    Protime 14 0 11 8 - 14 2 seconds    INR 1 11 0 86 - 1 17   APTT    Collection Time: 02/26/19  3:50 AM   Result Value Ref Range    PTT 38 26 - 38 seconds   Blood culture #1    Collection Time: 02/26/19  3:50 AM   Result Value Ref Range    Blood Culture No Growth at 24 hrs      Magnesium    Collection Time: 02/26/19  3:50 AM   Result Value Ref Range    Magnesium 1 8 1 6 - 2 6 mg/dL   NT-BNP PRO (BNP for [place], [place], [place], [place], [place], [facility], [facility], [place] campuses)    Collection Time: 02/26/19  3:50 AM   Result Value Ref Range    NT-proBNP 3,566 (H) <125 pg/mL   CBC and differential    Collection Time: 02/26/19  3:53 AM   Result Value Ref Range    WBC 16 13 (H) 4 31 - 10 16 Thousand/uL    RBC 4 16 3 88 - 5 62 Million/uL    Hemoglobin 13 0 12 0 - 17 0 g/dL    Hematocrit 40 0 36 5 - 49 3 %    MCV 96 82 - 98 fL    MCH 31 3 26 8 - 34 3 pg    MCHC 32 5 31 4 - 37 4 g/dL    RDW 13 0 11 6 - 15 1 %    MPV 9 8 8 9 - 12 7 fL    Platelets 466 300 - 140 Thousands/uL    nRBC 0 /100 WBCs    Neutrophils Relative 76 (H) 43 - 75 %    Immat GRANS % 0 0 - 2 %    Lymphocytes Relative 10 (L) 14 - 44 %    Monocytes Relative 13 (H) 4 - 12 %    Eosinophils Relative 1 0 - 6 %    Basophils Relative 0 0 - 1 %    Neutrophils Absolute 12 29 (H) 1 85 - 7 62 Thousands/µL    Immature Grans Absolute 0 07 0 00 - 0 20 Thousand/uL    Lymphocytes Absolute 1 53 0 60 - 4 47 Thousands/µL    Monocytes Absolute 2 10 (H) 0 17 - 1 22 Thousand/µL    Eosinophils Absolute 0 10 0 00 - 0 61 Thousand/µL    Basophils Absolute 0 04 0 00 - 0 10 Thousands/µL   Rapid Influenza Screen with Reflex PCR    Collection Time: 02/26/19  3:53 AM   Result Value Ref Range    Rapid Influenza A Ag Negative Negative, Indeterminate    Rapid Influenza B Ag Negative Negative, Indeterminate   INFLUENZA A/B AND RSV, PCR    Collection Time: 02/26/19  3:53 AM   Result Value Ref Range    INFLU A PCR Not Detected Not Detected    INFLU B PCR Not Detected Not Detected    RSV PCR Not Detected Not Detected   Blood culture #2    Collection Time: 02/26/19  4:00 AM   Result Value Ref Range    Blood Culture No Growth at 24 hrs      Troponin I    Collection Time: 02/26/19  4:25 AM   Result Value Ref Range    Troponin I 0 05 (H) <=0 04 ng/mL   UA w Reflex to Microscopic w Reflex to Culture    Collection Time: 02/26/19  6:29 AM   Result Value Ref Range    Color, UA Yellow     Clarity, UA Clear     Specific Gravity, UA <=1 005 1 003 - 1 030    pH, UA 6 5 4 5 - 8 0    Leukocytes, UA Negative Negative    Nitrite, UA Negative Negative    Protein, UA Negative Negative mg/dl    Glucose, UA Negative Negative mg/dl    Ketones, UA Negative Negative mg/dl    Urobilinogen, UA 0 2 0 2, 1 0 E U /dl E U /dl    Bilirubin, UA Negative Negative    Blood, UA Negative Negative   ED Urine Macroscopic    Collection Time: 02/26/19  6:32 AM   Result Value Ref Range    Color, UA Yellow     Clarity, UA Clear     pH, UA 6 5 4 5 - 8 0    Leukocytes, UA Negative Negative    Nitrite, UA Negative Negative    Protein, UA Negative Negative mg/dl    Glucose, UA Negative Negative mg/dl    Ketones, UA Negative Negative mg/dl    Urobilinogen, UA 0 2 0 2, 1 0 E U /dl E U /dl    Bilirubin, UA Negative Negative    Blood, UA Trace (A) Negative    Specific Gravity, UA 1 010 1 003 - 1 030   Procalcitonin    Collection Time: 02/26/19  8:43 AM   Result Value Ref Range    Procalcitonin 0 17 <=0 25 ng/ml   Platelet count    Collection Time: 02/26/19  8:43 AM   Result Value Ref Range    Platelets 300 782 - 195 Thousands/uL    MPV 9 8 8 9 - 12 7 fL   Troponin I    Collection Time: 02/26/19  8:43 AM   Result Value Ref Range    Troponin I 0 04 <=0 04 ng/mL   Troponin I    Collection Time: 02/26/19 12:04 PM   Result Value Ref Range    Troponin I 0 04 <=0 04 ng/mL   Strep Pneumoniae, Urine    Collection Time: 02/26/19  2:09 PM   Result Value Ref Range    Strep pneumoniae antigen, urine Negative Negative   Legionella antigen, urine    Collection Time: 02/26/19  2:09 PM   Result Value Ref Range    Legionella Urinary Antigen Negative Negative   Basic metabolic panel    Collection Time: 02/27/19  5:31 AM   Result Value Ref Range    Sodium 138 136 - 145 mmol/L    Potassium 3 7 3 5 - 5 3 mmol/L    Chloride 104 100 - 108 mmol/L    CO2 24 21 - 32 mmol/L    ANION GAP 10 4 - 13 mmol/L    BUN 7 5 - 25 mg/dL    Creatinine 0 97 0 60 - 1 30 mg/dL    Glucose 105 65 - 140 mg/dL    Calcium 8 4 8 3 - 10 1 mg/dL    eGFR 78 ml/min/1 73sq m   CBC and differential    Collection Time: 02/27/19  5:31 AM   Result Value Ref Range    WBC 11 13 (H) 4 31 - 10 16 Thousand/uL    RBC 3 64 (L) 3 88 - 5 62 Million/uL    Hemoglobin 11 2 (L) 12 0 - 17 0 g/dL    Hematocrit 34 7 (L) 36 5 - 49 3 %    MCV 95 82 - 98 fL    MCH 30 8 26 8 - 34 3 pg    MCHC 32 3 31 4 - 37 4 g/dL    RDW 13 2 11 6 - 15 1 % MPV 9 8 8 9 - 12 7 fL    Platelets 109 646 - 731 Thousands/uL    nRBC 0 /100 WBCs    Neutrophils Relative 73 43 - 75 %    Immat GRANS % 0 0 - 2 %    Lymphocytes Relative 11 (L) 14 - 44 %    Monocytes Relative 13 (H) 4 - 12 %    Eosinophils Relative 3 0 - 6 %    Basophils Relative 0 0 - 1 %    Neutrophils Absolute 8 06 (H) 1 85 - 7 62 Thousands/µL    Immature Grans Absolute 0 04 0 00 - 0 20 Thousand/uL    Lymphocytes Absolute 1 19 0 60 - 4 47 Thousands/µL    Monocytes Absolute 1 47 (H) 0 17 - 1 22 Thousand/µL    Eosinophils Absolute 0 34 0 00 - 0 61 Thousand/µL    Basophils Absolute 0 03 0 00 - 0 10 Thousands/µL     Imaging: I have personally reviewed pertinent reports  Tele- NSR      Counseling / Coordination of Care  Total time spent today 20 minutes  Greater than 50% of total time was spent with the patient and / or family counseling and / or coordination of care

## 2019-02-27 NOTE — TELEPHONE ENCOUNTER
DR Vale Mcleod - PT CANCELLED APPT FOR TODAY  HE ADMITTED TO  [facility] FOR PNEUMONIA YESTERDAY    HE JUST WANTED YOU TO KNOW

## 2019-02-27 NOTE — PROGRESS NOTES
Progress Note - [facility] 1945, 68 y o  male MRN: [id number]    Unit/Bed#: -01 Encounter: [id number]    Primary Care Provider: Gilberto Weber DO   Date and time admitted to hospital: 2019  3:41 AM        * Sepsis [facility])  Assessment & Plan  Present on admission improving  Monitor    Pneumonia  Assessment & Plan  Left lower lobe pneumonia  Continue antibiotics  Follow up on blood cultures  Flu PCR negative, urine Legionella strep antigen negative  Infectious Disease input noted      Pleural effusion, left  Assessment & Plan  Left pleural effusion  Less likely empyema  If persistent or worsens may need thoracocentesis  Monitor    Atrial fibrillation ([address] )  Assessment & Plan  Noted on admission likely due to sepsis  Since resolved  Follow up on 2D echo  Cardiology input noted      VTE Pharmacologic Prophylaxis:   Pharmacologic: Enoxaparin (Lovenox)  Mechanical VTE Prophylaxis in Place: Yes    Patient Centered Rounds: I have performed bedside rounds with nursing staff today  Discussions with Specialists or Other Care Team Provider:     Education and Discussions with Family / Patient:  Discussed with the patient, spouse at bedside updated    Time Spent for Care: 30 minutes  More than 50% of total time spent on counseling and coordination of care as described above      Current Length of Stay: 1 day(s)    Current Patient Status: Inpatient   Certification Statement: The patient will continue to require additional inpatient hospital stay due to As mentioned    Discharge Plan:  Awaiting clinical and symptomatic improvement, follow up on blood cultures, 2D echo    Code Status: Level 1 - Full Code      Subjective:     Reports feeling better  Sitting up in a chair  Cough thick tenacious sputum  Encourage ambulation as able    Objective:     Vitals:   Temp (24hrs), Av 6 °F (37 °C), Min:98 2 °F (36 8 °C), Max:98 9 °F (37 2 °C)    Temp:  [98 2 °F (36 8 °C)-98 9 °F (37 2 °C)] 98 2 °F (36 8 °C)  HR: [80-88] 80  Resp:  [18] 18  BP: (131-138)/(62-63) 131/62  SpO2:  [94 %-96 %] 96 %  Body mass index is 27 76 kg/m²  Input and Output Summary (last 24 hours): Intake/Output Summary (Last 24 hours) at 2/27/2019 1437  Last data filed at 2/27/2019 1337  Gross per 24 hour   Intake 580 ml   Output 2075 ml   Net -1495 ml       Physical Exam:     Physical Exam    Comfortably sitting up in chair  Neck supple  Lungs diminished breath sounds bilaterally, crackles audible  Heart sounds S1 and S2 noted  Abdomen soft  Awake obeys simple commands  No rash  Pulses noted  No pedal edema    Additional Data:     Labs:    Results from last 7 days   Lab Units 02/27/19  0531   WBC Thousand/uL 11 13*   HEMOGLOBIN g/dL 11 2*   HEMATOCRIT % 34 7*   PLATELETS Thousands/uL 319   NEUTROS PCT % 73   LYMPHS PCT % 11*   MONOS PCT % 13*   EOS PCT % 3     Results from last 7 days   Lab Units 02/27/19  0531 02/26/19  0350   SODIUM mmol/L 138 135*   POTASSIUM mmol/L 3 7 3 7   CHLORIDE mmol/L 104 99*   CO2 mmol/L 24 25   BUN mg/dL 7 9   CREATININE mg/dL 0 97 1 09   ANION GAP mmol/L 10 11   CALCIUM mg/dL 8 4 8 7   ALBUMIN g/dL  --  2 5*   TOTAL BILIRUBIN mg/dL  --  0 70   ALK PHOS U/L  --  108   ALT U/L  --  58   AST U/L  --  53*   GLUCOSE RANDOM mg/dL 105 120     Results from last 7 days   Lab Units 02/26/19  0350   INR  1 11             Results from last 7 days   Lab Units 02/27/19  0531 02/26/19  0843 02/26/19  0350   LACTIC ACID mmol/L  --   --  1 3   PROCALCITONIN ng/ml 0 22 0 17 0 16           * I Have Reviewed All Lab Data Listed Above  * Additional Pertinent Lab Tests Reviewed: All Labs Within Last 24 Hours Reviewed    Imaging:    Imaging Reports Reviewed Today Include:   Imaging Personally Reviewed by Myself Includes:      Recent Cultures (last 7 days):     Results from last 7 days   Lab Units 02/26/19  1409 02/26/19  0400 02/26/19  0353 02/26/19  0350   BLOOD CULTURE   --  No Growth at 24 hrs   --  No Growth at 24 hrs     INFLUENZA B PCR   --   --  Not Detected  --    RSV PCR   --   --  Not Detected  --    LEGIONELLA URINARY ANTIGEN  Negative  --   --   --        Last 24 Hours Medication List:     Current Facility-Administered Medications:  acetaminophen 650 mg Oral Q6H PRN Zabrina Daigle MD    azithromycin 500 mg Oral Q24H Juanita Neil MD    benzonatate 100 mg Oral TID PRN Zabrina Daigle MD    cefTRIAXone 1,000 mg Intravenous Q24H Juanita Neil MD Last Rate: 1,000 mg (02/27/19 3860)   enoxaparin 40 mg Subcutaneous Daily Loulou Collado PA-C    guaiFENesin 1,200 mg Oral Q12H Pablo Acevedo MD    ondansetron 4 mg Intravenous Q8H PRN Loulou Collado PA-C    sodium chloride (PF) 3 mL Intravenous PRN Loulou Collado PA-C         Today, Patient Was Seen By: Zabrina Daigle MD    ** Please Note: Dictation voice to text software may have been used in the creation of this document   **

## 2019-02-27 NOTE — PLAN OF CARE
Problem: PAIN - ADULT  Goal: Verbalizes/displays adequate comfort level or baseline comfort level  Description  Interventions:  - Encourage patient to monitor pain and request assistance  - Assess pain using appropriate pain scale  - Administer analgesics based on type and severity of pain and evaluate response  - Implement non-pharmacological measures as appropriate and evaluate response  - Consider cultural and social influences on pain and pain management  - Notify physician/advanced practitioner if interventions unsuccessful or patient reports new pain  Outcome: Progressing     Problem: INFECTION - ADULT  Goal: Absence or prevention of progression during hospitalization  Description  INTERVENTIONS:  - Assess and monitor for signs and symptoms of infection  - Monitor lab/diagnostic results  - Monitor all insertion sites, i e  indwelling lines, tubes, and drains  - Monitor endotracheal (as able) and nasal secretions for changes in amount and color  - [place] appropriate cooling/warming therapies per order  - Administer medications as ordered  - Instruct and encourage patient and family to use good hand hygiene technique  - Identify and instruct in appropriate isolation precautions for identified infection/condition  Outcome: Progressing     Problem: SAFETY ADULT  Goal: Patient will remain free of falls  Description  INTERVENTIONS:  - Assess patient frequently for physical needs  -  Identify cognitive and physical deficits and behaviors that affect risk of falls    -  [place] fall precautions as indicated by assessment   - Educate patient/family on patient safety including physical limitations  - Instruct patient to call for assistance with activity based on assessment  - Modify environment to reduce risk of injury  - Consider OT/PT consult to assist with strengthening/mobility  Outcome: Progressing     Problem: DISCHARGE PLANNING  Goal: Discharge to home or other facility with appropriate resources  Description  INTERVENTIONS:  - Identify barriers to discharge w/patient and caregiver  - Arrange for needed discharge resources and transportation as appropriate  - Identify discharge learning needs (meds, wound care, etc )  - Arrange for interpretive services to assist at discharge as needed  - Refer to Case Management Department for coordinating discharge planning if the patient needs post-hospital services based on physician/advanced practitioner order or complex needs related to functional status, cognitive ability, or social support system  Outcome: Progressing     Problem: Knowledge Deficit  Goal: Patient/family/caregiver demonstrates understanding of disease process, treatment plan, medications, and discharge instructions  Description  Complete learning assessment and assess knowledge base    Interventions:  - Provide teaching at level of understanding  - Provide teaching via preferred learning methods  Outcome: Progressing     Problem: RESPIRATORY - ADULT  Goal: Achieves optimal ventilation and oxygenation  Description  INTERVENTIONS:  - Assess for changes in respiratory status  - Assess for changes in mentation and behavior  - Position to facilitate oxygenation and minimize respiratory effort  - Oxygen administration by appropriate delivery method based on oxygen saturation (per order) or ABGs  - Initiate smoking cessation education as indicated  - Encourage broncho-pulmonary hygiene including cough, deep breathe, Incentive Spirometry  - Assess the need for suctioning and aspirate as needed  - Assess and instruct to report SOB or any respiratory difficulty  - Respiratory Therapy support as indicated  Outcome: Progressing

## 2019-02-27 NOTE — PROGRESS NOTES
Progress Note - Infectious Disease   Allyson Rivas 68 y o  male MRN: [id number]  Unit/Bed#: -01 Encounter: [id number]      Impression/Recommendations:  1  Sepsis, POA  Tachycardia and leukocytosis  Due to # 2  Blood cultures pending  UA negative  No abdominal symptoms and LFTs normal   Clinically stable and nontoxic  Rec:  ? Continue antibiotics as below  ? Follow up blood cultures from admission  ? Follow temperatures closely  ? Check CBC in a m   ? Supportive care as per the primary service     2  Left lower lobe pneumonia  Consider postviral bacterial superinfection given biphasic illness  Consider pneumococcus versus other strep/Staph  Legionella/Pneumococcal antigens and Flu PCR negative  Serial procalcitonin negative but clinical history suspicious for bacterial infection  Respiratory status stable without hypoxia  Rec:  ? Continue ceftriaxone and azithromycin for now  ? Recheck procalcitonin in a m    ? Follow respiratory status closely     3  Left pleural effusion  Suspect parapneumonic in etiology  Patient appears to clinically well for empyema  Rec:  ? Continue antibiotics as above  ? Serial lung exam  ? Consider repeat chest x-ray  If effusion worsens may need diagnostic thoracentesis     4   AFib with RVR  Likely due to pneumonia  Spontaneously improved      Antibiotics:  Ceftriaxone/azithromycin #2    Subjective:  Patient seen on AM rounds  Feeling somewhat better  His out of bed to chair  Feels like he is moving air better and cough is more productive  Denies fevers, chills, sweats, nausea, vomiting, or diarrhea  24 Hour Events:  No documented fevers, chills, sweats, nausea, vomiting, or diarrhea      Objective:  Vitals:  Temp:  [97 5 °F (36 4 °C)-98 9 °F (37 2 °C)] 98 8 °F (37 1 °C)  HR:  [46-88] 88  Resp:  [16-18] 18  BP: ()/(50-63) 138/63  SpO2:  [94 %-97 %] 94 %  Temp (24hrs), Av 4 °F (36 9 °C), Min:97 5 °F (36 4 °C), Max:98 9 °F (37 2 °C)  Current: Temperature: 98 8 °F (37 1 °C)    Physical Exam:   General:  No acute distress  Psychiatric:  Awake and alert  Pulmonary:  Normal respiratory excursion without accessory muscle use, decreased breath sounds left lower lung field  Abdomen:  Soft, nontender  Extremities:  No edema  Skin:  No rashes    Lab Results:  I have personally reviewed pertinent labs  Results from last 7 days   Lab Units 02/27/19  0531 02/26/19  0350   POTASSIUM mmol/L 3 7 3 7   CHLORIDE mmol/L 104 99*   CO2 mmol/L 24 25   BUN mg/dL 7 9   CREATININE mg/dL 0 97 1 09   EGFR ml/min/1 73sq m 78 67   CALCIUM mg/dL 8 4 8 7   AST U/L  --  53*   ALT U/L  --  58   ALK PHOS U/L  --  108     Results from last 7 days   Lab Units 02/27/19  0531 02/26/19  0843 02/26/19  0353   WBC Thousand/uL 11 13*  --  16 13*   HEMOGLOBIN g/dL 11 2*  --  13 0   PLATELETS Thousands/uL 319 328 364     Results from last 7 days   Lab Units 02/26/19  1409 02/26/19  0353   INFLUENZA B PCR   --  Not Detected   RSV PCR   --  Not Detected   LEGIONELLA URINARY ANTIGEN  Negative  --        Imaging Studies:   I have personally reviewed pertinent imaging study reports and images in PACS  EKG, Pathology, and Other Studies:   I have personally reviewed pertinent reports

## 2019-02-27 NOTE — UTILIZATION REVIEW
Initial Clinical Review    Admission: Date/Time/Statement: 2/26/19 @ 0630   Orders Placed This Encounter   Procedures    Inpatient Admission     Standing Status:   Standing     Number of Occurrences:   1     Order Specific Question:   Admitting Physician     Answer:   Gunjan Wild [[id number]]     Order Specific Question:   Level of Care     Answer:   Med Surg [[id number]]     Order Specific Question:   Estimated length of stay     Answer:   More than 2 Midnights     Order Specific Question:   Certification     Answer:   I certify that inpatient services are medically necessary for this patient for a duration of greater than two midnights  See H&P and MD Progress Notes for additional information about the patient's course of treatment  ED: Date/Time/Mode of Arrival:   ED Arrival Information     Expected Arrival Acuity Means of Arrival Escorted By Service Admission Type    - 2/26/2019 03:40 Emergent Ambulance [facility] Hospitalist Emergency    Arrival Complaint    Back Pain        Chief Complaint:   Chief Complaint   Patient presents with    Back Pain     Pt c/o intermittent lower back pain x4 days  (-) Urinary sxs  Pt states "the past two nights I feel like I was breathing really heavy " (-) SOB at this time  (+) Cough for 3 weeks  Assessment/Plan: This is a 68year old male from home with past medical history of lumbar laminectomy and anemia,  Who presented to ED via ems with shortness of breath and cough  Cough has been present @ 3 weeks, PCP prescribed antibiotic,  now is worse the last 2 days, now with feelings of heart racing and SOB  He has associated chest pain  In the ED, patient found in atrial fib with RVR, episode about 1 hour before conversion to sinus, with elevated troponin of 0 05 and elevated NT-proBNP of 3566, had leukocytosis of 16 13   Ct showed ground glass opacities left lung, interstitial edema and left pleural effusion left loculation  May be infection/pneumonia   Heart enlarged and increased right heart pressures  Patient is admitted inpatient with pneumonia, plan includes:  Start cefepime and vanco since failed outpatient antibiotics and loculation on CT, respiratory protocol - on 2 liters, consult ID; For NSTEMI, type 2 , will trend troponin, monitor on telemetry, echo, consult cardiology, will hold diuresis with elevated NT-proBNP until seen by cardiology    Troponin negative x2    2/27/2019-  Wbc 11 13, hgb 11 2, hct 34 7    ED Vital Signs:   ED Triage Vitals [02/26/19 0345]   Temperature Pulse Respirations Blood Pressure SpO2   98 4 °F (36 9 °C) (!) 156 20 145/81 94 %      Temp Source Heart Rate Source Patient Position - Orthostatic VS BP Location FiO2 (%)   Oral Monitor Sitting Right arm --      Pain Score       No Pain        Wt Readings from Last 1 Encounters:   02/27/19 99 4 kg (219 lb 2 2 oz)     Vital Signs (abnormal):   Exam - CV irregularly irregular rhythm, tachycardia  Tachypnea, coarse breath sounds on left  02/26/19 0618 -- 80 Provider aware 95 % -- 122/57 -- -- DB   02/26/19 0600 -- 128 93 % 18 95/54          Pertinent Labs/Diagnostic Test Results:   Wbc 16 13, hgb 14, hct 40  Na 135  Cl 99   ast 53  Albumin 2 5    UA trace blood  NT-proBNP 3566    [id number]-  atrial fibrillation with RVR    0617- EKG - sinus    Ct PE study with ct abdomen - Respiratory motion precludes evaluation of some of the peripheral pulmonary arterial vessels but subject to this, no pulmonary embolism is seen    Patchy groundglass opacities in the left upper lobe with scattered groundglass and alveolar opacities in the mid and lower portions of the left lower lobe   In addition, there is a large ill-defined dense consolidation in the left lower lobe, suspected interstitial edema in the left lower lobe, and an associated small to moderate-sized left pleural effusion, a portion of which in the medial left lower lobe appears to be loculated   Consider infection/pneumonia in the appropriate clinical setting  The heart appears enlarged and there is evidence to suggest increased right heart pressures  No acute process seen in the abdomen/pelvis    ED Treatment: blood cultures     Medication Administration from 02/26/2019 0340 to 02/26/2019 0726       Date/Time Order Dose Route Action Comments     02/26/2019 0355 acetaminophen (TYLENOL) tablet 975 mg 975 mg Oral Given      02/26/2019 0424 sodium chloride 0 9 % bolus 1,000 mL 0 mL Intravenous Stopped      02/26/2019 0355 sodium chloride 0 9 % bolus 1,000 mL 1,000 mL Intravenous New Bag      02/26/2019 0500 ceftriaxone (ROCEPHIN) 1 g/50 mL in dextrose IVPB 0 mg Intravenous Stopped      02/26/2019 0424 ceftriaxone (ROCEPHIN) 1 g/50 mL in dextrose IVPB 1,000 mg Intravenous New Bag      02/26/2019 0623 azithromycin (ZITHROMAX) 500 mg in sodium chloride 0 9% 250mL IVPB 500 mg 0 mg Intravenous Stopped      02/26/2019 0510 azithromycin (ZITHROMAX) 500 mg in sodium chloride 0 9% 250mL IVPB 500 mg 500 mg Intravenous New Bag      02/26/2019 0500 sodium chloride 0 9 % bolus 1,000 mL 0 mL Intravenous Stopped      02/26/2019 0424 sodium chloride 0 9 % bolus 1,000 mL 1,000 mL Intravenous New Bag      02/26/2019 0458 aspirin chewable tablet 324 mg 324 mg Oral Given      02/26/2019 0532 iohexol (OMNIPAQUE) 350 MG/ML injection (SINGLE-DOSE) 100 mL 100 mL Intravenous Given         Past Medical/Surgical History:   Past Medical History:   Diagnosis Date    Anemia     Closed nondisplaced fracture of second metatarsal bone of left foot 11/11/2018    Foot drop     Left inguinal hernia     Skin lesion      Admitting Diagnosis: Back pain [M54 9]  Tachycardia [R00 0]  CAP (community acquired pneumonia) [J18 9]  NSTEMI (non-ST elevated myocardial infarction) ([facility] [address] ) [I21 4]  Elevated brain natriuretic peptide (BNP) level [R79 89]  Sepsis ([facility] [address] ) [A41 9]  Atrial fibrillation, unspecified type ([facility] [address] ) [I48 91]  Age/Sex: 68 y o  male    Admission Orders:  2/26/2019  0630 INPATIENT   Scheduled Meds:   Current Facility-Administered Medications:  acetaminophen 650 mg Oral Q6H PRN    azithromycin 500 mg Oral Q24H    benzonatate 100 mg Oral TID PRN    cefTRIAXone 1,000 mg Intravenous Q24H Last Rate: 1,000 mg (02/27/19 0621)   enoxaparin 40 mg Subcutaneous Daily    guaiFENesin 1,200 mg Oral Q12H ROSEANN    ondansetron 4 mg Intravenous Q8H PRN    sodium chloride (PF) 3 mL Intravenous PRN      Continuous Infusions:    PRN Meds:    Benzonatate - used x 1  OTHER ORDERS: aspiration precautions  Telemetry  scds  Consult ID, cardiology  Echo  Oxygen 2 liters      Network Utilization Review Department  Phone: [phone]; Fax [phone]  [email or web address]  ATTENTION: Please call with any questions or concerns to [phone]  and carefully listen to the prompts so that you are directed to the right person  Send all requests for admission clinical reviews, approved or denied determinations and any other requests to fax [phone]   All voicemails are confidential

## 2019-02-27 NOTE — ASSESSMENT & PLAN NOTE
Noted on admission likely due to sepsis  Since resolved  Follow up on 2D echo  Cardiology input noted

## 2019-02-27 NOTE — ASSESSMENT & PLAN NOTE
Left lower lobe pneumonia  Continue antibiotics  Follow up on blood cultures  Flu PCR negative, urine Legionella strep antigen negative  Infectious Disease input noted

## 2019-02-28 ENCOUNTER — APPOINTMENT (INPATIENT)
Dept: RADIOLOGY | Facility: HOSPITAL | Age: 74
DRG: 871 | End: 2019-02-28
Payer: COMMERCIAL

## 2019-02-28 PROBLEM — R07.9 CHEST PAIN: Status: ACTIVE | Noted: 2019-02-28

## 2019-02-28 LAB
ALBUMIN SERPL BCP-MCNC: 2.3 G/DL (ref 3.5–5)
ANION GAP SERPL CALCULATED.3IONS-SCNC: 9 MMOL/L (ref 4–13)
ATRIAL RATE: 153 BPM
ATRIAL RATE: 154 BPM
ATRIAL RATE: 308 BPM
ATRIAL RATE: 468 BPM
BUN SERPL-MCNC: 8 MG/DL (ref 5–25)
CALCIUM SERPL-MCNC: 8.7 MG/DL (ref 8.3–10.1)
CHLORIDE SERPL-SCNC: 104 MMOL/L (ref 100–108)
CO2 SERPL-SCNC: 24 MMOL/L (ref 21–32)
CREAT SERPL-MCNC: 0.92 MG/DL (ref 0.6–1.3)
ERYTHROCYTE [DISTWIDTH] IN BLOOD BY AUTOMATED COUNT: 12.9 % (ref 11.6–15.1)
GFR SERPL CREATININE-BSD FRML MDRD: 83 ML/MIN/1.73SQ M
GLUCOSE SERPL-MCNC: 107 MG/DL (ref 65–140)
HCT VFR BLD AUTO: 34.4 % (ref 36.5–49.3)
HGB BLD-MCNC: 11.5 G/DL (ref 12–17)
MAGNESIUM SERPL-MCNC: 1.9 MG/DL (ref 1.6–2.6)
MCH RBC QN AUTO: 31.2 PG (ref 26.8–34.3)
MCHC RBC AUTO-ENTMCNC: 33.4 G/DL (ref 31.4–37.4)
MCV RBC AUTO: 93 FL (ref 82–98)
P AXIS: 251 DEGREES
P AXIS: 54 DEGREES
PLATELET # BLD AUTO: 351 THOUSANDS/UL (ref 149–390)
PMV BLD AUTO: 9.5 FL (ref 8.9–12.7)
POTASSIUM SERPL-SCNC: 3.4 MMOL/L (ref 3.5–5.3)
QRS AXIS: -10 DEGREES
QRS AXIS: -32 DEGREES
QRS AXIS: -34 DEGREES
QRS AXIS: -35 DEGREES
QRSD INTERVAL: 114 MS
QRSD INTERVAL: 88 MS
QRSD INTERVAL: 90 MS
QRSD INTERVAL: 98 MS
QT INTERVAL: 260 MS
QT INTERVAL: 286 MS
QT INTERVAL: 314 MS
QT INTERVAL: 314 MS
QTC INTERVAL: 416 MS
QTC INTERVAL: 458 MS
QTC INTERVAL: 467 MS
QTC INTERVAL: 471 MS
RBC # BLD AUTO: 3.69 MILLION/UL (ref 3.88–5.62)
SODIUM SERPL-SCNC: 137 MMOL/L (ref 136–145)
T WAVE AXIS: 12 DEGREES
T WAVE AXIS: 33 DEGREES
T WAVE AXIS: 34 DEGREES
T WAVE AXIS: 34 DEGREES
TROPONIN I SERPL-MCNC: <0.02 NG/ML
VENTRICULAR RATE: 133 BPM
VENTRICULAR RATE: 135 BPM
VENTRICULAR RATE: 154 BPM
VENTRICULAR RATE: 154 BPM
WBC # BLD AUTO: 10.19 THOUSAND/UL (ref 4.31–10.16)

## 2019-02-28 PROCEDURE — 93010 ELECTROCARDIOGRAM REPORT: CPT | Performed by: INTERNAL MEDICINE

## 2019-02-28 PROCEDURE — 83735 ASSAY OF MAGNESIUM: CPT | Performed by: PHYSICIAN ASSISTANT

## 2019-02-28 PROCEDURE — 85027 COMPLETE CBC AUTOMATED: CPT | Performed by: PHYSICIAN ASSISTANT

## 2019-02-28 PROCEDURE — 71046 X-RAY EXAM CHEST 2 VIEWS: CPT

## 2019-02-28 PROCEDURE — 99232 SBSQ HOSP IP/OBS MODERATE 35: CPT | Performed by: INTERNAL MEDICINE

## 2019-02-28 PROCEDURE — 84484 ASSAY OF TROPONIN QUANT: CPT | Performed by: PHYSICIAN ASSISTANT

## 2019-02-28 PROCEDURE — 80048 BASIC METABOLIC PNL TOTAL CA: CPT | Performed by: PHYSICIAN ASSISTANT

## 2019-02-28 PROCEDURE — 93306 TTE W/DOPPLER COMPLETE: CPT | Performed by: INTERNAL MEDICINE

## 2019-02-28 PROCEDURE — 93005 ELECTROCARDIOGRAM TRACING: CPT

## 2019-02-28 PROCEDURE — 82040 ASSAY OF SERUM ALBUMIN: CPT | Performed by: PHYSICIAN ASSISTANT

## 2019-02-28 PROCEDURE — 99233 SBSQ HOSP IP/OBS HIGH 50: CPT | Performed by: INTERNAL MEDICINE

## 2019-02-28 RX ORDER — IBUPROFEN 400 MG/1
400 TABLET ORAL EVERY 8 HOURS SCHEDULED
Status: DISCONTINUED | OUTPATIENT
Start: 2019-02-28 | End: 2019-02-28

## 2019-02-28 RX ORDER — DILTIAZEM HYDROCHLORIDE 5 MG/ML
15 INJECTION INTRAVENOUS ONCE
Status: COMPLETED | OUTPATIENT
Start: 2019-02-28 | End: 2019-02-28

## 2019-02-28 RX ORDER — SODIUM CHLORIDE 9 MG/ML
100 INJECTION, SOLUTION INTRAVENOUS CONTINUOUS
Status: DISPENSED | OUTPATIENT
Start: 2019-02-28 | End: 2019-02-28

## 2019-02-28 RX ORDER — POTASSIUM CHLORIDE 20 MEQ/1
40 TABLET, EXTENDED RELEASE ORAL ONCE
Status: COMPLETED | OUTPATIENT
Start: 2019-02-28 | End: 2019-02-28

## 2019-02-28 RX ORDER — DIGOXIN 0.25 MG/ML
62.5 INJECTION INTRAMUSCULAR; INTRAVENOUS ONCE
Status: CANCELLED | OUTPATIENT
Start: 2019-02-28

## 2019-02-28 RX ORDER — IBUPROFEN 400 MG/1
400 TABLET ORAL EVERY 8 HOURS SCHEDULED
Status: DISCONTINUED | OUTPATIENT
Start: 2019-02-28 | End: 2019-03-03 | Stop reason: HOSPADM

## 2019-02-28 RX ORDER — DIGOXIN 0.25 MG/ML
62.5 INJECTION INTRAMUSCULAR; INTRAVENOUS ONCE
Status: COMPLETED | OUTPATIENT
Start: 2019-02-28 | End: 2019-02-28

## 2019-02-28 RX ORDER — LORAZEPAM 0.5 MG/1
0.5 TABLET ORAL ONCE
Status: COMPLETED | OUTPATIENT
Start: 2019-02-28 | End: 2019-02-28

## 2019-02-28 RX ORDER — POTASSIUM CHLORIDE 20 MEQ/1
20 TABLET, EXTENDED RELEASE ORAL ONCE
Status: COMPLETED | OUTPATIENT
Start: 2019-02-28 | End: 2019-02-28

## 2019-02-28 RX ORDER — KETOROLAC TROMETHAMINE 30 MG/ML
15 INJECTION, SOLUTION INTRAMUSCULAR; INTRAVENOUS ONCE
Status: COMPLETED | OUTPATIENT
Start: 2019-02-28 | End: 2019-02-28

## 2019-02-28 RX ORDER — MAGNESIUM SULFATE HEPTAHYDRATE 40 MG/ML
2 INJECTION, SOLUTION INTRAVENOUS ONCE
Status: COMPLETED | OUTPATIENT
Start: 2019-02-28 | End: 2019-02-28

## 2019-02-28 RX ORDER — METOPROLOL TARTRATE 5 MG/5ML
2.5 INJECTION INTRAVENOUS ONCE
Status: COMPLETED | OUTPATIENT
Start: 2019-02-28 | End: 2019-02-28

## 2019-02-28 RX ORDER — POTASSIUM CHLORIDE 29.8 MG/ML
40 INJECTION INTRAVENOUS ONCE
Status: DISCONTINUED | OUTPATIENT
Start: 2019-02-28 | End: 2019-02-28

## 2019-02-28 RX ORDER — ALBUMIN (HUMAN) 12.5 G/50ML
25 SOLUTION INTRAVENOUS ONCE
Status: COMPLETED | OUTPATIENT
Start: 2019-02-28 | End: 2019-02-28

## 2019-02-28 RX ADMIN — DIGOXIN 62.5 MCG: 250 INJECTION, SOLUTION INTRAMUSCULAR; INTRAVENOUS; PARENTERAL at 05:37

## 2019-02-28 RX ADMIN — SODIUM CHLORIDE 100 ML/HR: 0.9 INJECTION, SOLUTION INTRAVENOUS at 08:33

## 2019-02-28 RX ADMIN — MAGNESIUM SULFATE HEPTAHYDRATE 2 G: 40 INJECTION, SOLUTION INTRAVENOUS at 03:48

## 2019-02-28 RX ADMIN — POTASSIUM CHLORIDE 20 MEQ: 1500 TABLET, EXTENDED RELEASE ORAL at 09:38

## 2019-02-28 RX ADMIN — IBUPROFEN 400 MG: 400 TABLET ORAL at 21:12

## 2019-02-28 RX ADMIN — METOPROLOL TARTRATE 25 MG: 25 TABLET, FILM COATED ORAL at 17:16

## 2019-02-28 RX ADMIN — DILTIAZEM HYDROCHLORIDE 7.5 MG/HR: 5 INJECTION INTRAVENOUS at 10:53

## 2019-02-28 RX ADMIN — SODIUM CHLORIDE 100 ML/HR: 0.9 INJECTION, SOLUTION INTRAVENOUS at 15:56

## 2019-02-28 RX ADMIN — POTASSIUM CHLORIDE 40 MEQ: 1500 TABLET, EXTENDED RELEASE ORAL at 03:48

## 2019-02-28 RX ADMIN — IBUPROFEN 400 MG: 400 TABLET ORAL at 17:16

## 2019-02-28 RX ADMIN — CEFTRIAXONE 1000 MG: 1 INJECTION, POWDER, FOR SOLUTION INTRAMUSCULAR; INTRAVENOUS at 06:59

## 2019-02-28 RX ADMIN — AMIODARONE HYDROCHLORIDE 150 MG: 50 INJECTION, SOLUTION INTRAVENOUS at 08:52

## 2019-02-28 RX ADMIN — AZITHROMYCIN 500 MG: 250 TABLET, FILM COATED ORAL at 06:59

## 2019-02-28 RX ADMIN — ACETAMINOPHEN 650 MG: 325 TABLET, FILM COATED ORAL at 18:53

## 2019-02-28 RX ADMIN — KETOROLAC TROMETHAMINE 15 MG: 30 INJECTION, SOLUTION INTRAMUSCULAR at 10:06

## 2019-02-28 RX ADMIN — ENOXAPARIN SODIUM 40 MG: 40 INJECTION SUBCUTANEOUS at 09:38

## 2019-02-28 RX ADMIN — METOPROLOL TARTRATE 25 MG: 25 TABLET, FILM COATED ORAL at 23:02

## 2019-02-28 RX ADMIN — GUAIFENESIN 1200 MG: 600 TABLET, EXTENDED RELEASE ORAL at 21:12

## 2019-02-28 RX ADMIN — LORAZEPAM 0.5 MG: 0.5 TABLET ORAL at 09:46

## 2019-02-28 RX ADMIN — ACETAMINOPHEN 650 MG: 325 TABLET, FILM COATED ORAL at 01:34

## 2019-02-28 RX ADMIN — DILTIAZEM HYDROCHLORIDE 15 MG: 5 INJECTION INTRAVENOUS at 09:33

## 2019-02-28 RX ADMIN — METOPROLOL TARTRATE 25 MG: 25 TABLET, FILM COATED ORAL at 10:54

## 2019-02-28 RX ADMIN — DILTIAZEM HYDROCHLORIDE 5 MG/HR: 5 INJECTION INTRAVENOUS at 09:34

## 2019-02-28 RX ADMIN — ALBUMIN (HUMAN) 25 G: 0.25 INJECTION, SOLUTION INTRAVENOUS at 01:34

## 2019-02-28 RX ADMIN — METOPROLOL TARTRATE 2.5 MG: 5 INJECTION, SOLUTION INTRAVENOUS at 08:34

## 2019-02-28 NOTE — PROGRESS NOTES
Patient telemetry began reading 140s-150s  SLIM notified, received orders for metoprolol and carried out  VS rechecked in 1 hr, /58, HR 130s-140s  Order for albumin received and carried out  BP now 100/60 with HR sustaining 140s-150s  Patient asymptomatic  SLIM notified, nasal cannula applied, will continue to monitor heart rate and inform oncoming RN

## 2019-02-28 NOTE — PROGRESS NOTES
Notified by nursing that patient was tachycardic with rates in the 140-150s sustained  Patient was asymptomatic and vital signs stable  EKG revealed atrial fibrillation with RVR  Noted upon chart review that patient had approximately 2 5 hour episode of atrial fibrillation with RVR last night but it broke spontaneously without any medical intervention  The atrial fibrillation is likely correlated with his acute illness  Patient was given 1 dose of Lopressor 2 5 mg IV and vital signs were rechecked  Blood pressure was then 985 systolic with heart rates maintaining in the 130-150 range  Albumin given and blood pressure was 662 systolic with no improvement in heart rate  Patient had been asymptomatic the entire time  Stat labs ordered including magnesium and potassium, which were 1 9 and 3 4 respectively  2 g of magnesium IV given as well as 40 mEq oral potassium given  Patient reassessed and still asymptomatic with heart rates in 130-140 range   systolic  Digoxin 62 5 mcg given and patient now with rates in 110-125 range  Asymptomatic, VSS  Nursing instructed to call with any change in condition, patient becoming symptomatic, or instabilty of vital signs  Appreciate Cardiology input in the morning       Yvoana Faria PA-C

## 2019-02-28 NOTE — ASSESSMENT & PLAN NOTE
Likely due to AFib RVR  Discussed with Cardiology  Toradol x1, monitor for response  Monitor closely

## 2019-02-28 NOTE — ASSESSMENT & PLAN NOTE
AFib with RVR, overnight events noted  Discussed with Cardiology  Received IV amiodarone  Now on IV Cardizem drip  2D echo noted  Anticoagulation per Cardiology  Monitor closely    Outpatient sleep study strongly recommended discussed with the patient he verbalized understanding and agrees

## 2019-02-28 NOTE — PROGRESS NOTES
Progress Note - Infectious Disease   River Jaimes 68 y o  male MRN: [id number]  Unit/Bed#: -01 Encounter: [id number]    Impression/Plan:  1   Sepsis, POA   Tachycardia and leukocytosis   Due to #2   Blood cultures are negative after 48 hours  Sarai Bocanegra was negative   No abdominal symptoms and LFTs normal   Clinically stable and nontoxic  Rec:  ? Continue antibiotics as below  ? Monitor CBC and BMP  ? Follow up blood cultures  ? Follow temperatures closely  ? Supportive care     2   Left lower lobe pneumonia   Consider postviral bacterial superinfection given biphasic illness   Consider pneumococcus versus other strep/Staph  Legionella/Pneumococcal antigens and Flu PCR negative  Serial procalcitonin negative but clinical history suspicious for bacterial infection   Respiratory status stable without hypoxia    Rec:  ? Continue IV ceftriaxone and azithromycin for now  ? Follow respiratory status closely  ? Monitor vitals     3   Left pleural effusion   Suspect parapneumonic in etiology   Patient appears to clinically well for empyema  Rec:  ? Continue antibiotics as above  ? Serial lung exam     4   AFib with RVR   Likely due to pneumonia  Patient with chest pain earlier this morning  Was found once again in rapid AFib  He did not respond to IV amiodarone bolus  He is now on a Cardizem drip  He continues following closely with Cardiology  Rec:  ? Management per Cardiology    Antibiotics:  Ceftriaxone 4  Azithromycin 4  Antibiotics 4    Subjective:  Patient reports that he is feeling better now but tells me that he had a terrible morning  Reports he developed intense substernal chest pain which radiated through his body towards his spine  Reports it was extremely painful to move around and to roll in bed  Reports it was very scary experience but his nurse was fantastic and helped common down  Now he is feeling better and his pain which was a 10/10 is now a 2/10    He denies fever, chills, sweats; no nausea, vomiting, diarrhea, or dysuria; has a mild occasional cough that is nonproductive but denies shortness of breath  Objective:  Vitals:  Temp:  [98 °F (36 7 °C)-99 6 °F (37 6 °C)] 98 8 °F (37 1 °C)  HR:  [] 83  Resp:  [18-20] 18  BP: (100-167)/(56-86) 148/67  SpO2:  [90 %-98 %] 96 %  Temp (24hrs), Av 7 °F (37 1 °C), Min:98 °F (36 7 °C), Max:99 6 °F (37 6 °C)  Current: Temperature: 98 8 °F (37 1 °C)    Physical Exam:   General Appearance:  Alert, interactive, nontoxic, no acute distress  Throat: Oropharynx moist without lesions  Lungs:   Decreased to auscultation bilaterally; no wheezes, rhonchi or rales; respirations unlabored   Heart:  RRR; no murmur, rub or gallop   Abdomen:   Soft, non-tender, non-distended, positive bowel sounds  Extremities: No clubbing or cyanosis, no edema   Skin: No new rashes or lesions  No draining wounds noted  Labs, Imaging, & Other studies:   All pertinent labs and imaging studies were personally reviewed  Results from last 7 days   Lab Units 19  0242 19  0531 19  0843 19  0353   WBC Thousand/uL 10 19* 11 13*  --  16 13*   HEMOGLOBIN g/dL 11 5* 11 2*  --  13 0   PLATELETS Thousands/uL 351 319 328 364     Results from last 7 days   Lab Units 19  0242  19  0350   POTASSIUM mmol/L 3 4*   < > 3 7   CHLORIDE mmol/L 104   < > 99*   CO2 mmol/L 24   < > 25   BUN mg/dL 8   < > 9   CREATININE mg/dL 0 92   < > 1 09   EGFR ml/min/1 73sq m 83   < > 67   CALCIUM mg/dL 8 7   < > 8 7   AST U/L  --   --  53*   ALT U/L  --   --  58   ALK PHOS U/L  --   --  108    < > = values in this interval not displayed  Results from last 7 days   Lab Units 19  1409 19  0400 19  0353 19  0350   BLOOD CULTURE   --  No Growth at 48 hrs  --  No Growth at 48 hrs     INFLUENZA B PCR   --   --  Not Detected  --    RSV PCR   --   --  Not Detected  --    LEGIONELLA URINARY ANTIGEN  Negative  --   --   --

## 2019-02-28 NOTE — PROGRESS NOTES
Progress Note - Cardiology   Elsa Garcia 68 y o  male MRN: [id number]  Unit/Bed#: -01 Encounter: [id number]        Principal Problem:    Sepsis ([facility] )  Active Problems:    History of lumbar laminectomy    Pneumonia    NSTEMI (non-ST elevated myocardial infarction) ([facility] )    Elevated brain natriuretic peptide (BNP) level    Atrial fibrillation (HCC)    Pleural effusion, left        Assessment/Plan     1   CAP- Left sided pneumonia  Rapid influenza negative   Influenza and RSV PCR negative  Legionella/pneumococcal antigen negative  Procalcitonin negative but clinically high suspicion for bacterial infection  On IV ceftriaxone and azithromycin/ ID following  Symptomatically improving  WBC down       2  Sepsis - POA  Secondary to #1  F/U BC- negative thus far  Hemodynamically stable       3  Atrial fibrillation/flutter with RVR ( PAF) -likely secondary to acute illness     Last PM about midnight pt with recurrent afib with rvr, IV lopressor 2 5 given, SBP to 100 thus albumin given  Remained rapid over night, digoxin 62  5mcg IV given  I was called to see pt at 0800 regarding CP that started at 0740  He had a sense of palpitations all night  EKG no ischemic changes   cc bolus now  Likely has some component of hypovolemia  IV lopressor 2 5 IV now  IV amiodarone 150 bolus now  Potassium 3 4 will replete  Did receive 40 potassium overnight as well as 2 g of IV magnesium      4  Abnormal CT the chest  Small to moderate left pleural effusion midportion appears to be loculated-suspected parapneumonic in etiology   Will need to monitor  5  Echocardiogram- EF normal  Moderate RV enlargement, normal function  Mod LAE, mild LAE, Mild MR, mod AI, mild TR  Will need outpatient follow up           Subjective/Objective   Chief Complaint/Subjective  Went into rapid atrial fibrillation last night  He was awakened by the staff and felt palpitations at that time  He was seen by [facility]    He was given IV Lopressor with mild hypotension  Skin a dose of albumin  Remained AFib through the night and was given 62 5 mcg of digoxin this morning  At 7:40 a m  Complained of chest pain center with radiation to left side of the chest   A feels the palpitations        tBP 143/73 (BP Location: Left arm)   Pulse 99   Temp 98 °F (36 7 °C) (Oral)   Resp 20   Ht 6' 2 5" (1 892 m)   Wt 98 3 kg (216 lb 12 8 oz)   SpO2 98%   BMI 27 46 kg/m²     Vitals:    02/27/19 0532 02/28/19 0600   Weight: 99 4 kg (219 lb 2 2 oz) 98 3 kg (216 lb 12 8 oz)     Orthostatic Blood Pressures      Most Recent Value   Blood Pressure  143/73 filed at 02/28/2019 0746   Patient Position - Orthostatic VS  Lying filed at 02/28/2019 0746            Intake/Output Summary (Last 24 hours) at 2/28/2019 0837  Last data filed at 2/28/2019 0401  Gross per 24 hour   Intake 960 ml   Output 2100 ml   Net -1140 ml       Invasive Devices     Peripheral Intravenous Line            Peripheral IV 02/26/19 Right Antecubital 2 days                Current Facility-Administered Medications   Medication Dose Route Frequency    acetaminophen (TYLENOL) tablet 650 mg  650 mg Oral Q6H PRN    amiodarone 150 mg in dextrose 5 % 100 mL IV bolus  150 mg Intravenous Once    benzonatate (TESSALON PERLES) capsule 100 mg  100 mg Oral TID PRN    cefTRIAXone (ROCEPHIN) 1,000 mg in dextrose 5 % 50 mL IVPB  1,000 mg Intravenous Q24H    enoxaparin (LOVENOX) subcutaneous injection 40 mg  40 mg Subcutaneous Daily    guaiFENesin (MUCINEX) 12 hr tablet 1,200 mg  1,200 mg Oral Q12H ROSEANN    ondansetron (ZOFRAN) injection 4 mg  4 mg Intravenous Q8H PRN    potassium chloride (K-DUR,KLOR-CON) CR tablet 20 mEq  20 mEq Oral Once    sodium chloride (PF) 0 9 % injection 3 mL  3 mL Intravenous PRN    sodium chloride 0 9 % infusion  100 mL/hr Intravenous Continuous         Physical Exam: /73 (BP Location: Left arm)   Pulse 99   Temp 98 °F (36 7 °C) (Oral)   Resp 20   Ht 6' 2 5" (1 892 m) Wt 98 3 kg (216 lb 12 8 oz)   SpO2 98%   BMI 27 46 kg/m²     General Appearance:    Alert, cooperative, no distress, appears stated age   Head:    Normocephalic, no scleral icterus   Eyes:    PERRL   Nose:   Nares normal, septum midline, no drainage    Throat:   Lips, mucosa, and tongue normal   Neck:   Supple, symmetrical, trachea midline,       no  JVD       Lungs:     Clear to auscultation bilaterally, respirations unlabored at rest on r/a        Heart:    Irregular rate and rhythm, S1 and S2 normal   Abdomen:     Soft, non-tender, bowel sounds active all four quadrants,     no masses, no organomegaly   Extremities:   Extremities normal, atraumatic, no cyanosis or edema   Pulses:   2+ and symmetric all extremities   Skin:   Skin color, texture, turgor normal, no rashes or lesions   Neurologic:   Alert and oriented to person place and time, no focal deficits                 Lab Results:   Recent Results (from the past 72 hour(s))   Comprehensive metabolic panel    Collection Time: 02/26/19  3:50 AM   Result Value Ref Range    Sodium 135 (L) 136 - 145 mmol/L    Potassium 3 7 3 5 - 5 3 mmol/L    Chloride 99 (L) 100 - 108 mmol/L    CO2 25 21 - 32 mmol/L    ANION GAP 11 4 - 13 mmol/L    BUN 9 5 - 25 mg/dL    Creatinine 1 09 0 60 - 1 30 mg/dL    Glucose 120 65 - 140 mg/dL    Calcium 8 7 8 3 - 10 1 mg/dL    AST 53 (H) 5 - 45 U/L    ALT 58 12 - 78 U/L    Alkaline Phosphatase 108 46 - 116 U/L    Total Protein 6 9 6 4 - 8 2 g/dL    Albumin 2 5 (L) 3 5 - 5 0 g/dL    Total Bilirubin 0 70 0 20 - 1 00 mg/dL    eGFR 67 ml/min/1 73sq m   Lactic acid x2    Collection Time: 02/26/19  3:50 AM   Result Value Ref Range    LACTIC ACID 1 3 0 5 - 2 0 mmol/L   Procalcitonin    Collection Time: 02/26/19  3:50 AM   Result Value Ref Range    Procalcitonin 0 16 <=0 25 ng/ml   Protime-INR    Collection Time: 02/26/19  3:50 AM   Result Value Ref Range    Protime 14 0 11 8 - 14 2 seconds    INR 1 11 0 86 - 1 17   APTT    Collection Time: 02/26/19  3:50 AM   Result Value Ref Range    PTT 38 26 - 38 seconds   Blood culture #1    Collection Time: 02/26/19  3:50 AM   Result Value Ref Range    Blood Culture No Growth at 48 hrs      Magnesium    Collection Time: 02/26/19  3:50 AM   Result Value Ref Range    Magnesium 1 8 1 6 - 2 6 mg/dL   NT-BNP PRO (BNP for [facility], [facility], [facility], [place], [place], [facility], [facility], [place] campuses)    Collection Time: 02/26/19  3:50 AM   Result Value Ref Range    NT-proBNP 3,566 (H) <125 pg/mL   CBC and differential    Collection Time: 02/26/19  3:53 AM   Result Value Ref Range    WBC 16 13 (H) 4 31 - 10 16 Thousand/uL    RBC 4 16 3 88 - 5 62 Million/uL    Hemoglobin 13 0 12 0 - 17 0 g/dL    Hematocrit 40 0 36 5 - 49 3 %    MCV 96 82 - 98 fL    MCH 31 3 26 8 - 34 3 pg    MCHC 32 5 31 4 - 37 4 g/dL    RDW 13 0 11 6 - 15 1 %    MPV 9 8 8 9 - 12 7 fL    Platelets 682 075 - 130 Thousands/uL    nRBC 0 /100 WBCs    Neutrophils Relative 76 (H) 43 - 75 %    Immat GRANS % 0 0 - 2 %    Lymphocytes Relative 10 (L) 14 - 44 %    Monocytes Relative 13 (H) 4 - 12 %    Eosinophils Relative 1 0 - 6 %    Basophils Relative 0 0 - 1 %    Neutrophils Absolute 12 29 (H) 1 85 - 7 62 Thousands/µL    Immature Grans Absolute 0 07 0 00 - 0 20 Thousand/uL    Lymphocytes Absolute 1 53 0 60 - 4 47 Thousands/µL    Monocytes Absolute 2 10 (H) 0 17 - 1 22 Thousand/µL    Eosinophils Absolute 0 10 0 00 - 0 61 Thousand/µL    Basophils Absolute 0 04 0 00 - 0 10 Thousands/µL   Rapid Influenza Screen with Reflex PCR    Collection Time: 02/26/19  3:53 AM   Result Value Ref Range    Rapid Influenza A Ag Negative Negative, Indeterminate    Rapid Influenza B Ag Negative Negative, Indeterminate   INFLUENZA A/B AND RSV, PCR    Collection Time: 02/26/19  3:53 AM   Result Value Ref Range    INFLU A PCR Not Detected Not Detected    INFLU B PCR Not Detected Not Detected    RSV PCR Not Detected Not Detected   Blood culture #2    Collection Time: 02/26/19  4:00 AM   Result Value Ref Range    Blood Culture No Growth at 48 hrs      Troponin I    Collection Time: 02/26/19  4:25 AM   Result Value Ref Range    Troponin I 0 05 (H) <=0 04 ng/mL   ECG 12 lead    Collection Time: 02/26/19  6:17 AM   Result Value Ref Range    Ventricular Rate 79 BPM    Atrial Rate 79 BPM    HI Interval 134 ms    QRSD Interval 90 ms    QT Interval 376 ms    QTC Interval 431 ms    P Axis 70 degrees    QRS Axis -39 degrees    T Wave Axis 39 degrees   UA w Reflex to Microscopic w Reflex to Culture    Collection Time: 02/26/19  6:29 AM   Result Value Ref Range    Color, UA Yellow     Clarity, UA Clear     Specific Gravity, UA <=1 005 1 003 - 1 030    pH, UA 6 5 4 5 - 8 0    Leukocytes, UA Negative Negative    Nitrite, UA Negative Negative    Protein, UA Negative Negative mg/dl    Glucose, UA Negative Negative mg/dl    Ketones, UA Negative Negative mg/dl    Urobilinogen, UA 0 2 0 2, 1 0 E U /dl E U /dl    Bilirubin, UA Negative Negative    Blood, UA Negative Negative   ED Urine Macroscopic    Collection Time: 02/26/19  6:32 AM   Result Value Ref Range    Color, UA Yellow     Clarity, UA Clear     pH, UA 6 5 4 5 - 8 0    Leukocytes, UA Negative Negative    Nitrite, UA Negative Negative    Protein, UA Negative Negative mg/dl    Glucose, UA Negative Negative mg/dl    Ketones, UA Negative Negative mg/dl    Urobilinogen, UA 0 2 0 2, 1 0 E U /dl E U /dl    Bilirubin, UA Negative Negative    Blood, UA Trace (A) Negative    Specific Gravity, UA 1 010 1 003 - 1 030   Procalcitonin    Collection Time: 02/26/19  8:43 AM   Result Value Ref Range    Procalcitonin 0 17 <=0 25 ng/ml   Platelet count    Collection Time: 02/26/19  8:43 AM   Result Value Ref Range    Platelets 978 143 - 547 Thousands/uL    MPV 9 8 8 9 - 12 7 fL   Troponin I    Collection Time: 02/26/19  8:43 AM   Result Value Ref Range    Troponin I 0 04 <=0 04 ng/mL   Troponin I    Collection Time: 02/26/19 12:04 PM   Result Value Ref Range    Troponin I 0 04 <=0 04 ng/mL   Strep Pneumoniae, Urine Collection Time: 02/26/19  2:09 PM   Result Value Ref Range    Strep pneumoniae antigen, urine Negative Negative   Legionella antigen, urine    Collection Time: 02/26/19  2:09 PM   Result Value Ref Range    Legionella Urinary Antigen Negative Negative   Basic metabolic panel    Collection Time: 02/27/19  5:31 AM   Result Value Ref Range    Sodium 138 136 - 145 mmol/L    Potassium 3 7 3 5 - 5 3 mmol/L    Chloride 104 100 - 108 mmol/L    CO2 24 21 - 32 mmol/L    ANION GAP 10 4 - 13 mmol/L    BUN 7 5 - 25 mg/dL    Creatinine 0 97 0 60 - 1 30 mg/dL    Glucose 105 65 - 140 mg/dL    Calcium 8 4 8 3 - 10 1 mg/dL    eGFR 78 ml/min/1 73sq m   CBC and differential    Collection Time: 02/27/19  5:31 AM   Result Value Ref Range    WBC 11 13 (H) 4 31 - 10 16 Thousand/uL    RBC 3 64 (L) 3 88 - 5 62 Million/uL    Hemoglobin 11 2 (L) 12 0 - 17 0 g/dL    Hematocrit 34 7 (L) 36 5 - 49 3 %    MCV 95 82 - 98 fL    MCH 30 8 26 8 - 34 3 pg    MCHC 32 3 31 4 - 37 4 g/dL    RDW 13 2 11 6 - 15 1 %    MPV 9 8 8 9 - 12 7 fL    Platelets 916 514 - 775 Thousands/uL    nRBC 0 /100 WBCs    Neutrophils Relative 73 43 - 75 %    Immat GRANS % 0 0 - 2 %    Lymphocytes Relative 11 (L) 14 - 44 %    Monocytes Relative 13 (H) 4 - 12 %    Eosinophils Relative 3 0 - 6 %    Basophils Relative 0 0 - 1 %    Neutrophils Absolute 8 06 (H) 1 85 - 7 62 Thousands/µL    Immature Grans Absolute 0 04 0 00 - 0 20 Thousand/uL    Lymphocytes Absolute 1 19 0 60 - 4 47 Thousands/µL    Monocytes Absolute 1 47 (H) 0 17 - 1 22 Thousand/µL    Eosinophils Absolute 0 34 0 00 - 0 61 Thousand/µL    Basophils Absolute 0 03 0 00 - 0 10 Thousands/µL   Procalcitonin    Collection Time: 02/27/19  5:31 AM   Result Value Ref Range    Procalcitonin 0 22 <=0 25 ng/ml   Magnesium    Collection Time: 02/28/19  2:42 AM   Result Value Ref Range    Magnesium 1 9 1 6 - 2 6 mg/dL   Basic metabolic panel    Collection Time: 02/28/19  2:42 AM   Result Value Ref Range    Sodium 137 136 - 145 mmol/L Potassium 3 4 (L) 3 5 - 5 3 mmol/L    Chloride 104 100 - 108 mmol/L    CO2 24 21 - 32 mmol/L    ANION GAP 9 4 - 13 mmol/L    BUN 8 5 - 25 mg/dL    Creatinine 0 92 0 60 - 1 30 mg/dL    Glucose 107 65 - 140 mg/dL    Calcium 8 7 8 3 - 10 1 mg/dL    eGFR 83 ml/min/1 73sq m   CBC    Collection Time: 19  2:42 AM   Result Value Ref Range    WBC 10 19 (H) 4 31 - 10 16 Thousand/uL    RBC 3 69 (L) 3 88 - 5 62 Million/uL    Hemoglobin 11 5 (L) 12 0 - 17 0 g/dL    Hematocrit 34 4 (L) 36 5 - 49 3 %    MCV 93 82 - 98 fL    MCH 31 2 26 8 - 34 3 pg    MCHC 33 4 31 4 - 37 4 g/dL    RDW 12 9 11 6 - 15 1 %    Platelets 125 877 - 906 Thousands/uL    MPV 9 5 8 9 - 12 7 fL   Albumin    Collection Time: 19  2:42 AM   Result Value Ref Range    Albumin 2 3 (L) 3 5 - 5 0 g/dL   Troponin I    Collection Time: 19  7:57 AM   Result Value Ref Range    Troponin I <0 02 <=0 04 ng/mL     [place] [address] [address] [place] [address]  [phone]     Transthoracic Echocardiogram  2D, M-mode, Doppler, and Color Doppler     Study date:  2019     Patient: Liya Chan  MR number: [id number]  Account number: [de-identified]  : 1945  Age: 68 years  Gender: Male  Status: Inpatient  Location: Bedside  Height: 74 in  Weight: 223 5 lb  BP: 107/ 50 mmHg     Indications: Congestive heart failure      Diagnoses: I50 9 - Heart failure, unspecified     Sonographer:  Antonia Vilchis RDCS  Primary Physician:  Sharon Galaviz MD  Referring Physician:  Zenaida Dumont PA-C  Group:  [facility]  Interpreting Physician:  DO HUMAIRA Roca     LEFT VENTRICLE:  Systolic function was normal  Ejection fraction was estimated to be 60 %  There were no regional wall motion abnormalities  Wall thickness was at the upper limits of normal   Left ventricular diastolic function parameters were normal      RIGHT VENTRICLE:  The ventricle was moderately dilated    Systolic function was normal      LEFT ATRIUM:  The atrium was moderately dilated      RIGHT ATRIUM:  The atrium was mildly dilated      MITRAL VALVE:  There was mild regurgitation      AORTIC VALVE:  There was moderate regurgitation      TRICUSPID VALVE:  There was mild regurgitation      PULMONIC VALVE:  There was mild to moderate regurgitation      HISTORY: PRIOR HISTORY: Patient has no history of cardiovascular disease      PROCEDURE: The procedure was performed at the bedside  This was a routine study  The transthoracic approach was used  The study included complete 2D imaging, M-mode, complete spectral Doppler, and color Doppler  The heart rate was 90 bpm,  at the start of the study  Image quality was adequate      LEFT VENTRICLE: Size was normal  Systolic function was normal  Ejection fraction was estimated to be 60 %  There were no regional wall motion abnormalities  Wall thickness was at the upper limits of normal  DOPPLER: Left ventricular  diastolic function parameters were normal      RIGHT VENTRICLE: The ventricle was moderately dilated  Systolic function was normal      LEFT ATRIUM: The atrium was moderately dilated      RIGHT ATRIUM: The atrium was mildly dilated      MITRAL VALVE: Valve structure was normal  There was normal leaflet separation  DOPPLER: The transmitral velocity was within the normal range  There was no evidence for stenosis  There was mild regurgitation      AORTIC VALVE: The valve was trileaflet  Leaflets exhibited normal thickness and normal cuspal separation  DOPPLER: Transaortic velocity was within the normal range  There was no evidence for stenosis  There was moderate regurgitation      TRICUSPID VALVE: The valve structure was normal  There was normal leaflet separation  DOPPLER: The transtricuspid velocity was within the normal range  There was no evidence for stenosis  There was mild regurgitation  The tricuspid jet  envelope definition was inadequate for estimation of RV systolic pressure   There are no indirect findings (abnormal RV volume or geometry, altered pulmonary flow velocity profile, or leftward septal displacement) which would suggest  moderate or severe pulmonary hypertension      PULMONIC VALVE: Leaflets exhibited normal thickness, no calcification, and normal cuspal separation  DOPPLER: The transpulmonic velocity was within the normal range  There was mild to moderate regurgitation      PERICARDIUM: There was no pericardial effusion  The pericardium was normal in appearance      AORTA: The root exhibited normal size      SYSTEMIC VEINS: IVC: Respirophasic changes were normal     Imaging: I have personally reviewed pertinent reports  EKG: afib rvr  VTE Pharmacologic Prophylaxis: Enoxaparin (Lovenox)  VTE Mechanical Prophylaxis: sequential compression device    Counseling / Coordination of Care  Total time spent today 40 minutes  Greater than 50% of total time was spent with the patient and / or family counseling and / or coordination of care

## 2019-02-28 NOTE — PLAN OF CARE
Problem: PAIN - ADULT  Goal: Verbalizes/displays adequate comfort level or baseline comfort level  Description  Interventions:  - Encourage patient to monitor pain and request assistance  - Assess pain using appropriate pain scale  - Administer analgesics based on type and severity of pain and evaluate response  - Implement non-pharmacological measures as appropriate and evaluate response  - Consider cultural and social influences on pain and pain management  - Notify physician/advanced practitioner if interventions unsuccessful or patient reports new pain  Outcome: Progressing     Problem: INFECTION - ADULT  Goal: Absence or prevention of progression during hospitalization  Description  INTERVENTIONS:  - Assess and monitor for signs and symptoms of infection  - Monitor lab/diagnostic results  - Monitor all insertion sites, i e  indwelling lines, tubes, and drains  - Monitor endotracheal (as able) and nasal secretions for changes in amount and color  - [place] appropriate cooling/warming therapies per order  - Administer medications as ordered  - Instruct and encourage patient and family to use good hand hygiene technique  - Identify and instruct in appropriate isolation precautions for identified infection/condition  Outcome: Progressing     Problem: SAFETY ADULT  Goal: Patient will remain free of falls  Description  INTERVENTIONS:  - Assess patient frequently for physical needs  -  Identify cognitive and physical deficits and behaviors that affect risk of falls    -  [place] fall precautions as indicated by assessment   - Educate patient/family on patient safety including physical limitations  - Instruct patient to call for assistance with activity based on assessment  - Modify environment to reduce risk of injury  - Consider OT/PT consult to assist with strengthening/mobility  Outcome: Progressing     Problem: DISCHARGE PLANNING  Goal: Discharge to home or other facility with appropriate resources  Description  INTERVENTIONS:  - Identify barriers to discharge w/patient and caregiver  - Arrange for needed discharge resources and transportation as appropriate  - Identify discharge learning needs (meds, wound care, etc )  - Arrange for interpretive services to assist at discharge as needed  - Refer to Case Management Department for coordinating discharge planning if the patient needs post-hospital services based on physician/advanced practitioner order or complex needs related to functional status, cognitive ability, or social support system  Outcome: Progressing     Problem: Knowledge Deficit  Goal: Patient/family/caregiver demonstrates understanding of disease process, treatment plan, medications, and discharge instructions  Description  Complete learning assessment and assess knowledge base  Interventions:  - Provide teaching at level of understanding  - Provide teaching via preferred learning methods  Outcome: Progressing     Problem: RESPIRATORY - ADULT  Goal: Achieves optimal ventilation and oxygenation  Description  INTERVENTIONS:  - Assess for changes in respiratory status  - Assess for changes in mentation and behavior  - Position to facilitate oxygenation and minimize respiratory effort  - Oxygen administration by appropriate delivery method based on oxygen saturation (per order) or ABGs  - Initiate smoking cessation education as indicated  - Encourage broncho-pulmonary hygiene including cough, deep breathe, Incentive Spirometry  - Assess the need for suctioning and aspirate as needed  - Assess and instruct to report SOB or any respiratory difficulty  - Respiratory Therapy support as indicated  Outcome: Progressing     Problem: Potential for Falls  Goal: Patient will remain free of falls  Description  INTERVENTIONS:  - Assess patient frequently for physical needs  -  Identify cognitive and physical deficits and behaviors that affect risk of falls    -  [place] fall precautions as indicated by assessment   - Educate patient/family on patient safety including physical limitations  - Instruct patient to call for assistance with activity based on assessment  - Modify environment to reduce risk of injury  - Consider OT/PT consult to assist with strengthening/mobility  Outcome: Progressing     Problem: CARDIOVASCULAR - ADULT  Goal: Maintains optimal cardiac output and hemodynamic stability  Description  INTERVENTIONS:  - Monitor I/O, vital signs and rhythm  - Monitor for S/S and trends of decreased cardiac output i e  bleeding, hypotension  - Administer and titrate ordered vasoactive medications to optimize hemodynamic stability  - Assess quality of pulses, skin color and temperature  - Assess for signs of decreased coronary artery perfusion - ex   Angina  - Instruct patient to report change in severity of symptoms  Outcome: Progressing  Goal: Absence of cardiac dysrhythmias or at baseline rhythm  Description  INTERVENTIONS:  - Continuous cardiac monitoring, monitor vital signs, obtain 12 lead EKG if indicated  - Administer antiarrhythmic and heart rate control medications as ordered  - Monitor electrolytes and administer replacement therapy as ordered  Outcome: Progressing

## 2019-02-28 NOTE — ASSESSMENT & PLAN NOTE
Left lower lobe pneumonia  Continue antibiotics  Follow up on blood cultures  Flu PCR negative, urine Legionella strep antigen negative  Infectious Disease following

## 2019-02-28 NOTE — PROGRESS NOTES
Progress Note - Gregg Lafleur 1945, 68 y o  male MRN: [id number]    Unit/Bed#: -01 Encounter: [id number]    Primary Care Provider: Fabiola Rick DO   Date and time admitted to hospital: 2/26/2019  3:41 AM        * Sepsis [facility])  Assessment & Plan  Present on admission improving  Monitor    Atrial fibrillation ([address] )  Assessment & Plan  AFib with RVR, overnight events noted  Discussed with Cardiology  Received IV amiodarone  Now on IV Cardizem drip  2D echo noted  Anticoagulation per Cardiology  Monitor closely    Outpatient sleep study strongly recommended discussed with the patient he verbalized understanding and agrees    Pneumonia  Assessment & Plan  Left lower lobe pneumonia  Continue antibiotics  Follow up on blood cultures  Flu PCR negative, urine Legionella strep antigen negative  Infectious Disease following      Chest pain  Assessment & Plan  Likely due to AFib RVR  Discussed with Cardiology  Toradol x1, monitor for response  Monitor closely      Pleural effusion, left  Assessment & Plan  Left pleural effusion  Less likely empyema  If persistent or worsens may need thoracocentesis  Monitor      VTE Pharmacologic Prophylaxis:   Pharmacologic: Enoxaparin (Lovenox)  Mechanical VTE Prophylaxis in Place: Yes    Patient Centered Rounds: I have performed bedside rounds with nursing staff today  Discussions with Specialists or Other Care Team Provider:  Cardiology    Education and Discussions with Family / Patient:  Discussed with the patient, spouse at bedside updated    Time Spent for Care: 30 minutes  More than 50% of total time spent on counseling and coordination of care as described above      Current Length of Stay: 2 day(s)    Current Patient Status: Inpatient   Certification Statement: The patient will continue to require additional inpatient hospital stay due to As mentioned    Discharge Plan:  Awaiting clinical and symptomatic improvement    Code Status: Level 1 - Full Code      Subjective:     Comfortably in bed  Reports chest pain and discomfort  Dyspnea at rest but able to complete sentences  Denies diaphoresis or presyncopal syncopal symptoms    Objective:     Vitals:   Temp (24hrs), Av 7 °F (37 1 °C), Min:98 °F (36 7 °C), Max:99 6 °F (37 6 °C)    Temp:  [98 °F (36 7 °C)-99 6 °F (37 6 °C)] 98 °F (36 7 °C)  HR:  [] 75  Resp:  [18-20] 20  BP: (100-167)/(56-86) 146/67  SpO2:  [90 %-98 %] 98 %  Body mass index is 27 46 kg/m²  Input and Output Summary (last 24 hours): Intake/Output Summary (Last 24 hours) at 2019 1421  Last data filed at 2019 1249  Gross per 24 hour   Intake 860 ml   Output 2525 ml   Net -1665 ml       Physical Exam:     Physical Exam    Comfortably in bed dyspneic at rest but able to complete sentences  Neck supple  Lungs diminished breath sounds bilaterally occasional crackles  Heart sounds S1-S2 noted irregular, tachycardia noted  Abdomen soft nontender  Awake obeys simple commands  No rash  Pulses noted  No pedal edema    Additional Data:     Labs:    Results from last 7 days   Lab Units 19  0242 19  0531   WBC Thousand/uL 10 19* 11 13*   HEMOGLOBIN g/dL 11 5* 11 2*   HEMATOCRIT % 34 4* 34 7*   PLATELETS Thousands/uL 351 319   NEUTROS PCT %  --  73   LYMPHS PCT %  --  11*   MONOS PCT %  --  13*   EOS PCT %  --  3     Results from last 7 days   Lab Units 19  0242  19  0350   SODIUM mmol/L 137   < > 135*   POTASSIUM mmol/L 3 4*   < > 3 7   CHLORIDE mmol/L 104   < > 99*   CO2 mmol/L 24   < > 25   BUN mg/dL 8   < > 9   CREATININE mg/dL 0 92   < > 1 09   ANION GAP mmol/L 9   < > 11   CALCIUM mg/dL 8 7   < > 8 7   ALBUMIN g/dL 2 3*  --  2 5*   TOTAL BILIRUBIN mg/dL  --   --  0 70   ALK PHOS U/L  --   --  108   ALT U/L  --   --  58   AST U/L  --   --  53*   GLUCOSE RANDOM mg/dL 107   < > 120    < > = values in this interval not displayed       Results from last 7 days   Lab Units 19  0350   INR  1 11 Results from last 7 days   Lab Units 02/27/19  0531 02/26/19  0843 02/26/19  0350   LACTIC ACID mmol/L  --   --  1 3   PROCALCITONIN ng/ml 0 22 0 17 0 16           * I Have Reviewed All Lab Data Listed Above  * Additional Pertinent Lab Tests Reviewed: All Labs Within Last 24 Hours Reviewed    Imaging:    Imaging Reports Reviewed Today Include:  2D echo noted  Imaging Personally Reviewed by Myself Includes:     Recent Cultures (last 7 days):     Results from last 7 days   Lab Units 02/26/19  1409 02/26/19  0400 02/26/19  0353 02/26/19  0350   BLOOD CULTURE   --  No Growth at 48 hrs  --  No Growth at 48 hrs  INFLUENZA B PCR   --   --  Not Detected  --    RSV PCR   --   --  Not Detected  --    LEGIONELLA URINARY ANTIGEN  Negative  --   --   --        Last 24 Hours Medication List:     Current Facility-Administered Medications:  acetaminophen 650 mg Oral Q6H PRN Yissel Barroso MD    benzonatate 100 mg Oral TID PRN Yissel Barroso MD    cefTRIAXone 1,000 mg Intravenous Q24H Chas Spence MD Last Rate: 1,000 mg (02/28/19 0659)   diltiazem 1-15 mg/hr Intravenous Titrated ANNIE Dia Last Rate: Stopped (02/28/19 1140)   enoxaparin 40 mg Subcutaneous Daily Anamaria Maciel PA-C    guaiFENesin 1,200 mg Oral Q12H [address] Yissel Barroso MD    metoprolol tartrate 25 mg Oral Q6H [address] Gianna Aranda DO    morphine injection 1 mg Intravenous Q4H PRN Yissel Barroso MD    ondansetron 4 mg Intravenous Q8H PRN Tiffanie Stephens, PA-C    sodium chloride (PF) 3 mL Intravenous PRN Tiffanie Six, PA-C    sodium chloride 100 mL/hr Intravenous Continuous ANNIE Dia Last Rate: 100 mL/hr (02/28/19 1244)        Today, Patient Was Seen By: Yissel Barroso MD    ** Please Note: Dictation voice to text software may have been used in the creation of this document   **

## 2019-02-28 NOTE — PROGRESS NOTES
Pt did not convert with IV amiodarone bolus  Also given 2 5mg IV lopressor  No further hypotension  IVF at 100cc/hr  CP improves with improved HR  Will start IV cardizem gtt for HR control  Monitor BP closely

## 2019-03-01 ENCOUNTER — APPOINTMENT (INPATIENT)
Dept: RADIOLOGY | Facility: HOSPITAL | Age: 74
DRG: 871 | End: 2019-03-01
Attending: INTERNAL MEDICINE
Payer: COMMERCIAL

## 2019-03-01 LAB
ALBUMIN FLD-MCNC: 1.8 G/DL
APPEARANCE FLD: NORMAL
COLOR FLD: NORMAL
EOSINOPHIL NFR FLD MANUAL: 3 %
GLUCOSE FLD-MCNC: 40 MG/DL
LDH FLD L TO P-CCNC: 504 U/L
MONONUC CELLS NFR FLD MANUAL: 9 %
NEUTS SEG NFR BLD AUTO: 88 %
PH BODY FLUID: 7.1
PROT FLD-MCNC: 3.8 G/DL
RBC # FLD MANUAL: 4000 /UL
SITE: NORMAL
TOTAL CELLS COUNTED SPEC: 100
WBC # FLD MANUAL: 1458 /UL

## 2019-03-01 PROCEDURE — 82042 OTHER SOURCE ALBUMIN QUAN EA: CPT | Performed by: INTERNAL MEDICINE

## 2019-03-01 PROCEDURE — 99232 SBSQ HOSP IP/OBS MODERATE 35: CPT | Performed by: INTERNAL MEDICINE

## 2019-03-01 PROCEDURE — 88112 CYTOPATH CELL ENHANCE TECH: CPT | Performed by: PATHOLOGY

## 2019-03-01 PROCEDURE — 0W9B3ZZ DRAINAGE OF LEFT PLEURAL CAVITY, PERCUTANEOUS APPROACH: ICD-10-PCS | Performed by: RADIOLOGY

## 2019-03-01 PROCEDURE — 87070 CULTURE OTHR SPECIMN AEROBIC: CPT | Performed by: INTERNAL MEDICINE

## 2019-03-01 PROCEDURE — 99222 1ST HOSP IP/OBS MODERATE 55: CPT | Performed by: INTERNAL MEDICINE

## 2019-03-01 PROCEDURE — 88305 TISSUE EXAM BY PATHOLOGIST: CPT | Performed by: PATHOLOGY

## 2019-03-01 PROCEDURE — 87102 FUNGUS ISOLATION CULTURE: CPT | Performed by: NURSE PRACTITIONER

## 2019-03-01 PROCEDURE — 32555 ASPIRATE PLEURA W/ IMAGING: CPT | Performed by: RADIOLOGY

## 2019-03-01 PROCEDURE — 94760 N-INVAS EAR/PLS OXIMETRY 1: CPT

## 2019-03-01 PROCEDURE — 84157 ASSAY OF PROTEIN OTHER: CPT | Performed by: NURSE PRACTITIONER

## 2019-03-01 PROCEDURE — 83986 ASSAY PH BODY FLUID NOS: CPT | Performed by: INTERNAL MEDICINE

## 2019-03-01 PROCEDURE — 83615 LACTATE (LD) (LDH) ENZYME: CPT | Performed by: NURSE PRACTITIONER

## 2019-03-01 PROCEDURE — 82945 GLUCOSE OTHER FLUID: CPT | Performed by: NURSE PRACTITIONER

## 2019-03-01 PROCEDURE — 87206 SMEAR FLUORESCENT/ACID STAI: CPT | Performed by: NURSE PRACTITIONER

## 2019-03-01 PROCEDURE — 89050 BODY FLUID CELL COUNT: CPT | Performed by: NURSE PRACTITIONER

## 2019-03-01 PROCEDURE — 87116 MYCOBACTERIA CULTURE: CPT | Performed by: NURSE PRACTITIONER

## 2019-03-01 PROCEDURE — 32555 ASPIRATE PLEURA W/ IMAGING: CPT

## 2019-03-01 PROCEDURE — 94640 AIRWAY INHALATION TREATMENT: CPT

## 2019-03-01 PROCEDURE — 87205 SMEAR GRAM STAIN: CPT | Performed by: INTERNAL MEDICINE

## 2019-03-01 PROCEDURE — 93005 ELECTROCARDIOGRAM TRACING: CPT

## 2019-03-01 PROCEDURE — 89051 BODY FLUID CELL COUNT: CPT | Performed by: INTERNAL MEDICINE

## 2019-03-01 RX ORDER — LEVALBUTEROL INHALATION SOLUTION 0.63 MG/3ML
0.63 SOLUTION RESPIRATORY (INHALATION)
Status: DISCONTINUED | OUTPATIENT
Start: 2019-03-01 | End: 2019-03-03 | Stop reason: HOSPADM

## 2019-03-01 RX ORDER — LEVALBUTEROL INHALATION SOLUTION 0.63 MG/3ML
0.63 SOLUTION RESPIRATORY (INHALATION)
Status: DISCONTINUED | OUTPATIENT
Start: 2019-03-01 | End: 2019-03-01

## 2019-03-01 RX ADMIN — GUAIFENESIN 1200 MG: 600 TABLET, EXTENDED RELEASE ORAL at 23:59

## 2019-03-01 RX ADMIN — METOPROLOL TARTRATE 25 MG: 25 TABLET, FILM COATED ORAL at 17:29

## 2019-03-01 RX ADMIN — GUAIFENESIN 1200 MG: 600 TABLET, EXTENDED RELEASE ORAL at 08:46

## 2019-03-01 RX ADMIN — IBUPROFEN 400 MG: 400 TABLET ORAL at 05:30

## 2019-03-01 RX ADMIN — METOPROLOL TARTRATE 25 MG: 25 TABLET, FILM COATED ORAL at 05:30

## 2019-03-01 RX ADMIN — METOPROLOL TARTRATE 25 MG: 25 TABLET, FILM COATED ORAL at 13:47

## 2019-03-01 RX ADMIN — LEVALBUTEROL HYDROCHLORIDE 0.63 MG: 0.63 SOLUTION RESPIRATORY (INHALATION) at 21:18

## 2019-03-01 RX ADMIN — IBUPROFEN 400 MG: 400 TABLET ORAL at 13:47

## 2019-03-01 RX ADMIN — IPRATROPIUM BROMIDE 0.5 MG: 0.5 SOLUTION RESPIRATORY (INHALATION) at 21:16

## 2019-03-01 RX ADMIN — ENOXAPARIN SODIUM 40 MG: 40 INJECTION SUBCUTANEOUS at 08:46

## 2019-03-01 RX ADMIN — CEFTRIAXONE 2000 MG: 2 INJECTION, POWDER, FOR SOLUTION INTRAMUSCULAR; INTRAVENOUS at 06:31

## 2019-03-01 NOTE — PROGRESS NOTES
Progress Note - Infectious Disease   Yanely Lozano 68 y o  male MRN: [id number]  Unit/Bed#: -01 Encounter: [id number]      Impression/Plan:  1   Sepsis, POA   Tachycardia and leukocytosis   Due to #2   Blood cultures are negative after 48 hours  Francisco Stamp was negative   No abdominal symptoms and LFTs normal   Fortunately he remains clinically stable and nontoxic  Rec:  ? Continue antibiotics as below  ? Monitor CBC and BMP  ? Follow up blood cultures  ? Follow temperatures closely  ? Supportive care     2   Left lower lobe pneumonia   Consider postviral bacterial superinfection given biphasic illness   Consider pneumococcus versus other strep/Staph   Legionella/Pneumococcal antigens and Flu PCR negative   Serial procalcitonin negative but clinical history suspicious for bacterial infection  He did experience some left-sided chest pain and mild respiratory distress yesterday evening  Repeat chest x-ray from this morning shows a large left pleural effusion and associated left lung consolidation  He will be going for thoracentesis this afternoon  Rec:  ? Continue IV ceftriaxone  ? Monitor CBC and BMP  ? Follow up thoracentesis results  ? Follow respiratory status closely  ? Monitor vitals     3   Left pleural effusion   Suspect parapneumonic in etiology   Patient will have thoracentesis later this afternoon  Rec:  ? Continue antibiotics as above  ? Serial lung exam  ? Follow up thoracentesis results     4   AFib with RVR   Likely due to pneumonia  Patient with chest pain earlier this morning  Was found once again in rapid AFib  He did not respond to IV amiodarone bolus and was placed on a Cardizem drip  Now back in normal sinus rhythm  Cardizem drip discontinued  He continues following closely with Cardiology  Rec:  ? Management per Cardiology    Antibiotics:  Ceftriaxone 5  Antibiotics 5    Subjective:  Patient reports he had a bad night  States he developed terrible pain on his side yesterday evening  Reports it was several hours before his pain was under control  He tells me that he will be going for a test later to drain fluid from the side of his lung so that it can be sent for a culture  He tells me that last night he had a fever but feels it has stopped now  He denies chills, sweats, shakes; no nausea, vomiting, or diarrhea;    Objective:  Vitals:  Temp:  [97 5 °F (36 4 °C)-103 1 °F (39 5 °C)] 99 1 °F (37 3 °C)  HR:  [60-93] 68  Resp:  [18] 18  BP: (101-148)/(52-67) 101/53  SpO2:  [91 %-96 %] 91 %  Temp (24hrs), Av 4 °F (37 4 °C), Min:97 5 °F (36 4 °C), Max:103 1 °F (39 5 °C)  Current: Temperature: 99 1 °F (37 3 °C)    Physical Exam:   General Appearance:  Alert, anxious, interactive, nontoxic   Throat: Oropharynx moist without lesions  Lungs:   Decreased to auscultation bilaterally with mild wheezing; respirations tachypneic   Heart:  RRR; no murmur, rub or gallop   Abdomen:   Soft, non-tender, non-distended, positive bowel sounds  Extremities: No clubbing or cyanosis, no edema   Skin: No new rashes or lesions  No draining wounds noted  Labs, Imaging, & Other studies:   All pertinent labs and imaging studies were personally reviewed  Results from last 7 days   Lab Units 19  0242 19  0531 19  0843 19  0353   WBC Thousand/uL 10 19* 11 13*  --  16 13*   HEMOGLOBIN g/dL 11 5* 11 2*  --  13 0   PLATELETS Thousands/uL 351 319 328 364     Results from last 7 days   Lab Units 19  0242  19  0350   POTASSIUM mmol/L 3 4*   < > 3 7   CHLORIDE mmol/L 104   < > 99*   CO2 mmol/L 24   < > 25   BUN mg/dL 8   < > 9   CREATININE mg/dL 0 92   < > 1 09   EGFR ml/min/1 73sq m 83   < > 67   CALCIUM mg/dL 8 7   < > 8 7   AST U/L  --   --  53*   ALT U/L  --   --  58   ALK PHOS U/L  --   --  108    < > = values in this interval not displayed       Results from last 7 days   Lab Units 19  1409 19  0400 19  0353 19  0350   BLOOD CULTURE   --  No Growth at 72 hrs   --  No Growth at 72 hrs     INFLUENZA B PCR   --   --  Not Detected  --    RSV PCR   --   --  Not Detected  --    LEGIONELLA URINARY ANTIGEN  Negative  --   --   --

## 2019-03-01 NOTE — ASSESSMENT & PLAN NOTE
Left pleural effusion likely parapneumonic  Status post thoracentesis today  Gross appearance unlikely to be pus  Pleural fluid has a pH of 7 1  Await Gram stain and cultures to assess empyema versus parapneumonic effusion

## 2019-03-01 NOTE — ASSESSMENT & PLAN NOTE
· Likely type 2 in the setting of sepsis and tachycardia  · Patient also had an hour long episode of AFib with RVR in the ED  · Converted to sinus prior to leaving the ED  · Also on the setting of elevated BNP  · Initial troponin 0 05  · Obtain echo  · Appreciate Cardiology input

## 2019-03-01 NOTE — SEDATION DOCUMENTATION
Left thoracentesis completed in IR by Dr Dwyer Bank for 1175ml alondra fluid without complication  Tolerated well  Labs sent as ordered

## 2019-03-01 NOTE — PROGRESS NOTES
General Cardiology   Progress Note -  [facility]   Yamileth Lopez 68 y o  male MRN: [id number]    Unit/Bed#: -01 Encounter: [id number]    Assessment/ Plan    1  Paroxysmal atrial fibrillation/flutter with RVR - in the setting of #2   Converted to NSR yesterday  On metoprolol 25 Q 6 hours, can change to BID prior to discharge  No OAC due to low [id number] VASc score     2  NSVT- reviewed on telmetry   Continue BB  Recommend keep K > 4 0 and Mg > 2 0     3  Sepsis POA in the setting of community acquired pneumonia-   Influenza and RSV PCR negative  Legionella/pneumococcal antigen negative    Blood cultures showed no growth for 72 hours  ID following  Patient is scheduled for thoracentesis today in IR  T-max 103 1°  On IV antibiotics:  ceftriaxone and azithromycin     4  Abnormal Echocardiogram- EF normal  Moderate RV enlargement, normal function, Mod LAE, mild LAE, Mild MR, mod AI, mild TR  Will need outpatient follow up     Subjective  Patient reports he feels much better today  He denies palpitations, chest pain or shortness of breath  No subjective fevers or chills    Review of Systems   Constitution: Negative for chills and fever  Cardiovascular: Negative for chest pain, dyspnea on exertion, leg swelling, orthopnea and palpitations  Respiratory: Positive for cough and sputum production  Musculoskeletal: Negative for falls  Gastrointestinal: Negative for nausea and vomiting  Neurological: Negative for dizziness  Psychiatric/Behavioral: Negative for altered mental status  Objective:   Vitals: Blood pressure 101/53, pulse 68, temperature 99 1 °F (37 3 °C), temperature source Oral, resp  rate 18, height 6' 2 5" (1 892 m), weight 99 5 kg (219 lb 6 4 oz), SpO2 91 % ,       Body mass index is 27 79 kg/m²  ,     Systolic (95AZI), YNU:513 , Min:101 , SHEILA:581     Diastolic (79JHE), BAY:32, Min:52, Max:67      Intake/Output Summary (Last 24 hours) at 3/1/2019 1007  Last data filed at 3/1/2019 0401  Gross per 24 hour   Intake 480 ml   Output 1400 ml   Net -920 ml     Weight (last 2 days)     Date/Time   Weight    03/01/19 0600   99 5 (219 4)    02/28/19 0600   98 3 (216 8)    02/27/19 0532   99 4 (219 14)            Telemetry Review:  Normal sinus rhythm heart rate 60s 70s and 9 beats of NSVT  Physical Exam   Constitutional: He is oriented to person, place, and time  No distress  Neck: Neck supple  Cardiovascular: Normal rate, regular rhythm, normal heart sounds and intact distal pulses  Pulmonary/Chest: Effort normal  No respiratory distress  Decreased in bases  No crackles   Abdominal: Soft  Bowel sounds are normal    Musculoskeletal: He exhibits no edema  Neurological: He is alert and oriented to person, place, and time  Skin: Skin is warm  He is diaphoretic  Psychiatric: He has a normal mood and affect   His behavior is normal        LABORATORY RESULTS  Results from last 7 days   Lab Units 02/28/19  0757 02/26/19  1204 02/26/19  0843   TROPONIN I ng/mL <0 02 0 04 0 04     CBC with diff:   Results from last 7 days   Lab Units 02/28/19  0242 02/27/19  0531 02/26/19  0843 02/26/19  0353   WBC Thousand/uL 10 19* 11 13*  --  16 13*   HEMOGLOBIN g/dL 11 5* 11 2*  --  13 0   HEMATOCRIT % 34 4* 34 7*  --  40 0   MCV fL 93 95  --  96   PLATELETS Thousands/uL 351 319 328 364   MCH pg 31 2 30 8  --  31 3   MCHC g/dL 33 4 32 3  --  32 5   RDW % 12 9 13 2  --  13 0   MPV fL 9 5 9 8 9 8 9 8   NRBC AUTO /100 WBCs  --  0  --  0       CMP:  Results from last 7 days   Lab Units 02/28/19  0242 02/27/19  0531 02/26/19  0350   POTASSIUM mmol/L 3 4* 3 7 3 7   CHLORIDE mmol/L 104 104 99*   CO2 mmol/L 24 24 25   BUN mg/dL 8 7 9   CREATININE mg/dL 0 92 0 97 1 09   CALCIUM mg/dL 8 7 8 4 8 7   AST U/L  --   --  53*   ALT U/L  --   --  58   ALK PHOS U/L  --   --  108   EGFR ml/min/1 73sq m 83 78 67       BMP:  Results from last 7 days   Lab Units 02/28/19  0242 02/27/19  0531 02/26/19  0350   POTASSIUM mmol/L 3  4* 3 7 3 7   CHLORIDE mmol/L 104 104 99*   CO2 mmol/L 24 24 25   BUN mg/dL 8 7 9   CREATININE mg/dL 0 92 0 97 1 09   CALCIUM mg/dL 8 7 8 4 8 7       Lab Results   Component Value Date    NTBNP 3,566 (H) 2019        Results from last 7 days   Lab Units 19  0242 19  0350   MAGNESIUM mg/dL 1 9 1 8       Results from last 7 days   Lab Units 19  0350   INR  1 11       Lipid Profile:   No results found for: CHOL  Lab Results   Component Value Date    HDL 43 2018     Lab Results   Component Value Date    LDLCALC 114 (H) 2018     Lab Results   Component Value Date    TRIG 85 2018       Cardiac testing:   Results for orders placed during the hospital encounter of 19   Echo complete with contrast if indicated    Narrative [place] [address], [address] [facility]  [phone]    Transthoracic Echocardiogram  2D, M-mode, Doppler, and Color Doppler    Study date:  2019    Patient: Destiney Gordillo  MR number: [id number]  Account number: [de-identified]  : 1945  Age: 68 years  Gender: Male  Status: Inpatient  Location: Bedside  Height: 74 in  Weight: 223 5 lb  BP: 107/ 50 mmHg    Indications: Congestive heart failure  Diagnoses: I50 9 - Heart failure, unspecified    Sonographer:  Ilya Simeon RDCS  Primary Physician:  Mehran Carreon MD  Referring Physician:  Armand Diggs PA-C  Group:  [facility]  Interpreting Physician:  DO HUMAIRA Ospina    LEFT VENTRICLE:  Systolic function was normal  Ejection fraction was estimated to be 60 %  There were no regional wall motion abnormalities  Wall thickness was at the upper limits of normal   Left ventricular diastolic function parameters were normal     RIGHT VENTRICLE:  The ventricle was moderately dilated  Systolic function was normal     LEFT ATRIUM:  The atrium was moderately dilated  RIGHT ATRIUM:  The atrium was mildly dilated      MITRAL VALVE:  There was mild regurgitation  AORTIC VALVE:  There was moderate regurgitation  TRICUSPID VALVE:  There was mild regurgitation  PULMONIC VALVE:  There was mild to moderate regurgitation  HISTORY: PRIOR HISTORY: Patient has no history of cardiovascular disease  PROCEDURE: The procedure was performed at the bedside  This was a routine study  The transthoracic approach was used  The study included complete 2D imaging, M-mode, complete spectral Doppler, and color Doppler  The heart rate was 90 bpm,  at the start of the study  Image quality was adequate  LEFT VENTRICLE: Size was normal  Systolic function was normal  Ejection fraction was estimated to be 60 %  There were no regional wall motion abnormalities  Wall thickness was at the upper limits of normal  DOPPLER: Left ventricular  diastolic function parameters were normal     RIGHT VENTRICLE: The ventricle was moderately dilated  Systolic function was normal     LEFT ATRIUM: The atrium was moderately dilated  RIGHT ATRIUM: The atrium was mildly dilated  MITRAL VALVE: Valve structure was normal  There was normal leaflet separation  DOPPLER: The transmitral velocity was within the normal range  There was no evidence for stenosis  There was mild regurgitation  AORTIC VALVE: The valve was trileaflet  Leaflets exhibited normal thickness and normal cuspal separation  DOPPLER: Transaortic velocity was within the normal range  There was no evidence for stenosis  There was moderate regurgitation  TRICUSPID VALVE: The valve structure was normal  There was normal leaflet separation  DOPPLER: The transtricuspid velocity was within the normal range  There was no evidence for stenosis  There was mild regurgitation  The tricuspid jet  envelope definition was inadequate for estimation of RV systolic pressure   There are no indirect findings (abnormal RV volume or geometry, altered pulmonary flow velocity profile, or leftward septal displacement) which would suggest  moderate or severe pulmonary hypertension  PULMONIC VALVE: Leaflets exhibited normal thickness, no calcification, and normal cuspal separation  DOPPLER: The transpulmonic velocity was within the normal range  There was mild to moderate regurgitation  PERICARDIUM: There was no pericardial effusion  The pericardium was normal in appearance  AORTA: The root exhibited normal size      SYSTEMIC VEINS: IVC: Respirophasic changes were normal     SYSTEM MEASUREMENT TABLES    2D  %FS: 44 85 %  Ao Diam: 3 91 cm  EDV(Teich): 172 72 ml  EF(Teich): 75 38 %  ESV(Teich): 42 53 ml  IVSd: 1 04 cm  LA Area: 36 18 cm2  LA Diam: 4 94 cm  LVEDV MOD A4C: 192 68 ml  LVEF MOD A4C: 61 13 %  LVESV MOD A4C: 74 9 ml  LVIDd: 5 89 cm  LVIDs: 3 25 cm  LVLd A4C: 9 84 cm  LVLs A4C: 7 63 cm  LVPWd: 1 03 cm  RA Area: 22 72 cm2  RVIDd: 5 86 cm  SV MOD A4C: 117 78 ml  SV(Teich): 130 2 ml    CW  TR MaxP 85 mmHg  TR Vmax: 3 16 m/s    MM  TAPSE: 2 86 cm    PW  MV A Juan: 0 79 m/s  MV Dec [place]: 4 82 m/s2  MV DecT: 216 07 ms  MV E Juan: 1 04 m/s  MV E/A Ratio: 1 32  MV PHT: 62 66 ms  MVA By PHT: 3 51 cm2    [facility]    Prepared and electronically signed by    Oswald Hdz DO  Signed [id number] 06:58:25       Meds/Allergies   all current active meds have been reviewed and current meds:   Current Facility-Administered Medications   Medication Dose Route Frequency    acetaminophen (TYLENOL) tablet 650 mg  650 mg Oral Q6H PRN    benzonatate (TESSALON PERLES) capsule 100 mg  100 mg Oral TID PRN    cefTRIAXone (ROCEPHIN) 2,000 mg in dextrose 5 % 50 mL IVPB  2,000 mg Intravenous Q24H    enoxaparin (LOVENOX) subcutaneous injection 40 mg  40 mg Subcutaneous Daily    guaiFENesin (MUCINEX) 12 hr tablet 1,200 mg  1,200 mg Oral Q12H ROSEANN    ibuprofen (MOTRIN) tablet 400 mg  400 mg Oral Q8H [address]    metoprolol tartrate (LOPRESSOR) tablet 25 mg  25 mg Oral Q6H [address]    morphine injection 1 mg 1 mg Intravenous Q4H PRN    ondansetron (ZOFRAN) injection 4 mg  4 mg Intravenous Q8H PRN    sodium chloride (PF) 0 9 % injection 3 mL  3 mL Intravenous PRN     Medications Prior to Admission   Medication    Calcium-Magnesium-Vitamin D (CALCIUM MAGNESIUM PO)    chlorhexidine (PERIDEX) 0 12 % solution    Cholecalciferol (VITAMIN D PO)    Cyanocobalamin (VITAMIN B12 PO)    doxycycline (PERIOSTAT) 20 MG tablet    ibuprofen (MOTRIN) 200 mg tablet     Counseling / Coordination of Care  Total floor / unit time spent today 20 minutes  Greater than 50% of total time was spent with the patient and / or family counseling and / or coordination of care  ** Please Note: Dragon 360 Dictation voice to text software may have been used in the creation of this document   **

## 2019-03-01 NOTE — ASSESSMENT & PLAN NOTE
Left-sided chest pain likely pleuritic from pneumonia as well as underlying pleural effusion  Improving  No signs symptoms for acute coronary syndrome  Cardiology input appreciated

## 2019-03-01 NOTE — PROGRESS NOTES
Progress Note - River Jaimes 1945, 68 y o  male MRN: [id number]    Unit/Bed#: -01 Encounter: [id number]    Primary Care Provider: Twyla Nichols DO   Date and time admitted to hospital: 2/26/2019  3:41 AM        Chest pain  Assessment & Plan  Left-sided chest pain likely pleuritic from pneumonia as well as underlying pleural effusion  Improving  No signs symptoms for acute coronary syndrome  Cardiology input appreciated  Pleural effusion, left  Assessment & Plan  Left pleural effusion likely parapneumonic  Status post thoracentesis today  Gross appearance unlikely to be pus  Pleural fluid has a pH of 7 1  Await Gram stain and cultures to assess empyema versus parapneumonic effusion  Atrial fibrillation ([facility] [address] )  Assessment & Plan  AFib with RVR, back to normal sinus rhythm now  Continue with beta-blocker on discharge  Outpatient sleep study strongly recommended discussed with the patient he verbalized understanding and agrees    Elevated brain natriuretic peptide (BNP) level  Assessment & Plan  · Presents with BNP of 3566  · No known history of heart failure  · CT chest showed    suspected interstitial edema in the left lower lobe, and an associated small to moderate-sized left pleural effusion, a portion of which in the medial left lower lobe appears to be loculated"  · Cardiomegaly appreciable on CT, evidence to suggest elevated right heart pressure  · Echo Cardiogram with normal ejection fraction  NSTEMI (non-ST elevated myocardial infarction) [facility])  Assessment & Plan  · Likely type 2 in the setting of sepsis and tachycardia  · Patient also had an hour long episode of AFib with RVR in the ED  · Converted to sinus prior to leaving the ED  · Also on the setting of elevated BNP  · Initial troponin 0 05  · Obtain echo  · Appreciate Cardiology input    Pneumonia  Assessment & Plan  Left lower lobe pneumonia  Likely post viral   In for a non RSV PCR negative    In flu and then RSV PCR negative  Urine Legionella and streptococcal antigen negative  Continue with IV ceftriaxone per Infectious Disease recommendations  Patient had left-sided pleural effusion likely parapneumonic status post thoracentesis today  Will follow up on fluid analysis g stain and culture  VTE Pharmacologic Prophylaxis:   Pharmacologic: Enoxaparin (Lovenox)  Mechanical VTE Prophylaxis in Place: No    Patient Centered Rounds: I have performed bedside rounds with nursing staff today  Discussions with Specialists or Other Care Team Provider:  Discussed with nursing  Education and Discussions with Family / Patient: d/w  pts brother     Time Spent for Care: 30 minutes  More than 50% of total time spent on counseling and coordination of care as described above  Current Length of Stay: 3 day(s)    Current Patient Status: Inpatient   Certification Statement: The patient will continue to require additional inpatient hospital stay due to Follow up on pleural fluid analysis and ongoing IV antibiotics    Discharge Plan:  Currently not medically stable for discharge  Code Status: Level 1 - Full Code      Subjective:   Patient states that his left pleuritic chest pain has improved  T-max 103°  Objective:     Vitals:   Temp (24hrs), Av 4 °F (37 4 °C), Min:97 5 °F (36 4 °C), Max:103 1 °F (39 5 °C)    Temp:  [97 5 °F (36 4 °C)-103 1 °F (39 5 °C)] 98 8 °F (37 1 °C)  HR:  [60-93] 81  Resp:  [16-18] 16  BP: (101-148)/(49-67) 148/67  SpO2:  [91 %-96 %] 92 %  Body mass index is 27 79 kg/m²  Input and Output Summary (last 24 hours): Intake/Output Summary (Last 24 hours) at 3/1/2019 1507  Last data filed at 3/1/2019 1424  Gross per 24 hour   Intake    Output 2775 ml   Net -2775 ml       Physical Exam:     Physical Exam   Constitutional: He is oriented to person, place, and time  No distress  HENT:   Head: Normocephalic and atraumatic     Mouth/Throat: Oropharynx is clear and moist    Eyes: Pupils are equal, round, and reactive to light  EOM are normal    Neck: Normal range of motion  Neck supple  Cardiovascular: Normal rate and regular rhythm  Pulmonary/Chest:   Decreased breath sounds left bases   Abdominal: Soft  Bowel sounds are normal    Musculoskeletal: He exhibits no edema or deformity  Neurological: He is alert and oriented to person, place, and time  Skin: Skin is warm  He is not diaphoretic  Additional Data:     Labs:    Results from last 7 days   Lab Units 02/28/19  0242 02/27/19  0531   WBC Thousand/uL 10 19* 11 13*   HEMOGLOBIN g/dL 11 5* 11 2*   HEMATOCRIT % 34 4* 34 7*   PLATELETS Thousands/uL 351 319   NEUTROS PCT %  --  73   LYMPHS PCT %  --  11*   MONOS PCT %  --  13*   EOS PCT %  --  3     Results from last 7 days   Lab Units 02/28/19  0242  02/26/19  0350   SODIUM mmol/L 137   < > 135*   POTASSIUM mmol/L 3 4*   < > 3 7   CHLORIDE mmol/L 104   < > 99*   CO2 mmol/L 24   < > 25   BUN mg/dL 8   < > 9   CREATININE mg/dL 0 92   < > 1 09   ANION GAP mmol/L 9   < > 11   CALCIUM mg/dL 8 7   < > 8 7   ALBUMIN g/dL 2 3*  --  2 5*   TOTAL BILIRUBIN mg/dL  --   --  0 70   ALK PHOS U/L  --   --  108   ALT U/L  --   --  58   AST U/L  --   --  53*   GLUCOSE RANDOM mg/dL 107   < > 120    < > = values in this interval not displayed  Results from last 7 days   Lab Units 02/26/19  0350   INR  1 11             Results from last 7 days   Lab Units 02/27/19  0531 02/26/19  0843 02/26/19  0350   LACTIC ACID mmol/L  --   --  1 3   PROCALCITONIN ng/ml 0 22 0 17 0 16           * I Have Reviewed All Lab Data Listed Above  * Additional Pertinent Lab Tests Reviewed: [address] Admission Reviewed    Imaging:    Imaging Reports Reviewed Today Include: NA  Recent Cultures (last 7 days):     Results from last 7 days   Lab Units 02/26/19  1409 02/26/19  0400 02/26/19  0353 02/26/19  0350   BLOOD CULTURE   --  No Growth at 72 hrs   --  No Growth at 72 hrs     INFLUENZA B PCR   --   --  Not Detected --    RSV PCR   --   --  Not Detected  --    LEGIONELLA URINARY ANTIGEN  Negative  --   --   --        Last 24 Hours Medication List:     Current Facility-Administered Medications:  acetaminophen 650 mg Oral Q6H PRN Zabrina Daigle MD    benzonatate 100 mg Oral TID PRN Zabrina Daigle MD    cefTRIAXone 2,000 mg Intravenous Q24H Zabrina Daigle MD Last Rate: 2,000 mg (03/01/19 0631)   enoxaparin 40 mg Subcutaneous Daily Anamaria Maciel PA-C    guaiFENesin 1,200 mg Oral Q12H [facility] Zabrina Daigle MD    ibuprofen 400 mg Oral [facility] ANNIE Jansen    ipratropium 0 5 mg Nebulization TID Katherin Russo MD    levalbuterol 0 63 mg Nebulization TID Katherin Russo MD    metoprolol tartrate 25 mg Oral Q6H [address] [facility],     morphine injection 1 mg Intravenous Q4H PRN Zabrina Daigle MD    ondansetron 4 mg Intravenous Q8H PRN Loulou Collado PA-C    sodium chloride (PF) 3 mL Intravenous PRN Loulou Collado PA-C         Today, Patient Was Seen By: Katherin Russo MD    ** Please Note: Dictation voice to text software may have been used in the creation of this document   **

## 2019-03-01 NOTE — ASSESSMENT & PLAN NOTE
Left lower lobe pneumonia  Likely post viral   In for a non RSV PCR negative  In flu and then RSV PCR negative  Urine Legionella and streptococcal antigen negative  Continue with IV ceftriaxone per Infectious Disease recommendations  Patient had left-sided pleural effusion likely parapneumonic status post thoracentesis today  Will follow up on fluid analysis g stain and culture

## 2019-03-01 NOTE — ASSESSMENT & PLAN NOTE
AFib with RVR, back to normal sinus rhythm now  Continue with beta-blocker on discharge      Outpatient sleep study strongly recommended discussed with the patient he verbalized understanding and agrees

## 2019-03-02 ENCOUNTER — APPOINTMENT (INPATIENT)
Dept: RADIOLOGY | Facility: HOSPITAL | Age: 74
DRG: 871 | End: 2019-03-02
Payer: COMMERCIAL

## 2019-03-02 LAB
ANION GAP SERPL CALCULATED.3IONS-SCNC: 10 MMOL/L (ref 4–13)
ATRIAL RATE: 68 BPM
ATRIAL RATE: 69 BPM
BASOPHILS # BLD AUTO: 0.06 THOUSANDS/ΜL (ref 0–0.1)
BASOPHILS NFR BLD AUTO: 0 % (ref 0–1)
BUN SERPL-MCNC: 9 MG/DL (ref 5–25)
CALCIUM SERPL-MCNC: 8.8 MG/DL (ref 8.3–10.1)
CHLORIDE SERPL-SCNC: 106 MMOL/L (ref 100–108)
CO2 SERPL-SCNC: 24 MMOL/L (ref 21–32)
CREAT SERPL-MCNC: 0.96 MG/DL (ref 0.6–1.3)
EOSINOPHIL # BLD AUTO: 0.8 THOUSAND/ΜL (ref 0–0.61)
EOSINOPHIL NFR BLD AUTO: 5 % (ref 0–6)
ERYTHROCYTE [DISTWIDTH] IN BLOOD BY AUTOMATED COUNT: 13.4 % (ref 11.6–15.1)
GFR SERPL CREATININE-BSD FRML MDRD: 79 ML/MIN/1.73SQ M
GLUCOSE SERPL-MCNC: 104 MG/DL (ref 65–140)
HCT VFR BLD AUTO: 37.1 % (ref 36.5–49.3)
HGB BLD-MCNC: 12.2 G/DL (ref 12–17)
IMM GRANULOCYTES # BLD AUTO: 0.11 THOUSAND/UL (ref 0–0.2)
IMM GRANULOCYTES NFR BLD AUTO: 1 % (ref 0–2)
LYMPHOCYTES # BLD AUTO: 1.33 THOUSANDS/ΜL (ref 0.6–4.47)
LYMPHOCYTES NFR BLD AUTO: 9 % (ref 14–44)
MCH RBC QN AUTO: 31 PG (ref 26.8–34.3)
MCHC RBC AUTO-ENTMCNC: 32.9 G/DL (ref 31.4–37.4)
MCV RBC AUTO: 94 FL (ref 82–98)
MONOCYTES # BLD AUTO: 1.59 THOUSAND/ΜL (ref 0.17–1.22)
MONOCYTES NFR BLD AUTO: 10 % (ref 4–12)
NEUTROPHILS # BLD AUTO: 11.84 THOUSANDS/ΜL (ref 1.85–7.62)
NEUTS SEG NFR BLD AUTO: 75 % (ref 43–75)
NRBC BLD AUTO-RTO: 0 /100 WBCS
P AXIS: 0 DEGREES
P AXIS: 6 DEGREES
PLATELET # BLD AUTO: 439 THOUSANDS/UL (ref 149–390)
PMV BLD AUTO: 9.6 FL (ref 8.9–12.7)
POTASSIUM SERPL-SCNC: 4 MMOL/L (ref 3.5–5.3)
PR INTERVAL: 108 MS
PR INTERVAL: 110 MS
QRS AXIS: 10 DEGREES
QRS AXIS: 5 DEGREES
QRSD INTERVAL: 92 MS
QRSD INTERVAL: 94 MS
QT INTERVAL: 420 MS
QT INTERVAL: 420 MS
QTC INTERVAL: 446 MS
QTC INTERVAL: 450 MS
RBC # BLD AUTO: 3.93 MILLION/UL (ref 3.88–5.62)
SODIUM SERPL-SCNC: 140 MMOL/L (ref 136–145)
T WAVE AXIS: 17 DEGREES
T WAVE AXIS: 28 DEGREES
VENTRICULAR RATE: 68 BPM
VENTRICULAR RATE: 69 BPM
WBC # BLD AUTO: 15.73 THOUSAND/UL (ref 4.31–10.16)

## 2019-03-02 PROCEDURE — 99232 SBSQ HOSP IP/OBS MODERATE 35: CPT | Performed by: NURSE PRACTITIONER

## 2019-03-02 PROCEDURE — 94760 N-INVAS EAR/PLS OXIMETRY 1: CPT

## 2019-03-02 PROCEDURE — 99232 SBSQ HOSP IP/OBS MODERATE 35: CPT | Performed by: INTERNAL MEDICINE

## 2019-03-02 PROCEDURE — 85025 COMPLETE CBC W/AUTO DIFF WBC: CPT | Performed by: INTERNAL MEDICINE

## 2019-03-02 PROCEDURE — 80048 BASIC METABOLIC PNL TOTAL CA: CPT | Performed by: NURSE PRACTITIONER

## 2019-03-02 PROCEDURE — 71046 X-RAY EXAM CHEST 2 VIEWS: CPT

## 2019-03-02 PROCEDURE — 94640 AIRWAY INHALATION TREATMENT: CPT

## 2019-03-02 PROCEDURE — 93010 ELECTROCARDIOGRAM REPORT: CPT | Performed by: INTERNAL MEDICINE

## 2019-03-02 PROCEDURE — 94668 MNPJ CHEST WALL SBSQ: CPT

## 2019-03-02 PROCEDURE — 94664 DEMO&/EVAL PT USE INHALER: CPT

## 2019-03-02 RX ADMIN — LEVALBUTEROL HYDROCHLORIDE 0.63 MG: 0.63 SOLUTION RESPIRATORY (INHALATION) at 08:34

## 2019-03-02 RX ADMIN — IBUPROFEN 400 MG: 400 TABLET ORAL at 00:00

## 2019-03-02 RX ADMIN — METOPROLOL TARTRATE 25 MG: 25 TABLET, FILM COATED ORAL at 17:01

## 2019-03-02 RX ADMIN — IBUPROFEN 400 MG: 400 TABLET ORAL at 21:49

## 2019-03-02 RX ADMIN — LEVALBUTEROL HYDROCHLORIDE 0.63 MG: 0.63 SOLUTION RESPIRATORY (INHALATION) at 13:21

## 2019-03-02 RX ADMIN — IPRATROPIUM BROMIDE 0.5 MG: 0.5 SOLUTION RESPIRATORY (INHALATION) at 08:34

## 2019-03-02 RX ADMIN — IPRATROPIUM BROMIDE 0.5 MG: 0.5 SOLUTION RESPIRATORY (INHALATION) at 13:20

## 2019-03-02 RX ADMIN — LEVALBUTEROL HYDROCHLORIDE 0.63 MG: 0.63 SOLUTION RESPIRATORY (INHALATION) at 19:25

## 2019-03-02 RX ADMIN — IPRATROPIUM BROMIDE 0.5 MG: 0.5 SOLUTION RESPIRATORY (INHALATION) at 19:25

## 2019-03-02 RX ADMIN — METOPROLOL TARTRATE 25 MG: 25 TABLET, FILM COATED ORAL at 14:01

## 2019-03-02 RX ADMIN — IBUPROFEN 400 MG: 400 TABLET ORAL at 06:00

## 2019-03-02 RX ADMIN — METOPROLOL TARTRATE 25 MG: 25 TABLET, FILM COATED ORAL at 06:00

## 2019-03-02 RX ADMIN — METOPROLOL TARTRATE 25 MG: 25 TABLET, FILM COATED ORAL at 00:00

## 2019-03-02 RX ADMIN — IBUPROFEN 400 MG: 400 TABLET ORAL at 14:01

## 2019-03-02 RX ADMIN — GUAIFENESIN 1200 MG: 600 TABLET, EXTENDED RELEASE ORAL at 21:49

## 2019-03-02 RX ADMIN — CEFTRIAXONE 2000 MG: 2 INJECTION, POWDER, FOR SOLUTION INTRAMUSCULAR; INTRAVENOUS at 06:25

## 2019-03-02 RX ADMIN — ENOXAPARIN SODIUM 40 MG: 40 INJECTION SUBCUTANEOUS at 08:31

## 2019-03-02 RX ADMIN — GUAIFENESIN 1200 MG: 600 TABLET, EXTENDED RELEASE ORAL at 08:31

## 2019-03-02 NOTE — PROGRESS NOTES
Progress Note - Ashutosh Petty 1945, 68 y o  male MRN: [id number]    Unit/Bed#: -01 Encounter: [id number]    Primary Care Provider: Eddie Urias DO   Date and time admitted to hospital: 2/26/2019  3:41 AM        * Sepsis [facility])  Assessment & Plan  Present on admission improving  Monitor    Pneumonia  Assessment & Plan  Left lower lobe pneumonia  Flu and  RSV PCR negative  Urine Legionella and streptococcal antigen negative  Continue with IV ceftriaxone/ Infectious Disease recommendations  Patient had left-sided pleural effusion likely parapneumonic status post thoracentesis 3/1  Will follow up on fluid analysis g stain and culture  Pleural effusion, left  Assessment & Plan  Left pleural effusion likely parapneumonic  Status post thoracentesis 3/1 with cloudy alondra fluid and a pH of 7 1  Await Gram stain and cultures   For repeat CXR today/Pulm following    Atrial fibrillation ([facility] [address] )  Assessment & Plan  AFib with RVR, back to normal sinus rhythm now  Continue with beta-blocker on discharge  · Echo normal EF with  moderately dilated left atrium  · No AC /low risk score  Appreciate Cardiology input    Outpatient sleep study strongly recommended discussed with the patient he verbalized understanding and agrees    NSTEMI (non-ST elevated myocardial infarction) [facility])  Assessment & Plan  · Likely type 2 in the setting of sepsis and tachycardia  · Patient also had an hour long episode of AFib with RVR in the ED  · Converted to sinus   · Also on the setting of elevated BNP  · Initial troponin 0 05      Chest pain  Assessment & Plan  Left-sided chest pain likely pleuritic from pneumonia as well as underlying pleural effusion  Improving  No signs symptoms for acute coronary syndrome  Cardiology input appreciated            VTE Pharmacologic Prophylaxis:   Pharmacologic: Enoxaparin (Lovenox)  Mechanical VTE Prophylaxis in Place: Yes    Discussions with Specialists or Other Care Team Provider: Pulm    Education and Discussions with Family / Patient:     Time Spent for Care: 20 minutes  More than 50% of total time spent on counseling and coordination of care as described above  Current Length of Stay: 4 day(s)    Current Patient Status: Inpatient   Certification Statement: The patient will continue to require additional inpatient hospital stay due to above    Discharge Plan / Estimated Discharge Date: Not ready/Pending cultures/IV abx    Code Status: Level 1 - Full Code      Subjective:   Minimal back pain/left chest pain  Denies dyspnea    Objective:     Vitals:   Temp (24hrs), Av 1 °F (36 7 °C), Min:97 3 °F (36 3 °C), Max:98 8 °F (37 1 °C)    Temp:  [97 3 °F (36 3 °C)-98 8 °F (37 1 °C)] 97 3 °F (36 3 °C)  HR:  [62-81] 65  Resp:  [16-18] 18  BP: (108-148)/(49-67) 110/53  SpO2:  [9 %-96 %] 96 %  Body mass index is 27 58 kg/m²  Input and Output Summary (last 24 hours): Intake/Output Summary (Last 24 hours) at 3/2/2019 1134  Last data filed at 3/2/2019 0601  Gross per 24 hour   Intake 120 ml   Output 2125 ml   Net -2005 ml       Physical Exam:     Physical Exam   Constitutional: He is oriented to person, place, and time  No distress  Eyes: Pupils are equal, round, and reactive to light  Neck: Normal range of motion  Neck supple  Pulmonary/Chest: Effort normal  No tachypnea  No respiratory distress  He has decreased breath sounds in the left lower field  Abdominal: Soft  Bowel sounds are normal  He exhibits no distension  Musculoskeletal: He exhibits no edema  Neurological: He is alert and oriented to person, place, and time  Skin: Skin is warm  He is not diaphoretic  Psychiatric: He has a normal mood and affect             Additional Data:     Labs:    Results from last 7 days   Lab Units 19  0532   WBC Thousand/uL 15 73*   HEMOGLOBIN g/dL 12 2   HEMATOCRIT % 37 1   PLATELETS Thousands/uL 439*   NEUTROS PCT % 75   LYMPHS PCT % 9*   MONOS PCT % 10   EOS PCT % 5 Results from last 7 days   Lab Units 03/02/19  0532  02/26/19  0350   POTASSIUM mmol/L 4 0   < > 3 7   CHLORIDE mmol/L 106   < > 99*   CO2 mmol/L 24   < > 25   BUN mg/dL 9   < > 9   CREATININE mg/dL 0 96   < > 1 09   CALCIUM mg/dL 8 8   < > 8 7   ALK PHOS U/L  --   --  108   ALT U/L  --   --  58   AST U/L  --   --  53*    < > = values in this interval not displayed  Results from last 7 days   Lab Units 02/26/19  0350   INR  1 11         Recent Cultures (last 7 days):     Results from last 7 days   Lab Units 03/01/19  1151 02/26/19  1409 02/26/19  0400 02/26/19  0353 02/26/19  0350   BLOOD CULTURE   --   --  No Growth After 4 Days  --  No Growth After 4 Days     GRAM STAIN RESULT  Rare Polys  No organisms seen  --   --   --   --    BODY FLUID CULTURE, STERILE  No growth  --   --   --   --    INFLUENZA B PCR   --   --   --  Not Detected  --    RSV PCR   --   --   --  Not Detected  --    LEGIONELLA URINARY ANTIGEN   --  Negative  --   --   --        Last 24 Hours Medication List:     Current Facility-Administered Medications:  acetaminophen 650 mg Oral Q6H PRN Collin Campos MD    benzonatate 100 mg Oral TID PRN Collin Campos MD    cefTRIAXone 2,000 mg Intravenous Q24H Collin Campos MD Last Rate: 2,000 mg (03/02/19 0625)   enoxaparin 40 mg Subcutaneous Daily Anamaria Maciel PA-C    guaiFENesin 1,200 mg Oral Q12H [facility] Collin Campos MD    ibuprofen 400 mg Oral [facility] ANNIE Jansen    ipratropium 0 5 mg Nebulization TID Lolis Tobias MD    levalbuterol 0 63 mg Nebulization TID Lolis Tobias MD    metoprolol tartrate 25 mg Oral Q6H [facility] Leonard DO    morphine injection 1 mg Intravenous Q4H PRN Collin Campos MD    ondansetron 4 mg Intravenous Q8H PRN Doe Pimentel PA-C    sodium chloride (PF) 3 mL Intravenous PRN Doe Pimentel PA-C         Today, Patient Was Seen By: Arturo Matias MD    ** Please Note: This note has been constructed using a voice recognition system   **

## 2019-03-02 NOTE — ASSESSMENT & PLAN NOTE
Left lower lobe pneumonia  Flu and  RSV PCR negative  Urine Legionella and streptococcal antigen negative  Continue with IV ceftriaxone/ Infectious Disease recommendations  Patient had left-sided pleural effusion likely parapneumonic status post thoracentesis 3/1  Will follow up on fluid analysis g stain and culture

## 2019-03-02 NOTE — PROGRESS NOTES
Progress Note - Infectious Disease   Giovanna Tapia 68 y o  male MRN: [id number]  Unit/Bed#: -01 Encounter: [id number]      Impression/Recommendations:  1  Sepsis, POA, secondary to pneumonia  Patient is clinically improved  Blood cultures remain negative  Antibiotic plan as in below      2  LLL pneumonia  Consider post viral pneumonia  Influenza PCR negative  Patient is clinically improved on IV antibiotic  Procalcitonin normal   Continue IV ceftriaxone  If pleural fluid cultures remain negative, plan to treat for 7 days total, through 3/4  If to be discharged sooner, he can be given oral cefdinir to complete antibiotic course      3  Left pleural effusion  This is likely parapneumonic effusion  Rule out empyema  Patient is status post thoracentesis  Appearance of fluid is not suggestive of empyema  Pleural fluid culture negative thus far  Repeat chest x-ray shows improvement in pleural effusion  Followup pleural fluid cultures until finalized      4  Atrial fibrillation with RVR  Heart rate control  Rhythm back to sinus      Discussed with patient in detail regarding the above plan, and with primary service  Subjective:  Breathing much improved  Minimal cough  No shortness of breath  He is concerned he may be having urinary retention  Objective:  Vitals:  Temp:  [97 3 °F (36 3 °C)-98 7 °F (37 1 °C)] 97 3 °F (36 3 °C)  HR:  [65-77] 65  Resp:  [18] 18  BP: (110-145)/(53-65) 110/53  SpO2:  [9 %-97 %] 97 %  Temp (24hrs), Av 9 °F (36 6 °C), Min:97 3 °F (36 3 °C), Max:98 7 °F (37 1 °C)  Current: Temperature: (!) 97 3 °F (36 3 °C)    Physical Exam:   General:  No acute distress  Psychiatric:  Awake and alert  Pulmonary:  Normal respiratory excursion without accessory muscle use  Abdomen:  Soft, nontender  Extremities:  No edema  Skin:  No rashes    Lab Results:  I have personally reviewed pertinent labs    Results from last 7 days   Lab Units 19  0532 19  0948 02/27/19  0531 02/26/19  0350   POTASSIUM mmol/L 4 0 3 4* 3 7 3 7   CHLORIDE mmol/L 106 104 104 99*   CO2 mmol/L 24 24 24 25   BUN mg/dL 9 8 7 9   CREATININE mg/dL 0 96 0 92 0 97 1 09   EGFR ml/min/1 73sq m 79 83 78 67   CALCIUM mg/dL 8 8 8 7 8 4 8 7   AST U/L  --   --   --  53*   ALT U/L  --   --   --  58   ALK PHOS U/L  --   --   --  108     Results from last 7 days   Lab Units 03/02/19  0532 02/28/19  0242 02/27/19  0531   WBC Thousand/uL 15 73* 10 19* 11 13*   HEMOGLOBIN g/dL 12 2 11 5* 11 2*   PLATELETS Thousands/uL 439* 351 319     Results from last 7 days   Lab Units 03/01/19  1151 02/26/19  1409 02/26/19  0400 02/26/19  0353 02/26/19  0350   BLOOD CULTURE   --   --  No Growth After 4 Days  --  No Growth After 4 Days  GRAM STAIN RESULT  Rare Polys  No organisms seen  --   --   --   --    BODY FLUID CULTURE, STERILE  No growth  --   --   --   --    INFLUENZA B PCR   --   --   --  Not Detected  --    RSV PCR   --   --   --  Not Detected  --    LEGIONELLA URINARY ANTIGEN   --  Negative  --   --   --        Imaging Studies:   I have personally reviewed pertinent imaging study reports and images in PACS  Repeat chest x-ray shows interval decrease in pleural effusion  EKG, Pathology, and Other Studies:   I have personally reviewed pertinent reports

## 2019-03-02 NOTE — ASSESSMENT & PLAN NOTE
Left pleural effusion likely parapneumonic  Status post thoracentesis 3/1 with cloudy alondra fluid and a pH of 7 1      Await Gram stain and cultures   For repeat CXR today/Pulm following

## 2019-03-02 NOTE — ASSESSMENT & PLAN NOTE
· Likely type 2 in the setting of sepsis and tachycardia  · Patient also had an hour long episode of AFib with RVR in the ED  · Converted to sinus   · Also on the setting of elevated BNP  · Initial troponin 0 05

## 2019-03-02 NOTE — ASSESSMENT & PLAN NOTE
AFib with RVR, back to normal sinus rhythm now  Continue with beta-blocker on discharge  · Echo normal EF with  moderately dilated left atrium  · No AC /low risk score   Appreciate Cardiology input    Outpatient sleep study strongly recommended discussed with the patient he verbalized understanding and agrees

## 2019-03-02 NOTE — ASSESSMENT & PLAN NOTE
· Presents with BNP of 3566  · No known history of heart failure  · CT chest showed    suspected interstitial edema in the left lower lobe, and an associated small to moderate-sized left pleural effusion, a portion of which in the medial left lower lobe appears to be loculated"  · Cardiomegaly appreciable on CT, evidence to suggest elevated right heart pressure  · Echo Cardiogram with normal ejection fraction

## 2019-03-02 NOTE — PROGRESS NOTES
Progress Note - Pulmonary   Paulla Shape 68 y o  male MRN: [id number]  Unit/Bed#: -01 Encounter: [id number]    Assessment/Plan:    Acute hypoxic pulmonary insufficiency due to abnormal CT of the chest with left lower lobe community-acquired pneumonia and associated left pleural effusion, suspect parapneumonic   Titrate oxygen as needed to keep saturations greater than or equal to 88%  Out of bed as tolerated  Add incentive spirometer and flutter valve  Continue ceftriaxone per Infectious Disease  Monitor temperatures and WBCs  Recheck chest x-ray today to evaluate for resolution of pleural effusion status post thoracentesis yesterday for 1175mL of exudative fluid  Follow-up cytology  Continue Xopenex/Atrovent for pulmonary hygiene  Will also need repeat imaging in 4 to 6 weeks  New onset atrial fibrillation   Management per primary team and Cardiology  Outpatient follow-up as per discharge instructions  Discussed with primary team     Chief Complaint:    Michelle Palma I go home?     Subjective:    Ashok Berrios reports he is feeling better today  He had a thoracentesis yesterday and shortness of breath is resolved  He continues to have a loose cough, but is not producing mucus  He denies chest pain  He is eager to go home  Objective:    Vitals: Blood pressure 110/53, pulse 65, temperature (!) 97 3 °F (36 3 °C), temperature source Oral, resp  rate 18, height 6' 2 5" (1 892 m), weight 98 8 kg (217 lb 11 5 oz), SpO2 96 %  2 liters nasal cannula,Body mass index is 27 58 kg/m²  Intake/Output Summary (Last 24 hours) at 3/2/2019 1055  Last data filed at 3/2/2019 0601  Gross per 24 hour   Intake 120 ml   Output 2125 ml   Net -2005 ml       Invasive Devices     Peripheral Intravenous Line            Peripheral IV 02/28/19 Left Forearm 2 days                Physical Exam:     Physical Exam   Constitutional: He is oriented to person, place, and time  He appears well-developed  He is cooperative  Non-toxic appearance  No distress  HENT:   Head: Normocephalic and atraumatic  Mouth/Throat: No oropharyngeal exudate  Eyes: EOM are normal  No scleral icterus  Neck: Neck supple  No JVD present  No tracheal deviation present  Cardiovascular: Normal rate, regular rhythm, S1 normal and S2 normal  Exam reveals no gallop and no friction rub  No murmur heard  Pulmonary/Chest: Effort normal and breath sounds normal  No accessory muscle usage or stridor  No tachypnea  No respiratory distress  He has no decreased breath sounds  He has no wheezes  He has no rhonchi  He has no rales  He exhibits no tenderness  Abdominal: Soft  Bowel sounds are normal  He exhibits no distension  There is no tenderness  There is no rebound and no guarding  Musculoskeletal: He exhibits no edema or tenderness  Neurological: He is alert and oriented to person, place, and time  He has normal strength  GCS eye subscore is 4  GCS verbal subscore is 5  GCS motor subscore is 6  Skin: Skin is warm and dry  No abrasion, no ecchymosis, no lesion and no rash noted  He is not diaphoretic  No cyanosis or erythema  Nails show no clubbing  Psychiatric: He has a normal mood and affect  His speech is normal and behavior is normal    Vitals reviewed        Labs:   CBC:   Lab Results   Component Value Date    WBC 15 73 (H) 03/02/2019    HGB 12 2 03/02/2019    HCT 37 1 03/02/2019    MCV 94 03/02/2019     (H) 03/02/2019    MCH 31 0 03/02/2019    MCHC 32 9 03/02/2019    RDW 13 4 03/02/2019    MPV 9 6 03/02/2019    NRBC 0 03/02/2019   , CMP:   Lab Results   Component Value Date    SODIUM 140 03/02/2019    K 4 0 03/02/2019     03/02/2019    CO2 24 03/02/2019    BUN 9 03/02/2019    CREATININE 0 96 03/02/2019    CALCIUM 8 8 03/02/2019    EGFR 79 03/02/2019     Left Pleural Fluid Analysis:   pH:  7 1   Glucose:  40   Fluid/Serum Protein:  3 8/6 9   Fluid/Serum LDH:  504/   Bacterial Culture:  Gram stain shows no organisms   Cell Count/Diff:  WBCs 1458 with 88% neutrophils   Cytology:  Pending    Imaging and other studies: None today

## 2019-03-03 VITALS
RESPIRATION RATE: 18 BRPM | TEMPERATURE: 98.1 F | DIASTOLIC BLOOD PRESSURE: 56 MMHG | HEART RATE: 77 BPM | SYSTOLIC BLOOD PRESSURE: 107 MMHG | HEIGHT: 75 IN | BODY MASS INDEX: 26.98 KG/M2 | WEIGHT: 217 LBS | OXYGEN SATURATION: 92 %

## 2019-03-03 PROBLEM — A41.9 SEPSIS (HCC): Status: RESOLVED | Noted: 2019-02-26 | Resolved: 2019-03-03

## 2019-03-03 PROBLEM — R07.9 CHEST PAIN: Status: RESOLVED | Noted: 2019-02-28 | Resolved: 2019-03-03

## 2019-03-03 LAB
BACTERIA BLD CULT: NORMAL
BACTERIA BLD CULT: NORMAL

## 2019-03-03 PROCEDURE — 94668 MNPJ CHEST WALL SBSQ: CPT

## 2019-03-03 PROCEDURE — 99239 HOSP IP/OBS DSCHRG MGMT >30: CPT | Performed by: INTERNAL MEDICINE

## 2019-03-03 PROCEDURE — 94760 N-INVAS EAR/PLS OXIMETRY 1: CPT

## 2019-03-03 PROCEDURE — 94640 AIRWAY INHALATION TREATMENT: CPT

## 2019-03-03 PROCEDURE — 99232 SBSQ HOSP IP/OBS MODERATE 35: CPT | Performed by: INTERNAL MEDICINE

## 2019-03-03 RX ORDER — CEFDINIR 300 MG/1
300 CAPSULE ORAL EVERY 12 HOURS SCHEDULED
Qty: 2 CAPSULE | Refills: 0 | Status: SHIPPED | OUTPATIENT
Start: 2019-03-04 | End: 2019-03-05

## 2019-03-03 RX ORDER — METOPROLOL TARTRATE 50 MG/1
50 TABLET, FILM COATED ORAL EVERY 12 HOURS SCHEDULED
Status: DISCONTINUED | OUTPATIENT
Start: 2019-03-03 | End: 2019-03-03 | Stop reason: HOSPADM

## 2019-03-03 RX ORDER — METOPROLOL TARTRATE 50 MG/1
50 TABLET, FILM COATED ORAL EVERY 12 HOURS SCHEDULED
Qty: 60 TABLET | Refills: 0 | Status: SHIPPED | OUTPATIENT
Start: 2019-03-03 | End: 2019-03-11 | Stop reason: ALTCHOICE

## 2019-03-03 RX ORDER — BENZONATATE 100 MG/1
100 CAPSULE ORAL 3 TIMES DAILY PRN
Qty: 20 CAPSULE | Refills: 0 | Status: SHIPPED | OUTPATIENT
Start: 2019-03-03 | End: 2019-03-11

## 2019-03-03 RX ORDER — CEFDINIR 300 MG/1
300 CAPSULE ORAL EVERY 12 HOURS SCHEDULED
Status: DISCONTINUED | OUTPATIENT
Start: 2019-03-04 | End: 2019-03-03 | Stop reason: HOSPADM

## 2019-03-03 RX ORDER — GUAIFENESIN 1200 MG/1
1200 TABLET, EXTENDED RELEASE ORAL EVERY 12 HOURS SCHEDULED
Qty: 10 TABLET | Refills: 0 | Status: SHIPPED | OUTPATIENT
Start: 2019-03-03 | End: 2019-04-26 | Stop reason: ALTCHOICE

## 2019-03-03 RX ORDER — IBUPROFEN 400 MG/1
400 TABLET ORAL EVERY 8 HOURS SCHEDULED
Refills: 0
Start: 2019-03-03 | End: 2019-04-26 | Stop reason: ALTCHOICE

## 2019-03-03 RX ADMIN — ENOXAPARIN SODIUM 40 MG: 40 INJECTION SUBCUTANEOUS at 08:45

## 2019-03-03 RX ADMIN — IPRATROPIUM BROMIDE 0.5 MG: 0.5 SOLUTION RESPIRATORY (INHALATION) at 07:00

## 2019-03-03 RX ADMIN — METOPROLOL TARTRATE 25 MG: 25 TABLET, FILM COATED ORAL at 01:01

## 2019-03-03 RX ADMIN — GUAIFENESIN 1200 MG: 600 TABLET, EXTENDED RELEASE ORAL at 08:46

## 2019-03-03 RX ADMIN — CEFTRIAXONE 2000 MG: 2 INJECTION, POWDER, FOR SOLUTION INTRAMUSCULAR; INTRAVENOUS at 06:33

## 2019-03-03 RX ADMIN — IBUPROFEN 400 MG: 400 TABLET ORAL at 06:33

## 2019-03-03 RX ADMIN — METOPROLOL TARTRATE 25 MG: 25 TABLET, FILM COATED ORAL at 06:34

## 2019-03-03 RX ADMIN — LEVALBUTEROL HYDROCHLORIDE 0.63 MG: 0.63 SOLUTION RESPIRATORY (INHALATION) at 07:00

## 2019-03-03 NOTE — DISCHARGE SUMMARY
Discharge Summary - [place] [address] Internal Medicine    Patient Information: Gregg Lafleur 68 y o  male MRN: [id number]  Unit/Bed#: -01 Encounter: [id number]    Discharging Physician / Practitioner: Kathy Mccoy MD  PCP: Fabiola Rick DO  Admission Date: 2/26/2019  Discharge Date: 03/03/19    Reason for Admission:  Sepsis/left lower lobe pneumonia/AFib with RVR    Discharge Diagnoses:     Principal Problem:    Sepsis ([facility] )  Active Problems:    History of lumbar laminectomy    Pneumonia    NSTEMI (non-ST elevated myocardial infarction) ([facility] )    Atrial fibrillation ([place] [address] )    Pleural effusion, left    Chest pain      Consultations During Hospital Stay:  · Cardiology  · Pulmonology  · Infectious Disease    Procedures Performed:   · Left-sided thoracentesis - "Successful ultrasound-guided thoracentesis yielding 1175 mL cloudy alondra pleural fluid"    Significant findings:  · Chest x-ray 2/26/19 - "Left mid to lower lung consolidation with small pleural effusion  Minimal right base atelectasis"  · Chest x-ray 2/28/19 - "Large left pleural effusion with associated left lung consolidation"  · Chest x-ray 3/2/19 - "Interval decrease in left pleural effusion  Extensive left lung consolidation again present"    Hospital Course:   Gregg Lafleur is a 68 y o  male patient who originally presented to the hospital on 2/26/2019 due to intermittent low back pain as well as shortness of breath with left-sided rib pain  He was diagnosed with sepsis secondary to left lower lobe p neumonia and started on IV antibiotics on admission  He also had evidence of a left-sided pleural effusion  Hospital course was complicated by atrial fibrillation with RVR in the setting of sepsis  He was seen by Cardiology and started on metoprolol  He eventually converted back to sinus rhythm prior to discharge  He will continue metoprolol  Due to low [id number] VASc score, no anticoagulation was recommended    He will follow up with the cardiologist outpatient and has an appointment scheduled on 4/26/19 at 1:20 p m  with Dr Gabriella Galeano  Underwent thoracentesis on 3/1/19 with improved left pleural effusion and improvement of left-sided chest pain  Pleural fluid analysis showed exudative effusion, cultures were negative  He was recommended to complete a 7 day course of antibiotics for community-acquired pneumonia and was transition to oral cefdinir at discharge  Patient's O2 sats were in the 90s on room air at rest and with ambulation  No further inpatient needs were identified and patient was cleared for discharge home on 3/3/19  Condition at Discharge: stable     Discharge Day Visit / Exam:     Subjective:  No new complaints or acute overnight events  Left-sided chest pain is mostly resolved    Vitals: Blood Pressure: 107/56 (03/03/19 0704)  Pulse: 77 (03/03/19 0704)  Temperature: 98 1 °F (36 7 °C) (03/03/19 0704)  Temp Source: Oral (03/03/19 0704)  Respirations: 18 (03/03/19 0704)  Height: 6' 2 5" (189 2 cm) (02/27/19 1300)  Weight - Scale: 98 4 kg (217 lb) (03/03/19 0554)  SpO2: 92 % (03/03/19 0704)    General Appearance:    Alert, cooperative, no distress, appropriately responsive    Head:    Normocephalic, without obvious abnormality, atraumatic, mucous membranes moist    Eyes:    Conjunctiva/corneas clear, EOM's intact   Neck:   Supple   Lungs:     Slightly decreased breath sounds left base, otherwise clear, no crackles, no wheezes     Heart:    Regular rate and rhythm, S1 and S2    Abdomen:     Soft, non-tender, bowel sounds active all four quadrants,     no masses, no organomegaly   Extremities:   Extremities normal, atraumatic, no cyanosis or edema   Neurologic:  nonfocal      Discharge instructions/Information to patient and family:   See after visit summary for information provided to patient and family  Provisions for Follow-Up Care:  See after visit summary for information related to follow-up care and any pertinent home health orders  Disposition: Home      Discharge Statement:  I spent >30 minutes discharging the patient  This time was spent on the day of discharge  I had direct contact with the patient on the day of discharge  Greater than 50% of the total time was spent examining patient, answering all patient questions, arranging and discussing plan of care with patient as well as directly providing post-discharge instructions  Additional time then spent on discharge activities  Discharge Medications:  See after visit summary for reconciled discharge medications provided to patient and family  ** Please Note: Dragon 360 Dictation voice to text software may have been used in the creation of this document   **

## 2019-03-03 NOTE — PROGRESS NOTES
Progress Note - Infectious Disease   Sherwin Martin 68 y o  male MRN: [id number]  Unit/Bed#: -01 Encounter: [id number]      Impression/Recommendations:  1  Sepsis, POA, secondary to pneumonia   Patient is clinically improved   Blood cultures remain negative  Antibiotic plan as in below      2  LLL pneumonia   Consider post viral pneumonia   Influenza PCR negative   Patient is clinically improved on IV antibiotic   Procalcitonin normal   Pleural fluid culture remains negative  Clinically much improved  Okay to transition to oral cefdinir to complete 7 day course, through 3/4      3  Left pleural effusion   This is likely parapneumonic effusion   Rule out empyema   Patient is status post thoracentesis   Appearance of fluid is not suggestive of empyema  Pleural fluid culture negative thus far  Repeat chest x-ray shows improvement in pleural effusion  Followup pleural fluid cultures until finalized      4  Atrial fibrillation with RVR   Heart rate control   Rhythm back to sinus      Discussed with patient in detail regarding the above plan  Subjective:  Patient feels well in eager to go home  Shortness of breath, cough improved  Denies fevers, chills, or sweats  Denies nausea, vomiting, or diarrhea  Objective:  Vitals:  Temp:  [98 1 °F (36 7 °C)-98 6 °F (37 °C)] 98 1 °F (36 7 °C)  HR:  [75-82] 77  Resp:  [18] 18  BP: (107-132)/(56-58) 107/56  SpO2:  [90 %-97 %] 92 %  Temp (24hrs), Av 3 °F (36 8 °C), Min:98 1 °F (36 7 °C), Max:98 6 °F (37 °C)  Current: Temperature: 98 1 °F (36 7 °C)    Physical Exam:   General:  No acute distress  Psychiatric:  Awake and alert  Pulmonary:  Normal respiratory excursion without accessory muscle use  Abdomen:  Soft, nontender  Extremities:  No edema  Skin:  No rashes    Lab Results:  I have personally reviewed pertinent labs    Results from last 7 days   Lab Units 19  0532 19  0242 19  0531 19  0350   POTASSIUM mmol/L 4 0 3 4* 3 7 3 7 CHLORIDE mmol/L 106 104 104 99*   CO2 mmol/L 24 24 24 25   BUN mg/dL 9 8 7 9   CREATININE mg/dL 0 96 0 92 0 97 1 09   EGFR ml/min/1 73sq m 79 83 78 67   CALCIUM mg/dL 8 8 8 7 8 4 8 7   AST U/L  --   --   --  53*   ALT U/L  --   --   --  58   ALK PHOS U/L  --   --   --  108     Results from last 7 days   Lab Units 03/02/19  0532 02/28/19  0242 02/27/19  0531   WBC Thousand/uL 15 73* 10 19* 11 13*   HEMOGLOBIN g/dL 12 2 11 5* 11 2*   PLATELETS Thousands/uL 439* 351 319     Results from last 7 days   Lab Units 03/01/19  1151 02/26/19  1409 02/26/19  0400 02/26/19  0353 02/26/19  0350   BLOOD CULTURE   --   --  No Growth After 5 Days  --  No Growth After 5 Days  GRAM STAIN RESULT  Rare Polys  No organisms seen  --   --   --   --    BODY FLUID CULTURE, STERILE  No growth  --   --   --   --    INFLUENZA B PCR   --   --   --  Not Detected  --    RSV PCR   --   --   --  Not Detected  --    LEGIONELLA URINARY ANTIGEN   --  Negative  --   --   --        Imaging Studies:   I have personally reviewed pertinent imaging study reports and images in PACS  EKG, Pathology, and Other Studies:   I have personally reviewed pertinent reports

## 2019-03-03 NOTE — DISCHARGE INSTRUCTIONS
Thoracentesis   WHAT YOU NEED TO KNOW:   A thoracentesis is a procedure to remove extra fluid or air from between your lungs and your inner chest wall  Air or fluid buildup may make it hard for you to breathe  A thoracentesis allows your lungs to expand fully so you can breathe more easily  DISCHARGE INSTRUCTIONS:     Small amount of shoulder pain and bloody sputum is normal after a Thoracentesis  Rest:  Rest when you feel it is needed  Slowly start to do more each day  Return to your daily activities as directed  Resume your normal diet  Small sips of flat soda will help mild nausea  Do not smoke: If you smoke, it is never too late to quit  Ask for information about how to stop smoking if you need help  Contact Interventional Radiology at [phone] [place] PATIENTS: Contact Interventional Radiology at [phone]) Orie Dancer PATIENTS: Contact Interventional Radiology at [phone]) if:   · You have a fever  · Your puncture site is red, warm, swollen, or draining pus  · You have questions or concerns about your procedure, medicine, or care  Seek care immediately or call 911 if:   · Severe chest pain with inspiration and shortness of breath    · Large amounts of blood in your sputum    Follow up with your healthcare provider as directed         Please follow-up with your primary care physician or the pulmonologist for a repeat chest x-ray in 4-6 weeks to ensure resolution of pneumonia

## 2019-03-04 ENCOUNTER — TRANSITIONAL CARE MANAGEMENT (OUTPATIENT)
Dept: FAMILY MEDICINE CLINIC | Facility: CLINIC | Age: 74
End: 2019-03-04

## 2019-03-04 LAB
BACTERIA SPEC BFLD CULT: NO GROWTH
GRAM STN SPEC: NORMAL
GRAM STN SPEC: NORMAL

## 2019-03-07 NOTE — PROGRESS NOTES
Hospital Follow Up Office Visit Note  Yanely Lozano   68 y o    male   MRN: [id number]  [address]  [address]  [place] [address] [place] [address]  [phone]    PCP: Donte Alves DO  Cardiologist:  Will be Dr Janay Ralph           Assessment/plan  Paroxysmal atrial fibrillation; likely precipitated by pneumonia  EKG today: in normal sinus rhythm on metoprolol tartrate 50 b i d  His [id number]  score is low; no anticoagulation is recommended  --STOP short acting metoprolol tartrate  Start Toprol  mg/d tomorrow  community-acquired pneumonia; recent hospital admission, discharged 3/3/19  Aortic insufficiency, moderate  RV enlargement  Loculated pleural effusion-S/P thoracentesis 3/1/19  -- Pleural fluid analysis showed exudative effusion, cultures were negative  Cardiac testing  --TTE 2/27/19 EF 60; no RWMA; RV dilation; moderate AI, TR-mild  Sinus congestion-advised to avoid decongestants      Summary of recommendations  Sleep study- within the next 2 months  Stop metoprolol tartrate after tonight s dose  Start Toprol Xl 100 mg/d, tomorrow  Ok to take [place] [employer]  Avoid decongestants  Follow up will be scheduled with Dr Janay Ralph 4/26/19                HPI  Mr Melvi Roa is [id number] gentleman with no cardiac history, recently admitted with a 3 week hx of cough, intermittent fevers and fatigue  A CT scan of his chest showed a left-sided pneumonia and evidence of an enlarged heart suggestive of increased right heart pressures; no PE was noted  He was treated for sepsis due to community-acquired pneumonia and followed by Cardiology given new onset paroxysmal atrial fibrillation, and noted CT findings  In the emergency room he was found to be in atrial fibrillation with RVR  He spontaneously converted to NSR without specific intervention  Metoprolol tartrate was later added to his regimen, when this occurred overnight  He again converted to sinus rhythm    His echocardiogram showed normal LV function however right ventricular enlargement was noted as well as moderate aortic insufficiency and mild tricuspid regurgitation  An outpatient sleep study has been recommended to rule out obstructive sleep apnea  3/11/19 he presents for cardiology follow-up  No CP  Feeling much better than when in the hospital  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)  Anxious to go back to exercise; he is starting to walk inside 10 minutes twice a day, gradually increasing since he had footdrop last year  Complaints of sinus congestion  He wonders when he can take over-the-counter  His EKG today demonstrates sinus rhythm at 65 beats per minute with occasional PAC        Assessment  Diagnoses and all orders for this visit:    Paroxysmal atrial fibrillation (HCC)    Pleural effusion, left    Pneumonia of left lower lobe due to infectious organism [facility])          Past Medical History:   Diagnosis Date    Anemia     Closed nondisplaced fracture of second metatarsal bone of left foot 11/11/2018    Foot drop     Left    Left inguinal hernia     Managed By Rey Celestin / last assessed 3/27/15     Skin lesion        Review of Systems   Constitution: Negative for chills  HENT:        + postnasal drip   Cardiovascular: Negative for chest pain, claudication, cyanosis, dyspnea on exertion, irregular heartbeat, leg swelling, near-syncope, orthopnea, palpitations, paroxysmal nocturnal dyspnea and syncope  Respiratory: Negative for cough and shortness of breath  Gastrointestinal: Negative for heartburn and nausea  Neurological: Negative for dizziness, focal weakness, headaches, light-headedness and weakness  All other systems reviewed and are negative  No Known Allergies        Current Outpatient Medications:     benzonatate (TESSALON PERLES) 100 mg capsule, Take 1 capsule (100 mg total) by mouth 3 (three) times a day as needed for cough, Disp: 20 capsule, Rfl: 0    Calcium-Magnesium-Vitamin D (CALCIUM MAGNESIUM PO), Take by mouth, Disp: , Rfl:     chlorhexidine (PERIDEX) 0 12 % solution, RINSE WITH 1/2 OZ TWO TIMES DAILY FOLLOWING BRUSHING AND FLOSSING, Disp: , Rfl: 3    Cholecalciferol (VITAMIN D PO), Take by mouth, Disp: , Rfl:     Cyanocobalamin (VITAMIN B12 PO), Take by mouth, Disp: , Rfl:     guaiFENesin 1200 MG TB12, Take 1 tablet (1,200 mg total) by mouth every 12 (twelve) hours, Disp: 10 tablet, Rfl: 0    ibuprofen (MOTRIN) 400 mg tablet, Take 1 tablet (400 mg total) by mouth every 8 (eight) hours for 3 days Take with meals    Do not take on an empty stomach, Disp: , Rfl: 0    metoprolol tartrate (LOPRESSOR) 50 mg tablet, Take 1 tablet (50 mg total) by mouth every 12 (twelve) hours, Disp: 60 tablet, Rfl: 0        Social History     Socioeconomic History    Marital status: /Civil Union     Spouse name: Not on file    Number of children: Not on file    Years of education: Not on file    Highest education level: Not on file   Occupational History    Not on file   Social Needs    Financial resource strain: Not on file    Food insecurity:     Worry: Not on file     Inability: Not on file    Transportation needs:     Medical: Not on file     Non-medical: Not on file   Tobacco Use    Smoking status: Never Smoker    Smokeless tobacco: Never Used   Substance and Sexual Activity    Alcohol use: Yes     Comment: social     Drug use: No    Sexual activity: Not on file   Lifestyle    Physical activity:     Days per week: Not on file     Minutes per session: Not on file    Stress: Not on file   Relationships    Social connections:     Talks on phone: Not on file     Gets together: Not on file     Attends Hindu service: Not on file     Active member of club or organization: Not on file     Attends meetings of clubs or organizations: Not on file     Relationship status: Not on file    Intimate partner violence:     Fear of current or ex partner: Not on file     Emotionally abused: Not on file     Physically abused: Not on file     Forced sexual activity: Not on file   Other Topics Concern    Not on file   Social History Narrative    Not on file       Family History   Problem Relation Age of Onset    Heart disease Mother     No Known Problems Father     No Known Problems Sister     No Known Problems Brother     No Known Problems Family        Physical Exam   Constitutional: He is oriented to person, place, and time  No distress  HENT:   Head: Normocephalic and atraumatic  Eyes: Conjunctivae and EOM are normal    Neck: Normal range of motion  Neck supple  Cardiovascular: Normal rate, regular rhythm and intact distal pulses  Murmur heard  High-pitched blowing decrescendo early diastolic murmur is present at the upper right sternal border radiating to the apex  Pulmonary/Chest: Effort normal and breath sounds normal    Abdominal: Soft  Bowel sounds are normal    Musculoskeletal: Normal range of motion  Neurological: He is alert and oriented to person, place, and time  Skin: Skin is warm and dry  Psychiatric: He has a normal mood and affect  Nursing note and vitals reviewed  Vitals: There were no vitals taken for this visit     Wt Readings from Last 3 Encounters:   03/03/19 98 4 kg (217 lb)   01/03/19 97 4 kg (214 lb 11 2 oz)   12/20/18 92 5 kg (204 lb)         Labs & Results:  Lab Results   Component Value Date    WBC 15 73 (H) 03/02/2019    HGB 12 2 03/02/2019    HCT 37 1 03/02/2019    MCV 94 03/02/2019     (H) 03/02/2019     No results found for: BNP  No components found for: CHEM  Troponin I   Date Value Ref Range Status   02/28/2019 <0 02 <=0 04 ng/mL Final     Comment:       Siemens Chemistry analyzer 99% cutoff is > 0 04 ng/mL in network labs     o cTnI 99% cutoff is useful only when applied to patients in the clinical setting of myocardial ischemia   o cTnI 99% cutoff should be interpreted in the context of clinical history, ECG findings and possibly cardiac imaging to establish correct diagnosis  o cTnI 99% cutoff may be suggestive but clearly not indicative of a coronary event without the clinical setting of myocardial ischemia      2019 0 04 <=0 04 ng/mL Final     Comment:       Siemens Chemistry analyzer 99% cutoff is > 0 04 ng/mL in network labs     o cTnI 99% cutoff is useful only when applied to patients in the clinical setting of myocardial ischemia   o cTnI 99% cutoff should be interpreted in the context of clinical history, ECG findings and possibly cardiac imaging to establish correct diagnosis  o cTnI 99% cutoff may be suggestive but clearly not indicative of a coronary event without the clinical setting of myocardial ischemia      2019 0 04 <=0 04 ng/mL Final     Comment:       Siemens Chemistry analyzer 99% cutoff is > 0 04 ng/mL in network labs     o cTnI 99% cutoff is useful only when applied to patients in the clinical setting of myocardial ischemia   o cTnI 99% cutoff should be interpreted in the context of clinical history, ECG findings and possibly cardiac imaging to establish correct diagnosis  o cTnI 99% cutoff may be suggestive but clearly not indicative of a coronary event without the clinical setting of myocardial ischemia  Results for orders placed during the hospital encounter of 19   Echo complete with contrast if indicated    Narrative [facility], [address] [facility]  [phone]    Transthoracic Echocardiogram  2D, M-mode, Doppler, and Color Doppler    Study date:  2019    Patient: Wallace Boas  MR number: [id number]  Account number: [de-identified]  : 1945  Age: 68 years  Gender: Male  Status: Inpatient  Location: Bedside  Height: 74 in  Weight: 223 5 lb  BP: 107/ 50 mmHg    Indications: Congestive heart failure      Diagnoses: I50 9 - Heart failure, unspecified    Sonographer:  Nury Stacy RDCS  Primary Physician:  Jordin Aldana MD  Referring Physician:  Alejo Cleveland PA-C  Group:  [facility]  Interpreting Physician:  DO HUMAIRA Jett    LEFT VENTRICLE:  Systolic function was normal  Ejection fraction was estimated to be 60 %  There were no regional wall motion abnormalities  Wall thickness was at the upper limits of normal   Left ventricular diastolic function parameters were normal     RIGHT VENTRICLE:  The ventricle was moderately dilated  Systolic function was normal     LEFT ATRIUM:  The atrium was moderately dilated  RIGHT ATRIUM:  The atrium was mildly dilated  MITRAL VALVE:  There was mild regurgitation  AORTIC VALVE:  There was moderate regurgitation  TRICUSPID VALVE:  There was mild regurgitation  PULMONIC VALVE:  There was mild to moderate regurgitation  HISTORY: PRIOR HISTORY: Patient has no history of cardiovascular disease  PROCEDURE: The procedure was performed at the bedside  This was a routine study  The transthoracic approach was used  The study included complete 2D imaging, M-mode, complete spectral Doppler, and color Doppler  The heart rate was 90 bpm,  at the start of the study  Image quality was adequate  LEFT VENTRICLE: Size was normal  Systolic function was normal  Ejection fraction was estimated to be 60 %  There were no regional wall motion abnormalities  Wall thickness was at the upper limits of normal  DOPPLER: Left ventricular  diastolic function parameters were normal     RIGHT VENTRICLE: The ventricle was moderately dilated  Systolic function was normal     LEFT ATRIUM: The atrium was moderately dilated  RIGHT ATRIUM: The atrium was mildly dilated  MITRAL VALVE: Valve structure was normal  There was normal leaflet separation  DOPPLER: The transmitral velocity was within the normal range  There was no evidence for stenosis  There was mild regurgitation  AORTIC VALVE: The valve was trileaflet   Leaflets exhibited normal thickness and normal cuspal separation  DOPPLER: Transaortic velocity was within the normal range  There was no evidence for stenosis  There was moderate regurgitation  TRICUSPID VALVE: The valve structure was normal  There was normal leaflet separation  DOPPLER: The transtricuspid velocity was within the normal range  There was no evidence for stenosis  There was mild regurgitation  The tricuspid jet  envelope definition was inadequate for estimation of RV systolic pressure  There are no indirect findings (abnormal RV volume or geometry, altered pulmonary flow velocity profile, or leftward septal displacement) which would suggest  moderate or severe pulmonary hypertension  PULMONIC VALVE: Leaflets exhibited normal thickness, no calcification, and normal cuspal separation  DOPPLER: The transpulmonic velocity was within the normal range  There was mild to moderate regurgitation  PERICARDIUM: There was no pericardial effusion  The pericardium was normal in appearance  AORTA: The root exhibited normal size      SYSTEMIC VEINS: IVC: Respirophasic changes were normal     SYSTEM MEASUREMENT TABLES    2D  %FS: 44 85 %  Ao Diam: 3 91 cm  EDV(Teich): 172 72 ml  EF(Teich): 75 38 %  ESV(Teich): 42 53 ml  IVSd: 1 04 cm  LA Area: 36 18 cm2  LA Diam: 4 94 cm  LVEDV MOD A4C: 192 68 ml  LVEF MOD A4C: 61 13 %  LVESV MOD A4C: 74 9 ml  LVIDd: 5 89 cm  LVIDs: 3 25 cm  LVLd A4C: 9 84 cm  LVLs A4C: 7 63 cm  LVPWd: 1 03 cm  RA Area: 22 72 cm2  RVIDd: 5 86 cm  SV MOD A4C: 117 78 ml  SV(Teich): 130 2 ml    CW  TR MaxP 85 mmHg  TR Vmax: 3 16 m/s    MM  TAPSE: 2 86 cm    PW  MV A Juan: 0 79 m/s  MV Dec Tillman: 4 82 m/s2  MV DecT: 216 07 ms  MV E Juan: 1 04 m/s  MV E/A Ratio: 1 32  MV PHT: 62 66 ms  MVA By PHT: 3 51 cm2    [employer] Accredited [facility]    Prepared and electronically signed by    Yue Esteves DO  Signed [id number] 06:58:25         This note was completed in part utilizing m-modal fluency direct voice recognition software  Grammatical errors, random word insertion, spelling mistakes, and incomplete sentences may be an occasional consequence of the system secondary to software limitations, ambient noise and hardware issues  At the time of dictation, efforts were made to edit, clarify and /or correct errors  Please read the chart carefully and recognize, using context, where substitutions have occurred    If you have any questions or concerns about the context, text or information contained within the body of this dictation, please contact myself, the provider, for further clarification

## 2019-03-11 ENCOUNTER — OFFICE VISIT (OUTPATIENT)
Dept: CARDIOLOGY CLINIC | Facility: CLINIC | Age: 74
End: 2019-03-11
Payer: COMMERCIAL

## 2019-03-11 VITALS
WEIGHT: 208.8 LBS | SYSTOLIC BLOOD PRESSURE: 140 MMHG | HEART RATE: 65 BPM | BODY MASS INDEX: 25.96 KG/M2 | OXYGEN SATURATION: 96 % | HEIGHT: 75 IN | DIASTOLIC BLOOD PRESSURE: 64 MMHG

## 2019-03-11 DIAGNOSIS — J90 PLEURAL EFFUSION, LEFT: ICD-10-CM

## 2019-03-11 DIAGNOSIS — J18.9 PNEUMONIA OF LEFT LOWER LOBE DUE TO INFECTIOUS ORGANISM: ICD-10-CM

## 2019-03-11 DIAGNOSIS — I48.0 PAROXYSMAL ATRIAL FIBRILLATION (HCC): Primary | ICD-10-CM

## 2019-03-11 PROCEDURE — 99214 OFFICE O/P EST MOD 30 MIN: CPT | Performed by: NURSE PRACTITIONER

## 2019-03-11 PROCEDURE — 93000 ELECTROCARDIOGRAM COMPLETE: CPT | Performed by: NURSE PRACTITIONER

## 2019-03-11 PROCEDURE — 1111F DSCHRG MED/CURRENT MED MERGE: CPT | Performed by: NURSE PRACTITIONER

## 2019-03-11 RX ORDER — METOPROLOL SUCCINATE 100 MG/1
100 TABLET, EXTENDED RELEASE ORAL DAILY
Qty: 90 TABLET | Refills: 3 | Status: SHIPPED | OUTPATIENT
Start: 2019-03-11 | End: 2019-04-26 | Stop reason: SDUPTHER

## 2019-03-11 NOTE — LETTER
March 11, 2019     Marisela Chow, [address]    Patient: Allan Wolff   YOB: 1945   Date of Visit: 3/11/2019       Dear Dr Mehreen Miller: Thank you for referring Monty Saucedo to me for evaluation  Below are my notes for this consultation  If you have questions, please do not hesitate to call me  I look forward to following your patient along with you  Sincerely,        ANNIE Love        CC: DO Viktoriya Sosa CRNP  3/11/2019  2:59 PM  Sign at close encounter  Hospital Follow Up Office Visit Note  Allan Wolff   68 y o    male   MRN: [id number]  [address]  [address] [facility] [address] [place] [address]  [phone]  [phone]    PCP: Marisela Chow DO  Cardiologist:  Will be Dr Shama Best           Assessment/plan  Paroxysmal atrial fibrillation; likely precipitated by pneumonia  EKG today: in normal sinus rhythm on metoprolol tartrate 50 b i d  His [id number]  score is low; no anticoagulation is recommended  --STOP short acting metoprolol tartrate  Start Toprol  mg/d tomorrow  community-acquired pneumonia; recent hospital admission, discharged 3/3/19  Aortic insufficiency, moderate  RV enlargement  Loculated pleural effusion-S/P thoracentesis 3/1/19  -- Pleural fluid analysis showed exudative effusion, cultures were negative  Cardiac testing  --TTE 2/27/19 EF 60; no RWMA; RV dilation; moderate AI, TR-mild      Summary of recommendations  Sleep study- within the next 2 months  Stop metoprolol tartrate after tonight s dose  Start Toprol Xl 100 mg/d, sissy  Ok to take [place]  Avoid decongestants  Follow up will be scheduled with Dr Shama Best 4/26/19                HPI  Mr Casie Cabral is [id number] gentleman with no cardiac history, recently admitted with a 3 week hx of cough, intermittent fevers and fatigue    A CT scan of his chest showed a left-sided pneumonia and evidence of an enlarged heart suggestive of increased right heart pressures; no PE was noted  He was treated for sepsis due to community-acquired pneumonia and followed by Cardiology given new onset paroxysmal atrial fibrillation, and noted CT findings  In the emergency room he was found to be in atrial fibrillation with RVR  He spontaneously converted to NSR without specific intervention  Metoprolol tartrate was later added to his regimen, when this occurred overnight  He again converted to sinus rhythm  His echocardiogram showed normal LV function however right ventricular enlargement was noted as well as moderate aortic insufficiency and mild tricuspid regurgitation  An outpatient sleep study has been recommended to rule out obstructive sleep apnea  3/11/19 he presents for cardiology follow-up  No CP  Feeling much better than when in the hospital  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)  Anxious to go back to exercise  Assessment  Diagnoses and all orders for this visit:    Paroxysmal atrial fibrillation (HCC)    Pleural effusion, left    Pneumonia of left lower lobe due to infectious organism [facility])          Past Medical History:   Diagnosis Date    Anemia     Closed nondisplaced fracture of second metatarsal bone of left foot 11/11/2018    Foot drop     Left    Left inguinal hernia     Managed By Matt Maldonado / last assessed 3/27/15     Skin lesion        Review of Systems   Constitution: Negative for chills  HENT:        + postnasal drip   Cardiovascular: Negative for chest pain, claudication, cyanosis, dyspnea on exertion, irregular heartbeat, leg swelling, near-syncope, orthopnea, palpitations, paroxysmal nocturnal dyspnea and syncope  Respiratory: Negative for cough and shortness of breath  Gastrointestinal: Negative for heartburn and nausea  Neurological: Negative for dizziness, focal weakness, headaches, light-headedness and weakness     All other systems reviewed and are negative  No Known Allergies    Current Outpatient Medications:     benzonatate (TESSALON PERLES) 100 mg capsule, Take 1 capsule (100 mg total) by mouth 3 (three) times a day as needed for cough, Disp: 20 capsule, Rfl: 0    Calcium-Magnesium-Vitamin D (CALCIUM MAGNESIUM PO), Take by mouth, Disp: , Rfl:     chlorhexidine (PERIDEX) 0 12 % solution, RINSE WITH 1/2 OZ TWO TIMES DAILY FOLLOWING BRUSHING AND FLOSSING, Disp: , Rfl: 3    Cholecalciferol (VITAMIN D PO), Take by mouth, Disp: , Rfl:     Cyanocobalamin (VITAMIN B12 PO), Take by mouth, Disp: , Rfl:     guaiFENesin 1200 MG TB12, Take 1 tablet (1,200 mg total) by mouth every 12 (twelve) hours, Disp: 10 tablet, Rfl: 0    ibuprofen (MOTRIN) 400 mg tablet, Take 1 tablet (400 mg total) by mouth every 8 (eight) hours for 3 days Take with meals    Do not take on an empty stomach, Disp: , Rfl: 0    metoprolol tartrate (LOPRESSOR) 50 mg tablet, Take 1 tablet (50 mg total) by mouth every 12 (twelve) hours, Disp: 60 tablet, Rfl: 0        Social History     Socioeconomic History    Marital status: /Civil Union     Spouse name: Not on file    Number of children: Not on file    Years of education: Not on file    Highest education level: Not on file   Occupational History    Not on file   Social Needs    Financial resource strain: Not on file    Food insecurity:     Worry: Not on file     Inability: Not on file    Transportation needs:     Medical: Not on file     Non-medical: Not on file   Tobacco Use    Smoking status: Never Smoker    Smokeless tobacco: Never Used   Substance and Sexual Activity    Alcohol use: Yes     Comment: social     Drug use: No    Sexual activity: Not on file   Lifestyle    Physical activity:     Days per week: Not on file     Minutes per session: Not on file    Stress: Not on file   Relationships    Social connections:     Talks on phone: Not on file     Gets together: Not on file     Attends Hindu service: Not on file     Active member of club or organization: Not on file     Attends meetings of clubs or organizations: Not on file     Relationship status: Not on file    Intimate partner violence:     Fear of current or ex partner: Not on file     Emotionally abused: Not on file     Physically abused: Not on file     Forced sexual activity: Not on file   Other Topics Concern    Not on file   Social History Narrative    Not on file       Family History   Problem Relation Age of Onset    Heart disease Mother     No Known Problems Father     No Known Problems Sister     No Known Problems Brother     No Known Problems Family        Physical Exam   Constitutional: He is oriented to person, place, and time  No distress  HENT:   Head: Normocephalic and atraumatic  Eyes: Conjunctivae and EOM are normal    Neck: Normal range of motion  Neck supple  Cardiovascular: Normal rate, regular rhythm and intact distal pulses  Murmur heard  High-pitched blowing decrescendo early diastolic murmur is present at the upper right sternal border radiating to the apex  Pulmonary/Chest: Effort normal and breath sounds normal    Abdominal: Soft  Bowel sounds are normal    Musculoskeletal: Normal range of motion  Neurological: He is alert and oriented to person, place, and time  Skin: Skin is warm and dry  Psychiatric: He has a normal mood and affect  Nursing note and vitals reviewed  Vitals: There were no vitals taken for this visit     Wt Readings from Last 3 Encounters:   03/03/19 98 4 kg (217 lb)   01/03/19 97 4 kg (214 lb 11 2 oz)   12/20/18 92 5 kg (204 lb)         Labs & Results:  Lab Results   Component Value Date    WBC 15 73 (H) 03/02/2019    HGB 12 2 03/02/2019    HCT 37 1 03/02/2019    MCV 94 03/02/2019     (H) 03/02/2019     No results found for: BNP  No components found for: CHEM  Troponin I   Date Value Ref Range Status   02/28/2019 <0 02 <=0 04 ng/mL Final     Comment: Siemens Chemistry analyzer 99% cutoff is > 0 04 ng/mL in network labs     o cTnI 99% cutoff is useful only when applied to patients in the clinical setting of myocardial ischemia   o cTnI 99% cutoff should be interpreted in the context of clinical history, ECG findings and possibly cardiac imaging to establish correct diagnosis  o cTnI 99% cutoff may be suggestive but clearly not indicative of a coronary event without the clinical setting of myocardial ischemia      2019 0 04 <=0 04 ng/mL Final     Comment:       Siemens Chemistry analyzer 99% cutoff is > 0 04 ng/mL in network labs     o cTnI 99% cutoff is useful only when applied to patients in the clinical setting of myocardial ischemia   o cTnI 99% cutoff should be interpreted in the context of clinical history, ECG findings and possibly cardiac imaging to establish correct diagnosis  o cTnI 99% cutoff may be suggestive but clearly not indicative of a coronary event without the clinical setting of myocardial ischemia      2019 0 04 <=0 04 ng/mL Final     Comment:       Siemens Chemistry analyzer 99% cutoff is > 0 04 ng/mL in network labs     o cTnI 99% cutoff is useful only when applied to patients in the clinical setting of myocardial ischemia   o cTnI 99% cutoff should be interpreted in the context of clinical history, ECG findings and possibly cardiac imaging to establish correct diagnosis  o cTnI 99% cutoff may be suggestive but clearly not indicative of a coronary event without the clinical setting of myocardial ischemia         Results for orders placed during the hospital encounter of 19   Echo complete with contrast if indicated    Narrative [facility] [address] [facility]  [phone]    Transthoracic Echocardiogram  2D, M-mode, Doppler, and Color Doppler    Study date:  2019    Patient: Nicolasa Rodriguez  MR number: [id number]  Account number: [de-identified]  : 1945  Age: 68 years  Gender: Male  Status: Inpatient  Location: Bedside  Height: 74 in  Weight: 223 5 lb  BP: 107/ 50 mmHg    Indications: Congestive heart failure  Diagnoses: I50 9 - Heart failure, unspecified    Sonographer:  Lisa Murphy RDCS  Primary Physician:  Thelma Pina MD  Referring Physician:  Angélica Le PA-C  Group:  [facility]  Interpreting Physician:  DO HUMAIRA Balderrama    LEFT VENTRICLE:  Systolic function was normal  Ejection fraction was estimated to be 60 %  There were no regional wall motion abnormalities  Wall thickness was at the upper limits of normal   Left ventricular diastolic function parameters were normal     RIGHT VENTRICLE:  The ventricle was moderately dilated  Systolic function was normal     LEFT ATRIUM:  The atrium was moderately dilated  RIGHT ATRIUM:  The atrium was mildly dilated  MITRAL VALVE:  There was mild regurgitation  AORTIC VALVE:  There was moderate regurgitation  TRICUSPID VALVE:  There was mild regurgitation  PULMONIC VALVE:  There was mild to moderate regurgitation  HISTORY: PRIOR HISTORY: Patient has no history of cardiovascular disease  PROCEDURE: The procedure was performed at the bedside  This was a routine study  The transthoracic approach was used  The study included complete 2D imaging, M-mode, complete spectral Doppler, and color Doppler  The heart rate was 90 bpm,  at the start of the study  Image quality was adequate  LEFT VENTRICLE: Size was normal  Systolic function was normal  Ejection fraction was estimated to be 60 %  There were no regional wall motion abnormalities  Wall thickness was at the upper limits of normal  DOPPLER: Left ventricular  diastolic function parameters were normal     RIGHT VENTRICLE: The ventricle was moderately dilated  Systolic function was normal     LEFT ATRIUM: The atrium was moderately dilated  RIGHT ATRIUM: The atrium was mildly dilated      MITRAL VALVE: Valve structure was normal  There was normal leaflet separation  DOPPLER: The transmitral velocity was within the normal range  There was no evidence for stenosis  There was mild regurgitation  AORTIC VALVE: The valve was trileaflet  Leaflets exhibited normal thickness and normal cuspal separation  DOPPLER: Transaortic velocity was within the normal range  There was no evidence for stenosis  There was moderate regurgitation  TRICUSPID VALVE: The valve structure was normal  There was normal leaflet separation  DOPPLER: The transtricuspid velocity was within the normal range  There was no evidence for stenosis  There was mild regurgitation  The tricuspid jet  envelope definition was inadequate for estimation of RV systolic pressure  There are no indirect findings (abnormal RV volume or geometry, altered pulmonary flow velocity profile, or leftward septal displacement) which would suggest  moderate or severe pulmonary hypertension  PULMONIC VALVE: Leaflets exhibited normal thickness, no calcification, and normal cuspal separation  DOPPLER: The transpulmonic velocity was within the normal range  There was mild to moderate regurgitation  PERICARDIUM: There was no pericardial effusion  The pericardium was normal in appearance  AORTA: The root exhibited normal size      SYSTEMIC VEINS: IVC: Respirophasic changes were normal     SYSTEM MEASUREMENT TABLES    2D  %FS: 44 85 %  Ao Diam: 3 91 cm  EDV(Teich): 172 72 ml  EF(Teich): 75 38 %  ESV(Teich): 42 53 ml  IVSd: 1 04 cm  LA Area: 36 18 cm2  LA Diam: 4 94 cm  LVEDV MOD A4C: 192 68 ml  LVEF MOD A4C: 61 13 %  LVESV MOD A4C: 74 9 ml  LVIDd: 5 89 cm  LVIDs: 3 25 cm  LVLd A4C: 9 84 cm  LVLs A4C: 7 63 cm  LVPWd: 1 03 cm  RA Area: 22 72 cm2  RVIDd: 5 86 cm  SV MOD A4C: 117 78 ml  SV(Teich): 130 2 ml    CW  TR MaxP 85 mmHg  TR Vmax: 3 16 m/s    MM  TAPSE: 2 86 cm    PW  MV A Juan: 0 79 m/s  MV Dec Tripp: 4 82 m/s2  MV DecT: 216 07 ms  MV E Juan: 1 04 m/s  MV E/A Ratio: 1 32  MV PHT: 62 66 ms  MVA By PHT: 3 51 cm2    [employer] Accredited Echocardiography [facility]    Prepared and electronically signed by    Patricia Valdes DO  Signed [id number] 06:58:25         This note was completed in part utilizing m-modal fluency direct voice recognition software  Grammatical errors, random word insertion, spelling mistakes, and incomplete sentences may be an occasional consequence of the system secondary to software limitations, ambient noise and hardware issues  At the time of dictation, efforts were made to edit, clarify and /or correct errors  Please read the chart carefully and recognize, using context, where substitutions have occurred    If you have any questions or concerns about the context, text or information contained within the body of this dictation, please contact myself, the provider, for further clarification

## 2019-03-12 ENCOUNTER — OFFICE VISIT (OUTPATIENT)
Dept: FAMILY MEDICINE CLINIC | Facility: CLINIC | Age: 74
End: 2019-03-12
Payer: COMMERCIAL

## 2019-03-12 VITALS
TEMPERATURE: 98.3 F | RESPIRATION RATE: 18 BRPM | OXYGEN SATURATION: 96 % | WEIGHT: 209 LBS | HEIGHT: 75 IN | HEART RATE: 81 BPM | DIASTOLIC BLOOD PRESSURE: 54 MMHG | BODY MASS INDEX: 25.99 KG/M2 | SYSTOLIC BLOOD PRESSURE: 122 MMHG

## 2019-03-12 DIAGNOSIS — Z09 HOSPITAL DISCHARGE FOLLOW-UP: Primary | ICD-10-CM

## 2019-03-12 DIAGNOSIS — J90 PLEURAL EFFUSION, LEFT: ICD-10-CM

## 2019-03-12 DIAGNOSIS — I48.0 PAROXYSMAL ATRIAL FIBRILLATION (HCC): ICD-10-CM

## 2019-03-12 DIAGNOSIS — J18.9 PNEUMONIA OF LEFT LOWER LOBE DUE TO INFECTIOUS ORGANISM: ICD-10-CM

## 2019-03-12 PROCEDURE — 99495 TRANSJ CARE MGMT MOD F2F 14D: CPT | Performed by: FAMILY MEDICINE

## 2019-03-12 PROCEDURE — 1160F RVW MEDS BY RX/DR IN RCRD: CPT | Performed by: FAMILY MEDICINE

## 2019-03-12 NOTE — PROGRESS NOTES
Assessment/Plan:    No problem-specific Assessment & Plan notes found for this encounter  Diagnoses and all orders for this visit:    Hospital discharge follow-up    Pleural effusion, left  Status post thoracentesis  Paroxysmal atrial fibrillation [facility])  Follow-up with Cardiology on April 26, 2019  Metoprolol switched to Toprol XL yesterday  Pneumonia of left lower lobe due to infectious organism [facility])    completed cefdinir  Currently has no fevers or chills  Monitor  Subjective:      Patient ID: River Jaimes is a 68 y o  male  42-year-old male Ms  presents with after hospital discharge  Patient was admitted on February 26, 2019 for sepsis secondary to pneumonia along with atrial fibrillation with RVR in the setting of sepsis  Patient during his hospitalization was found to have a left lower lobe pneumonia along with a pleural effusion  Id was consulted  Patient was started on IV antibiotics along with a thoracentesis that was done  The thoracentesis yielded 1175 mL of cloudy alondra colored fluid  Serial chest x-rays were done that showed a decrease in the left pleural effusion  Patient did present with atrial fibrillation with RVR in the setting of sepsis which converted back to normal sinus rhythm  Cardiology was consulted and patient was placed on metoprolol  No anticoagulation was started on patient as Exie Merl Vasc 2 score did not indicated  TTE was done that showed EF of 60% with right ventricular dilatation and mild aortic insufficiency  Patient was seen yesterday by the cardiology nurse where his metoprolol was switched to Toprol XL  He does have a follow-up with Cardiology on April 26, 2019  Denies any fevers, chills or diaphoresis  Patient does have occasional dyspnea shortness of breath but it is not all the time  He does monitor his pulse at home which has been stable in the 60s to 80s  Patient denies any chest pain, or abdominal pain      TCM Call (since 2/9/2019)     Date and time call was made  3/4/2019  3:14 PM    Hospital care reviewed  Records reviewed    Patient [facility] hospitial[facility] at  [facility]    Date of Admission  02/26/19    Date of discharge  03/03/19    Diagnosis  Sepsis    Disposition  Home    Current Symptoms  Cough; Weakness; Shortness of breath    Cough Severity  Moderate    Shortness of breath severity  Moderate    Weakness severity  Mild      TCM Call (since 2/9/2019)     Scheduled for follow up? Yes    I have advised the patient to call PCP with any new or worsening symptoms    Priya Alcantar CMA          The following portions of the patient's history were reviewed and updated as appropriate: allergies, current medications, past family history, past medical history, past social history, past surgical history and problem list     Review of Systems   Constitutional: Negative for fever  HENT: Negative for sore throat  Respiratory: Negative for cough and shortness of breath  Gastrointestinal: Negative for abdominal pain, constipation, nausea and vomiting  Genitourinary: Negative for dysuria  Musculoskeletal: Negative for back pain  Neurological: Negative for dizziness, weakness, light-headedness and headaches  Psychiatric/Behavioral: Negative for agitation  Objective:      /54   Pulse 81   Temp 98 3 °F (36 8 °C)   Resp 18   Ht 6' 2 5" (1 892 m)   Wt 94 8 kg (209 lb)   SpO2 96%   BMI 26 48 kg/m²          Physical Exam   Constitutional: He is oriented to person, place, and time  He appears well-developed and well-nourished  HENT:   Head: Normocephalic and atraumatic  Right Ear: External ear normal    Left Ear: External ear normal    Nose: Nose normal    Mouth/Throat: Oropharynx is clear and moist  No oropharyngeal exudate  Eyes: EOM are normal  Right eye exhibits no discharge  Left eye exhibits no discharge  Neck: Normal range of motion  Neck supple     Cardiovascular: Normal rate, regular rhythm, normal heart sounds and intact distal pulses  Pulmonary/Chest: Effort normal  He has no wheezes  Left lower middle/upper lower lobe with inspiratory crackles  Left middle lobe diminished breath sounds  Abdominal: Soft  Bowel sounds are normal  There is no tenderness  Musculoskeletal: He exhibits no edema or tenderness  Neurological: He is alert and oriented to person, place, and time  Skin: Skin is warm  No erythema  Psychiatric: He has a normal mood and affect  His behavior is normal    Vitals reviewed

## 2019-04-01 LAB — FUNGUS SPEC CULT: NORMAL

## 2019-04-02 ENCOUNTER — OFFICE VISIT (OUTPATIENT)
Dept: PODIATRY | Facility: CLINIC | Age: 74
End: 2019-04-02
Payer: COMMERCIAL

## 2019-04-02 VITALS
RESPIRATION RATE: 17 BRPM | BODY MASS INDEX: 25.99 KG/M2 | DIASTOLIC BLOOD PRESSURE: 65 MMHG | HEART RATE: 75 BPM | SYSTOLIC BLOOD PRESSURE: 120 MMHG | WEIGHT: 209 LBS | HEIGHT: 75 IN

## 2019-04-02 DIAGNOSIS — M79.672 PAIN IN BOTH FEET: ICD-10-CM

## 2019-04-02 DIAGNOSIS — B35.3 TINEA PEDIS OF BOTH FEET: ICD-10-CM

## 2019-04-02 DIAGNOSIS — M21.372 LEFT FOOT DROP: Primary | ICD-10-CM

## 2019-04-02 DIAGNOSIS — I70.209 PERIPHERAL ARTERIOSCLEROSIS (HCC): ICD-10-CM

## 2019-04-02 DIAGNOSIS — M21.969 ACQUIRED DEFORMITY OF FOOT, UNSPECIFIED LATERALITY: ICD-10-CM

## 2019-04-02 DIAGNOSIS — B35.1 ONYCHOMYCOSIS: ICD-10-CM

## 2019-04-02 DIAGNOSIS — M79.671 PAIN IN BOTH FEET: ICD-10-CM

## 2019-04-02 PROCEDURE — 99203 OFFICE O/P NEW LOW 30 MIN: CPT | Performed by: PODIATRIST

## 2019-04-02 RX ORDER — TERBINAFINE HYDROCHLORIDE 250 MG/1
250 TABLET ORAL DAILY
Qty: 30 TABLET | Refills: 0 | Status: SHIPPED | OUTPATIENT
Start: 2019-04-02 | End: 2019-05-28 | Stop reason: SDUPTHER

## 2019-04-02 RX ORDER — KETOCONAZOLE 20 MG/G
CREAM TOPICAL DAILY
Qty: 60 G | Refills: 1 | Status: SHIPPED | OUTPATIENT
Start: 2019-04-02 | End: 2019-04-26 | Stop reason: ALTCHOICE

## 2019-04-02 RX ORDER — DOXYCYCLINE 40 MG/1
40 CAPSULE ORAL EVERY MORNING
COMMUNITY
End: 2020-01-08 | Stop reason: HOSPADM

## 2019-04-16 ENCOUNTER — OFFICE VISIT (OUTPATIENT)
Dept: PULMONOLOGY | Facility: CLINIC | Age: 74
End: 2019-04-16
Payer: COMMERCIAL

## 2019-04-16 ENCOUNTER — APPOINTMENT (OUTPATIENT)
Dept: RADIOLOGY | Facility: CLINIC | Age: 74
End: 2019-04-16
Payer: COMMERCIAL

## 2019-04-16 ENCOUNTER — TRANSCRIBE ORDERS (OUTPATIENT)
Dept: RADIOLOGY | Facility: CLINIC | Age: 74
End: 2019-04-16

## 2019-04-16 VITALS
WEIGHT: 212.8 LBS | TEMPERATURE: 96.4 F | DIASTOLIC BLOOD PRESSURE: 66 MMHG | BODY MASS INDEX: 26.46 KG/M2 | SYSTOLIC BLOOD PRESSURE: 116 MMHG | HEART RATE: 52 BPM | OXYGEN SATURATION: 99 % | HEIGHT: 75 IN

## 2019-04-16 DIAGNOSIS — J18.9 PNEUMONIA OF LEFT LOWER LOBE DUE TO INFECTIOUS ORGANISM: Primary | ICD-10-CM

## 2019-04-16 DIAGNOSIS — J90 PLEURAL EFFUSION, LEFT: ICD-10-CM

## 2019-04-16 DIAGNOSIS — J18.9 PNEUMONIA OF LEFT LOWER LOBE DUE TO INFECTIOUS ORGANISM: ICD-10-CM

## 2019-04-16 LAB
MYCOBACTERIUM SPEC CULT: NORMAL
RHODAMINE-AURAMINE STN SPEC: NORMAL

## 2019-04-16 PROCEDURE — 99214 OFFICE O/P EST MOD 30 MIN: CPT | Performed by: NURSE PRACTITIONER

## 2019-04-16 PROCEDURE — 71046 X-RAY EXAM CHEST 2 VIEWS: CPT

## 2019-04-17 DIAGNOSIS — J18.9 PNEUMONIA DUE TO INFECTIOUS ORGANISM, UNSPECIFIED LATERALITY, UNSPECIFIED PART OF LUNG: Primary | ICD-10-CM

## 2019-04-22 NOTE — PROGRESS NOTES
PT Discharge    Today's date: 2019  Patient name: Summer Harris  : 1945  MRN: [id number]  Referring provider: Danita Alejo MD  Dx:   Encounter Diagnosis     ICD-10-CM    1  Aftercare following surgery of the musculoskeletal system Z47 [id number]    2  Left foot drop [id number]        Start Time: 1430  Stop Time: 1520  Total time in clinic (min): 50 minutes    Assessment/Plan  Summer Harris has not returned since last appointment, unable to perform objective measures since then  At this time, Summer Harris will be discharged from physical therapy services     Subjective    Objective    Flowsheet Rows      Most Recent Value   PT/OT G-Codes   Current Score  47   Projected Score  68

## 2019-04-25 PROBLEM — I51.7 RIGHT VENTRICULAR ENLARGEMENT: Status: ACTIVE | Noted: 2019-04-25

## 2019-04-25 PROBLEM — I48.0 PAROXYSMAL ATRIAL FIBRILLATION (HCC): Status: ACTIVE | Noted: 2019-02-27

## 2019-04-25 PROBLEM — I35.1 MODERATE AORTIC INSUFFICIENCY: Status: ACTIVE | Noted: 2019-04-25

## 2019-04-26 ENCOUNTER — OFFICE VISIT (OUTPATIENT)
Dept: CARDIOLOGY CLINIC | Facility: CLINIC | Age: 74
End: 2019-04-26
Payer: COMMERCIAL

## 2019-04-26 VITALS
HEART RATE: 68 BPM | WEIGHT: 211.1 LBS | SYSTOLIC BLOOD PRESSURE: 108 MMHG | HEIGHT: 75 IN | OXYGEN SATURATION: 97 % | BODY MASS INDEX: 26.25 KG/M2 | DIASTOLIC BLOOD PRESSURE: 50 MMHG

## 2019-04-26 DIAGNOSIS — I51.7 RIGHT VENTRICULAR ENLARGEMENT: ICD-10-CM

## 2019-04-26 DIAGNOSIS — E78.5 DYSLIPIDEMIA: ICD-10-CM

## 2019-04-26 DIAGNOSIS — I35.1 MODERATE AORTIC INSUFFICIENCY: ICD-10-CM

## 2019-04-26 DIAGNOSIS — I25.10 CORONARY ARTERY CALCIFICATION SEEN ON CAT SCAN: ICD-10-CM

## 2019-04-26 DIAGNOSIS — I48.0 PAROXYSMAL ATRIAL FIBRILLATION (HCC): ICD-10-CM

## 2019-04-26 DIAGNOSIS — I48.0 PAROXYSMAL ATRIAL FIBRILLATION (HCC): Primary | ICD-10-CM

## 2019-04-26 PROBLEM — I21.4 NSTEMI (NON-ST ELEVATED MYOCARDIAL INFARCTION) (HCC): Status: RESOLVED | Noted: 2019-02-26 | Resolved: 2019-04-26

## 2019-04-26 PROCEDURE — 99214 OFFICE O/P EST MOD 30 MIN: CPT | Performed by: INTERNAL MEDICINE

## 2019-04-26 RX ORDER — METOPROLOL SUCCINATE 50 MG/1
50 TABLET, EXTENDED RELEASE ORAL DAILY
Qty: 90 TABLET | Refills: 3 | Status: ON HOLD | OUTPATIENT
Start: 2019-04-26 | End: 2019-11-20 | Stop reason: SDUPTHER

## 2019-04-26 RX ORDER — ATORVASTATIN CALCIUM 40 MG/1
40 TABLET, FILM COATED ORAL DAILY
Qty: 90 TABLET | Refills: 3 | Status: SHIPPED | OUTPATIENT
Start: 2019-04-26 | End: 2020-04-10 | Stop reason: SDUPTHER

## 2019-05-28 ENCOUNTER — OFFICE VISIT (OUTPATIENT)
Dept: PODIATRY | Facility: CLINIC | Age: 74
End: 2019-05-28
Payer: COMMERCIAL

## 2019-05-28 VITALS
HEIGHT: 72 IN | HEART RATE: 68 BPM | DIASTOLIC BLOOD PRESSURE: 50 MMHG | BODY MASS INDEX: 28.58 KG/M2 | SYSTOLIC BLOOD PRESSURE: 108 MMHG | WEIGHT: 211 LBS | RESPIRATION RATE: 17 BRPM

## 2019-05-28 DIAGNOSIS — M21.372 LEFT FOOT DROP: Primary | ICD-10-CM

## 2019-05-28 DIAGNOSIS — B35.3 TINEA PEDIS OF BOTH FEET: ICD-10-CM

## 2019-05-28 DIAGNOSIS — B35.1 ONYCHOMYCOSIS: ICD-10-CM

## 2019-05-28 PROCEDURE — 99213 OFFICE O/P EST LOW 20 MIN: CPT | Performed by: PODIATRIST

## 2019-05-28 RX ORDER — TERBINAFINE HYDROCHLORIDE 250 MG/1
250 TABLET ORAL DAILY
Qty: 30 TABLET | Refills: 0 | Status: SHIPPED | OUTPATIENT
Start: 2019-05-28 | End: 2019-06-27

## 2019-05-28 RX ORDER — GABAPENTIN 100 MG/1
100 CAPSULE ORAL 3 TIMES DAILY
Qty: 90 CAPSULE | Refills: 0 | Status: SHIPPED | OUTPATIENT
Start: 2019-05-28 | End: 2019-08-16 | Stop reason: ALTCHOICE

## 2019-06-13 ENCOUNTER — HOSPITAL ENCOUNTER (OUTPATIENT)
Dept: RADIOLOGY | Facility: HOSPITAL | Age: 74
Discharge: HOME | End: 2019-06-13
Payer: COMMERCIAL

## 2019-06-13 DIAGNOSIS — J18.9 PNEUMONIA DUE TO INFECTIOUS ORGANISM, UNSPECIFIED LATERALITY, UNSPECIFIED PART OF LUNG: ICD-10-CM

## 2019-06-13 PROCEDURE — 71046 X-RAY EXAM CHEST 2 VIEWS: CPT

## 2019-06-26 ENCOUNTER — OFFICE VISIT (OUTPATIENT)
Dept: PODIATRY | Facility: CLINIC | Age: 74
End: 2019-06-26
Payer: COMMERCIAL

## 2019-06-26 ENCOUNTER — APPOINTMENT (OUTPATIENT)
Dept: LAB | Facility: CLINIC | Age: 74
End: 2019-06-26
Payer: COMMERCIAL

## 2019-06-26 VITALS
RESPIRATION RATE: 17 BRPM | WEIGHT: 211 LBS | HEIGHT: 72 IN | SYSTOLIC BLOOD PRESSURE: 134 MMHG | DIASTOLIC BLOOD PRESSURE: 72 MMHG | BODY MASS INDEX: 28.58 KG/M2

## 2019-06-26 DIAGNOSIS — M21.372 LEFT FOOT DROP: ICD-10-CM

## 2019-06-26 DIAGNOSIS — E78.5 DYSLIPIDEMIA: ICD-10-CM

## 2019-06-26 DIAGNOSIS — S92.425A CLOSED NONDISPLACED FRACTURE OF DISTAL PHALANX OF LEFT GREAT TOE, INITIAL ENCOUNTER: Primary | ICD-10-CM

## 2019-06-26 DIAGNOSIS — M21.969 ACQUIRED DEFORMITY OF FOOT, UNSPECIFIED LATERALITY: ICD-10-CM

## 2019-06-26 LAB
ALBUMIN SERPL BCP-MCNC: 3.6 G/DL (ref 3.5–5)
ALP SERPL-CCNC: 87 U/L (ref 46–116)
ALT SERPL W P-5'-P-CCNC: 58 U/L (ref 12–78)
ANION GAP SERPL CALCULATED.3IONS-SCNC: 7 MMOL/L (ref 4–13)
AST SERPL W P-5'-P-CCNC: 46 U/L (ref 5–45)
BILIRUB SERPL-MCNC: 0.5 MG/DL (ref 0.2–1)
BUN SERPL-MCNC: 18 MG/DL (ref 5–25)
CALCIUM SERPL-MCNC: 8.8 MG/DL (ref 8.3–10.1)
CHLORIDE SERPL-SCNC: 104 MMOL/L (ref 100–108)
CHOLEST SERPL-MCNC: 104 MG/DL (ref 50–200)
CO2 SERPL-SCNC: 28 MMOL/L (ref 21–32)
CREAT SERPL-MCNC: 1.09 MG/DL (ref 0.6–1.3)
GFR SERPL CREATININE-BSD FRML MDRD: 67 ML/MIN/1.73SQ M
GLUCOSE P FAST SERPL-MCNC: 98 MG/DL (ref 65–99)
HDLC SERPL-MCNC: 36 MG/DL (ref 40–60)
LDLC SERPL CALC-MCNC: 58 MG/DL (ref 0–100)
POTASSIUM SERPL-SCNC: 4.1 MMOL/L (ref 3.5–5.3)
PROT SERPL-MCNC: 6.9 G/DL (ref 6.4–8.2)
SODIUM SERPL-SCNC: 139 MMOL/L (ref 136–145)
TRIGL SERPL-MCNC: 48 MG/DL

## 2019-06-26 PROCEDURE — 80061 LIPID PANEL: CPT

## 2019-06-26 PROCEDURE — 73620 X-RAY EXAM OF FOOT: CPT | Performed by: PODIATRIST

## 2019-06-26 PROCEDURE — 36415 COLL VENOUS BLD VENIPUNCTURE: CPT | Performed by: INTERNAL MEDICINE

## 2019-06-26 PROCEDURE — 99213 OFFICE O/P EST LOW 20 MIN: CPT | Performed by: PODIATRIST

## 2019-06-26 PROCEDURE — 80053 COMPREHEN METABOLIC PANEL: CPT | Performed by: INTERNAL MEDICINE

## 2019-06-27 ENCOUNTER — TELEPHONE (OUTPATIENT)
Dept: CARDIOLOGY CLINIC | Facility: CLINIC | Age: 74
End: 2019-06-27

## 2019-07-11 ENCOUNTER — OFFICE VISIT (OUTPATIENT)
Dept: PODIATRY | Facility: CLINIC | Age: 74
End: 2019-07-11
Payer: COMMERCIAL

## 2019-07-11 VITALS
RESPIRATION RATE: 17 BRPM | DIASTOLIC BLOOD PRESSURE: 63 MMHG | BODY MASS INDEX: 28.58 KG/M2 | HEART RATE: 57 BPM | WEIGHT: 211 LBS | HEIGHT: 72 IN | SYSTOLIC BLOOD PRESSURE: 129 MMHG

## 2019-07-11 DIAGNOSIS — M79.672 LEFT FOOT PAIN: ICD-10-CM

## 2019-07-11 DIAGNOSIS — S92.425D CLOSED NONDISPLACED FRACTURE OF DISTAL PHALANX OF LEFT GREAT TOE WITH ROUTINE HEALING, SUBSEQUENT ENCOUNTER: Primary | ICD-10-CM

## 2019-07-11 DIAGNOSIS — M21.372 LEFT FOOT DROP: ICD-10-CM

## 2019-07-11 PROCEDURE — 99213 OFFICE O/P EST LOW 20 MIN: CPT | Performed by: PODIATRIST

## 2019-07-11 PROCEDURE — 73620 X-RAY EXAM OF FOOT: CPT | Performed by: PODIATRIST

## 2019-07-11 NOTE — PROGRESS NOTES
Assessment/Plan:  Healing fracture left hallux  Resolved fracture blister  Drop foot left  Plan  X-rays taken  Patient advised on condition  No longer needs to bandage  He will continue doing strengthening exercises as directed  No problem-specific Assessment & Plan notes found for this encounter  There are no diagnoses linked to this encounter  Subjective:  Patient is much better  He has no pain in his left foot  He is bandaging wound of left toe      Past Medical History:   Diagnosis Date    Anemia     Closed nondisplaced fracture of second metatarsal bone of left foot 11/11/2018    Foot drop     Left    Left inguinal hernia     Managed By Dwight Ness / last assessed 3/27/15     Paroxysmal atrial fibrillation ([facility] [address] ) 2/27/2019    Skin lesion        Past Surgical History:   Procedure Laterality Date    HERNIA REPAIR      IR THORACENTESIS  3/1/2019    LUMBAR LAMINECTOMY Left 11/9/2018    Procedure: Metrx L4-5 left hemilaminectomy and bilateral foraminotomy, Metrx L5-S1 left hemilaminectomy and microdiskectomy;  Surgeon: Toya Eason MD;  [facility];  Service: Neurosurgery       No Known Allergies      Current Outpatient Medications:     atorvastatin (LIPITOR) 40 mg tablet, Take 1 tablet (40 mg total) by mouth daily, Disp: 90 tablet, Rfl: 3    Calcium-Magnesium-Vitamin D (CALCIUM MAGNESIUM PO), Take by mouth, Disp: , Rfl:     chlorhexidine (PERIDEX) 0 12 % solution, RINSE WITH 1/2 OZ TWO TIMES DAILY FOLLOWING BRUSHING AND FLOSSING, Disp: , Rfl: 3    Cholecalciferol (VITAMIN D PO), Take by mouth, Disp: , Rfl:     Cyanocobalamin (VITAMIN B12 PO), Take by mouth, Disp: , Rfl:     doxycycline (ORACEA) 40 MG capsule, Take 40 mg by mouth every morning, Disp: , Rfl:     gabapentin (NEURONTIN) 100 mg capsule, Take 1 capsule (100 mg total) by mouth 3 (three) times a day for 30 days, Disp: 90 capsule, Rfl: 0    metoprolol succinate (TOPROL-XL) 50 mg 24 hr tablet, Take 1 tablet (50 mg total) by mouth daily, Disp: 90 tablet, Rfl: 3    Patient Active Problem List   Diagnosis    Hamstring tightness of both lower extremities    Weakness of left foot    Left foot drop    Closed nondisplaced fracture of second metatarsal bone of left foot    Well adult exam    History of lumbar laminectomy    Pneumonia    Paroxysmal atrial fibrillation (HCC)    Pleural effusion, left    Acquired deformity of foot    Pain in both feet    Onychomycosis    Tinea pedis of both feet    Moderate aortic insufficiency    Right ventricular enlargement    Dyslipidemia    Coronary artery calcification seen on CAT scan    Closed nondisplaced fracture of distal phalanx of left great toe          Patient ID: Melvi Levin is a 68 y o  male  HPI    The following portions of the patient's history were reviewed and updated as appropriate:     family history includes Heart disease in his mother; No Known Problems in his brother, family, father, and sister  reports that he has never smoked  He has never used smokeless tobacco  He reports that he drinks alcohol  He reports that he does not use drugs  Vitals:    07/11/19 1458   BP: 129/63   Pulse: 57   Resp: 17       Review of Systems      Objective:  Patient's shoes and socks removed     Foot ExamPhysical Exam      General  General Appearance: appears stated age and healthy   Orientation: alert and oriented to person, place, and time   Affect: appropriate   Gait: antalgic         Right Foot/Ankle      Inspection and Palpation  Ecchymosis: none  Tenderness: metatarsals   Swelling: dorsum and metatarsals   Arch: pes planus  Hammertoes: fifth toe  Hallux valgus: no  Hallux limitus: yes  Skin Exam: callus, dry skin and tinea;      Neurovascular  Dorsalis pedis: 1+  Posterior tibial: 2+  Achilles reflex: 2+  Babinski reflex: 2+     Muscle Strength  Ankle dorsiflexion: 4+        Left Foot/Ankle       Inspection and Palpation  Ecchymosis: none  Tenderness: metatarsals   Swelling: dorsum and metatarsals   Arch: pes planus  Hammertoes: fifth toe  Hallux valgus: no  Hallux limitus: yes  Skin Exam: callus, dry skin and tinea;      Neurovascular  Dorsalis pedis: 1+  Posterior tibial: 2+  Saphenous nerve sensation: diminished  Tibial nerve sensation: diminished  Superficial peroneal nerve sensation: diminished  Deep peroneal nerve sensation: diminished  Sural nerve sensation: diminished  Achilles reflex: 0  Babinski reflex: 0     Muscle Strength  Ankle dorsiflexion: 3           Physical Exam   Constitutional: He appears well-developed and well-nourished  Cardiovascular: Normal rate and regular rhythm  Pulses:       Dorsalis pedis pulses are 1+ on the right side, and 1+ on the left side         Posterior tibial pulses are 2+ on the right side, and 2+ on the left side  Feet:   Right Foot:   Skin Integrity: Positive for callus and dry skin  Left Foot:   Skin Integrity: Positive for callus and dry skin  Neurological:   Reflex Scores:       Achilles reflexes are 2+ on the right side and 0 on the left side  Skin:   Severe xerosis of skin noted bilateral   This is secondary to chronic moccasin tinea pedis   Patient has dystrophy of all nails   Hallux nail bilateral demonstrates mycosis      Right hallux  Fracture blister of proximal nail fold is healed  No evidence of infection  Hallux is rectus  X-ray    Soft callus noted at avulsion site of dorsal proximal avulsion fracture distal phalanx right hallux    Procedures

## 2019-08-16 ENCOUNTER — OFFICE VISIT (OUTPATIENT)
Dept: FAMILY MEDICINE CLINIC | Facility: CLINIC | Age: 74
End: 2019-08-16
Payer: COMMERCIAL

## 2019-08-16 VITALS
BODY MASS INDEX: 28.35 KG/M2 | HEART RATE: 67 BPM | DIASTOLIC BLOOD PRESSURE: 60 MMHG | SYSTOLIC BLOOD PRESSURE: 128 MMHG | WEIGHT: 209.3 LBS | RESPIRATION RATE: 22 BRPM | TEMPERATURE: 97.8 F | OXYGEN SATURATION: 98 % | HEIGHT: 72 IN

## 2019-08-16 DIAGNOSIS — M70.42 PREPATELLAR BURSITIS OF LEFT KNEE: ICD-10-CM

## 2019-08-16 DIAGNOSIS — S92.325D CLOSED NONDISPLACED FRACTURE OF SECOND METATARSAL BONE OF LEFT FOOT WITH ROUTINE HEALING, SUBSEQUENT ENCOUNTER: ICD-10-CM

## 2019-08-16 DIAGNOSIS — S92.425D CLOSED NONDISPLACED FRACTURE OF DISTAL PHALANX OF LEFT GREAT TOE WITH ROUTINE HEALING, SUBSEQUENT ENCOUNTER: Primary | ICD-10-CM

## 2019-08-16 DIAGNOSIS — M79.89 SWELLING OF LEFT FOOT: ICD-10-CM

## 2019-08-16 DIAGNOSIS — M21.372 LEFT FOOT DROP: ICD-10-CM

## 2019-08-16 PROBLEM — Z00.00 WELL ADULT EXAM: Status: RESOLVED | Noted: 2019-01-05 | Resolved: 2019-08-16

## 2019-08-16 PROBLEM — M79.672 LEFT FOOT PAIN: Status: RESOLVED | Noted: 2019-07-11 | Resolved: 2019-08-16

## 2019-08-16 PROBLEM — M79.672 PAIN IN BOTH FEET: Status: RESOLVED | Noted: 2019-04-02 | Resolved: 2019-08-16

## 2019-08-16 PROBLEM — M79.671 PAIN IN BOTH FEET: Status: RESOLVED | Noted: 2019-04-02 | Resolved: 2019-08-16

## 2019-08-16 PROBLEM — J18.9 PNEUMONIA: Status: RESOLVED | Noted: 2019-02-26 | Resolved: 2019-08-16

## 2019-08-16 PROBLEM — J90 PLEURAL EFFUSION, LEFT: Status: RESOLVED | Noted: 2019-02-27 | Resolved: 2019-08-16

## 2019-08-16 PROBLEM — R29.898 WEAKNESS OF LEFT FOOT: Status: RESOLVED | Noted: 2018-11-07 | Resolved: 2019-08-16

## 2019-08-16 PROCEDURE — 99213 OFFICE O/P EST LOW 20 MIN: CPT | Performed by: FAMILY MEDICINE

## 2019-08-16 PROCEDURE — 3008F BODY MASS INDEX DOCD: CPT | Performed by: FAMILY MEDICINE

## 2019-08-16 RX ORDER — DOXYCYCLINE HYCLATE 20 MG
20 TABLET ORAL
Refills: 2 | COMMUNITY
Start: 2019-07-09

## 2019-08-16 NOTE — PROGRESS NOTES
HPI: Patient  presents with left Knee anterior swelling  No knee pain  Dropped foot on R  Is improving  Walking a lot (with brace)  Is concerned with some ankle swelling for a few weeks  Stiffness L foot has improved  Had slipped and fractured L foot while walking into the ER Nov  At [facility]  H/o herniated disc  No h/o immobility    [facility] July 4th and did ok    Significant trauma history? no  Significant overuse history? Yes - was kneeling a lot    Associated symptoms: none  Any meniscal symptoms (locking, true bucking, global swelling)? none      Red flag screens: No unexplained fevers/chills/sweats/weight loss/personal history of cancer/unusual night pain/deep bone pain/unexplained neurological symptoms such as numbness, motor weakness no new relevant sx    Exercise/sport history: walks, likes to run when can    Chart reviewed   Relevant medical, surgical and psychosocial history, medications and allergies reviewed  Imaging none for knee     EXAM:   Alert, NAD Vitals as noted  /60 (BP Location: Left arm, Patient Position: Sitting, Cuff Size: Adult)   Pulse 67   Temp 97 8 °F (36 6 °C) (Tympanic)   Resp 22   Ht 6' 0 25" (1 835 m)   Wt 94 9 kg (209 lb 4 8 oz)   SpO2 98%   BMI 28 19 kg/m²     Gait: still mild L foot drop  General coordination/movement integration: otherwise ok    Left KNEE INSPECTED:  ? Inspection: for redness, deformity, swelling, atrophy, signs of injury, skin changes, malalignment + pre-patellar bursa swelling, non-tender  ? Palpation of joint line, area bursa, mauro-patellar for tenderness  neg  ? PROM: (normal extension/flexion range is -5 to 130 degrees) full and painless    ? Ligament testing: Collaterals intact  Lachman intact  Muscle testing of knee prime movers, done mid-range, isometrically: normal except L foot dorsiflexion  ?  Distal neurovascular + mild L foot numbness and weak 3+/5 L foot dorsiflexio    L foot with puffy edema but no redness/warmth/tenderness  No tenderness or pain on testing previous fx sites    No L calf swelling/tenderness/cord      General summary and Impression:   1  Closed nondisplaced fracture of distal phalanx of left great toe with routine healing, subsequent encounter  Well healed clinically    2  Left foot drop  Still remains  Has brace    3  Closed nondisplaced fracture of second metatarsal bone of left foot with routine healing, subsequent encounter  Well healed clinically    4  Prepatellar bursitis of left knee  This is probably from kneeling - avoid this and should resolve  No evidence is infected    5  Swelling of left foot  May from brace  Exam otherwise benign, so no aggressive w/u at this time  Elevation, compressive stockings  Report sx      Risks and benefits of therapeutic plan discussed, answered all patient questions and concerns and patient expressed understanding and agreement of therapeutic plan      Follow up in prn

## 2019-08-26 ENCOUNTER — OFFICE VISIT (OUTPATIENT)
Dept: PODIATRY | Facility: CLINIC | Age: 74
End: 2019-08-26
Payer: COMMERCIAL

## 2019-08-26 VITALS
HEIGHT: 72 IN | DIASTOLIC BLOOD PRESSURE: 67 MMHG | BODY MASS INDEX: 28.31 KG/M2 | SYSTOLIC BLOOD PRESSURE: 147 MMHG | HEART RATE: 61 BPM | RESPIRATION RATE: 17 BRPM | WEIGHT: 209 LBS

## 2019-08-26 DIAGNOSIS — M79.672 LEFT FOOT PAIN: ICD-10-CM

## 2019-08-26 DIAGNOSIS — S92.355A CLOSED NONDISPLACED FRACTURE OF FIFTH METATARSAL BONE OF LEFT FOOT, INITIAL ENCOUNTER: Primary | ICD-10-CM

## 2019-08-26 PROCEDURE — 99213 OFFICE O/P EST LOW 20 MIN: CPT | Performed by: PODIATRIST

## 2019-08-26 PROCEDURE — L4361 PNEUMA/VAC WALK BOOT PRE OTS: HCPCS | Performed by: PODIATRIST

## 2019-08-26 PROCEDURE — 73620 X-RAY EXAM OF FOOT: CPT | Performed by: PODIATRIST

## 2019-08-26 NOTE — PROGRESS NOTES
Assessment/Plan:  Avulsion fracture base 5th metatarsal left foot  Plan  X-rays taken  We will mobilize patient with Aircast   He will take Tylenol p r n  No problem-specific Assessment & Plan notes found for this encounter  Diagnoses and all orders for this visit:    Closed nondisplaced fracture of fifth metatarsal bone of left foot, initial encounter    Left foot pain          Subjective:  Patient has history of injury 10 days prior  Patient fell at home  He tripped  Injured his left foot  This is the 1st time he has sought treatment      Past Medical History:   Diagnosis Date    Anemia     Closed nondisplaced fracture of second metatarsal bone of left foot 11/11/2018    Foot drop     Left    Left foot pain 7/11/2019    Left inguinal hernia     Managed By Dru Alvarado / last assessed 3/27/15     Pain in both feet 4/2/2019    Paroxysmal atrial fibrillation ([facility] [address] ) 2/27/2019    Pleural effusion, left 2/27/2019    Pneumonia 2/26/2019    Skin lesion     Weakness of left foot 11/7/2018    Well adult exam 1/5/2019       Past Surgical History:   Procedure Laterality Date    HERNIA REPAIR      IR THORACENTESIS  3/1/2019    LUMBAR LAMINECTOMY Left 11/9/2018    Procedure: Metrx L4-5 left hemilaminectomy and bilateral foraminotomy, Metrx L5-S1 left hemilaminectomy and microdiskectomy;  Surgeon: Bashir Hernandez MD;  [facility];  Service: Neurosurgery       No Known Allergies      Current Outpatient Medications:     atorvastatin (LIPITOR) 40 mg tablet, Take 1 tablet (40 mg total) by mouth daily, Disp: 90 tablet, Rfl: 3    Calcium-Magnesium-Vitamin D (CALCIUM MAGNESIUM PO), Take by mouth, Disp: , Rfl:     chlorhexidine (PERIDEX) 0 12 % solution, RINSE WITH 1/2 OZ TWO TIMES DAILY FOLLOWING BRUSHING AND FLOSSING, Disp: , Rfl: 3    Cholecalciferol (VITAMIN D PO), Take by mouth, Disp: , Rfl:     Cyanocobalamin (VITAMIN B12 PO), Take by mouth, Disp: , Rfl:     doxycycline (ORACEA) 40 MG capsule, Take 40 mg by mouth every morning, Disp: , Rfl:     doxycycline (PERIOSTAT) 20 MG tablet, Take 20 mg by mouth 2 (two) times a day, Disp: , Rfl: 2    metoprolol succinate (TOPROL-XL) 50 mg 24 hr tablet, Take 1 tablet (50 mg total) by mouth daily, Disp: 90 tablet, Rfl: 3    Patient Active Problem List   Diagnosis    Hamstring tightness of both lower extremities    Left foot drop    Closed nondisplaced fracture of second metatarsal bone of left foot    History of lumbar laminectomy    Paroxysmal atrial fibrillation (HCC)    Onychomycosis    Tinea pedis of both feet    Moderate aortic insufficiency    Right ventricular enlargement    Dyslipidemia    Coronary artery calcification seen on CAT scan    Closed nondisplaced fracture of distal phalanx of left great toe    Closed nondisplaced fracture of fifth left metatarsal bone    Left foot pain          Patient ID: Melvi Levin is a 69 y o  male  HPI    The following portions of the patient's history were reviewed and updated as appropriate:     family history includes Heart disease in his mother; No Known Problems in his brother, family, father, and sister  reports that he has never smoked  He has never used smokeless tobacco  He reports that he drinks alcohol  He reports that he does not use drugs  Vitals:    08/26/19 1533   BP: 147/67   Pulse: 61   Resp: 17       Review of Systems      Objective:  Patient's shoes and socks removed  Foot ExamPhysical Exam   Constitutional: He is oriented to person, place, and time  He appears well-developed and well-nourished  Cardiovascular: Normal rate and regular rhythm  Musculoskeletal:   Pain with palpation 5th metatarsal base  There is edema and erythema within the area  X-ray  There is avulsion fracture of the tuberosity at the base of the 5th metatarsal of the left foot  Capital fragment aligned  There is only 1 mm of gapping     Neurological: He is alert and oriented to person, place, and time  Skin: Skin is warm and dry  Capillary refill takes less than 2 seconds  Psychiatric: He has a normal mood and affect  His behavior is normal  Judgment and thought content normal    Vitals reviewed          Procedures

## 2019-09-16 ENCOUNTER — OFFICE VISIT (OUTPATIENT)
Dept: PODIATRY | Facility: CLINIC | Age: 74
End: 2019-09-16
Payer: COMMERCIAL

## 2019-09-16 VITALS — RESPIRATION RATE: 16 BRPM | BODY MASS INDEX: 28.31 KG/M2 | HEIGHT: 72 IN | WEIGHT: 209 LBS

## 2019-09-16 DIAGNOSIS — M79.672 LEFT FOOT PAIN: ICD-10-CM

## 2019-09-16 DIAGNOSIS — S92.355D CLOSED NONDISPLACED FRACTURE OF FIFTH METATARSAL BONE OF LEFT FOOT WITH ROUTINE HEALING, SUBSEQUENT ENCOUNTER: Primary | ICD-10-CM

## 2019-09-16 PROCEDURE — 99213 OFFICE O/P EST LOW 20 MIN: CPT | Performed by: PODIATRIST

## 2019-09-16 PROCEDURE — 73620 X-RAY EXAM OF FOOT: CPT | Performed by: PODIATRIST

## 2019-09-16 NOTE — PROGRESS NOTES
Assessment/Plan:  Avulsion fracture base 5th metatarsal left foot  Healing      Plan  X-rays taken  We will mobilize patient with Aircast   He will take Tylenol p r n               Diagnoses and all orders for this visit:     Closed nondisplaced fracture of fifth metatarsal bone of left foot, initial encounter     Left foot pain            Subjective:  Patient has history of injury 10 days prior  Patient fell at home  He tripped  Injured his left foot    This is the 1st time he has sought treatment           Past Medical History:   Diagnosis Date    Anemia      Closed nondisplaced fracture of second metatarsal bone of left foot 11/11/2018    Foot drop       Left    Left foot pain 7/11/2019    Left inguinal hernia       Managed By Dina Beck / last assessed 3/27/15     Pain in both feet 4/2/2019    Paroxysmal atrial fibrillation ([facility] [address] ) 2/27/2019    Pleural effusion, left 2/27/2019    Pneumonia 2/26/2019    Skin lesion      Weakness of left foot 11/7/2018    Well adult exam 1/5/2019               Past Surgical History:   Procedure Laterality Date    HERNIA REPAIR        IR THORACENTESIS   3/1/2019    LUMBAR LAMINECTOMY Left 11/9/2018     Procedure: Metrx L4-5 left hemilaminectomy and bilateral foraminotomy, Metrx L5-S1 left hemilaminectomy and microdiskectomy;  Surgeon: Deidra Juarez MD;  [facility];  Service: Neurosurgery         No Known Allergies        Current Outpatient Medications:     atorvastatin (LIPITOR) 40 mg tablet, Take 1 tablet (40 mg total) by mouth daily, Disp: 90 tablet, Rfl: 3    Calcium-Magnesium-Vitamin D (CALCIUM MAGNESIUM PO), Take by mouth, Disp: , Rfl:     chlorhexidine (PERIDEX) 0 12 % solution, RINSE WITH 1/2 OZ TWO TIMES DAILY FOLLOWING BRUSHING AND FLOSSING, Disp: , Rfl: 3    Cholecalciferol (VITAMIN D PO), Take by mouth, Disp: , Rfl:     Cyanocobalamin (VITAMIN B12 PO), Take by mouth, Disp: , Rfl:     doxycycline (ORACEA) 40 MG capsule, Take 40 mg by mouth every morning, Disp: , Rfl:     doxycycline (PERIOSTAT) 20 MG tablet, Take 20 mg by mouth 2 (two) times a day, Disp: , Rfl: 2    metoprolol succinate (TOPROL-XL) 50 mg 24 hr tablet, Take 1 tablet (50 mg total) by mouth daily, Disp: 90 tablet, Rfl: 3         Patient Active Problem List   Diagnosis    Hamstring tightness of both lower extremities    Left foot drop    Closed nondisplaced fracture of second metatarsal bone of left foot    History of lumbar laminectomy    Paroxysmal atrial fibrillation (HCC)    Onychomycosis    Tinea pedis of both feet    Moderate aortic insufficiency    Right ventricular enlargement    Dyslipidemia    Coronary artery calcification seen on CAT scan    Closed nondisplaced fracture of distal phalanx of left great toe    Closed nondisplaced fracture of fifth left metatarsal bone    Left foot pain             Patient ID: Monnie Dubin is a 69 y o  male      HPI     The following portions of the patient's history were reviewed and updated as appropriate:      family history includes Heart disease in his mother; No Known Problems in his brother, family, father, and sister        reports that he has never smoked  He has never used smokeless tobacco  He reports that he drinks alcohol  He reports that he does not use drugs          Vitals:     08/26/19 1533   BP: 147/67   Pulse: 61   Resp: 17         Review of Systems       Objective:  Patient's shoes and socks removed  Foot ExamPhysical Exam   Constitutional: He is oriented to person, place, and time  He appears well-developed and well-nourished  Cardiovascular: Normal rate and regular rhythm  Musculoskeletal:   Pain with palpation 5th metatarsal base  There is edema and erythema within the area  X-ray  There is avulsion fracture of the tuberosity at the base of the 5th metatarsal of the left foot  Capital fragment aligned  There is only 1 mm of gapping     Neurological: He is alert and oriented to person, place, and time  Skin: Skin is warm and dry  Capillary refill takes less than 2 seconds  Psychiatric: He has a normal mood and affect  His behavior is normal  Judgment and thought content normal    Vitals reviewed

## 2019-11-11 ENCOUNTER — OFFICE VISIT (OUTPATIENT)
Dept: PODIATRY | Facility: CLINIC | Age: 74
End: 2019-11-11
Payer: COMMERCIAL

## 2019-11-11 VITALS
DIASTOLIC BLOOD PRESSURE: 67 MMHG | SYSTOLIC BLOOD PRESSURE: 110 MMHG | BODY MASS INDEX: 28.31 KG/M2 | HEART RATE: 75 BPM | WEIGHT: 209 LBS | RESPIRATION RATE: 16 BRPM | HEIGHT: 72 IN

## 2019-11-11 DIAGNOSIS — M79.672 LEFT FOOT PAIN: ICD-10-CM

## 2019-11-11 DIAGNOSIS — M21.969 ACQUIRED DEFORMITY OF FOOT, UNSPECIFIED LATERALITY: ICD-10-CM

## 2019-11-11 DIAGNOSIS — S92.355D CLOSED NONDISPLACED FRACTURE OF FIFTH METATARSAL BONE OF LEFT FOOT WITH ROUTINE HEALING, SUBSEQUENT ENCOUNTER: ICD-10-CM

## 2019-11-11 DIAGNOSIS — M21.372 LEFT FOOT DROP: Primary | ICD-10-CM

## 2019-11-11 PROCEDURE — 99213 OFFICE O/P EST LOW 20 MIN: CPT | Performed by: PODIATRIST

## 2019-11-11 NOTE — PROGRESS NOTES
Assessment/Plan:  Drop foot left  Pain upon ambulation  Status post injury  Plan  Patient will remain in brace as directed  He will stretch daily  He will use orthotics  He will return p r n  No problem-specific Assessment & Plan notes found for this encounter  There are no diagnoses linked to this encounter  Subjective:  Patient is doing much better  He has no pain  He is exercising daily      Past Medical History:   Diagnosis Date    Anemia     Closed nondisplaced fracture of second metatarsal bone of left foot 11/11/2018    Foot drop     Left    Left foot pain 7/11/2019    Left inguinal hernia     Managed By Bg Yi / last assessed 3/27/15     Pain in both feet 4/2/2019    Paroxysmal atrial fibrillation ([facility] [address] ) 2/27/2019    Pleural effusion, left 2/27/2019    Pneumonia 2/26/2019    Skin lesion     Weakness of left foot 11/7/2018    Well adult exam 1/5/2019       Past Surgical History:   Procedure Laterality Date    HERNIA REPAIR      IR THORACENTESIS  3/1/2019    LUMBAR LAMINECTOMY Left 11/9/2018    Procedure: Metrx L4-5 left hemilaminectomy and bilateral foraminotomy, Metrx L5-S1 left hemilaminectomy and microdiskectomy;  Surgeon: Ana Anderson MD;  [facility];  Service: Neurosurgery       No Known Allergies      Current Outpatient Medications:     atorvastatin (LIPITOR) 40 mg tablet, Take 1 tablet (40 mg total) by mouth daily, Disp: 90 tablet, Rfl: 3    Calcium-Magnesium-Vitamin D (CALCIUM MAGNESIUM PO), Take by mouth, Disp: , Rfl:     chlorhexidine (PERIDEX) 0 12 % solution, RINSE WITH 1/2 OZ TWO TIMES DAILY FOLLOWING BRUSHING AND FLOSSING, Disp: , Rfl: 3    Cholecalciferol (VITAMIN D PO), Take by mouth, Disp: , Rfl:     Cyanocobalamin (VITAMIN B12 PO), Take by mouth, Disp: , Rfl:     doxycycline (ORACEA) 40 MG capsule, Take 40 mg by mouth every morning, Disp: , Rfl:     doxycycline (PERIOSTAT) 20 MG tablet, Take 20 mg by mouth 2 (two) times a day, Disp: , Rfl: 2    metoprolol succinate (TOPROL-XL) 50 mg 24 hr tablet, Take 1 tablet (50 mg total) by mouth daily, Disp: 90 tablet, Rfl: 3    Patient Active Problem List   Diagnosis    Hamstring tightness of both lower extremities    Left foot drop    Closed nondisplaced fracture of second metatarsal bone of left foot    History of lumbar laminectomy    Paroxysmal atrial fibrillation (HCC)    Onychomycosis    Tinea pedis of both feet    Moderate aortic insufficiency    Right ventricular enlargement    Dyslipidemia    Coronary artery calcification seen on CAT scan    Closed nondisplaced fracture of distal phalanx of left great toe    Closed nondisplaced fracture of fifth left metatarsal bone    Left foot pain          Patient ID: Waleska Brown is a 69 y o  male  HPI    The following portions of the patient's history were reviewed and updated as appropriate:     family history includes Heart disease in his mother; No Known Problems in his brother, family, father, and sister  reports that he has never smoked  He has never used smokeless tobacco  He reports that he drinks alcohol  He reports that he does not use drugs  Vitals:    11/11/19 0938   BP: 110/67   Pulse: 75   Resp: 16       Review of Systems      Objective:  Patient's shoes and socks removed  Foot ExamPhysical Exam        Patient's shoes and socks removed    Foot ExamPhysical Exam   Constitutional: He is oriented to person, place, and time  He appears well-developed and well-nourished  Cardiovascular: Normal rate and regular rhythm  Musculoskeletal:   Pain with palpation 5th metatarsal base   There is edema and erythema within the area  Negin [place] is avulsion fracture of the tuberosity at the base of the 5th metatarsal of the left foot   Capital fragment aligned  [place] is only 1 mm of gapping    Neurological: He is alert and oriented to person, place, and time  Skin: Skin is warm and dry   Capillary refill takes less than 2 seconds  Psychiatric: He has a normal mood and affect   His behavior is normal  Judgment and thought content normal    Vitals reviewed        Procedures

## 2019-11-14 ENCOUNTER — APPOINTMENT (INPATIENT)
Dept: ULTRASOUND IMAGING | Facility: HOSPITAL | Age: 74
DRG: 308 | End: 2019-11-14
Payer: COMMERCIAL

## 2019-11-14 ENCOUNTER — APPOINTMENT (INPATIENT)
Dept: CT IMAGING | Facility: HOSPITAL | Age: 74
DRG: 308 | End: 2019-11-14
Payer: COMMERCIAL

## 2019-11-14 ENCOUNTER — HOSPITAL ENCOUNTER (INPATIENT)
Facility: HOSPITAL | Age: 74
LOS: 6 days | Discharge: HOME HEALTH | DRG: 308 | End: 2019-11-20
Attending: EMERGENCY MEDICINE | Admitting: INTERNAL MEDICINE
Payer: COMMERCIAL

## 2019-11-14 ENCOUNTER — OFFICE VISIT (OUTPATIENT)
Dept: CARDIOLOGY CLINIC | Facility: CLINIC | Age: 74
End: 2019-11-14
Payer: COMMERCIAL

## 2019-11-14 VITALS
HEART RATE: 158 BPM | BODY MASS INDEX: 28.35 KG/M2 | OXYGEN SATURATION: 97 % | DIASTOLIC BLOOD PRESSURE: 66 MMHG | HEIGHT: 72 IN | SYSTOLIC BLOOD PRESSURE: 102 MMHG

## 2019-11-14 DIAGNOSIS — K35.32 PERFORATED APPENDICITIS: ICD-10-CM

## 2019-11-14 DIAGNOSIS — K37 APPENDICITIS: ICD-10-CM

## 2019-11-14 DIAGNOSIS — I48.0 PAROXYSMAL ATRIAL FIBRILLATION (HCC): ICD-10-CM

## 2019-11-14 DIAGNOSIS — I48.91 ATRIAL FIBRILLATION WITH RAPID VENTRICULAR RESPONSE (HCC): Primary | ICD-10-CM

## 2019-11-14 DIAGNOSIS — R10.31 RIGHT LOWER QUADRANT ABDOMINAL PAIN: ICD-10-CM

## 2019-11-14 DIAGNOSIS — I48.0 PAROXYSMAL ATRIAL FIBRILLATION (HCC): Primary | ICD-10-CM

## 2019-11-14 PROBLEM — D72.829 LEUKOCYTOSIS (LEUCOCYTOSIS): Status: ACTIVE | Noted: 2019-11-14

## 2019-11-14 LAB
ALBUMIN SERPL BCP-MCNC: 2.5 G/DL (ref 3.5–5)
ALBUMIN SERPL BCP-MCNC: 3.1 G/DL (ref 3.5–5)
ALP SERPL-CCNC: 112 U/L (ref 46–116)
ALP SERPL-CCNC: 120 U/L (ref 46–116)
ALT SERPL W P-5'-P-CCNC: 61 U/L (ref 12–78)
ALT SERPL W P-5'-P-CCNC: 68 U/L (ref 12–78)
ANION GAP SERPL CALCULATED.3IONS-SCNC: 10 MMOL/L (ref 4–13)
ANION GAP SERPL CALCULATED.3IONS-SCNC: 11 MMOL/L (ref 4–13)
APTT PPP: 39 SECONDS (ref 23–37)
AST SERPL W P-5'-P-CCNC: 41 U/L (ref 5–45)
AST SERPL W P-5'-P-CCNC: 43 U/L (ref 5–45)
BACTERIA UR QL AUTO: ABNORMAL /HPF
BASOPHILS # BLD AUTO: 0.04 THOUSANDS/ΜL (ref 0–0.1)
BASOPHILS # BLD AUTO: 0.05 THOUSANDS/ΜL (ref 0–0.1)
BASOPHILS NFR BLD AUTO: 0 % (ref 0–1)
BASOPHILS NFR BLD AUTO: 0 % (ref 0–1)
BILIRUB DIRECT SERPL-MCNC: 0.24 MG/DL (ref 0–0.2)
BILIRUB SERPL-MCNC: 0.7 MG/DL (ref 0.2–1)
BILIRUB SERPL-MCNC: 0.9 MG/DL (ref 0.2–1)
BILIRUB UR QL STRIP: NEGATIVE
BUN SERPL-MCNC: 13 MG/DL (ref 5–25)
BUN SERPL-MCNC: 15 MG/DL (ref 5–25)
CALCIUM SERPL-MCNC: 8.3 MG/DL (ref 8.3–10.1)
CALCIUM SERPL-MCNC: 9 MG/DL (ref 8.3–10.1)
CHLORIDE SERPL-SCNC: 101 MMOL/L (ref 100–108)
CHLORIDE SERPL-SCNC: 99 MMOL/L (ref 100–108)
CLARITY UR: CLEAR
CO2 SERPL-SCNC: 23 MMOL/L (ref 21–32)
CO2 SERPL-SCNC: 27 MMOL/L (ref 21–32)
COLOR UR: YELLOW
CREAT SERPL-MCNC: 1.1 MG/DL (ref 0.6–1.3)
CREAT SERPL-MCNC: 1.16 MG/DL (ref 0.6–1.3)
EOSINOPHIL # BLD AUTO: 0.06 THOUSAND/ΜL (ref 0–0.61)
EOSINOPHIL # BLD AUTO: 0.06 THOUSAND/ΜL (ref 0–0.61)
EOSINOPHIL NFR BLD AUTO: 0 % (ref 0–6)
EOSINOPHIL NFR BLD AUTO: 0 % (ref 0–6)
ERYTHROCYTE [DISTWIDTH] IN BLOOD BY AUTOMATED COUNT: 13.7 % (ref 11.6–15.1)
ERYTHROCYTE [DISTWIDTH] IN BLOOD BY AUTOMATED COUNT: 13.9 % (ref 11.6–15.1)
GFR SERPL CREATININE-BSD FRML MDRD: 62 ML/MIN/1.73SQ M
GFR SERPL CREATININE-BSD FRML MDRD: 66 ML/MIN/1.73SQ M
GLUCOSE SERPL-MCNC: 111 MG/DL (ref 65–140)
GLUCOSE SERPL-MCNC: 98 MG/DL (ref 65–140)
GLUCOSE UR STRIP-MCNC: NEGATIVE MG/DL
HCT VFR BLD AUTO: 38 % (ref 36.5–49.3)
HCT VFR BLD AUTO: 44.4 % (ref 36.5–49.3)
HGB BLD-MCNC: 12.4 G/DL (ref 12–17)
HGB BLD-MCNC: 14.4 G/DL (ref 12–17)
HGB UR QL STRIP.AUTO: ABNORMAL
IMM GRANULOCYTES # BLD AUTO: 0.06 THOUSAND/UL (ref 0–0.2)
IMM GRANULOCYTES # BLD AUTO: 0.12 THOUSAND/UL (ref 0–0.2)
IMM GRANULOCYTES NFR BLD AUTO: 0 % (ref 0–2)
IMM GRANULOCYTES NFR BLD AUTO: 1 % (ref 0–2)
INR PPP: 0.98 (ref 0.84–1.19)
KETONES UR STRIP-MCNC: NEGATIVE MG/DL
LACTATE SERPL-SCNC: 0.8 MMOL/L (ref 0.5–2)
LEUKOCYTE ESTERASE UR QL STRIP: NEGATIVE
LYMPHOCYTES # BLD AUTO: 1.44 THOUSANDS/ΜL (ref 0.6–4.47)
LYMPHOCYTES # BLD AUTO: 1.65 THOUSANDS/ΜL (ref 0.6–4.47)
LYMPHOCYTES NFR BLD AUTO: 11 % (ref 14–44)
LYMPHOCYTES NFR BLD AUTO: 9 % (ref 14–44)
MCH RBC QN AUTO: 30.7 PG (ref 26.8–34.3)
MCH RBC QN AUTO: 30.7 PG (ref 26.8–34.3)
MCHC RBC AUTO-ENTMCNC: 32.4 G/DL (ref 31.4–37.4)
MCHC RBC AUTO-ENTMCNC: 32.6 G/DL (ref 31.4–37.4)
MCV RBC AUTO: 94 FL (ref 82–98)
MCV RBC AUTO: 95 FL (ref 82–98)
MONOCYTES # BLD AUTO: 1.33 THOUSAND/ΜL (ref 0.17–1.22)
MONOCYTES # BLD AUTO: 1.46 THOUSAND/ΜL (ref 0.17–1.22)
MONOCYTES NFR BLD AUTO: 9 % (ref 4–12)
MONOCYTES NFR BLD AUTO: 9 % (ref 4–12)
NEUTROPHILS # BLD AUTO: 12.34 THOUSANDS/ΜL (ref 1.85–7.62)
NEUTROPHILS # BLD AUTO: 12.95 THOUSANDS/ΜL (ref 1.85–7.62)
NEUTS SEG NFR BLD AUTO: 80 % (ref 43–75)
NEUTS SEG NFR BLD AUTO: 81 % (ref 43–75)
NITRITE UR QL STRIP: NEGATIVE
NON-SQ EPI CELLS URNS QL MICRO: ABNORMAL /HPF
NRBC BLD AUTO-RTO: 0 /100 WBCS
NRBC BLD AUTO-RTO: 0 /100 WBCS
PH UR STRIP.AUTO: 6 [PH]
PLATELET # BLD AUTO: 344 THOUSANDS/UL (ref 149–390)
PLATELET # BLD AUTO: 361 THOUSANDS/UL (ref 149–390)
PLATELET # BLD AUTO: 404 THOUSANDS/UL (ref 149–390)
PMV BLD AUTO: 10.1 FL (ref 8.9–12.7)
PMV BLD AUTO: 9.9 FL (ref 8.9–12.7)
PMV BLD AUTO: 9.9 FL (ref 8.9–12.7)
POTASSIUM SERPL-SCNC: 4 MMOL/L (ref 3.5–5.3)
POTASSIUM SERPL-SCNC: 4.1 MMOL/L (ref 3.5–5.3)
PROT SERPL-MCNC: 6.3 G/DL (ref 6.4–8.2)
PROT SERPL-MCNC: 7.4 G/DL (ref 6.4–8.2)
PROT UR STRIP-MCNC: NEGATIVE MG/DL
PROTHROMBIN TIME: 12.4 SECONDS (ref 11.6–14.5)
RBC # BLD AUTO: 4.04 MILLION/UL (ref 3.88–5.62)
RBC # BLD AUTO: 4.69 MILLION/UL (ref 3.88–5.62)
RBC #/AREA URNS AUTO: ABNORMAL /HPF
SODIUM SERPL-SCNC: 135 MMOL/L (ref 136–145)
SODIUM SERPL-SCNC: 136 MMOL/L (ref 136–145)
SP GR UR STRIP.AUTO: 1.01 (ref 1–1.03)
TROPONIN I SERPL-MCNC: <0.02 NG/ML
TSH SERPL DL<=0.05 MIU/L-ACNC: 2.54 UIU/ML (ref 0.36–3.74)
UROBILINOGEN UR QL STRIP.AUTO: 0.2 E.U./DL
WBC # BLD AUTO: 15.48 THOUSAND/UL (ref 4.31–10.16)
WBC # BLD AUTO: 16.08 THOUSAND/UL (ref 4.31–10.16)
WBC #/AREA URNS AUTO: ABNORMAL /HPF

## 2019-11-14 PROCEDURE — 74176 CT ABD & PELVIS W/O CONTRAST: CPT

## 2019-11-14 PROCEDURE — 83605 ASSAY OF LACTIC ACID: CPT | Performed by: PHYSICIAN ASSISTANT

## 2019-11-14 PROCEDURE — 96365 THER/PROPH/DIAG IV INF INIT: CPT

## 2019-11-14 PROCEDURE — 85049 AUTOMATED PLATELET COUNT: CPT | Performed by: INTERNAL MEDICINE

## 2019-11-14 PROCEDURE — 93005 ELECTROCARDIOGRAM TRACING: CPT

## 2019-11-14 PROCEDURE — 36415 COLL VENOUS BLD VENIPUNCTURE: CPT | Performed by: EMERGENCY MEDICINE

## 2019-11-14 PROCEDURE — 84443 ASSAY THYROID STIM HORMONE: CPT | Performed by: INTERNAL MEDICINE

## 2019-11-14 PROCEDURE — 85730 THROMBOPLASTIN TIME PARTIAL: CPT | Performed by: EMERGENCY MEDICINE

## 2019-11-14 PROCEDURE — G0008 ADMIN INFLUENZA VIRUS VAC: HCPCS | Performed by: INTERNAL MEDICINE

## 2019-11-14 PROCEDURE — 99211 OFF/OP EST MAY X REQ PHY/QHP: CPT | Performed by: INTERNAL MEDICINE

## 2019-11-14 PROCEDURE — 85025 COMPLETE CBC W/AUTO DIFF WBC: CPT | Performed by: EMERGENCY MEDICINE

## 2019-11-14 PROCEDURE — 99285 EMERGENCY DEPT VISIT HI MDM: CPT | Performed by: EMERGENCY MEDICINE

## 2019-11-14 PROCEDURE — 85025 COMPLETE CBC W/AUTO DIFF WBC: CPT | Performed by: PHYSICIAN ASSISTANT

## 2019-11-14 PROCEDURE — 80076 HEPATIC FUNCTION PANEL: CPT | Performed by: INTERNAL MEDICINE

## 2019-11-14 PROCEDURE — 81001 URINALYSIS AUTO W/SCOPE: CPT | Performed by: INTERNAL MEDICINE

## 2019-11-14 PROCEDURE — 90662 IIV NO PRSV INCREASED AG IM: CPT | Performed by: INTERNAL MEDICINE

## 2019-11-14 PROCEDURE — 76705 ECHO EXAM OF ABDOMEN: CPT

## 2019-11-14 PROCEDURE — 85610 PROTHROMBIN TIME: CPT | Performed by: EMERGENCY MEDICINE

## 2019-11-14 PROCEDURE — 80048 BASIC METABOLIC PNL TOTAL CA: CPT | Performed by: EMERGENCY MEDICINE

## 2019-11-14 PROCEDURE — 99223 1ST HOSP IP/OBS HIGH 75: CPT | Performed by: INTERNAL MEDICINE

## 2019-11-14 PROCEDURE — 99222 1ST HOSP IP/OBS MODERATE 55: CPT | Performed by: INTERNAL MEDICINE

## 2019-11-14 PROCEDURE — 84484 ASSAY OF TROPONIN QUANT: CPT | Performed by: EMERGENCY MEDICINE

## 2019-11-14 PROCEDURE — 84484 ASSAY OF TROPONIN QUANT: CPT | Performed by: INTERNAL MEDICINE

## 2019-11-14 PROCEDURE — 80053 COMPREHEN METABOLIC PANEL: CPT | Performed by: PHYSICIAN ASSISTANT

## 2019-11-14 PROCEDURE — 99285 EMERGENCY DEPT VISIT HI MDM: CPT

## 2019-11-14 RX ORDER — CALCIUM CARBONATE 200(500)MG
1000 TABLET,CHEWABLE ORAL DAILY PRN
Status: DISCONTINUED | OUTPATIENT
Start: 2019-11-14 | End: 2019-11-20 | Stop reason: HOSPADM

## 2019-11-14 RX ORDER — ATORVASTATIN CALCIUM 40 MG/1
40 TABLET, FILM COATED ORAL DAILY
Status: DISCONTINUED | OUTPATIENT
Start: 2019-11-14 | End: 2019-11-20 | Stop reason: HOSPADM

## 2019-11-14 RX ORDER — METOPROLOL SUCCINATE 50 MG/1
50 TABLET, EXTENDED RELEASE ORAL EVERY 12 HOURS
Status: DISCONTINUED | OUTPATIENT
Start: 2019-11-14 | End: 2019-11-20 | Stop reason: HOSPADM

## 2019-11-14 RX ORDER — DOCUSATE SODIUM 100 MG/1
100 CAPSULE, LIQUID FILLED ORAL 2 TIMES DAILY PRN
Status: DISCONTINUED | OUTPATIENT
Start: 2019-11-14 | End: 2019-11-15

## 2019-11-14 RX ORDER — ONDANSETRON 2 MG/ML
4 INJECTION INTRAMUSCULAR; INTRAVENOUS EVERY 6 HOURS PRN
Status: DISCONTINUED | OUTPATIENT
Start: 2019-11-14 | End: 2019-11-20 | Stop reason: HOSPADM

## 2019-11-14 RX ORDER — ACETAMINOPHEN 325 MG/1
650 TABLET ORAL EVERY 6 HOURS PRN
Status: DISCONTINUED | OUTPATIENT
Start: 2019-11-14 | End: 2019-11-20 | Stop reason: HOSPADM

## 2019-11-14 RX ORDER — METOPROLOL SUCCINATE 50 MG/1
50 TABLET, EXTENDED RELEASE ORAL DAILY
Status: DISCONTINUED | OUTPATIENT
Start: 2019-11-14 | End: 2019-11-14

## 2019-11-14 RX ORDER — DILTIAZEM HYDROCHLORIDE 5 MG/ML
10 INJECTION INTRAVENOUS ONCE
Status: COMPLETED | OUTPATIENT
Start: 2019-11-14 | End: 2019-11-14

## 2019-11-14 RX ADMIN — DILTIAZEM HYDROCHLORIDE 10 MG: 5 INJECTION INTRAVENOUS at 10:17

## 2019-11-14 RX ADMIN — ACETAMINOPHEN 650 MG: 325 TABLET, FILM COATED ORAL at 21:32

## 2019-11-14 RX ADMIN — DILTIAZEM HYDROCHLORIDE 10 MG/HR: 5 INJECTION INTRAVENOUS at 10:17

## 2019-11-14 RX ADMIN — DILTIAZEM HYDROCHLORIDE 5 MG/HR: 5 INJECTION INTRAVENOUS at 22:03

## 2019-11-14 RX ADMIN — DESMOPRESSIN ACETATE 40 MG: 0.2 TABLET ORAL at 14:38

## 2019-11-14 RX ADMIN — METOPROLOL SUCCINATE 50 MG: 50 TABLET, EXTENDED RELEASE ORAL at 14:41

## 2019-11-14 RX ADMIN — CEFTRIAXONE SODIUM 1000 MG: 10 INJECTION, POWDER, FOR SOLUTION INTRAVENOUS at 23:14

## 2019-11-14 RX ADMIN — APIXABAN 5 MG: 5 TABLET, FILM COATED ORAL at 18:18

## 2019-11-14 RX ADMIN — INFLUENZA A VIRUS A/MICHIGAN/45/2015 X-275 (H1N1) ANTIGEN (FORMALDEHYDE INACTIVATED), INFLUENZA A VIRUS A/SINGAPORE/INFIMH-16-0019/2016 IVR-186 (H3N2) ANTIGEN (FORMALDEHYDE INACTIVATED), AND INFLUENZA B VIRUS B/MARYLAND/15/2016 BX-69A (A B/COLORADO/6/2017-LIKE VIRUS) ANTIGEN (FORMALDEHYDE INACTIVATED) 0.5 ML: 60; 60; 60 INJECTION, SUSPENSION INTRAMUSCULAR at 14:37

## 2019-11-14 RX ADMIN — METOPROLOL SUCCINATE 50 MG: 50 TABLET, FILM COATED, EXTENDED RELEASE ORAL at 21:00

## 2019-11-14 RX ADMIN — ENOXAPARIN SODIUM 40 MG: 40 INJECTION SUBCUTANEOUS at 14:36

## 2019-11-14 NOTE — ASSESSMENT & PLAN NOTE
· Patient with PAF, has been having episodes of palpitations over last 1 week  Was found to be in AFib with [facility] and Cardiology office today    Sent to emergency department for evaluation  · Continue Cardizem drip  · Check TSH  · Not on anticoagulation  · Cardiology evaluation

## 2019-11-14 NOTE — CONSULTS
Consultation - Cardiology   Sixto Quincy 69 y o  male MRN: [id number]  Unit/Bed#: S -01 Encounter: [id number]        Assessment:   Principal Problem:    Atrial fibrillation with rapid ventricular response (HCC)  Active Problems:    Left foot drop    Dyslipidemia    Leukocytosis (leucocytosis)    Right lower quadrant abdominal pain    · New onset of Atrial Flutter: Patient is a 69year old male with a past medical history of paroxysmal atrial fibrillation, on metoprolol 50 mg daily, moderate aortic insufficiency, right ventricular enlargement, coronary artery calcification and dyslipidemia  · EKG shows heart rate 150, Atrial flutter with variable block  · Prior Echocardiogram from 02/2019 shows LV with normal systolic function and EF 82%, RV moderately dilated  LA and RA moderately dilated, mitral and tricuspid valve with mild regurgitation, moderate aortic insufficiency, and no signs of pericardial effusions  · Metoprolol dose was decreased in April from 50 mg BID to 50 mg daily  · Telemetry shows atrial flutter with heart rate in the 140-150s  · Atrial Fibrillation with Rapid Ventricular Response:  · Patient was noted to have A-fib during a prior hospitalization in 02/2019  · He follows with Dr Farhan Messina as outpatient  · He is on Metoprolol 50 mg daily for rate control  Has been in Sinus rhythm in ECGs prior to this admission  · [id number] score of 1 (age > 72)    · Dyslipidemia  · Patient is on Lipitor     · Leukocytosis:  · Management per primary team     Plan:   · Patient is scheduled for cardioversion tomorrow for rhythm control of Atrial Flutter  · Increase dose of Metoprolol Succinate to 50 mg BID for rate control  · Continue Diltiazem drip  · IMER is scheduled and pending  · Start Eliquis 5 mg BID for anticoagulation   · NPO effective at midnight  · Follow up with outpatient cardiology and electrophysiology for possible radiofrequency catheter ablation for atrial flutter    · Rest of management per primary team      History of Present Illness   Physician Requesting Consult: Alexis Syed MD  Reason for Consult / Principal Problem: Atrial Fibrillation with Rapid Ventricular Response  HPI: Summer Harris is a pleasant 69y o  year old male with a past medical history of paroxysmal atrial fibrillation, on metoprolol 50 mg daily, moderate aortic insufficiency, right ventricular enlargement, coronary artery calcification and dyslipidemia, who presents to the Emergency Department at [facility] on 11/14/2019 after being referred from his cardiologist office due to abnormal EKG showing SVT (per [facility] cardiology note)  Patient states that approximately one week ago he started to experience more fatigue than usual as well as mild generalized abdominal pain  He had an appointment scheduled with his cardiologist for next Wednesday, however he states he woke up yesterday morning feeling "off", he decided to measure his heart rate and realized it was in the 150s  This prompted him to call his cardiologist to be seen today  He denies any chest pain or shortness of breath, as well as diaphoresis, back pain, fever, cough, dizziness, lightheadedness and weakness  Patient follows with Cardiologist Dr Marylen Ano  On chart review, patient was noted to have atrial fibrillation with RVR during a previous hospital admission on 02/26/2019 in the setting of sepsis secondary to pneumonia  Echocardiogram done on 02/27/2019 showed LV with normal systolic function and EF 19%, RV moderately dilated  LA and RA moderately dilated, mitral and tricuspid valve with mild regurgitation, moderate aortic insufficiency, and no signs of pericardial effusions  Patient was then started on metoprolol and eventually converted back to sinus rhythm prior to discharge  His [id number]-[id number] score was low so anticoagulation was not recommended  Patient is currently on Lipitor 40 mg daily, and toprol 50 mg daily   He does not smoke and denies illegal substance abuse  He admits to drinking alcohol occasionally  On arrival to the ED, patient was noted to be tachycardic with a heart rate of 154 bpm, RR 18, /56 mmHg, SpO2 98%  Troponin is negative  CMP is unremarkable  CBC shows WBCs 16 08, neutrophils 81%  ECG was remarkable for atrial fibrillation with rapid ventricular response  Patient received a Cardizem bolus and was started on Cardizem drip and admitted to [place] [address]  Cardiology has been consulted for management of Atrial Fibrillation  Review of Systems   Constitutional: Positive for activity change and fatigue  Negative for diaphoresis, fever and unexpected weight change  HENT: Negative  Eyes: Negative  Respiratory: Negative for cough, chest tightness, shortness of breath, wheezing and stridor  Cardiovascular: Negative for chest pain, palpitations and leg swelling  Gastrointestinal: Negative for abdominal distention, abdominal pain, constipation, nausea and vomiting  Genitourinary: Negative for dysuria and hematuria  Musculoskeletal: Negative for arthralgias, back pain, myalgias, neck pain and neck stiffness  Skin: Negative for pallor and rash  Neurological: Positive for numbness (Left foot, chronic)  Negative for dizziness, facial asymmetry, weakness, light-headedness and headaches  Psychiatric/Behavioral: Negative            Historical Information   Past Medical History:   Diagnosis Date    Anemia     Closed nondisplaced fracture of second metatarsal bone of left foot 11/11/2018    Foot drop     Left    Left foot pain 7/11/2019    Left inguinal hernia     Managed By Sophia Andersen / last assessed 3/27/15     Pain in both feet 4/2/2019    Paroxysmal atrial fibrillation ([facility] [address] ) 2/27/2019    Pleural effusion, left 2/27/2019    Pneumonia 2/26/2019    Skin lesion     Weakness of left foot 11/7/2018    Well adult exam 1/5/2019     Past Surgical History:   Procedure Laterality Date    HERNIA REPAIR      IR THORACENTESIS  3/1/2019    LUMBAR LAMINECTOMY Left 11/9/2018    Procedure: Metrx L4-5 left hemilaminectomy and bilateral foraminotomy, Metrx L5-S1 left hemilaminectomy and microdiskectomy;  Surgeon: Stevie Mejia MD;  Location: [facility];  Service: Neurosurgery     Social History     Substance and Sexual Activity   Alcohol Use Yes    Binge frequency: Less than monthly    Comment: social      Social History     Substance and Sexual Activity   Drug Use No     Social History     Tobacco Use   Smoking Status Never Smoker   Smokeless Tobacco Never Used     Family History   Problem Relation Age of Onset    Heart disease Mother     No Known Problems Father     No Known Problems Sister     No Known Problems Brother     No Known Problems Family     Anemia Neg Hx     Arrhythmia Neg Hx     Clotting disorder Neg Hx     Heart attack Neg Hx     Heart failure Neg Hx     Hyperlipidemia Neg Hx     Hypertension Neg Hx     Fainting Neg Hx     Asthma Neg Hx        Allergies:  No Known Allergies    Medications:   Scheduled Meds:    Current Facility-Administered Medications:  acetaminophen 650 mg Oral Q6H PRN Reji Marcial MD    apixaban 5 mg Oral BID Rock Caputo MD    atorvastatin 40 mg Oral Daily Reji Marcial MD    calcium carbonate 1,000 mg Oral Daily PRN Reji Marcial MD    diltiazem 1-15 mg/hr Intravenous Titrated Reji Marcial MD Last Rate: 7 5 mg/hr (11/14/19 1443)   docusate sodium 100 mg Oral BID PRN Reji Marcial MD    metoprolol succinate 50 mg Oral Q12H Rock Caputo MD    ondansetron 4 mg Intravenous Q6H PRN Reji Marcial MD      Continuous Infusions:    diltiazem 1-15 mg/hr Last Rate: 7 5 mg/hr (11/14/19 1443)     PRN Meds:    acetaminophen 650 mg Q6H PRN   calcium carbonate 1,000 mg Daily PRN   docusate sodium 100 mg BID PRN   ondansetron 4 mg Q6H PRN       Objective:   Vitals:   Vitals:    11/14/19 1500   BP: 99/57   Pulse: 80   Resp: 18 Temp: 98 5 °F (36 9 °C)   SpO2: 96%     Body mass index is 27 63 kg/m²  Orthostatic Blood Pressures      Most Recent Value   Blood Pressure  99/57 filed at 11/14/2019 1500   Patient Position - Orthostatic VS  Sitting filed at 11/14/2019 5648        Systolic (46JKP), XEO:385 , Min:99 , MXZ:539     Diastolic (40VMW), QNZ:69, Min:56, Max:72      Intake/Output Summary (Last 24 hours) at 11/14/2019 1546  Last data filed at 11/14/2019 1230  Gross per 24 hour   Intake 120 ml   Output --   Net 120 ml     Weight (last 2 days)     Date/Time   Weight    11/14/19 1140   97 6 (215 2)    11/14/19 0954   94 8 (209)            Invasive Devices     Peripheral Intravenous Line            Peripheral IV 11/14/19 Left Antecubital less than 1 day                Physical Exam   Constitutional: He is oriented to person, place, and time  He appears well-developed and well-nourished  He is active  Non-toxic appearance  He does not have a sickly appearance  He does not appear ill  No distress  HENT:   Head: Normocephalic and atraumatic  Mouth/Throat: Oropharynx is clear and moist  No oropharyngeal exudate  Eyes: Pupils are equal, round, and reactive to light  Conjunctivae and EOM are normal  Right eye exhibits no discharge  Left eye exhibits no discharge  No scleral icterus  Neck: Normal range of motion  Neck supple  No JVD present  No tracheal deviation present  Cardiovascular: Normal heart sounds and intact distal pulses  Tachycardia present  Exam reveals no gallop and no friction rub  No murmur heard  Pulmonary/Chest: Effort normal and breath sounds normal  No stridor  No respiratory distress  He has no wheezes  He has no rales  He exhibits no tenderness  Abdominal: Soft  Bowel sounds are normal  He exhibits no distension  There is no tenderness  There is no guarding  Musculoskeletal: Normal range of motion  He exhibits no edema, tenderness or deformity     Neurological: He is alert and oriented to person, place, and time    Skin: Skin is warm and dry  Capillary refill takes less than 2 seconds  No rash noted  He is not diaphoretic  No erythema  No pallor  Psychiatric: He has a normal mood and affect  His behavior is normal  Judgment and thought content normal    Nursing note and vitals reviewed  Labs:     Results from last 7 days   Lab Units 11/14/19  1324 11/14/19  0955   TROPONIN I ng/mL <0 02 <0 02     CBC with diff:   Results from last 7 days   Lab Units 11/14/19  0955   WBC Thousand/uL 16 08*   HEMOGLOBIN g/dL 14 4   HEMATOCRIT % 44 4   MCV fL 95   PLATELETS Thousands/uL 361   MCH pg 30 7   MCHC g/dL 32 4   RDW % 13 7   MPV fL 10 1   NRBC AUTO /100 WBCs 0     CMP:  Results from last 7 days   Lab Units 11/14/19  1324 11/14/19  0955   POTASSIUM mmol/L  --  4 1   CHLORIDE mmol/L  --  99*   CO2 mmol/L  --  27   BUN mg/dL  --  13   CREATININE mg/dL  --  1 16   CALCIUM mg/dL  --  9 0   AST U/L 41  --    ALT U/L 68  --    ALK PHOS U/L 120*  --    EGFR ml/min/1 73sq m  --  62     NT-proBNP:   Lab Results   Component Value Date    NTBNP 3,566 (H) 02/26/2019      Magnesium:    Coags:  Results from last 7 days   Lab Units 11/14/19  0956   PTT seconds 39*   INR  0 98     TSH:   Results from last 7 days   Lab Units 11/14/19  1324   TSH 3RD GENERATON uIU/mL 2 539     Hemoglobin A1C:    Lipid Profile:   No results found for: CHOL  Lab Results   Component Value Date    HDL 36 (L) 06/26/2019    HDL 43 11/08/2018     Lab Results   Component Value Date    LDLCALC 58 06/26/2019    LDLCALC 114 (H) 11/08/2018     Lab Results   Component Value Date    TRIG 48 06/26/2019    TRIG 85 11/08/2018       Imaging & Testing   Cardiac testing:   EKG reviewed personally: heart rate 150, Atrial flutter with variable block  Telemetry reviewed personally: atrial flutter with heart rate in the 140-150s     Results for orders placed during the hospital encounter of 02/26/19   Echo complete with contrast if indicated    Narrative [facility] [address] [place] [address]  [phone]    Transthoracic Echocardiogram  2D, M-mode, Doppler, and Color Doppler    Study date:  2019    Patient: Wallace Boas  MR number: [id number]  Account number: [de-identified]  : 1945  Age: 68 years  Gender: Male  Status: Inpatient  Location: Bedside  Height: 74 in  Weight: 223 5 lb  BP: 107/ 50 mmHg    Indications: Congestive heart failure  Diagnoses: I50 9 - Heart failure, unspecified    Sonographer:  Nury Stacy RDCS  Primary Physician:  Germania Bermeo MD  Referring Physician:  Yashira Moyer PA-C  Group:  [facility]  Interpreting Physician:  DO HUMAIRA Saini    LEFT VENTRICLE:  Systolic function was normal  Ejection fraction was estimated to be 60 %  There were no regional wall motion abnormalities  Wall thickness was at the upper limits of normal   Left ventricular diastolic function parameters were normal     RIGHT VENTRICLE:  The ventricle was moderately dilated  Systolic function was normal     LEFT ATRIUM:  The atrium was moderately dilated  RIGHT ATRIUM:  The atrium was mildly dilated  MITRAL VALVE:  There was mild regurgitation  AORTIC VALVE:  There was moderate regurgitation  TRICUSPID VALVE:  There was mild regurgitation  PULMONIC VALVE:  There was mild to moderate regurgitation  HISTORY: PRIOR HISTORY: Patient has no history of cardiovascular disease  PROCEDURE: The procedure was performed at the bedside  This was a routine study  The transthoracic approach was used  The study included complete 2D imaging, M-mode, complete spectral Doppler, and color Doppler  The heart rate was 90 bpm,  at the start of the study  Image quality was adequate  LEFT VENTRICLE: Size was normal  Systolic function was normal  Ejection fraction was estimated to be 60 %  There were no regional wall motion abnormalities   Wall thickness was at the upper limits of normal  DOPPLER: Left ventricular  diastolic function parameters were normal     RIGHT VENTRICLE: The ventricle was moderately dilated  Systolic function was normal     LEFT ATRIUM: The atrium was moderately dilated  RIGHT ATRIUM: The atrium was mildly dilated  MITRAL VALVE: Valve structure was normal  There was normal leaflet separation  DOPPLER: The transmitral velocity was within the normal range  There was no evidence for stenosis  There was mild regurgitation  AORTIC VALVE: The valve was trileaflet  Leaflets exhibited normal thickness and normal cuspal separation  DOPPLER: Transaortic velocity was within the normal range  There was no evidence for stenosis  There was moderate regurgitation  TRICUSPID VALVE: The valve structure was normal  There was normal leaflet separation  DOPPLER: The transtricuspid velocity was within the normal range  There was no evidence for stenosis  There was mild regurgitation  The tricuspid jet  envelope definition was inadequate for estimation of RV systolic pressure  There are no indirect findings (abnormal RV volume or geometry, altered pulmonary flow velocity profile, or leftward septal displacement) which would suggest  moderate or severe pulmonary hypertension  PULMONIC VALVE: Leaflets exhibited normal thickness, no calcification, and normal cuspal separation  DOPPLER: The transpulmonic velocity was within the normal range  There was mild to moderate regurgitation  PERICARDIUM: There was no pericardial effusion  The pericardium was normal in appearance  AORTA: The root exhibited normal size      SYSTEMIC VEINS: IVC: Respirophasic changes were normal     SYSTEM MEASUREMENT TABLES    2D  %FS: 44 85 %  Ao Diam: 3 91 cm  EDV(Teich): 172 72 ml  EF(Teich): 75 38 %  ESV(Teich): 42 53 ml  IVSd: 1 04 cm  LA Area: 36 18 cm2  LA Diam: 4 94 cm  LVEDV MOD A4C: 192 68 ml  LVEF MOD A4C: 61 13 %  LVESV MOD A4C: 74 9 ml  LVIDd: 5 89 cm  LVIDs: 3 25 cm  LVLd A4C: 9 84 cm  LVLs A4C: 7 63 cm  LVPWd: 1 03 cm  RA Area: 22 72 cm2  RVIDd: 5 86 cm  SV MOD A4C: 117 78 ml  SV(Teich): 130 2 ml    CW  TR MaxP 85 mmHg  TR Vmax: 3 16 m/s    MM  TAPSE: 2 86 cm    PW  MV A Juan: 0 79 m/s  MV Dec Baxter: 4 82 m/s2  MV DecT: 216 07 ms  MV E Juan: 1 04 m/s  MV E/A Ratio: 1 32  MV PHT: 62 66 ms  MVA By PHT: 3 51 cm2    [employer] Accredited [facility]    Prepared and electronically signed by    Petr Higgins DO  Signed [id number] 44:77:02         Imaging:   I have personally reviewed pertinent reports  No results found  Xavier Dimas MD    Portions of the record may have been created with voice recognition software  Occasional wrong word or "sound a like" substitutions may have occurred due to the inherent limitations of voice recognition software  Read the chart carefully and recognize, using context, where substitutions have occurred

## 2019-11-14 NOTE — ASSESSMENT & PLAN NOTE
· Patient reports ongoing abdominal pain mostly on the right side  Denies any fever or chills  Tolerating regular diet, having regular bowel movements    Unclear etiology at this time  · Check liver function panel  · Check right upper quadrant abdominal ultrasound  · Monitor for now, with persistent may need to consider CT abdomen pelvis

## 2019-11-14 NOTE — PLAN OF CARE
Problem: CARDIOVASCULAR - ADULT  Goal: Maintains optimal cardiac output and hemodynamic stability  Description  INTERVENTIONS:  - Monitor I/O, vital signs and rhythm  - Monitor for S/S and trends of decreased cardiac output  - Administer and titrate ordered vasoactive medications to optimize hemodynamic stability  - Assess quality of pulses, skin color and temperature  - Assess for signs of decreased coronary artery perfusion  - Instruct patient to report change in severity of symptoms  11/14/2019 1542 by Elizabeth Davis RN  Outcome: Progressing  11/14/2019 1542 by Elizabeth Davis RN  Outcome: Progressing     Problem: CARDIOVASCULAR - ADULT  Goal: Absence of cardiac dysrhythmias or at baseline rhythm  Description  INTERVENTIONS:  - Continuous cardiac monitoring, vital signs, obtain 12 lead EKG if ordered  - Administer antiarrhythmic and heart rate control medications as ordered  - Monitor electrolytes and administer replacement therapy as ordered  11/14/2019 1542 by Elizabeth Davis RN  Outcome: Not Progressing  11/14/2019 1542 by Elizabeth Davis RN  Outcome: Progressing

## 2019-11-14 NOTE — PROGRESS NOTES
Patient came in due to high heart rate at home  He complained of stomach discomfort and 3 large bowel movements  His EKG showed SVT  Per Dr Magdiel Lopes it was suggested he report to the ED  Patient was taken down to the ED

## 2019-11-14 NOTE — ED PROVIDER NOTES
History  Chief Complaint   Patient presents with    Rapid Heart Rate     sent from cardiology office for abnormal ekg  pt presents with rapid heart rate; denies cp or sob  Patient referred from outpatient cardiology to the emergency department for evaluation of rapid heartbeat that brought him to the cardiologist office today  He woke up feeling palpitations and heart racing  Patient denies chest pain or sob  Denies jaw arm neck pain  Denies diaphoresis  Denies back or belly pain  Denies leg or calf pain  Does have a history of paroxysmal AFib  Patient denies fever chills or cough  Prior to Admission Medications   Prescriptions Last Dose Informant Patient Reported? Taking?    Calcium-Magnesium-Vitamin D (CALCIUM MAGNESIUM PO)  Self Yes No   Sig: Take by mouth   Cholecalciferol (VITAMIN D PO)  Self Yes No   Sig: Take by mouth   Cyanocobalamin (VITAMIN B12 PO)  Self Yes No   Sig: Take by mouth   atorvastatin (LIPITOR) 40 mg tablet  Self No No   Sig: Take 1 tablet (40 mg total) by mouth daily   chlorhexidine (PERIDEX) 0 12 % solution  Self Yes No   Sig: RINSE WITH 1/2 OZ TWO TIMES DAILY FOLLOWING BRUSHING AND FLOSSING   doxycycline (ORACEA) 40 MG capsule  Self Yes No   Sig: Take 40 mg by mouth every morning   doxycycline (PERIOSTAT) 20 MG tablet  Self Yes No   Sig: Take 20 mg by mouth 2 (two) times a day   metoprolol succinate (TOPROL-XL) 50 mg 24 hr tablet  Self No No   Sig: Take 1 tablet (50 mg total) by mouth daily      Facility-Administered Medications: None       Past Medical History:   Diagnosis Date    Anemia     Closed nondisplaced fracture of second metatarsal bone of left foot 11/11/2018    Foot drop     Left    Left foot pain 7/11/2019    Left inguinal hernia     Managed By [facility] / last assessed 3/27/15     Pain in both feet 4/2/2019    Paroxysmal atrial fibrillation ([facility] [address] ) 2/27/2019    Pleural effusion, left 2/27/2019    Pneumonia 2/26/2019    Skin lesion     Weakness of left foot 11/7/2018    Well adult exam 1/5/2019       Past Surgical History:   Procedure Laterality Date    HERNIA REPAIR      IR THORACENTESIS  3/1/2019    LUMBAR LAMINECTOMY Left 11/9/2018    Procedure: Metrx L4-5 left hemilaminectomy and bilateral foraminotomy, Metrx L5-S1 left hemilaminectomy and microdiskectomy;  Surgeon: Trista Franklin MD;  [facility];  Service: Neurosurgery       Family History   Problem Relation Age of Onset    Heart disease Mother     No Known Problems Father     No Known Problems Sister     No Known Problems Brother     No Known Problems Family     Anemia Neg Hx     Arrhythmia Neg Hx     Clotting disorder Neg Hx     Heart attack Neg Hx     Heart failure Neg Hx     Hyperlipidemia Neg Hx     Hypertension Neg Hx     Fainting Neg Hx     Asthma Neg Hx      I have reviewed and agree with the history as documented  Social History     Tobacco Use    Smoking status: Never Smoker    Smokeless tobacco: Never Used   Substance Use Topics    Alcohol use: Yes     Binge frequency: Less than monthly     Comment: social     Drug use: No        Review of Systems   Constitutional: Positive for fatigue  Negative for activity change, appetite change, chills, diaphoresis and fever  HENT: Negative  Negative for congestion, ear discharge, ear pain, sinus pressure, sinus pain, tinnitus, trouble swallowing and voice change  Eyes: Negative  Negative for photophobia and visual disturbance  Respiratory: Negative  Negative for cough, chest tightness, shortness of breath, wheezing and stridor  Cardiovascular: Positive for palpitations  Negative for chest pain and leg swelling  Gastrointestinal: Negative  Negative for abdominal distention, abdominal pain, diarrhea, nausea and vomiting  Endocrine: Negative  Genitourinary: Negative  Negative for difficulty urinating, dysuria, flank pain, hematuria, penile pain and urgency  Musculoskeletal: Negative    Negative for back pain, neck pain and neck stiffness  Skin: Negative  Negative for rash and wound  Allergic/Immunologic: Negative  Neurological: Negative for dizziness, tremors, seizures, syncope, facial asymmetry, speech difficulty, weakness, light-headedness, numbness and headaches  Hematological: Negative  Does not bruise/bleed easily  Psychiatric/Behavioral: Negative  Negative for confusion  Physical Exam  Physical Exam   Constitutional: He is oriented to person, place, and time  He appears well-developed and well-nourished  No distress  Nontoxic appearance  No respiratory distress  Patient looks comfortable sitting upright on the stretcher  HENT:   Head: Normocephalic and atraumatic  Right Ear: External ear normal    Left Ear: External ear normal    Mouth/Throat: Oropharynx is clear and moist    Eyes: Pupils are equal, round, and reactive to light  Conjunctivae and EOM are normal    Neck: Normal range of motion  Neck supple  Cardiovascular: Regular rhythm, normal heart sounds and intact distal pulses  Tachycardic   Pulmonary/Chest: Effort normal and breath sounds normal  No stridor  No respiratory distress  He has no wheezes  He has no rales  He exhibits no tenderness  Abdominal: Soft  Bowel sounds are normal  He exhibits no distension and no mass  There is no tenderness  There is no rebound and no guarding  No hernia  Musculoskeletal: Normal range of motion  He exhibits no edema, tenderness or deformity  Neurological: He is alert and oriented to person, place, and time  He has normal reflexes  He displays normal reflexes  No cranial nerve deficit or sensory deficit  He exhibits normal muscle tone  Coordination normal    Skin: Skin is warm and dry  No rash noted  He is not diaphoretic  Psychiatric: He has a normal mood and affect  His behavior is normal  Judgment and thought content normal    Nursing note and vitals reviewed        Vital Signs  ED Triage Vitals [11/14/19 0954] Temperature Pulse Respirations Blood Pressure SpO2   97 6 °F (36 4 °C) (!) 154 18 111/56 98 %      Temp Source Heart Rate Source Patient Position - Orthostatic VS BP Location FiO2 (%)   Oral Monitor -- -- --      Pain Score       --           Vitals:    11/14/19 0954 11/14/19 1015   BP: 111/56 107/69   Pulse: (!) 154 (!) 150         Visual Acuity      ED Medications  Medications   diltiazem (CARDIZEM) 125 mg in sodium chloride 0 9 % 125 mL infusion (10 mg/hr Intravenous New Bag 11/14/19 1017)   diltiazem (CARDIZEM) injection 10 mg (10 mg Intravenous Given 11/14/19 1017)       Diagnostic Studies  Results Reviewed     Procedure Component Value Units Date/Time    Troponin I [[id number]]  (Normal) Collected:  11/14/19 0955    Lab Status:  Final result Specimen:  Blood from Arm, Left Updated:  11/14/19 1033     Troponin I <0 02 ng/mL     Basic metabolic panel [[id number]]  (Abnormal) Collected:  11/14/19 0955    Lab Status:  Final result Specimen:  Blood from Arm, Left Updated:  11/14/19 1026     Sodium 136 mmol/L      Potassium 4 1 mmol/L      Chloride 99 mmol/L      CO2 27 mmol/L      ANION GAP 10 mmol/L      BUN 13 mg/dL      Creatinine 1 16 mg/dL      Glucose 98 mg/dL      Calcium 9 0 mg/dL      eGFR 62 ml/min/1 73sq m     Narrative:       Jase guidelines for Chronic Kidney Disease (CKD):     Stage 1 with normal or high GFR (GFR > 90 mL/min/1 73 square meters)    Stage 2 Mild CKD (GFR = 60-89 mL/min/1 73 square meters)    Stage 3A Moderate CKD (GFR = 45-59 mL/min/1 73 square meters)    Stage 3B Moderate CKD (GFR = 30-44 mL/min/1 73 square meters)    Stage 4 Severe CKD (GFR = 15-29 mL/min/1 73 square meters)    Stage 5 End Stage CKD (GFR <15 mL/min/1 73 square meters)  Note: GFR calculation is accurate only with a steady state creatinine    Protime-INR [[id number]]  (Normal) Collected:  11/14/19 0956    Lab Status:  Final result Specimen:  Blood from Arm, Left Updated:  11/14/19 1010 Protime 12 4 seconds      INR 0 98    APTT [[id number]]  (Abnormal) Collected:  11/14/19 0956    Lab Status:  Final result Specimen:  Blood from Arm, Left Updated:  11/14/19 1010     PTT 39 seconds     CBC and differential [[id number]]  (Abnormal) Collected:  11/14/19 0955    Lab Status:  Final result Specimen:  Blood from Arm, Left Updated:  11/14/19 1001     WBC 16 08 Thousand/uL      RBC 4 69 Million/uL      Hemoglobin 14 4 g/dL      Hematocrit 44 4 %      MCV 95 fL      MCH 30 7 pg      MCHC 32 4 g/dL      RDW 13 7 %      MPV 10 1 fL      Platelets 812 Thousands/uL      nRBC 0 /100 WBCs      Neutrophils Relative 81 %      Immat GRANS % 1 %      Lymphocytes Relative 9 %      Monocytes Relative 9 %      Eosinophils Relative 0 %      Basophils Relative 0 %      Neutrophils Absolute 12 95 Thousands/µL      Immature Grans Absolute 0 12 Thousand/uL      Lymphocytes Absolute 1 44 Thousands/µL      Monocytes Absolute 1 46 Thousand/µL      Eosinophils Absolute 0 06 Thousand/µL      Basophils Absolute 0 05 Thousands/µL                  No orders to display              Procedures  ECG 12 Lead Documentation Only  Date/Time: 11/14/2019 10:15 AM  Performed by: Ramon Alvarado MD  Authorized by: Ramon Alvarado MD     ECG reviewed by me, the ED Provider: yes    Patient location:  ED  Previous ECG:     Previous ECG:  Compared to current    Similarity:  Changes noted  Interpretation:     Interpretation: abnormal    Rate:     ECG rate:  153    ECG rate assessment: normal    Rhythm:     Rhythm: atrial fibrillation    QRS:     QRS axis:  Normal    QRS intervals:  Normal  Conduction:     Conduction: abnormal      Abnormal conduction: LAFB    ST segments:     ST segments:  Non-specific  T waves:     T waves: non-specific      CriticalCare Time  Performed by: Ramon Alvarado MD  Authorized by: Ramon Alvarado MD     Critical care provider statement:     Critical care time (minutes):  50           ED Course  ED Course as of Nov [facility] Nov 14, 2019   1042 Patient to be admitted to the hospitalist service  Cardizem in a bolus and infusion as begun  Troponin unremarkable  EKG without ischemia  1048 This case discussed with Dr Marcial of the hospitalist service was accepted this patient  MDM    Disposition  Final diagnoses:   Atrial fibrillation with rapid ventricular response ([address] )     Time reflects when diagnosis was documented in both MDM as applicable and the Disposition within this note     Time User Action Codes Description Comment    11/14/2019 10:58 AM Tatiana Box Add [I48 91] Atrial fibrillation with rapid ventricular response [facility])       ED Disposition     ED Disposition Condition Date/Time Comment    Admit Stable u Nov 14, 2019 10:49 AM Case was discussed with Dr Rush Waite and the patient's admission status was agreed to be Admission Status: inpatient status to the service of Dr Andrea Kennedy    None         Patient's Medications   Discharge Prescriptions    No medications on file     No discharge procedures on file      ED Provider  Electronically Signed by           Guero Wynn MD  11/14/19 Armani Ayala MD  11/14/19 1321

## 2019-11-14 NOTE — H&P
H&P- Ernestina Blanc 1945, 69 y o  male MRN: [id number]    Unit/Bed#: S -01 Encounter: [id number]    Primary Care Provider: Thelma Pina DO   Date and time admitted to hospital: 11/14/2019  9:38 AM    * Atrial fibrillation with rapid ventricular response ([address] )  Assessment & Plan  · Patient with PAF, has been having episodes of palpitations over last 1 week  Was found to be in AFib with RVR and Cardiology office today  Sent to emergency department for evaluation  · Continue Cardizem drip  · Check TSH  · Not on anticoagulation  · Cardiology evaluation    Right lower quadrant abdominal pain  Assessment & Plan  · Patient reports ongoing abdominal pain mostly on the right side  Denies any fever or chills  Tolerating regular diet, having regular bowel movements  Unclear etiology at this time  · Check liver function panel  · Check right upper quadrant abdominal ultrasound  · Monitor for now, with persistent may need to consider CT abdomen pelvis    Dyslipidemia  Assessment & Plan  · Continue statin    Leukocytosis (leucocytosis)  Assessment & Plan  · Unclear etiology  · Check right upper quadrant ultrasound  · Check UA with reflex culture    Left foot drop  Assessment & Plan  · Due to lumbar disc herniation status post laminectomy  · Continue foot brace    VTE Prophylaxis: Enoxaparin (Lovenox)    Code Status:  Full code  POLST: There is no POLST form on file for this patient (pre-hospital)  Discussion with family:     Anticipated Length of Stay:  Patient will be admitted on an Inpatient basis with an anticipated length of stay of  > 2 midnights  Justification for Hospital Stay:  AFib with RVR    Total Time for Visit, including Counseling / Coordination of Care: 70 minutes  Greater than 50% of this total time spent on direct patient counseling and coordination of care      Chief Complaint:   Palpitation    History of Present Illness:    Ernestina Blanc is a 69 y o  male with past medical significant for paroxysmal atrial fibrillation, dyslipidemia and left footdrop who presents with palpitations  Patient has been having intermittent episodes of palpitations, generally felt tired and fatigued  Denies any shortness of breath  He had schedule appointment next week with Cardiology, however he called the office and made appointment for today  Upon evaluation in Cardiology office patient was noted to have AFib with RVR  He was sent to the emergency department for further evaluation  He reports history of paroxysmal atrial fibrillation, last episode of AFib when he had pneumonia and sepsis  Since then he reports not having any episodes of AFib  Denies fever chills or rigors  No urinary symptoms  Denies cough or phlegm  Review of Systems:    Review of Systems  Twelve point review systems negative except noted  Past Medical and Surgical History:     Past Medical History:   Diagnosis Date    Anemia     Closed nondisplaced fracture of second metatarsal bone of left foot 11/11/2018    Foot drop     Left    Left foot pain 7/11/2019    Left inguinal hernia     Managed By [facility] / last assessed 3/27/15     Pain in both feet 4/2/2019    Paroxysmal atrial fibrillation ([facility] [address] ) 2/27/2019    Pleural effusion, left 2/27/2019    Pneumonia 2/26/2019    Skin lesion     Weakness of left foot 11/7/2018    Well adult exam 1/5/2019       Past Surgical History:   Procedure Laterality Date    HERNIA REPAIR      IR THORACENTESIS  3/1/2019    LUMBAR LAMINECTOMY Left 11/9/2018    Procedure: Metrx L4-5 left hemilaminectomy and bilateral foraminotomy, Metrx L5-S1 left hemilaminectomy and microdiskectomy;  Surgeon: Kierra Daniel MD;  [facility];  Service: Neurosurgery       Meds/Allergies:    Prior to Admission medications    Medication Sig Start Date End Date Taking?  Authorizing Provider   atorvastatin (LIPITOR) 40 mg tablet Take 1 tablet (40 mg total) by mouth daily 4/26/19   Jessica Craig,  Calcium-Magnesium-Vitamin D (CALCIUM MAGNESIUM PO) Take by mouth    Historical Provider, MD   chlorhexidine (PERIDEX) 0 12 % solution RINSE WITH 1/2 OZ TWO TIMES DAILY FOLLOWING BRUSHING AND FLOSSING 5/15/18   Historical Provider, MD   Cholecalciferol (VITAMIN D PO) Take by mouth    Historical Provider, MD   Cyanocobalamin (VITAMIN B12 PO) Take by mouth    Historical Provider, MD   doxycycline (ORACEA) 40 MG capsule Take 40 mg by mouth every morning    Historical Provider, MD   doxycycline (PERIOSTAT) 20 MG tablet Take 20 mg by mouth 2 (two) times a day 7/9/19   Historical Provider, MD   metoprolol succinate (TOPROL-XL) 50 mg 24 hr tablet Take 1 tablet (50 mg total) by mouth daily 4/26/19   Petr Higgins, DO     I have reviewed home medications with patient personally  Allergies: No Known Allergies    Social History:     Marital Status: /Civil Union   Occupation:   Patient Pre-hospital Living Situation:  Lives alone  Patient Pre-hospital Level of Mobility:  Limited due to footdrop  Patient Pre-hospital Diet Restrictions:  None  Substance Use History:   Social History     Substance and Sexual Activity   Alcohol Use Yes    Binge frequency: Less than monthly    Comment: social      Social History     Tobacco Use   Smoking Status Never Smoker   Smokeless Tobacco Never Used     Social History     Substance and Sexual Activity   Drug Use No       Family History:    non-contributory    Physical Exam:     Vitals:   Blood Pressure: 113/57 (11/14/19 1256)  Pulse: (!) 147 (11/14/19 1256)  Temperature: 98 °F (36 7 °C) (11/14/19 1140)  Temp Source: Oral (11/14/19 1140)  Respirations: 18 (11/14/19 1140)  Height: 6' 2" (188 cm) (11/14/19 1140)  Weight - Scale: 97 6 kg (215 lb 3 2 oz) (11/14/19 1140)  SpO2: 96 % (11/14/19 1140)    Physical Exam     Gen -Patient comfortable at rest  Neck- Supple   No thyromegaly or lymphadenopathy  Lungs-Clear bilaterally without any wheeze or rales   Heart S1-S2, irregular rhythm, tachycardia  Abdomen-soft nontender, no organomegaly  Bowel sounds present  Extremities-no cyanosi,  clubbing or edema  Skin- no rash  Neuro-nonfocal     Additional Data:     Lab Results: I have personally reviewed pertinent reports  Results from last 7 days   Lab Units 11/14/19  0955   WBC Thousand/uL 16 08*   HEMOGLOBIN g/dL 14 4   HEMATOCRIT % 44 4   PLATELETS Thousands/uL 361   NEUTROS PCT % 81*   LYMPHS PCT % 9*   MONOS PCT % 9   EOS PCT % 0     Results from last 7 days   Lab Units 11/14/19  0955   SODIUM mmol/L 136   POTASSIUM mmol/L 4 1   CHLORIDE mmol/L 99*   CO2 mmol/L 27   BUN mg/dL 13   CREATININE mg/dL 1 16   ANION GAP mmol/L 10   CALCIUM mg/dL 9 0   GLUCOSE RANDOM mg/dL 98     Results from last 7 days   Lab Units 11/14/19  0956   INR  0 98                   Imaging: I have personally reviewed pertinent reports  US abdomen complete    (Results Pending)       EKG, Pathology, and Other Studies Reviewed on Admission:   · EKG: afib with RVR    Allscripts / Epic Records Reviewed: Yes     ** Please Note: This note has been constructed using a voice recognition system   **

## 2019-11-15 PROBLEM — K35.32 PERFORATED APPENDICITIS: Status: ACTIVE | Noted: 2019-11-15

## 2019-11-15 PROBLEM — K37 APPENDICITIS: Status: ACTIVE | Noted: 2019-11-14

## 2019-11-15 LAB
ANION GAP SERPL CALCULATED.3IONS-SCNC: 11 MMOL/L (ref 4–13)
ATRIAL RATE: 145 BPM
ATRIAL RATE: 146 BPM
ATRIAL RATE: 146 BPM
ATRIAL RATE: 150 BPM
ATRIAL RATE: 159 BPM
ATRIAL RATE: 300 BPM
ATRIAL RATE: 340 BPM
ATRIAL RATE: 73 BPM
BUN SERPL-MCNC: 14 MG/DL (ref 5–25)
CALCIUM SERPL-MCNC: 8.7 MG/DL (ref 8.3–10.1)
CHLORIDE SERPL-SCNC: 100 MMOL/L (ref 100–108)
CO2 SERPL-SCNC: 26 MMOL/L (ref 21–32)
CREAT SERPL-MCNC: 1.27 MG/DL (ref 0.6–1.3)
ERYTHROCYTE [DISTWIDTH] IN BLOOD BY AUTOMATED COUNT: 13.8 % (ref 11.6–15.1)
GFR SERPL CREATININE-BSD FRML MDRD: 56 ML/MIN/1.73SQ M
GLUCOSE SERPL-MCNC: 106 MG/DL (ref 65–140)
HCT VFR BLD AUTO: 43.9 % (ref 36.5–49.3)
HGB BLD-MCNC: 13.8 G/DL (ref 12–17)
MAGNESIUM SERPL-MCNC: 2.2 MG/DL (ref 1.6–2.6)
MCH RBC QN AUTO: 30.7 PG (ref 26.8–34.3)
MCHC RBC AUTO-ENTMCNC: 31.4 G/DL (ref 31.4–37.4)
MCV RBC AUTO: 98 FL (ref 82–98)
P AXIS: -46 DEGREES
P AXIS: 118 DEGREES
P AXIS: 261 DEGREES
P AXIS: 29 DEGREES
P AXIS: 79 DEGREES
P AXIS: 79 DEGREES
P AXIS: 92 DEGREES
PLATELET # BLD AUTO: 398 THOUSANDS/UL (ref 149–390)
PMV BLD AUTO: 10.3 FL (ref 8.9–12.7)
POTASSIUM SERPL-SCNC: 3.7 MMOL/L (ref 3.5–5.3)
PR INTERVAL: 126 MS
PR INTERVAL: 158 MS
PR INTERVAL: 200 MS
PR INTERVAL: 56 MS
PR INTERVAL: 96 MS
PR INTERVAL: 96 MS
QRS AXIS: -23 DEGREES
QRS AXIS: -32 DEGREES
QRS AXIS: -47 DEGREES
QRS AXIS: -48 DEGREES
QRS AXIS: -57 DEGREES
QRS AXIS: 16 DEGREES
QRS AXIS: 21 DEGREES
QRS AXIS: 22 DEGREES
QRSD INTERVAL: 102 MS
QRSD INTERVAL: 116 MS
QRSD INTERVAL: 88 MS
QRSD INTERVAL: 90 MS
QRSD INTERVAL: 98 MS
QRSD INTERVAL: 98 MS
QT INTERVAL: 262 MS
QT INTERVAL: 280 MS
QT INTERVAL: 290 MS
QT INTERVAL: 320 MS
QT INTERVAL: 338 MS
QT INTERVAL: 344 MS
QT INTERVAL: 358 MS
QT INTERVAL: 414 MS
QTC INTERVAL: 418 MS
QTC INTERVAL: 426 MS
QTC INTERVAL: 442 MS
QTC INTERVAL: 456 MS
QTC INTERVAL: 458 MS
QTC INTERVAL: 497 MS
QTC INTERVAL: 526 MS
QTC INTERVAL: 536 MS
RBC # BLD AUTO: 4.5 MILLION/UL (ref 3.88–5.62)
SODIUM SERPL-SCNC: 137 MMOL/L (ref 136–145)
T WAVE AXIS: -2 DEGREES
T WAVE AXIS: 21 DEGREES
T WAVE AXIS: 29 DEGREES
T WAVE AXIS: 3 DEGREES
T WAVE AXIS: 32 DEGREES
T WAVE AXIS: 49 DEGREES
T WAVE AXIS: 52 DEGREES
T WAVE AXIS: 55 DEGREES
VENTRICULAR RATE: 145 BPM
VENTRICULAR RATE: 146 BPM
VENTRICULAR RATE: 146 BPM
VENTRICULAR RATE: 150 BPM
VENTRICULAR RATE: 150 BPM
VENTRICULAR RATE: 153 BPM
VENTRICULAR RATE: 73 BPM
VENTRICULAR RATE: 85 BPM
WBC # BLD AUTO: 18.13 THOUSAND/UL (ref 4.31–10.16)

## 2019-11-15 PROCEDURE — 83735 ASSAY OF MAGNESIUM: CPT | Performed by: INTERNAL MEDICINE

## 2019-11-15 PROCEDURE — 93010 ELECTROCARDIOGRAM REPORT: CPT | Performed by: INTERNAL MEDICINE

## 2019-11-15 PROCEDURE — 93005 ELECTROCARDIOGRAM TRACING: CPT

## 2019-11-15 PROCEDURE — 85027 COMPLETE CBC AUTOMATED: CPT | Performed by: INTERNAL MEDICINE

## 2019-11-15 PROCEDURE — 80048 BASIC METABOLIC PNL TOTAL CA: CPT | Performed by: INTERNAL MEDICINE

## 2019-11-15 PROCEDURE — 99232 SBSQ HOSP IP/OBS MODERATE 35: CPT | Performed by: INTERNAL MEDICINE

## 2019-11-15 RX ORDER — SODIUM CHLORIDE, SODIUM LACTATE, POTASSIUM CHLORIDE, CALCIUM CHLORIDE 600; 310; 30; 20 MG/100ML; MG/100ML; MG/100ML; MG/100ML
125 INJECTION, SOLUTION INTRAVENOUS CONTINUOUS
Status: DISCONTINUED | OUTPATIENT
Start: 2019-11-15 | End: 2019-11-16

## 2019-11-15 RX ORDER — HYDROMORPHONE HCL/PF 1 MG/ML
1 SYRINGE (ML) INJECTION EVERY 4 HOURS PRN
Status: DISCONTINUED | OUTPATIENT
Start: 2019-11-15 | End: 2019-11-20 | Stop reason: HOSPADM

## 2019-11-15 RX ORDER — HYDROMORPHONE HCL/PF 1 MG/ML
0.5 SYRINGE (ML) INJECTION EVERY 4 HOURS PRN
Status: DISCONTINUED | OUTPATIENT
Start: 2019-11-15 | End: 2019-11-20 | Stop reason: HOSPADM

## 2019-11-15 RX ORDER — HYDROMORPHONE HCL/PF 1 MG/ML
0.2 SYRINGE (ML) INJECTION EVERY 4 HOURS PRN
Status: DISCONTINUED | OUTPATIENT
Start: 2019-11-15 | End: 2019-11-20 | Stop reason: HOSPADM

## 2019-11-15 RX ADMIN — METOPROLOL SUCCINATE 50 MG: 50 TABLET, FILM COATED, EXTENDED RELEASE ORAL at 21:59

## 2019-11-15 RX ADMIN — ACETAMINOPHEN 650 MG: 325 TABLET, FILM COATED ORAL at 20:43

## 2019-11-15 RX ADMIN — METRONIDAZOLE 500 MG: 500 INJECTION, SOLUTION INTRAVENOUS at 00:53

## 2019-11-15 RX ADMIN — METOPROLOL SUCCINATE 50 MG: 50 TABLET, FILM COATED, EXTENDED RELEASE ORAL at 11:55

## 2019-11-15 RX ADMIN — DESMOPRESSIN ACETATE 40 MG: 0.2 TABLET ORAL at 08:23

## 2019-11-15 RX ADMIN — PIPERACILLIN SODIUM AND TAZOBACTAM SODIUM 3.38 G: 36; 4.5 INJECTION, POWDER, FOR SOLUTION INTRAVENOUS at 11:56

## 2019-11-15 RX ADMIN — METRONIDAZOLE 500 MG: 500 INJECTION, SOLUTION INTRAVENOUS at 06:33

## 2019-11-15 RX ADMIN — SODIUM CHLORIDE, SODIUM LACTATE, POTASSIUM CHLORIDE, AND CALCIUM CHLORIDE 125 ML/HR: .6; .31; .03; .02 INJECTION, SOLUTION INTRAVENOUS at 22:01

## 2019-11-15 RX ADMIN — PIPERACILLIN SODIUM AND TAZOBACTAM SODIUM 3.38 G: 36; 4.5 INJECTION, POWDER, FOR SOLUTION INTRAVENOUS at 19:07

## 2019-11-15 RX ADMIN — SODIUM CHLORIDE, SODIUM LACTATE, POTASSIUM CHLORIDE, AND CALCIUM CHLORIDE 125 ML/HR: .6; .31; .03; .02 INJECTION, SOLUTION INTRAVENOUS at 10:28

## 2019-11-15 NOTE — PLAN OF CARE
Problem: CARDIOVASCULAR - ADULT  Goal: Maintains optimal cardiac output and hemodynamic stability  Description  INTERVENTIONS:  - Monitor I/O, vital signs and rhythm  - Monitor for S/S and trends of decreased cardiac output  - Administer and titrate ordered vasoactive medications to optimize hemodynamic stability  - Assess quality of pulses, skin color and temperature  - Assess for signs of decreased coronary artery perfusion  - Instruct patient to report change in severity of symptoms  Outcome: Progressing  Goal: Absence of cardiac dysrhythmias or at baseline rhythm  Description  INTERVENTIONS:  - Continuous cardiac monitoring, vital signs, obtain 12 lead EKG if ordered  - Administer antiarrhythmic and heart rate control medications as ordered  - Monitor electrolytes and administer replacement therapy as ordered  Outcome: Progressing     Problem: PAIN - ADULT  Goal: Verbalizes/displays adequate comfort level or baseline comfort level  Description  Interventions:  - Encourage patient to monitor pain and request assistance  - Assess pain using appropriate pain scale  - Administer analgesics based on type and severity of pain and evaluate response  - Implement non-pharmacological measures as appropriate and evaluate response  - Consider cultural and social influences on pain and pain management  - Notify physician/advanced practitioner if interventions unsuccessful or patient reports new pain  Outcome: Progressing     Problem: INFECTION - ADULT  Goal: Absence or prevention of progression during hospitalization  Description  INTERVENTIONS:  - Assess and monitor for signs and symptoms of infection  - Monitor lab/diagnostic results  - Monitor all insertion sites, i e  indwelling lines, tubes, and drains  - Monitor endotracheal if appropriate and nasal secretions for changes in amount and color  - [place] appropriate cooling/warming therapies per order  - Administer medications as ordered  - Instruct and encourage patient and family to use good hand hygiene technique  - Identify and instruct in appropriate isolation precautions for identified infection/condition  Outcome: Progressing  Goal: Absence of fever/infection during neutropenic period  Description  INTERVENTIONS:  - Monitor WBC    Outcome: Progressing     Problem: SAFETY ADULT  Goal: Patient will remain free of falls  Description  INTERVENTIONS:  - Assess patient frequently for physical needs  -  Identify cognitive and physical deficits and behaviors that affect risk of falls    -  [place] fall precautions as indicated by assessment   - Educate patient/family on patient safety including physical limitations  - Instruct patient to call for assistance with activity based on assessment  - Modify environment to reduce risk of injury  - Consider OT/PT consult to assist with strengthening/mobility  Outcome: Progressing  Goal: Maintain or return to baseline ADL function  Description  INTERVENTIONS:  -  Assess patient's ability to carry out ADLs; assess patient's baseline for ADL function and identify physical deficits which impact ability to perform ADLs (bathing, care of mouth/teeth, toileting, grooming, dressing, etc )  - Assess/evaluate cause of self-care deficits   - Assess range of motion  - Assess patient's mobility; develop plan if impaired  - Assess patient's need for assistive devices and provide as appropriate  - Encourage maximum independence but intervene and supervise when necessary  - Involve family in performance of ADLs  - Assess for home care needs following discharge   - Consider OT consult to assist with ADL evaluation and planning for discharge  - Provide patient education as appropriate  Outcome: Progressing  Goal: Maintain or return mobility status to optimal level  Description  INTERVENTIONS:  - Assess patient's baseline mobility status (ambulation, transfers, stairs, etc )    - Identify cognitive and physical deficits and behaviors that affect mobility  - Identify mobility aids required to assist with transfers and/or ambulation (gait belt, sit-to-stand, lift, walker, cane, etc )  - [place] fall precautions as indicated by assessment  - Record patient progress and toleration of activity level on Mobility SBAR; progress patient to next Phase/Stage  - Instruct patient to call for assistance with activity based on assessment  - Consider rehabilitation consult to assist with strengthening/weightbearing, etc   Outcome: Progressing     Problem: DISCHARGE PLANNING  Goal: Discharge to home or other facility with appropriate resources  Description  INTERVENTIONS:  - Identify barriers to discharge w/patient and caregiver  - Arrange for needed discharge resources and transportation as appropriate  - Identify discharge learning needs (meds, wound care, etc )  - Arrange for interpretive services to assist at discharge as needed  - Refer to Case Management Department for coordinating discharge planning if the patient needs post-hospital services based on physician/advanced practitioner order or complex needs related to functional status, cognitive ability, or social support system  Outcome: Progressing     Problem: Knowledge Deficit  Goal: Patient/family/caregiver demonstrates understanding of disease process, treatment plan, medications, and discharge instructions  Description  Complete learning assessment and assess knowledge base  Interventions:  - Provide teaching at level of understanding  - Provide teaching via preferred learning methods  Outcome: Progressing     Problem: Nutrition/Hydration-ADULT  Goal: Nutrient/Hydration intake appropriate for improving, restoring or maintaining nutritional needs  Description  Monitor and assess patient's nutrition/hydration status for malnutrition  Collaborate with interdisciplinary team and initiate plan and interventions as ordered  Monitor patient's weight and dietary intake as ordered or per policy   Utilize nutrition screening tool and intervene as necessary  Determine patient's food preferences and provide high-protein, high-caloric foods as appropriate  INTERVENTIONS:  - Monitor oral intake, urinary output, labs, and treatment plans  - Assess nutrition and hydration status and recommend course of action  - Evaluate amount of meals eaten  - Assist patient with eating if necessary   - Allow adequate time for meals  - Recommend/ encourage appropriate diets, oral nutritional supplements, and vitamin/mineral supplements  - Order, calculate, and assess calorie counts as needed  - Recommend, monitor, and adjust tube feedings and TPN/PPN based on assessed needs  - Assess need for intravenous fluids  - Provide specific nutrition/hydration education as appropriate  - Include patient/family/caregiver in decisions related to nutrition  Outcome: Progressing     Problem: Potential for Falls  Goal: Patient will remain free of falls  Description  INTERVENTIONS:  - Assess patient frequently for physical needs  -  Identify cognitive and physical deficits and behaviors that affect risk of falls    -  [place] fall precautions as indicated by assessment   - Educate patient/family on patient safety including physical limitations  - Instruct patient to call for assistance with activity based on assessment  - Modify environment to reduce risk of injury  - Consider OT/PT consult to assist with strengthening/mobility  Outcome: Progressing

## 2019-11-15 NOTE — QUICK NOTE
Was called to the patient's room by nursing staff in regards to patient having a fever of 101 9  When I wanted to talk to the patient he denies any symptoms such as fever, chills, chest pain, shortness of breath, nausea, or vomiting  He did state that he was having some right lower quadrant abdominal pain, which he has had for the past couple days  Patient also stated that the last time he went into AFib he had pneumonia, and this turned into sepsis  During my exam patient's lungs were clear to auscultation bilaterally, heart was irregularly irregular, but rate controlled  During the abdominal exam patient was exquisitely tender to the right lower quadrant with soft and deep palpation  He was slightly rigid in the right lower quadrant as well  Plan is to obtain CT scan of the abdomen for suspected appendicitis  Will order stat CMP, lactic acid, CBC  Patient meets SIRS criteria currently with fever, and leukocytosis of 16  Addendum: received a call from the radiologist office who informed me that patient had an inflamed appendix suspicious for acute appendicitis with possible perforation  Spoke with Dr Aidee Olivo of Surgery who instructed me to hold patients eliquis and make NPO after midnight  Agreed to start patient on ceftriaxone and flagyl  They will see the patient in the AM and do not feel he need immediate surgery

## 2019-11-15 NOTE — UTILIZATION REVIEW
Initial Clinical Review    Admission: Date/Time/Statement: Inpatient Admission Orders (From admission, onward)     Ordered        11/14/19 1049  Inpatient Admission  Once                   Orders Placed This Encounter   Procedures    Inpatient Admission     Standing Status:   Standing     Number of Occurrences:   1     Order Specific Question:   Admitting Physician     Answer:   Emeka Story     Order Specific Question:   Level of Care     Answer:   Med Surg [16]     Order Specific Question:   Estimated length of stay     Answer:   More than 2 Midnights     Order Specific Question:   Certification     Answer:   I certify that inpatient services are medically necessary for this patient for a duration of greater than two midnights  See H&P and MD Progress Notes for additional information about the patient's course of treatment  ED Arrival Information     Expected Arrival Acuity Means of Arrival Escorted By Service Admission Type    - 11/14/2019 09:30 Emergent Walk-In Family Member General Medicine Emergency    Arrival Complaint    abnormal ekg        Chief Complaint   Patient presents with    Rapid Heart Rate     sent from cardiology office for abnormal ekg  pt presents with rapid heart rate; denies cp or sob  Assessment/Plan:     69year old male presents to ed from outpatient cardiology for rapid heart beat  PMHX  Anemia, paroxysmal atrial fibrillation and left pleural effusion  On arrival   tachycradia present  Ekg show atrial fibrillation and non specific  T waves  Treated in ed for rapid a fib with iv cardizem gtt  Admit to inpatient medical surgical for atrial fibrillation with rapid ventricular response  And right lower quadrant pain  Plan to consult cardiology, monitor on telemetry and continue cardizem gtt  Monitor right lower quadrant pain, check liver function panel and obtain right upper quadrant ultrasound  If pain persists obtain Ct       Patient states  abdominal pain on the right side  Is 4 10  Temp rodrigo to 101 9 , wbc  16 08   CT of the abdomen shows appendicitis with possible perforation  Surgery consulted and recommends iv antibiotics and hold off on surgery unless condition begins to worsen  Start iv piperacillin and iv fluids  Continue iv cardizem        ED Triage Vitals   11/14/19 0954 11/14/19 0954 11/14/19 0954 11/14/19 0954 11/14/19 0954   97 6 °F (36 4 °C) (!) 154 18 111/56 98 %      Oral          No Pain       11/14/19 97 6 kg (215 lb 3 2 oz)     Additional Vital Signs:   Date/Time  Temp  Pulse  Resp  BP  MAP (mmHg)  SpO2  O2 Device   11/15/19 0700  98 3 °F (36 8 °C)  74  18  127/62  88  99 %  None (Room air)   11/15/19 0300  98 2 °F (36 8 °C)  75  18  108/67  --  --  --   11/14/19 2200  --  87  18  99/50  --  96 %  None (Room air)   11/14/19 2128  101 9 °F (38 8 °C)Abnormal   --  --  --  --  --  --   11/14/19 2045  --  --  --  149/67  --  --  --   11/14/19 1735  --  --  --  108/59  --  --  --   11/14/19 1600  --  --  --  102/58  --  --  --   11/14/19 1500  98 5 °F (36 9 °C)  80  18  99/57  --  96 %  None (Room air)   11/14/19 1441  --  89  --  116/56  --  --  --   11/14/19 1256  --  147Abnormal   --  113/57  --  --  --   11/14/19 1140  98 °F (36 7 °C)  146Abnormal   18  112/72  83  96 %  None (Room air)   11/14/19 1100  --  146Abnormal   --  103/66  --  97 %  --   11/14/19 1045  --  144Abnormal   --  104/69  --  97 %  --   11/14/19 1015  --  150Abnormal   --  107/69  --  97 %  --             Pertinent Labs/Diagnostic Test Results:     11-14-19 1015 am   ECG rate:  153    ECG rate assessment: normal    Rhythm:     Rhythm: atrial fibrillation    QRS:     QRS axis:  Normal    QRS intervals:  Normal  Conduction:     Conduction: abnormal      Abnormal conduction: LAFB    ST segments:     ST segments:  Non-specific  T waves:     T waves: non-specific          CT abdomen and pelvis 11-14-19 2224   Indication tachycardia, fever and pain   Extensive right lower quadrant inflammation obscuring the retrocecal appendix   Distention of the appendix with several phleboliths, consistent with acute appendicitis   No free intraperitoneal air or organized fluid collection however findings are concerning for early contained perforation, likely at the base of the appendix      Results from last 7 days   Lab Units 11/15/19  0429 11/14/19  2208 11/14/19  1651 11/14/19  0955   WBC Thousand/uL 18 13* 15 48*  --  16 08*   HEMOGLOBIN g/dL 13 8 12 4  --  14 4   HEMATOCRIT % 43 9 38 0  --  44 4   PLATELETS Thousands/uL 398* 344 404* 361   NEUTROS ABS Thousands/µL  --  12 34*  --  12 95*         Results from last 7 days   Lab Units 11/15/19  0427 11/14/19  2208 11/14/19  0955   SODIUM mmol/L 137 135* 136   POTASSIUM mmol/L 3 7 4 0 4 1   CHLORIDE mmol/L 100 101 99*   CO2 mmol/L 26 23 27   ANION GAP mmol/L 11 11 10   BUN mg/dL 14 15 13   CREATININE mg/dL 1 27 1 10 1 16   EGFR ml/min/1 73sq m 56 66 62   CALCIUM mg/dL 8 7 8 3 9 0   MAGNESIUM mg/dL 2 2  --   --      Results from last 7 days   Lab Units 11/14/19 2208 11/14/19  1324   AST U/L 43 41   ALT U/L 61 68   ALK PHOS U/L 112 120*   TOTAL PROTEIN g/dL 6 3* 7 4   ALBUMIN g/dL 2 5* 3 1*   TOTAL BILIRUBIN mg/dL 0 70 0 90   BILIRUBIN DIRECT mg/dL  --  0 24*         Results from last 7 days   Lab Units 11/15/19  0427 11/14/19  2208 11/14/19  0955   GLUCOSE RANDOM mg/dL 106 111 98     Results from last 7 days   Lab Units 11/14/19 1956 11/14/19 1651 11/14/19  1324 11/14/19  0955   TROPONIN I ng/mL <0 02 <0 02 <0 02 <0 02         Results from last 7 days   Lab Units 11/14/19  0956   PROTIME seconds 12 4   INR  0 98   PTT seconds 39*     Results from last 7 days   Lab Units 11/14/19  1324   TSH 3RD GENERATON uIU/mL 2 539         Results from last 7 days   Lab Units 11/14/19 2208   LACTIC ACID mmol/L 0 8       Results from last 7 days   Lab Units 11/14/19  1651   CLARITY UA  Clear   COLOR UA  Yellow   SPEC GRAV UA  1 010   PH UA  6 0   GLUCOSE UA mg/dl Negative   KETONES UA mg/dl Negative   BLOOD UA  Trace-Intact*   PROTEIN UA mg/dl Negative   NITRITE UA  Negative   BILIRUBIN UA  Negative   UROBILINOGEN UA E U /dl 0 2   LEUKOCYTES UA  Negative   WBC UA /hpf 0-1*   RBC UA /hpf 0-1*   BACTERIA UA /hpf Occasional   EPITHELIAL CELLS WET PREP /hpf None Seen       ED Treatment:   Medication Administration from 11/14/2019 0930 to 11/14/2019 1127       Date/Time Order Dose Route Action     11/14/2019 1107 diltiazem (CARDIZEM) 125 mg in sodium chloride 0 9 % 125 mL infusion 12 5 mg/hr Intravenous Rate/Dose Change     11/14/2019 1017 diltiazem (CARDIZEM) 125 mg in sodium chloride 0 9 % 125 mL infusion 10 mg/hr Intravenous New Bag     11/14/2019 1017 diltiazem (CARDIZEM) injection 10 mg 10 mg Intravenous Given        Past Medical History:   Diagnosis Date    Anemia     Closed nondisplaced fracture of second metatarsal bone of left foot 11/11/2018    Foot drop     Left    Left foot pain 7/11/2019    Left inguinal hernia     Managed By Efrain Feldman / last assessed 3/27/15     Pain in both feet 4/2/2019    Paroxysmal atrial fibrillation (HCC) 2/27/2019    Pleural effusion, left 2/27/2019    Pneumonia 2/26/2019    Skin lesion     Weakness of left foot 11/7/2018    Well adult exam 1/5/2019     Present on Admission:   Left foot drop   Dyslipidemia      Admitting Diagnosis:     Rapid heart rate [R00 0]  Atrial fibrillation with rapid ventricular response (HCC) [I48 91]    Age/Sex: 69 y o  male     Admission Orders:    Scheduled Medications:    Medications:  atorvastatin 40 mg Oral Daily   metoprolol succinate 50 mg Oral Q12H   piperacillin-tazobactam 3 375 g Intravenous Q6H     Continuous IV Infusions:    diltiazem 1-15 mg/hr Intravenous Titrated   lactated ringers 125 mL/hr Intravenous Continuous     PRN Meds:    acetaminophen 650 mg Oral Q6H PRN   calcium carbonate 1,000 mg Oral Daily PRN   HYDROmorphone 0 2 mg Intravenous Q4H PRN   HYDROmorphone 0 5 mg Intravenous Q4H PRN   HYDROmorphone 1 mg Intravenous Q4H PRN   ondansetron 4 mg Intravenous Q6H PRN       IP CONSULT TO CARDIOLOGY  IP CONSULT TO ACUTE CARE SURGERY    Network Utilization Review Department  [email or web address]  ATTENTION: Please call with any questions or concerns to [phone] and carefully listen to the prompts so that you are directed to the right person  All voicemails are confidential   [facility] all requests for admission clinical reviews, approved or denied determinations and any other requests to dedicated fax number below belonging to the campus where the patient is receiving treatment    FACILITY NAME UR FAX NUMBER   ADMISSION DENIALS (Administrative/Medical Necessity) [address] [facility] (Maternity/NICU/Pediatrics) [phone]   [facility] [address] [facility][note 3] [phone]   Gisela Laguerre [address] [facility] [phone]   Kathrynn Osgood [address] [facility] [address] [phone]

## 2019-11-15 NOTE — ASSESSMENT & PLAN NOTE
· CT scan performed last night showed acute appendicitis with possible perforation with local peritonitis  · Seen by surgical team  · Continue IV antibiotics-Zosyn  · Repeat CT early next week

## 2019-11-15 NOTE — SOCIAL WORK
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  CM reviewed d/c planning process including the following: identifying help at home, patient preference for d/c planning needs, [facility] Meds to Bed program, availability of treatment team to discuss questions or concerns patient and/or family may have regarding understanding medications and recognizing signs and symptoms once discharged  CM also encouraged patient to follow up with all recommended appointments after discharge  Patient advised of importance for patient and family to participate in managing patients medical well being  No referrals at this time  Patient as self and will be here through weekend to medical management  Cm department will follow through discharge

## 2019-11-15 NOTE — CONSULTS
Consultation -General Surgery  Gregg Lafleur 69 y o  male MRN: [id number]  Unit/Bed#: S -01 Encounter: [id number]        Inpatient consult to Acute Care Surgery  Consult performed by: Shirley Best PA-C  Consult ordered by: Birgit Andujar PA-C          ASSESSMENT/PLAN:  Problem List          Appendicitis   -Patient has had ongoing abdominal pain for 10 days  Pain started out diffusely and now localized to RLQ, unclear of time frame  Patient was slightly nauseous and had one vomiting episode on day 1 of pain  Plan:  -zosyn  -NPO  -IVF  -Pain meds PRN  -eliquis has been held and patient cleared by cardiology      * (Principal) Atrial fibrillation with rapid ventricular response (HCC)    Hamstring tightness of both lower extremities    Left foot drop    Overview Signed 11/9/2018 11:26 AM by Rosy Moses MD     Added automatically from request for surgery [id number]         Closed nondisplaced fracture of second metatarsal bone of left foot    History of lumbar laminectomy    Paroxysmal atrial fibrillation (HCC)    Onychomycosis    Tinea pedis of both feet    Moderate aortic insufficiency    Right ventricular enlargement    Dyslipidemia    Coronary artery calcification seen on CAT scan    Closed nondisplaced fracture of distal phalanx of left great toe    Closed nondisplaced fracture of fifth left metatarsal bone    Left foot pain    Leukocytosis (leucocytosis)              Reason for Consult / Principal Problem:    HPI: Gregg Lafleur is a 69y o  year old male who presents with RLQ abdominal pain x 10 days  Patient came to ED for rapid heart rate, and later admitted to [place] [address] for a fib with RVR on 11/14  During visit patient complained of RLQ pain leading to a CT showing extensive RLQ inflammation obscuring the retrocecal appendix, distension of appendix with several phleboliths, consistent with acute appendicitis, no free air or fluid collection seen, however findings are concerning for early contained perforation, likely at base of appendix  The consult to surgery was then made and patient seen at bedside  Patient states the pain started in umbilical region, rating it a 9/10  Pain then become localized to RLQ at an unknown time and has since remained a constant, "achey", 3/10 pain  Patient states it is worse when bending over and to palpation  Pain is best when not moving or lying on back  Patient's appetite has decreased over the past week, states he has intermittent nausea, and vomited once on day 1 of pain  He denies diarrhea, and been having regular BM's, the last one being today that was more "loose" than baseline  Patient denies fever, chills, chest pain, SOB, or calf pain  Patient has a surgical history of internal hemorrhoid removal 10 years ago, L hernia repair 5 years ago, and lumbar laminectomy 1 year ago  Patient has NKDA, and denies tobacco or drug use with rare alcohol use  Review of Systems   Constitutional: Positive for activity change and appetite change  Negative for chills, diaphoresis, fatigue and fever  HENT: Negative  Eyes: Negative  Respiratory: Negative  Cardiovascular: Negative  Gastrointestinal: Positive for abdominal pain, nausea and vomiting  Negative for abdominal distention, anal bleeding, blood in stool, constipation, diarrhea and rectal pain  Endocrine: Negative  Genitourinary: Negative  Musculoskeletal: Negative  Skin: Negative  Allergic/Immunologic: Negative  Neurological: Negative  Hematological: Negative  Psychiatric/Behavioral: Negative          Historical Information   Past Medical History:   Diagnosis Date    Anemia     Closed nondisplaced fracture of second metatarsal bone of left foot 11/11/2018    Foot drop     Left    Left foot pain 7/11/2019    Left inguinal hernia     Managed By Adriana Cava / last assessed 3/27/15     Pain in both feet 4/2/2019    Paroxysmal atrial fibrillation ([facility] [address] ) 2/27/2019    Pleural effusion, left 2/27/2019    Pneumonia 2/26/2019    Skin lesion     Weakness of left foot 11/7/2018    Well adult exam 1/5/2019     Past Surgical History:   Procedure Laterality Date    HERNIA REPAIR      IR THORACENTESIS  3/1/2019    LUMBAR LAMINECTOMY Left 11/9/2018    Procedure: Metrx L4-5 left hemilaminectomy and bilateral foraminotomy, Metrx L5-S1 left hemilaminectomy and microdiskectomy;  Surgeon: Alysa Amaral MD;  Location: [facility];  Service: Neurosurgery     Social History   Social History     Substance and Sexual Activity   Alcohol Use Yes    Binge frequency: Less than monthly    Comment: social      Social History     Substance and Sexual Activity   Drug Use No     Social History     Tobacco Use   Smoking Status Never Smoker   Smokeless Tobacco Never Used     Family History   Problem Relation Age of Onset    Heart disease Mother     No Known Problems Father     No Known Problems Sister     No Known Problems Brother     No Known Problems Family     Anemia Neg Hx     Arrhythmia Neg Hx     Clotting disorder Neg Hx     Heart attack Neg Hx     Heart failure Neg Hx     Hyperlipidemia Neg Hx     Hypertension Neg Hx     Fainting Neg Hx     Asthma Neg Hx        Meds/Allergies     Medications Prior to Admission   Medication    atorvastatin (LIPITOR) 40 mg tablet    Calcium-Magnesium-Vitamin D (CALCIUM MAGNESIUM PO)    chlorhexidine (PERIDEX) 0 12 % solution    Cholecalciferol (VITAMIN D PO)    Cyanocobalamin (VITAMIN B12 PO)    doxycycline (ORACEA) 40 MG capsule    doxycycline (PERIOSTAT) 20 MG tablet    metoprolol succinate (TOPROL-XL) 50 mg 24 hr tablet     Current Facility-Administered Medications   Medication Dose Route Frequency    acetaminophen (TYLENOL) tablet 650 mg  650 mg Oral Q6H PRN    atorvastatin (LIPITOR) tablet 40 mg  40 mg Oral Daily    calcium carbonate (TUMS) chewable tablet 1,000 mg  1,000 mg Oral Daily PRN    cefTRIAXone (ROCEPHIN) 1,000 mg in dextrose 5 % 50 mL IVPB 1,000 mg Intravenous Q24H    diltiazem (CARDIZEM) 125 mg in sodium chloride 0 9 % 125 mL infusion  1-15 mg/hr Intravenous Titrated    docusate sodium (COLACE) capsule 100 mg  100 mg Oral BID PRN    metoprolol succinate (TOPROL-XL) 24 hr tablet 50 mg  50 mg Oral Q12H    metroNIDAZOLE (FLAGYL) IVPB (premix) 500 mg  500 mg Intravenous Q8H    ondansetron (ZOFRAN) injection 4 mg  4 mg Intravenous Q6H PRN       No Known Allergies    Objective     Blood pressure 127/62, pulse 74, temperature 98 3 °F (36 8 °C), temperature source Oral, resp  rate 18, height 6' 2" (1 88 m), weight 97 6 kg (215 lb 3 2 oz), SpO2 99 %  Intake/Output Summary (Last 24 hours) at 11/15/2019 0754  Last data filed at 11/15/2019 0429  Gross per 24 hour   Intake 360 ml   Output 650 ml   Net -290 ml       PHYSICAL EXAM  General appearance: alert and oriented, in no acute distress  Skin: Skin color, texture, turgor normal  No rashes or lesions  Head: Normocephalic, without obvious abnormality  Heart: irregularly irregular rhythm  Lungs: clear to auscultation bilaterally  Abdomen: extremely tender in RLQ to soft and deep palpation  Rigid and guarding on RLQ  BS x 4  Non-distended     Rectal: deferred  Neurological: normal without focal findings and mental status, speech normal, alert and oriented x3    Lab Results:   Admission on 11/14/2019   Component Date Value    Sodium 11/14/2019 136     Potassium 11/14/2019 4 1     Chloride 11/14/2019 99*    CO2 11/14/2019 27     ANION GAP 11/14/2019 10     BUN 11/14/2019 13     Creatinine 11/14/2019 1 16     Glucose 11/14/2019 98     Calcium 11/14/2019 9 0     eGFR 11/14/2019 62     WBC 11/14/2019 16 08*    RBC 11/14/2019 4 69     Hemoglobin 11/14/2019 14 4     Hematocrit 11/14/2019 44 4     MCV 11/14/2019 95     MCH 11/14/2019 30 7     MCHC 11/14/2019 32 4     RDW 11/14/2019 13 7     MPV 11/14/2019 10 1     Platelets 39/67/7157 361     nRBC 11/14/2019 0     Neutrophils Relative 11/14/2019 81*    Immat GRANS % 11/14/2019 1     Lymphocytes Relative 11/14/2019 9*    Monocytes Relative 11/14/2019 9     Eosinophils Relative 11/14/2019 0     Basophils Relative 11/14/2019 0     Neutrophils Absolute 11/14/2019 12 95*    Immature Grans Absolute 11/14/2019 0 12     Lymphocytes Absolute 11/14/2019 1 44     Monocytes Absolute 11/14/2019 1 46*    Eosinophils Absolute 11/14/2019 0 06     Basophils Absolute 11/14/2019 0 05     Protime 11/14/2019 12 4     INR 11/14/2019 0 98     PTT 11/14/2019 39*    Troponin I 11/14/2019 <0 02     Troponin I 11/14/2019 <0 02     Troponin I 11/14/2019 <0 02     TSH 3RD GENERATON 11/14/2019 2 539     Total Bilirubin 11/14/2019 0 90     Bilirubin, Direct 11/14/2019 0 24*    Alkaline Phosphatase 11/14/2019 120*    AST 11/14/2019 41     ALT 11/14/2019 68     Total Protein 11/14/2019 7 4     Albumin 11/14/2019 3 1*    Color, UA 11/14/2019 Yellow     Clarity, UA 11/14/2019 Clear     Specific Gravity, UA 11/14/2019 1 010     pH, UA 11/14/2019 6 0     Leukocytes, UA 11/14/2019 Negative     Nitrite, UA 11/14/2019 Negative     Protein, UA 11/14/2019 Negative     Glucose, UA 11/14/2019 Negative     Ketones, UA 11/14/2019 Negative     Urobilinogen, UA 11/14/2019 0 2     Bilirubin, UA 11/14/2019 Negative     Blood, UA 11/14/2019 Trace-Intact*    Platelets 33/31/5876 404*    MPV 11/14/2019 9 9     Troponin I 11/14/2019 <0 02     RBC, UA 11/14/2019 0-1*    WBC, UA 11/14/2019 0-1*    Epithelial Cells 11/14/2019 None Seen     Bacteria, UA 11/14/2019 Occasional     Sodium 11/14/2019 135*    Potassium 11/14/2019 4 0     Chloride 11/14/2019 101     CO2 11/14/2019 23     ANION GAP 11/14/2019 11     BUN 11/14/2019 15     Creatinine 11/14/2019 1 10     Glucose 11/14/2019 111     Calcium 11/14/2019 8 3     AST 11/14/2019 43     ALT 11/14/2019 61     Alkaline Phosphatase 11/14/2019 112     Total Protein 11/14/2019 6 3*    Albumin 11/14/2019 2 5*    Total Bilirubin 11/14/2019 0 70     eGFR 11/14/2019 66     WBC 11/14/2019 15 48*    RBC 11/14/2019 4 04     Hemoglobin 11/14/2019 12 4     Hematocrit 11/14/2019 38 0     MCV 11/14/2019 94     MCH 11/14/2019 30 7     MCHC 11/14/2019 32 6     RDW 11/14/2019 13 9     MPV 11/14/2019 9 9     Platelets 62/04/3506 344     nRBC 11/14/2019 0     Neutrophils Relative 11/14/2019 80*    Immat GRANS % 11/14/2019 0     Lymphocytes Relative 11/14/2019 11*    Monocytes Relative 11/14/2019 9     Eosinophils Relative 11/14/2019 0     Basophils Relative 11/14/2019 0     Neutrophils Absolute 11/14/2019 12 34*    Immature Grans Absolute 11/14/2019 0 06     Lymphocytes Absolute 11/14/2019 1 65     Monocytes Absolute 11/14/2019 1 33*    Eosinophils Absolute 11/14/2019 0 06     Basophils Absolute 11/14/2019 0 04     LACTIC ACID 11/14/2019 0 8     Sodium 11/15/2019 137     Potassium 11/15/2019 3 7     Chloride 11/15/2019 100     CO2 11/15/2019 26     ANION GAP 11/15/2019 11     BUN 11/15/2019 14     Creatinine 11/15/2019 1 27     Glucose 11/15/2019 106     Calcium 11/15/2019 8 7     eGFR 11/15/2019 56     Magnesium 11/15/2019 2 2     WBC 11/15/2019 18 13*    RBC 11/15/2019 4 50     Hemoglobin 11/15/2019 13 8     Hematocrit 11/15/2019 43 9     MCV 11/15/2019 98     MCH 11/15/2019 30 7     MCHC 11/15/2019 31 4     RDW 11/15/2019 13 8     Platelets 84/31/9598 398*    MPV 11/15/2019 10 3      Imaging Studies: IMPRESSION: Extensive right lower quadrant inflammation obscuring the retrocecal appendix  Distention of the appendix with several phleboliths, consistent with acute appendicitis  No free intraperitoneal air or organized fluid collection however findings are   concerning for early contained perforation, likely at the base of the appendix  Counseling / Coordination of Care  Total time spent today  30 minutes   Greater than 50% of total time was spent with the patient and / or family counseling and / or coordination of care         Santi Tyler PA-C

## 2019-11-15 NOTE — NURSING NOTE
Patient resting comfortably in bed with no c/o at time  Pt shows no signs of distress  Comfort measures in place, will continue to monitor

## 2019-11-15 NOTE — PROGRESS NOTES
Progress Note - Cardiology   Giovanna Tapia 69 y o  male MRN: [id number]  Unit/Bed#: S -01 Encounter: [id number]    Assessment:  Principal Problem:    Atrial fibrillation with rapid ventricular response (HCC)  Active Problems:    Appendicitis    Left foot drop    Dyslipidemia    Leukocytosis (leucocytosis)    Right lower quadrant abdominal pain    · New onset of Atrial Flutter: Patient is a 69year old male with a past medical history of paroxysmal atrial fibrillation, on metoprolol 50 mg daily, moderate aortic insufficiency, right ventricular enlargement, coronary artery calcification and dyslipidemia  ? EKG on admission showed heart rate 150, Atrial flutter with variable block  ? Prior Echocardiogram from 02/2019 shows LV with normal systolic function and EF 22%, RV moderately dilated  LA and RA moderately dilated, mitral and tricuspid valve with mild regurgitation, moderate aortic insufficiency, and no signs of pericardial effusions  ? Metoprolol dose was increased yesterday to 50 mg BID  ? Patient was scheduled for cardioversion today, however he converted into sinus rhythm and Telemetry today shows sinus rhythm with HR 70-80s     · Atrial Fibrillation with Rapid Ventricular Response:  ? Patient was noted to have A-fib during a prior hospitalization in 02/2019 in the setting of sepsis secondary to pneumonia  ? He follows with Dr Macie Enrique as outpatient  ? [id number] score of 1 (age > 72)     · Dyslipidemia  ? Patient is on Lipitor     · Appendicitis  · Patient complaining of diffuse abdominal pain that started approximately 1 week ago  Last night he developed fever 101 9 F with RLQ tenderness  CT scan of abdomen and pelvis showed "Extensive right lower quadrant inflammation obscuring the retrocecal appendix  Distention of the appendix with several phleboliths, consistent with acute appendicitis    No free intraperitoneal air or organized fluid collection however findings are concerning for early contained perforation, likely at the base of the appendix"  · Per surgery note, Given that symptoms started approximately 10 days ago and patient looks clinically stable, they will treat with antibiotics and plan surgical intervention down the road  ? Management per Surgical team     Plan:  · Since his first episode of Atrial fibrillation was diagnosed during sepsis secondary to pneumonia, it is likely that the acute infection caused by appendicitis was the trigger for this episode of A-fib/A-flutter  · Continue Metoprolol 50 mg BID  · Follow up with outpatient Cardiology  · Rest of management per primary and surgical teams  Subjective/Objective   Chief Complaint: Rapid heart rate    Subjective: Patient seen and examined this morning  He was comfortably sitting on the chair  He denies chest pain, shortness of breath and palpitations  He states he had a fever last night  Note from on call YURY KAUR states his temperature was 101 9 F, which in the setting of RLQ pain and leukocytosis prompted a CT scan of abdomen and pelvis which was suspicious for appendicitis with possible perforation  Surgery was consulted and Eliquis has been held  Patient was started on antibiotics       Objective:     Vitals: /62 (BP Location: Left arm)   Pulse 74   Temp 98 3 °F (36 8 °C) (Oral)   Resp 18   Ht 6' 2" (1 88 m)   Wt 97 6 kg (215 lb 3 2 oz)   SpO2 99%   BMI 27 63 kg/m²   Vitals:    11/14/19 0954 11/14/19 1140   Weight: 94 8 kg (208 lb 15 9 oz) 97 6 kg (215 lb 3 2 oz)     Orthostatic Blood Pressures      Most Recent Value   Blood Pressure  127/62 filed at 11/15/2019 0700   Patient Position - Orthostatic VS  Lying filed at 11/15/2019 0700            Intake/Output Summary (Last 24 hours) at 11/15/2019 1220  Last data filed at 11/15/2019 1028  Gross per 24 hour   Intake 1310 ml   Output 650 ml   Net 660 ml       Invasive Devices     Peripheral Intravenous Line            Peripheral IV 11/14/19 Left Antecubital 1 day Review of Systems   Constitutional: Positive for activity change, fatigue and fever (101 9 F last night)  Negative for diaphoresis and unexpected weight change  HENT: Negative  Eyes: Negative  Respiratory: Negative for cough, chest tightness, shortness of breath, wheezing and stridor  Cardiovascular: Negative for chest pain, palpitations and leg swelling  Gastrointestinal: Positive for abdominal pain (RLQ)  Negative for abdominal distention, constipation, nausea and vomiting  Genitourinary: Negative for dysuria and hematuria  Musculoskeletal: Negative for arthralgias, back pain, myalgias, neck pain and neck stiffness  Skin: Negative for pallor and rash  Neurological: Positive for numbness (Left foot, chronic)  Negative for dizziness, facial asymmetry, weakness, light-headedness and headaches  Psychiatric/Behavioral: Negative  Physical Exam   Constitutional: He is oriented to person, place, and time  He appears well-developed and well-nourished  He is active  Non-toxic appearance  He does not have a sickly appearance  He does not appear ill  No distress  HENT:   Head: Normocephalic and atraumatic  Mouth/Throat: Oropharynx is clear and moist  No oropharyngeal exudate  Eyes: Pupils are equal, round, and reactive to light  Conjunctivae and EOM are normal  Right eye exhibits no discharge  Left eye exhibits no discharge  No scleral icterus  Neck: Normal range of motion  Neck supple  No JVD present  No tracheal deviation present  Cardiovascular: Normal rate, regular rhythm, normal heart sounds and intact distal pulses  Exam reveals no gallop and no friction rub  No murmur heard  Pulmonary/Chest: Effort normal and breath sounds normal  No stridor  No respiratory distress  He has no wheezes  He has no rales  He exhibits no tenderness  Abdominal: Soft  Bowel sounds are normal  He exhibits no distension  There is tenderness (RLQ)     Musculoskeletal: Normal range of motion  He exhibits no edema, tenderness or deformity  Neurological: He is alert and oriented to person, place, and time  Skin: Skin is warm and dry  Capillary refill takes less than 2 seconds  No rash noted  He is not diaphoretic  No erythema  No pallor  Psychiatric: He has a normal mood and affect  His behavior is normal  Judgment and thought content normal    Nursing note and vitals reviewed  Lab Results:   I have personally reviewed pertinent lab results  CBC with diff:   Results from last 7 days   Lab Units 11/15/19  0429   WBC Thousand/uL 18 13*   RBC Million/uL 4 50   HEMOGLOBIN g/dL 13 8   HEMATOCRIT % 43 9   MCV fL 98   MCH pg 30 7   MCHC g/dL 31 4   RDW % 13 8   MPV fL 10 3   PLATELETS Thousands/uL 398*     CMP:   Results from last 7 days   Lab Units 11/15/19  0427 11/14/19  2208   SODIUM mmol/L 137 135*   POTASSIUM mmol/L 3 7 4 0   CHLORIDE mmol/L 100 101   CO2 mmol/L 26 23   BUN mg/dL 14 15   CREATININE mg/dL 1 27 1 10   CALCIUM mg/dL 8 7 8 3   AST U/L  --  43   ALT U/L  --  61   ALK PHOS U/L  --  112   EGFR ml/min/1 73sq m 56 66     Troponin:   0   Lab Value Date/Time    TROPONINI <0 02 11/14/2019 1956    TROPONINI <0 02 11/14/2019 1651    TROPONINI <0 02 11/14/2019 1324    TROPONINI <0 02 11/14/2019 0955    TROPONINI <0 02 02/28/2019 0757    TROPONINI 0 04 02/26/2019 1204    TROPONINI 0 04 02/26/2019 0843    TROPONINI 0 05 (H) 02/26/2019 0425     BNP:   Results from last 7 days   Lab Units 11/15/19  0427   POTASSIUM mmol/L 3 7   CHLORIDE mmol/L 100   CO2 mmol/L 26   BUN mg/dL 14   CREATININE mg/dL 1 27   CALCIUM mg/dL 8 7   EGFR ml/min/1 73sq m 56     Coags:   Results from last 7 days   Lab Units 11/14/19  0956   PTT seconds 39*   INR  0 98     TSH:   Results from last 7 days   Lab Units 11/14/19  1324   TSH 3RD GENERATON uIU/mL 2 539     Imaging: I have personally reviewed pertinent reports  EKG: (11/15/2019) HR: 73 bpm, Sinus rhythm, Left axis deviation  No ischemic changes    VTE Pharmacologic Prophylaxis: Reason for no pharmacologic prophylaxis Eliquis has been held by surgery   VTE Mechanical Prophylaxis: sequential compression device    Counseling / Coordination of Care  Total time spent today 20 minutes  Greater than 50% of total time was spent with the patient and/or family counseling and/or coordination of care  A description of the counseling/coordination of care:  Patient was seen and evaluated  Discussed with attending  Chart reviewed thoroughly including laboratory and imaging studies    Plan of care discussed with patient and primary team

## 2019-11-15 NOTE — PROGRESS NOTES
Progress Note - Theresa Todd 1945, 69 y o  male MRN: [id number]    Unit/Bed#: S -01 Encounter: [id number]    Primary Care Provider: Marino Douglas DO   Date and time admitted to hospital: 11/14/2019  9:38 AM        * Atrial fibrillation with rapid ventricular response ([facility] [address] )  Assessment & Plan  · Patient with PAF, has been having episodes of palpitations over last 1 week  Was found to be in AFib with RVR and Cardiology office today  Sent to emergency department for evaluation  · Patient spontaneously converted back to normal sinus rhythm  · TSH normal  · Eliquis has been held due to appendicitis  · Cardiology follow-up     Appendicitis  Assessment & Plan  · CT scan performed last night showed acute appendicitis with possible perforation with local peritonitis  · Seen by surgical team  · Continue IV antibiotics-Zosyn  · Repeat CT early next week    Sepsis ([address] )  Assessment & Plan  · POA: Fever leukocytosis and tachycardia  · Improving    Leukocytosis (leucocytosis)  Assessment & Plan  · Unclear etiology  · Check right upper quadrant ultrasound  · Check UA with reflex culture    Dyslipidemia  Assessment & Plan  · Continue statin    Left foot drop  Assessment & Plan  · Due to lumbar disc herniation status post laminectomy  · Continue foot brace    VTE Pharmacologic Prophylaxis:   Pharmacologic: Enoxaparin (Lovenox)  Mechanical VTE Prophylaxis in Place: Yes    Patient Centered Rounds: I have performed bedside rounds with nursing staff today  Discussions with Specialists or Other Care Team Provider:  Dr Richmond Alvarado surgery    Education and Discussions with Family / Patient:  Patient    Time Spent for Care: 30 minutes  More than 50% of total time spent on counseling and coordination of care as described above      Current Length of Stay: 1 day(s)    Current Patient Status: Inpatient   Certification Statement: The patient will continue to require additional inpatient hospital stay due to IV antibiotics    Discharge Plan:  Early next week    Code Status: Level 1 - Full Code      Subjective:   Had severe pain last night and had a CT scan which showed appendicitis  Currently feels okay  Minimal pain  Objective:     Vitals:   Temp (24hrs), Av 2 °F (37 3 °C), Min:98 2 °F (36 8 °C), Max:101 9 °F (38 8 °C)    Temp:  [98 2 °F (36 8 °C)-101 9 °F (38 8 °C)] 98 3 °F (36 8 °C)  HR:  [74-89] 74  Resp:  [18] 18  BP: ()/(50-67) 127/62  SpO2:  [96 %-99 %] 99 %  Body mass index is 27 63 kg/m²  Input and Output Summary (last 24 hours): Intake/Output Summary (Last 24 hours) at 11/15/2019 1409  Last data filed at 11/15/2019 1028  Gross per 24 hour   Intake 1190 ml   Output 650 ml   Net 540 ml       Physical Exam:     Physical Exam     Gen -Patient comfortable at rest  Neck- Supple   No thyromegaly or lymphadenopathy  Lungs-Clear bilaterally without any wheeze or rales   Heart S1-S2, regular rate and rhythm, no murmurs  Abdomen mild tenderness right side of the  Extremities-no cyanosi,  clubbing or edema  Skin- no rash  Neuro-nonfocal     Additional Data:     Labs:    Results from last 7 days   Lab Units 11/15/19  04219  2208   WBC Thousand/uL 18 13* 15 48*   HEMOGLOBIN g/dL 13 8 12 4   HEMATOCRIT % 43 9 38 0   PLATELETS Thousands/uL 398* 344   NEUTROS PCT %  --  80*   LYMPHS PCT %  --  11*   MONOS PCT %  --  9   EOS PCT %  --  0     Results from last 7 days   Lab Units 11/15/19  04219  2208   SODIUM mmol/L 137 135*   POTASSIUM mmol/L 3 7 4 0   CHLORIDE mmol/L 100 101   CO2 mmol/L 26 23   BUN mg/dL 14 15   CREATININE mg/dL 1 27 1 10   ANION GAP mmol/L 11 11   CALCIUM mg/dL 8 7 8 3   ALBUMIN g/dL  --  2 5*   TOTAL BILIRUBIN mg/dL  --  0 70   ALK PHOS U/L  --  112   ALT U/L  --  61   AST U/L  --  43   GLUCOSE RANDOM mg/dL 106 111     Results from last 7 days   Lab Units 19  0956   INR  0 98             Results from last 7 days   Lab Units 19  2208   LACTIC ACID mmol/L 0 8 * I Have Reviewed All Lab Data Listed Above  * Additional Pertinent Lab Tests Reviewed: All Labs Within Last 24 Hours Reviewed    Imaging:    Imaging Reports Reviewed Today Include:   Imaging Personally Reviewed by Myself Includes:      Recent Cultures (last 7 days):           Last 24 Hours Medication List:     Current Facility-Administered Medications:  acetaminophen 650 mg Oral Q6H PRN Reji Marcial MD    atorvastatin 40 mg Oral Daily Reji Marcial MD    calcium carbonate 1,000 mg Oral Daily PRN Reji Marcial MD    diltiazem 1-15 mg/hr Intravenous Titrated Reji Marcial MD Last Rate: Stopped (11/15/19 0824)   HYDROmorphone 0 2 mg Intravenous Q4H PRN Janine Apgar, PA-C    HYDROmorphone 0 5 mg Intravenous Q4H PRN Janine Apgar, PA-C    HYDROmorphone 1 mg Intravenous Q4H PRN Janine Apgar, PA-C    lactated ringers 125 mL/hr Intravenous Continuous Janine Apgar, PA-C Last Rate: 125 mL/hr (11/15/19 1028)   metoprolol succinate 50 mg Oral Q12H Chema Garcia MD    ondansetron 4 mg Intravenous Q6H PRN Reji Marcial MD    piperacillin-tazobactam 3 375 g Intravenous Q6H Milan Green DO Last Rate: 3 375 g (11/15/19 1156)        Today, Patient Was Seen By: Andrzej Santiago MD    ** Please Note: Dictation voice to text software may have been used in the creation of this document   **

## 2019-11-15 NOTE — ASSESSMENT & PLAN NOTE
· Patient with PAF, has been having episodes of palpitations over last 1 week  Was found to be in AFib with RVR and Cardiology office today    Sent to emergency department for evaluation  · Patient spontaneously converted back to normal sinus rhythm  · TSH normal  · Eliquis has been held due to appendicitis  · Cardiology follow-up

## 2019-11-16 PROBLEM — I48.91 ATRIAL FIBRILLATION WITH RAPID VENTRICULAR RESPONSE (HCC): Status: RESOLVED | Noted: 2019-11-14 | Resolved: 2019-11-16

## 2019-11-16 LAB
ANION GAP SERPL CALCULATED.3IONS-SCNC: 11 MMOL/L (ref 4–13)
BASOPHILS # BLD AUTO: 0.05 THOUSANDS/ΜL (ref 0–0.1)
BASOPHILS NFR BLD AUTO: 0 % (ref 0–1)
BUN SERPL-MCNC: 9 MG/DL (ref 5–25)
CALCIUM SERPL-MCNC: 8.5 MG/DL (ref 8.3–10.1)
CHLORIDE SERPL-SCNC: 101 MMOL/L (ref 100–108)
CO2 SERPL-SCNC: 24 MMOL/L (ref 21–32)
CREAT SERPL-MCNC: 1.11 MG/DL (ref 0.6–1.3)
EOSINOPHIL # BLD AUTO: 0.09 THOUSAND/ΜL (ref 0–0.61)
EOSINOPHIL NFR BLD AUTO: 1 % (ref 0–6)
ERYTHROCYTE [DISTWIDTH] IN BLOOD BY AUTOMATED COUNT: 13.9 % (ref 11.6–15.1)
GFR SERPL CREATININE-BSD FRML MDRD: 66 ML/MIN/1.73SQ M
GLUCOSE SERPL-MCNC: 106 MG/DL (ref 65–140)
HCT VFR BLD AUTO: 40.2 % (ref 36.5–49.3)
HGB BLD-MCNC: 13 G/DL (ref 12–17)
IMM GRANULOCYTES # BLD AUTO: 0.12 THOUSAND/UL (ref 0–0.2)
IMM GRANULOCYTES NFR BLD AUTO: 1 % (ref 0–2)
LYMPHOCYTES # BLD AUTO: 1.26 THOUSANDS/ΜL (ref 0.6–4.47)
LYMPHOCYTES NFR BLD AUTO: 8 % (ref 14–44)
MCH RBC QN AUTO: 30.7 PG (ref 26.8–34.3)
MCHC RBC AUTO-ENTMCNC: 32.3 G/DL (ref 31.4–37.4)
MCV RBC AUTO: 95 FL (ref 82–98)
MONOCYTES # BLD AUTO: 1.01 THOUSAND/ΜL (ref 0.17–1.22)
MONOCYTES NFR BLD AUTO: 6 % (ref 4–12)
NEUTROPHILS # BLD AUTO: 13.13 THOUSANDS/ΜL (ref 1.85–7.62)
NEUTS SEG NFR BLD AUTO: 84 % (ref 43–75)
NRBC BLD AUTO-RTO: 0 /100 WBCS
PLATELET # BLD AUTO: 350 THOUSANDS/UL (ref 149–390)
PMV BLD AUTO: 9.8 FL (ref 8.9–12.7)
POTASSIUM SERPL-SCNC: 3.9 MMOL/L (ref 3.5–5.3)
RBC # BLD AUTO: 4.23 MILLION/UL (ref 3.88–5.62)
SODIUM SERPL-SCNC: 136 MMOL/L (ref 136–145)
WBC # BLD AUTO: 15.66 THOUSAND/UL (ref 4.31–10.16)

## 2019-11-16 PROCEDURE — 80048 BASIC METABOLIC PNL TOTAL CA: CPT | Performed by: SURGERY

## 2019-11-16 PROCEDURE — 99232 SBSQ HOSP IP/OBS MODERATE 35: CPT | Performed by: INTERNAL MEDICINE

## 2019-11-16 PROCEDURE — 85025 COMPLETE CBC W/AUTO DIFF WBC: CPT | Performed by: SURGERY

## 2019-11-16 RX ADMIN — PIPERACILLIN SODIUM AND TAZOBACTAM SODIUM 3.38 G: 36; 4.5 INJECTION, POWDER, FOR SOLUTION INTRAVENOUS at 14:06

## 2019-11-16 RX ADMIN — PIPERACILLIN SODIUM AND TAZOBACTAM SODIUM 3.38 G: 36; 4.5 INJECTION, POWDER, FOR SOLUTION INTRAVENOUS at 07:19

## 2019-11-16 RX ADMIN — PIPERACILLIN SODIUM AND TAZOBACTAM SODIUM 3.38 G: 36; 4.5 INJECTION, POWDER, FOR SOLUTION INTRAVENOUS at 00:46

## 2019-11-16 RX ADMIN — PIPERACILLIN SODIUM AND TAZOBACTAM SODIUM 3.38 G: 36; 4.5 INJECTION, POWDER, FOR SOLUTION INTRAVENOUS at 18:49

## 2019-11-16 RX ADMIN — ACETAMINOPHEN 650 MG: 325 TABLET, FILM COATED ORAL at 21:12

## 2019-11-16 RX ADMIN — DESMOPRESSIN ACETATE 40 MG: 0.2 TABLET ORAL at 08:44

## 2019-11-16 RX ADMIN — METOPROLOL SUCCINATE 50 MG: 50 TABLET, FILM COATED, EXTENDED RELEASE ORAL at 10:05

## 2019-11-16 RX ADMIN — METOPROLOL SUCCINATE 50 MG: 50 TABLET, FILM COATED, EXTENDED RELEASE ORAL at 21:11

## 2019-11-16 RX ADMIN — SODIUM CHLORIDE, SODIUM LACTATE, POTASSIUM CHLORIDE, AND CALCIUM CHLORIDE 125 ML/HR: .6; .31; .03; .02 INJECTION, SOLUTION INTRAVENOUS at 07:23

## 2019-11-16 NOTE — PROGRESS NOTES
Progress Note - Isaac Wong 1945, 69 y o  male MRN: [id number]    Unit/Bed#: S -01 Encounter: [id number]    Primary Care Provider: Lakshmi Boothe DO   Date and time admitted to hospital: 11/14/2019  9:38 AM    * Atrial fibrillation with rapid ventricular response (HCC)-resolved as of 11/16/2019  Assessment & Plan  · Patient with PAF, has been having episodes of palpitations over last 1 week  Was found to be in AFib with RVR and Cardiology office today  Sent to emergency department for evaluation  · Patient spontaneously converted back to normal sinus rhythm  · TSH normal  · No anticoagulation needed as per Cardiology  · Outpatient Cardiology follow-up  · Discontinue telemetry    Appendicitis  Assessment & Plan  · CT scan performed last night showed acute appendicitis with possible perforation with local peritonitis  · Seen by surgical team  · Continue IV antibiotics-Zosyn  · Repeat CT early next week    Sepsis ([address] )  Assessment & Plan  · POA: Fever leukocytosis and tachycardia  · Improving    Leukocytosis (leucocytosis)  Assessment & Plan  · Likely due to appendicitis  · Improving    Dyslipidemia  Assessment & Plan  · Continue statin    Left foot drop  Assessment & Plan  · Due to lumbar disc herniation status post laminectomy  · Continue foot brace    VTE Pharmacologic Prophylaxis:   Pharmacologic: Enoxaparin (Lovenox)  Mechanical VTE Prophylaxis in Place: Yes    Patient Centered Rounds: I have performed bedside rounds with nursing staff today  Discussions with Specialists or Other Care Team Provider:  Dr Aleksandra Branch    Education and Discussions with Family / Patient:  Patient    Time Spent for Care: 30 minutes  More than 50% of total time spent on counseling and coordination of care as described above      Current Length of Stay: 2 day(s)    Current Patient Status: Inpatient   Certification Statement: The patient will continue to require additional inpatient hospital stay due to Appendicitis    Discharge Plan:  Likely early next week    Code Status: Level 1 - Full Code      Subjective:   No events reported  Feels okay today    Objective:     Vitals:   Temp (24hrs), Av 3 °F (36 8 °C), Min:97 2 °F (36 2 °C), Max:100 1 °F (37 8 °C)    Temp:  [97 2 °F (36 2 °C)-100 1 °F (37 8 °C)] 97 2 °F (36 2 °C)  HR:  [69-73] 71  Resp:  [18] 18  BP: (115-142)/(56-65) 130/61  SpO2:  [97 %-98 %] 97 %  Body mass index is 27 63 kg/m²  Input and Output Summary (last 24 hours): Intake/Output Summary (Last 24 hours) at 2019 1209  Last data filed at 2019 1005  Gross per 24 hour   Intake 2440 ml   Output 1350 ml   Net 1090 ml       Physical Exam:     Physical Exam     Gen -Patient comfortable   Neck- Supple  No thyromegaly or lymphadenopathy  Lungs-Clear bilaterally without any wheeze or rales   Heart S1-S2, regular rate and rhythm, no murmurs  Abdomen-soft nontender, no organomegaly  Bowel sounds present  Extremities-no cyanosi,  clubbing or edema  Skin- no rash  Neuro-nonfocal         Additional Data:     Labs:    Results from last 7 days   Lab Units 19  0603   WBC Thousand/uL 15 66*   HEMOGLOBIN g/dL 13 0   HEMATOCRIT % 40 2   PLATELETS Thousands/uL 350   NEUTROS PCT % 84*   LYMPHS PCT % 8*   MONOS PCT % 6   EOS PCT % 1     Results from last 7 days   Lab Units 19  0603  19  2208   SODIUM mmol/L 136   < > 135*   POTASSIUM mmol/L 3 9   < > 4 0   CHLORIDE mmol/L 101   < > 101   CO2 mmol/L 24   < > 23   BUN mg/dL 9   < > 15   CREATININE mg/dL 1 11   < > 1 10   ANION GAP mmol/L 11   < > 11   CALCIUM mg/dL 8 5   < > 8 3   ALBUMIN g/dL  --   --  2 5*   TOTAL BILIRUBIN mg/dL  --   --  0 70   ALK PHOS U/L  --   --  112   ALT U/L  --   --  61   AST U/L  --   --  43   GLUCOSE RANDOM mg/dL 106   < > 111    < > = values in this interval not displayed       Results from last 7 days   Lab Units 19  0956   INR  0 98             Results from last 7 days   Lab Units 19  1237 LACTIC ACID mmol/L 0 8           * I Have Reviewed All Lab Data Listed Above  * Additional Pertinent Lab Tests Reviewed: All Labs Within Last 24 Hours Reviewed    Imaging:    Imaging Reports Reviewed Today Include:   Imaging Personally Reviewed by Myself Includes:      Recent Cultures (last 7 days):           Last 24 Hours Medication List:     Current Facility-Administered Medications:  acetaminophen 650 mg Oral Q6H PRN Reji Marcial MD    atorvastatin 40 mg Oral Daily Reji Marcial MD    calcium carbonate 1,000 mg Oral Daily PRN Reji Marcial MD    enoxaparin 40 mg Subcutaneous Q24H [address] Reji Marcial MD    HYDROmorphone 0 2 mg Intravenous Q4H PRN Paloma Sigala PA-SHAYE    HYDROmorphone 0 5 mg Intravenous Q4H PRN Paloma Sigala PA-C    HYDROmorphone 1 mg Intravenous Q4H PRN Paloma Sigala PA-C    lactated ringers 125 mL/hr Intravenous Continuous Paloma Sigala PA-C Last Rate: 125 mL/hr (11/16/19 0723)   metoprolol succinate 50 mg Oral Q12H Lilia Taylor MD    ondansetron 4 mg Intravenous Q6H PRN Reji Marcial MD    piperacillin-tazobactam 3 375 g Intravenous Q6H Milan Green DO Last Rate: 3 375 g (11/16/19 0719)        Today, Patient Was Seen By: Daxa Solis MD    ** Please Note: Dictation voice to text software may have been used in the creation of this document   **

## 2019-11-16 NOTE — PROGRESS NOTES
Progress Note - Cardiology   Isaac Wong 69 y o  male MRN: [id number]  Unit/Bed#: S -01 Encounter: [id number]    Assessment:  Principal Problem:    Atrial fibrillation with rapid ventricular response (HCC)  Active Problems:    Left foot drop    Sepsis ([address] )    Dyslipidemia    Leukocytosis (leucocytosis)    Appendicitis    Perforated appendicitis    Previous episode of atrial fibrillation was in the setting of a pneumonia  He had atrial flutter currently now in sinus rhythm  This was in the setting of acute appendicitis as well  Plan:  Continue with Toprol-XL 50 mg twice a day  Not currently on anticoagulation both given plans for surgery in the near future and chads Vasc score of 1  No contraindication to proceeding with the surgery from a cardiac standpoint  Continue current medications as noted above  Will sign off, has outpatient follow-up arranged with his primary cardiologist   Please call with questions or changes  Subjective/Objective     Subjective:   No significant events overnight  Continues to have some abdominal pain, but treating conservatively now to allow this to heal   The concern is that if there were to do appendectomy now, he may need a partial colectomy  Remained in sinus rhythm  He denies any palpitations      Objective:    Vitals: /61   Pulse 71   Temp (!) 97 2 °F (36 2 °C) (Oral)   Resp 18   Ht 6' 2" (1 88 m)   Wt 97 6 kg (215 lb 3 2 oz)   SpO2 97%   BMI 27 63 kg/m²   Vitals:    11/14/19 0954 11/14/19 1140   Weight: 94 8 kg (208 lb 15 9 oz) 97 6 kg (215 lb 3 2 oz)     Orthostatic Blood Pressures      Most Recent Value   Blood Pressure  130/61 filed at 11/16/2019 1005   Patient Position - Orthostatic VS  Lying filed at 11/16/2019 0700            Intake/Output Summary (Last 24 hours) at 11/16/2019 1033  Last data filed at 11/16/2019 0900  Gross per 24 hour   Intake 2040 ml   Output 1350 ml   Net 690 ml     Physical Exam:   General appearance: alert and in no acute distress  Head: Normocephalic, without obvious abnormality, atraumatic  Neck: no carotid bruit, no JVD and supple, symmetrical, trachea midline  Lungs: clear to auscultation bilaterally  Normal air entry  Normal effort  Heart: S1, S2 normal and no S3 or S4  No murmurs  Abdomen: Tender RLQ  Extremities: extremities normal, atraumatic, no cyanosis or edema  Pulses: 2+ and symmetric bilaterally  Skin: Skin color, texture, turgor normal  No rashes or lesions  Neurologic: Grossly normal  Alert and oriented      Medications:    Current Facility-Administered Medications:     acetaminophen (TYLENOL) tablet 650 mg, 650 mg, Oral, Q6H PRN, Reji Marcial MD, 650 mg at 11/15/19 2043    atorvastatin (LIPITOR) tablet 40 mg, 40 mg, Oral, Daily, Reji Marcial MD, 40 mg at 11/16/19 0844    calcium carbonate (TUMS) chewable tablet 1,000 mg, 1,000 mg, Oral, Daily PRN, Reji Marcial MD    enoxaparin (LOVENOX) subcutaneous injection 40 mg, 40 mg, Subcutaneous, Q24H [facility], Reji Marcial MD, Stopped at 11/15/19 1906    HYDROmorphone (DILAUDID) injection 0 2 mg, 0 2 mg, Intravenous, Q4H PRN, Shannon Lei PA-C    HYDROmorphone (DILAUDID) injection 0 5 mg, 0 5 mg, Intravenous, Q4H PRN, Shannon Lei PA-C    HYDROmorphone (DILAUDID) injection 1 mg, 1 mg, Intravenous, Q4H PRN, NATASHA Zurita-SHAYE    lactated ringers infusion, 125 mL/hr, Intravenous, Continuous, Shannon Lei PA-C, Last Rate: 125 mL/hr at 11/16/19 0723, 125 mL/hr at 11/16/19 0723    metoprolol succinate (TOPROL-XL) 24 hr tablet 50 mg, 50 mg, Oral, Q12H, Perico Reilly MD, 50 mg at 11/16/19 1005    ondansetron (ZOFRAN) injection 4 mg, 4 mg, Intravenous, Q6H PRN, Reji Marcial MD    piperacillin-tazobactam (ZOSYN) 3 375 g in sodium chloride 0 9 % 50 mL IVPB, 3 375 g, Intravenous, Q6H, Milan Green DO, Last Rate: 100 mL/hr at 11/16/19 0719, 3 375 g at 11/16/19 0719    Lab Results:  Results from last 7 days   Lab Units 11/14/19  1956 11/14/19  1651 11/14/19  1324   TROPONIN I ng/mL <0 02 <0 02 <0 02     Results from last 7 days   Lab Units 11/16/19  0603 11/15/19  0429 11/14/19  2208   WBC Thousand/uL 15 66* 18 13* 15 48*   HEMOGLOBIN g/dL 13 0 13 8 12 4   HEMATOCRIT % 40 2 43 9 38 0   PLATELETS Thousands/uL 350 398* 344         Results from last 7 days   Lab Units 11/16/19  0603 11/15/19  0427 11/14/19  2208 11/14/19  1324   POTASSIUM mmol/L 3 9 3 7 4 0  --    CHLORIDE mmol/L 101 100 101  --    CO2 mmol/L 24 26 23  --    BUN mg/dL 9 14 15  --    CREATININE mg/dL 1 11 1 27 1 10  --    CALCIUM mg/dL 8 5 8 7 8 3  --    ALK PHOS U/L  --   --  112 120*   ALT U/L  --   --  61 68   AST U/L  --   --  43 41     Results from last 7 days   Lab Units 11/14/19  0956   INR  0 98   PTT seconds 39*     Results from last 7 days   Lab Units 11/15/19  0427   MAGNESIUM mg/dL 2 2       Telemetry: Personally reviewed  Maintaining sinus rhythm

## 2019-11-16 NOTE — ASSESSMENT & PLAN NOTE
· Patient with PAF, has been having episodes of palpitations over last 1 week  Was found to be in AFib with RVR and Cardiology office today    Sent to emergency department for evaluation  · Patient spontaneously converted back to normal sinus rhythm  · TSH normal  · No anticoagulation needed as per Cardiology  · Outpatient Cardiology follow-up  · Discontinue telemetry

## 2019-11-16 NOTE — PROGRESS NOTES
Progress Note - General Surgery   Elsa Garcia 69 y o  male MRN: [id number]  Unit/Bed#: S -01 Encounter: [id number]    Assessment:  69 y o  M w/ perforated appendicitis    WBC improved    Plan:  Cont abx, transition to PO in next couple of days if WBC continues to improve  Advance diet to full liquids  Plan for interval appendectomy in 6-8 weeks    Subjective/Objective   Chief Complaint:     Subjective:     Objective:     Blood pressure 130/61, pulse 71, temperature (!) 97 2 °F (36 2 °C), temperature source Oral, resp  rate 18, height 6' 2" (1 88 m), weight 97 6 kg (215 lb 3 2 oz), SpO2 97 %  ,Body mass index is 27 63 kg/m²  Intake/Output Summary (Last 24 hours) at 11/16/2019 1032  Last data filed at 11/16/2019 0900  Gross per 24 hour   Intake 2040 ml   Output 1350 ml   Net 690 ml       Invasive Devices     Peripheral Intravenous Line            Peripheral IV 11/14/19 Left Antecubital 2 days                Physical Exam:   NAD  CV RRR  Pulm normal effort  ABd soft, nondistended, mildly tender RLQ    Lab, Imaging and other studies:  I have personally reviewed pertinent lab results    , CBC:   Lab Results   Component Value Date    WBC 15 66 (H) 11/16/2019    HGB 13 0 11/16/2019    HCT 40 2 11/16/2019    MCV 95 11/16/2019     11/16/2019    MCH 30 7 11/16/2019    MCHC 32 3 11/16/2019    RDW 13 9 11/16/2019    MPV 9 8 11/16/2019    NRBC 0 11/16/2019   , CMP:   Lab Results   Component Value Date    SODIUM 136 11/16/2019    K 3 9 11/16/2019     11/16/2019    CO2 24 11/16/2019    BUN 9 11/16/2019    CREATININE 1 11 11/16/2019    CALCIUM 8 5 11/16/2019    EGFR 66 11/16/2019   , Coagulation: No results found for: PT, INR, APTT  VTE Pharmacologic Prophylaxis: Heparin  VTE Mechanical Prophylaxis: sequential compression device

## 2019-11-17 LAB
ANION GAP SERPL CALCULATED.3IONS-SCNC: 10 MMOL/L (ref 4–13)
BUN SERPL-MCNC: 7 MG/DL (ref 5–25)
CALCIUM SERPL-MCNC: 8.2 MG/DL (ref 8.3–10.1)
CHLORIDE SERPL-SCNC: 104 MMOL/L (ref 100–108)
CO2 SERPL-SCNC: 26 MMOL/L (ref 21–32)
CREAT SERPL-MCNC: 1.15 MG/DL (ref 0.6–1.3)
ERYTHROCYTE [DISTWIDTH] IN BLOOD BY AUTOMATED COUNT: 13.8 % (ref 11.6–15.1)
GFR SERPL CREATININE-BSD FRML MDRD: 63 ML/MIN/1.73SQ M
GLUCOSE SERPL-MCNC: 94 MG/DL (ref 65–140)
HCT VFR BLD AUTO: 38.3 % (ref 36.5–49.3)
HGB BLD-MCNC: 12.3 G/DL (ref 12–17)
MCH RBC QN AUTO: 30.5 PG (ref 26.8–34.3)
MCHC RBC AUTO-ENTMCNC: 32.1 G/DL (ref 31.4–37.4)
MCV RBC AUTO: 95 FL (ref 82–98)
PLATELET # BLD AUTO: 312 THOUSANDS/UL (ref 149–390)
PMV BLD AUTO: 9.9 FL (ref 8.9–12.7)
POTASSIUM SERPL-SCNC: 3.6 MMOL/L (ref 3.5–5.3)
RBC # BLD AUTO: 4.03 MILLION/UL (ref 3.88–5.62)
SODIUM SERPL-SCNC: 140 MMOL/L (ref 136–145)
WBC # BLD AUTO: 13.9 THOUSAND/UL (ref 4.31–10.16)

## 2019-11-17 PROCEDURE — 85027 COMPLETE CBC AUTOMATED: CPT | Performed by: INTERNAL MEDICINE

## 2019-11-17 PROCEDURE — 80048 BASIC METABOLIC PNL TOTAL CA: CPT | Performed by: INTERNAL MEDICINE

## 2019-11-17 PROCEDURE — 99232 SBSQ HOSP IP/OBS MODERATE 35: CPT | Performed by: INTERNAL MEDICINE

## 2019-11-17 RX ADMIN — PIPERACILLIN SODIUM AND TAZOBACTAM SODIUM 3.38 G: 36; 4.5 INJECTION, POWDER, FOR SOLUTION INTRAVENOUS at 18:33

## 2019-11-17 RX ADMIN — PIPERACILLIN SODIUM AND TAZOBACTAM SODIUM 3.38 G: 36; 4.5 INJECTION, POWDER, FOR SOLUTION INTRAVENOUS at 13:19

## 2019-11-17 RX ADMIN — PIPERACILLIN SODIUM AND TAZOBACTAM SODIUM 3.38 G: 36; 4.5 INJECTION, POWDER, FOR SOLUTION INTRAVENOUS at 00:29

## 2019-11-17 RX ADMIN — PIPERACILLIN SODIUM AND TAZOBACTAM SODIUM 3.38 G: 36; 4.5 INJECTION, POWDER, FOR SOLUTION INTRAVENOUS at 08:34

## 2019-11-17 RX ADMIN — DESMOPRESSIN ACETATE 40 MG: 0.2 TABLET ORAL at 08:36

## 2019-11-17 RX ADMIN — METOPROLOL SUCCINATE 50 MG: 50 TABLET, FILM COATED, EXTENDED RELEASE ORAL at 21:02

## 2019-11-17 NOTE — PROGRESS NOTES
Progress Note - General Surgery   Giovanna Regina 69 y o  male MRN: [id number]  Unit/Bed#: S -01 Encounter: [id number]    Assessment:  69 y o  M w/ perforated appendicitis    WBC 13 9  Tolerating PO    Plan:  Cont abx  Diet as tolerated  May need repeat CT AP early next week if persistent WBC  Plan for interval appendectomy in 6-8 weeks    Subjective/Objective   Chief Complaint:     Subjective:     Objective:     Blood pressure 129/62, pulse 71, temperature 98 1 °F (36 7 °C), temperature source Oral, resp  rate 18, height 6' 2" (1 88 m), weight 97 6 kg (215 lb 3 2 oz), SpO2 95 %  ,Body mass index is 27 63 kg/m²  Intake/Output Summary (Last 24 hours) at 11/17/2019 0835  Last data filed at 11/16/2019 1941  Gross per 24 hour   Intake 2318 75 ml   Output 700 ml   Net 1618 75 ml       Invasive Devices     Peripheral Intravenous Line            Peripheral IV 11/14/19 Left Antecubital 3 days                Physical Exam:   NAD  CV RRR  Pulm normal effort  ABd soft, nondistended, mildly tender RLQ    Lab, Imaging and other studies:  I have personally reviewed pertinent lab results    , CBC:   Lab Results   Component Value Date    WBC 13 90 (H) 11/17/2019    HGB 12 3 11/17/2019    HCT 38 3 11/17/2019    MCV 95 11/17/2019     11/17/2019    MCH 30 5 11/17/2019    MCHC 32 1 11/17/2019    RDW 13 8 11/17/2019    MPV 9 9 11/17/2019   , CMP:   Lab Results   Component Value Date    SODIUM 140 11/17/2019    K 3 6 11/17/2019     11/17/2019    CO2 26 11/17/2019    BUN 7 11/17/2019    CREATININE 1 15 11/17/2019    CALCIUM 8 2 (L) 11/17/2019    EGFR 63 11/17/2019   , Coagulation: No results found for: PT, INR, APTT  VTE Pharmacologic Prophylaxis: Heparin  VTE Mechanical Prophylaxis: sequential compression device

## 2019-11-17 NOTE — PROGRESS NOTES
Progress Note - Shubham Ramirez 1945, 69 y o  male MRN: [id number]    Unit/Bed#: S -01 Encounter: [id number]    Primary Care Provider: Niesha Dumont DO   Date and time admitted to hospital: 11/14/2019  9:38 AM      Appendicitis  Assessment & Plan  · CT scan performed last night showed acute appendicitis with possible perforation with local peritonitis  · Seen by surgical team  · Continue IV antibiotics-Zosyn  · Repeat CT early next week    Sepsis ([address] )  Assessment & Plan  · POA: Fever leukocytosis and tachycardia  · Improving    * Atrial fibrillation with rapid ventricular response (HCC)-resolved as of 11/16/2019  Assessment & Plan  · Patient with PAF, has been having episodes of palpitations over last 1 week  Was found to be in AFib with RVR and Cardiology office today  Sent to emergency department for evaluation  · Patient spontaneously converted back to normal sinus rhythm  · TSH normal  · No anticoagulation needed as per Cardiology  · Outpatient Cardiology follow-up  · Discontinue telemetry    Leukocytosis (leucocytosis)  Assessment & Plan  · Likely due to appendicitis  · Improving    Dyslipidemia  Assessment & Plan  · Continue statin    Left foot drop  Assessment & Plan  · Due to lumbar disc herniation status post laminectomy  · Continue foot brace    VTE Pharmacologic Prophylaxis:   Pharmacologic: Enoxaparin (Lovenox)  Mechanical VTE Prophylaxis in Place: Yes    Patient Centered Rounds: I have performed bedside rounds with nursing staff today  Discussions with Specialists or Other Care Team Provider:     Education and Discussions with Family / Patient:  Patient    Time Spent for Care: 30 minutes  More than 50% of total time spent on counseling and coordination of care as described above      Current Length of Stay: 3 day(s)    Current Patient Status: Inpatient   Certification Statement: The patient will continue to require additional inpatient hospital stay due to Appendicitis    Discharge Plan:  Pending surgical clearance    Code Status: Level 1 - Full Code      Subjective:   No events reported  Patient able to ambulate without any difficulty  Denies any pain    Objective:     Vitals:   Temp (24hrs), Av 1 °F (37 3 °C), Min:98 1 °F (36 7 °C), Max:100 9 °F (38 3 °C)    Temp:  [98 1 °F (36 7 °C)-100 9 °F (38 3 °C)] 98 1 °F (36 7 °C)  HR:  [71-75] 74  Resp:  [18] 18  BP: (108-147)/(53-65) 108/53  SpO2:  [95 %-97 %] 95 %  Body mass index is 27 63 kg/m²  Input and Output Summary (last 24 hours): Intake/Output Summary (Last 24 hours) at 2019 1111  Last data filed at 2019 0900  Gross per 24 hour   Intake 1368 75 ml   Output 700 ml   Net 668 75 ml       Physical Exam:     Physical Exam    Gen -Patient comfortable at rest  Neck- Supple  No thyromegaly or lymphadenopathy  Lungs-Clear bilaterally without any wheeze or rales   Heart S1-S2, regular rate and rhythm, no murmurs  Abdomen-soft nontender, no organomegaly  Bowel sounds present  Extremities-no cyanosi,  clubbing or edema  Skin- no rash  Neuro-nonfocal     Additional Data:     Labs:    Results from last 7 days   Lab Units 19  0450 19  0603   WBC Thousand/uL 13 90* 15 66*   HEMOGLOBIN g/dL 12 3 13 0   HEMATOCRIT % 38 3 40 2   PLATELETS Thousands/uL 312 350   NEUTROS PCT %  --  84*   LYMPHS PCT %  --  8*   MONOS PCT %  --  6   EOS PCT %  --  1     Results from last 7 days   Lab Units 19  0450  19  2208   SODIUM mmol/L 140   < > 135*   POTASSIUM mmol/L 3 6   < > 4 0   CHLORIDE mmol/L 104   < > 101   CO2 mmol/L 26   < > 23   BUN mg/dL 7   < > 15   CREATININE mg/dL 1 15   < > 1 10   ANION GAP mmol/L 10   < > 11   CALCIUM mg/dL 8 2*   < > 8 3   ALBUMIN g/dL  --   --  2 5*   TOTAL BILIRUBIN mg/dL  --   --  0 70   ALK PHOS U/L  --   --  112   ALT U/L  --   --  61   AST U/L  --   --  43   GLUCOSE RANDOM mg/dL 94   < > 111    < > = values in this interval not displayed       Results from last 7 days   Lab Units 11/14/19  0956   INR  0 98             Results from last 7 days   Lab Units 11/14/19  2208   LACTIC ACID mmol/L 0 8           * I Have Reviewed All Lab Data Listed Above  * Additional Pertinent Lab Tests Reviewed: All Labs Within Last 24 Hours Reviewed    Imaging:    Imaging Reports Reviewed Today Include:   Imaging Personally Reviewed by Myself Includes:      Recent Cultures (last 7 days):           Last 24 Hours Medication List:     Current Facility-Administered Medications:  acetaminophen 650 mg Oral Q6H PRN Reji Marcial MD    atorvastatin 40 mg Oral Daily Reji Marcial MD    calcium carbonate 1,000 mg Oral Daily PRN Reji Marcial MD    enoxaparin 40 mg Subcutaneous Q24H [address] Reji Marcial MD    HYDROmorphone 0 2 mg Intravenous Q4H PRN DapNATASHA Singh-SHAYE    HYDROmorphone 0 5 mg Intravenous Q4H PRN DapNATASHA Singh-C    HYDROmorphone 1 mg Intravenous Q4H PRN DapNATASHA Singh-SHAYE    metoprolol succinate 50 mg Oral Q12H Nancy Marcano MD    ondansetron 4 mg Intravenous Q6H PRN Reji Marcial MD    piperacillin-tazobactam 3 375 g Intravenous Q6H Milan Green DO Last Rate: 3 375 g (11/17/19 1067)        Today, Patient Was Seen By: Eden Lozano MD    ** Please Note: Dictation voice to text software may have been used in the creation of this document   **

## 2019-11-18 ENCOUNTER — APPOINTMENT (INPATIENT)
Dept: CT IMAGING | Facility: HOSPITAL | Age: 74
DRG: 308 | End: 2019-11-18
Payer: COMMERCIAL

## 2019-11-18 LAB
BASOPHILS # BLD AUTO: 0.04 THOUSANDS/ΜL (ref 0–0.1)
BASOPHILS NFR BLD AUTO: 0 % (ref 0–1)
EOSINOPHIL # BLD AUTO: 0.28 THOUSAND/ΜL (ref 0–0.61)
EOSINOPHIL NFR BLD AUTO: 2 % (ref 0–6)
ERYTHROCYTE [DISTWIDTH] IN BLOOD BY AUTOMATED COUNT: 13.4 % (ref 11.6–15.1)
HCT VFR BLD AUTO: 37.6 % (ref 36.5–49.3)
HGB BLD-MCNC: 12 G/DL (ref 12–17)
IMM GRANULOCYTES # BLD AUTO: 0.08 THOUSAND/UL (ref 0–0.2)
IMM GRANULOCYTES NFR BLD AUTO: 1 % (ref 0–2)
LYMPHOCYTES # BLD AUTO: 1.13 THOUSANDS/ΜL (ref 0.6–4.47)
LYMPHOCYTES NFR BLD AUTO: 9 % (ref 14–44)
MCH RBC QN AUTO: 30.4 PG (ref 26.8–34.3)
MCHC RBC AUTO-ENTMCNC: 31.9 G/DL (ref 31.4–37.4)
MCV RBC AUTO: 95 FL (ref 82–98)
MONOCYTES # BLD AUTO: 1.02 THOUSAND/ΜL (ref 0.17–1.22)
MONOCYTES NFR BLD AUTO: 8 % (ref 4–12)
NEUTROPHILS # BLD AUTO: 10.61 THOUSANDS/ΜL (ref 1.85–7.62)
NEUTS SEG NFR BLD AUTO: 80 % (ref 43–75)
NRBC BLD AUTO-RTO: 0 /100 WBCS
PLATELET # BLD AUTO: 337 THOUSANDS/UL (ref 149–390)
PMV BLD AUTO: 9.7 FL (ref 8.9–12.7)
RBC # BLD AUTO: 3.95 MILLION/UL (ref 3.88–5.62)
WBC # BLD AUTO: 13.16 THOUSAND/UL (ref 4.31–10.16)

## 2019-11-18 PROCEDURE — 99232 SBSQ HOSP IP/OBS MODERATE 35: CPT | Performed by: INTERNAL MEDICINE

## 2019-11-18 PROCEDURE — 74177 CT ABD & PELVIS W/CONTRAST: CPT

## 2019-11-18 PROCEDURE — 85025 COMPLETE CBC W/AUTO DIFF WBC: CPT | Performed by: SURGERY

## 2019-11-18 RX ADMIN — METOPROLOL SUCCINATE 50 MG: 50 TABLET, FILM COATED, EXTENDED RELEASE ORAL at 22:11

## 2019-11-18 RX ADMIN — PIPERACILLIN SODIUM AND TAZOBACTAM SODIUM 3.38 G: 36; 4.5 INJECTION, POWDER, FOR SOLUTION INTRAVENOUS at 00:14

## 2019-11-18 RX ADMIN — DESMOPRESSIN ACETATE 40 MG: 0.2 TABLET ORAL at 09:16

## 2019-11-18 RX ADMIN — PIPERACILLIN SODIUM AND TAZOBACTAM SODIUM 3.38 G: 36; 4.5 INJECTION, POWDER, FOR SOLUTION INTRAVENOUS at 12:54

## 2019-11-18 RX ADMIN — IOHEXOL 100 ML: 350 INJECTION, SOLUTION INTRAVENOUS at 21:50

## 2019-11-18 RX ADMIN — PIPERACILLIN SODIUM AND TAZOBACTAM SODIUM 3.38 G: 36; 4.5 INJECTION, POWDER, FOR SOLUTION INTRAVENOUS at 06:32

## 2019-11-18 RX ADMIN — ENOXAPARIN SODIUM 40 MG: 40 INJECTION SUBCUTANEOUS at 09:16

## 2019-11-18 NOTE — PLAN OF CARE
Problem: CARDIOVASCULAR - ADULT  Goal: Maintains optimal cardiac output and hemodynamic stability  Description  INTERVENTIONS:  - Monitor I/O, vital signs and rhythm  - Monitor for S/S and trends of decreased cardiac output  - Administer and titrate ordered vasoactive medications to optimize hemodynamic stability  - Assess quality of pulses, skin color and temperature  - Assess for signs of decreased coronary artery perfusion  - Instruct patient to report change in severity of symptoms  11/18/2019 0907 by Priyanka Ross RN  Outcome: Progressing  11/18/2019 0907 by Priyanka Ross RN  Outcome: Progressing  Goal: Absence of cardiac dysrhythmias or at baseline rhythm  Description  INTERVENTIONS:  - Continuous cardiac monitoring, vital signs, obtain 12 lead EKG if ordered  - Administer antiarrhythmic and heart rate control medications as ordered  - Monitor electrolytes and administer replacement therapy as ordered  11/18/2019 0907 by Priyanka Ross RN  Outcome: Progressing  11/18/2019 0907 by Priyanka Ross RN  Outcome: Progressing     Problem: PAIN - ADULT  Goal: Verbalizes/displays adequate comfort level or baseline comfort level  Description  Interventions:  - Encourage patient to monitor pain and request assistance  - Assess pain using appropriate pain scale  - Administer analgesics based on type and severity of pain and evaluate response  - Implement non-pharmacological measures as appropriate and evaluate response  - Consider cultural and social influences on pain and pain management  - Notify physician/advanced practitioner if interventions unsuccessful or patient reports new pain  11/18/2019 0907 by Priyanka Ross RN  Outcome: Progressing  11/18/2019 0907 by Priyanka Ross RN  Outcome: Progressing     Problem: INFECTION - ADULT  Goal: Absence or prevention of progression during hospitalization  Description  INTERVENTIONS:  - Assess and monitor for signs and symptoms of infection  - Monitor lab/diagnostic results  - Monitor all insertion sites, i e  indwelling lines, tubes, and drains  - Monitor endotracheal if appropriate and nasal secretions for changes in amount and color  - [place] appropriate cooling/warming therapies per order  - Administer medications as ordered  - Instruct and encourage patient and family to use good hand hygiene technique  - Identify and instruct in appropriate isolation precautions for identified infection/condition  11/18/2019 0907 by Emiliana Steen RN  Outcome: Progressing  11/18/2019 0907 by Emiliana Steen RN  Outcome: Progressing  Goal: Absence of fever/infection during neutropenic period  Description  INTERVENTIONS:  - Monitor WBC    11/18/2019 0907 by Emiliana Steen RN  Outcome: Progressing  11/18/2019 0907 by Emiliana Steen RN  Outcome: Progressing     Problem: SAFETY ADULT  Goal: Patient will remain free of falls  Description  INTERVENTIONS:  - Assess patient frequently for physical needs  -  Identify cognitive and physical deficits and behaviors that affect risk of falls    -  [place] fall precautions as indicated by assessment   - Educate patient/family on patient safety including physical limitations  - Instruct patient to call for assistance with activity based on assessment  - Modify environment to reduce risk of injury  - Consider OT/PT consult to assist with strengthening/mobility  11/18/2019 0907 by Emiliana Steen RN  Outcome: Progressing  11/18/2019 0907 by Emiliana Steen RN  Outcome: Progressing  Goal: Maintain or return to baseline ADL function  Description  INTERVENTIONS:  -  Assess patient's ability to carry out ADLs; assess patient's baseline for ADL function and identify physical deficits which impact ability to perform ADLs (bathing, care of mouth/teeth, toileting, grooming, dressing, etc )  - Assess/evaluate cause of self-care deficits   - Assess range of motion  - Assess patient's mobility; develop plan if impaired  - Assess patient's need for assistive devices and provide as appropriate  - Encourage maximum independence but intervene and supervise when necessary  - Involve family in performance of ADLs  - Assess for home care needs following discharge   - Consider OT consult to assist with ADL evaluation and planning for discharge  - Provide patient education as appropriate  11/18/2019 0907 by Brisa Cruz RN  Outcome: Progressing  11/18/2019 0907 by Brisa Cruz RN  Outcome: Progressing  Goal: Maintain or return mobility status to optimal level  Description  INTERVENTIONS:  - Assess patient's baseline mobility status (ambulation, transfers, stairs, etc )    - Identify cognitive and physical deficits and behaviors that affect mobility  - Identify mobility aids required to assist with transfers and/or ambulation (gait belt, sit-to-stand, lift, walker, cane, etc )  - [place] fall precautions as indicated by assessment  - Record patient progress and toleration of activity level on Mobility SBAR; progress patient to next Phase/Stage  - Instruct patient to call for assistance with activity based on assessment  - Consider rehabilitation consult to assist with strengthening/weightbearing, etc   11/18/2019 0907 by Brisa Cruz RN  Outcome: Progressing  11/18/2019 0907 by Brisa Cruz RN  Outcome: Progressing     Problem: DISCHARGE PLANNING  Goal: Discharge to home or other facility with appropriate resources  Description  INTERVENTIONS:  - Identify barriers to discharge w/patient and caregiver  - Arrange for needed discharge resources and transportation as appropriate  - Identify discharge learning needs (meds, wound care, etc )  - Arrange for interpretive services to assist at discharge as needed  - Refer to Case Management Department for coordinating discharge planning if the patient needs post-hospital services based on physician/advanced practitioner order or complex needs related to functional status, cognitive ability, or social support system  11/18/2019 0907 by Apoorva Jarvis RN  Outcome: Progressing  11/18/2019 0907 by Apoorva Jarvis RN  Outcome: Progressing     Problem: Knowledge Deficit  Goal: Patient/family/caregiver demonstrates understanding of disease process, treatment plan, medications, and discharge instructions  Description  Complete learning assessment and assess knowledge base  Interventions:  - Provide teaching at level of understanding  - Provide teaching via preferred learning methods  11/18/2019 0907 by Apoorva Jarvis RN  Outcome: Progressing  11/18/2019 0907 by Apoorva Jarvis RN  Outcome: Progressing     Problem: Nutrition/Hydration-ADULT  Goal: Nutrient/Hydration intake appropriate for improving, restoring or maintaining nutritional needs  Description  Monitor and assess patient's nutrition/hydration status for malnutrition  Collaborate with interdisciplinary team and initiate plan and interventions as ordered  Monitor patient's weight and dietary intake as ordered or per policy  Utilize nutrition screening tool and intervene as necessary  Determine patient's food preferences and provide high-protein, high-caloric foods as appropriate       INTERVENTIONS:  - Monitor oral intake, urinary output, labs, and treatment plans  - Assess nutrition and hydration status and recommend course of action  - Evaluate amount of meals eaten  - Assist patient with eating if necessary   - Allow adequate time for meals  - Recommend/ encourage appropriate diets, oral nutritional supplements, and vitamin/mineral supplements  - Order, calculate, and assess calorie counts as needed  - Recommend, monitor, and adjust tube feedings and TPN/PPN based on assessed needs  - Assess need for intravenous fluids  - Provide specific nutrition/hydration education as appropriate  - Include patient/family/caregiver in decisions related to nutrition  11/18/2019 0907 by Apoorva Jarvis RN  Outcome: Progressing  11/18/2019 6803 by Emiliana Enio, RN  Outcome: Progressing     Problem: Potential for Falls  Goal: Patient will remain free of falls  Description  INTERVENTIONS:  - Assess patient frequently for physical needs  -  Identify cognitive and physical deficits and behaviors that affect risk of falls    -  [place] fall precautions as indicated by assessment   - Educate patient/family on patient safety including physical limitations  - Instruct patient to call for assistance with activity based on assessment  - Modify environment to reduce risk of injury  - Consider OT/PT consult to assist with strengthening/mobility  11/18/2019 0907 by Emiliana Steen RN  Outcome: Progressing  11/18/2019 0907 by Emiliana Steen RN  Outcome: Progressing

## 2019-11-18 NOTE — PROGRESS NOTES
Progress Note - Gregg Lafleur 1945, 69 y o  male MRN: [id number]    Unit/Bed#: S -01 Encounter: [id number]    Primary Care Provider: Fabiola Rick DO   Date and time admitted to hospital: 11/14/2019  9:38 AM        Appendicitis  Assessment & Plan  · CT scan performed last night showed acute appendicitis with possible perforation with local peritonitis  · Seen by surgical team  · Continue IV antibiotics-Zosyn  · Repeat CT abdomen pelvis as per per surgery    Sepsis ([address] )  Assessment & Plan  · POA: Fever leukocytosis and tachycardia  · Improving    * Atrial fibrillation with rapid ventricular response (HCC)-resolved as of 11/16/2019  Assessment & Plan  · Patient with PAF, has been having episodes of palpitations over last 1 week  Was found to be in AFib with RVR and Cardiology office today  Sent to emergency department for evaluation  · Patient spontaneously converted back to normal sinus rhythm  · TSH normal  · No anticoagulation needed as per Cardiology  · Outpatient Cardiology follow-up  · Discontinue telemetry    Leukocytosis (leucocytosis)  Assessment & Plan  · Likely due to appendicitis  · Improving    Dyslipidemia  Assessment & Plan  · Continue statin    Left foot drop  Assessment & Plan  · Due to lumbar disc herniation status post laminectomy  · Continue foot brace    VTE Pharmacologic Prophylaxis:   Pharmacologic: Enoxaparin (Lovenox)  Mechanical VTE Prophylaxis in Place: Yes    Patient Centered Rounds: I have performed bedside rounds with nursing staff today  Discussions with Specialists or Other Care Team Provider:     Education and Discussions with Family / Patient:  Patient    Time Spent for Care: 20 minutes  More than 50% of total time spent on counseling and coordination of care as described above      Current Length of Stay: 4 day(s)    Current Patient Status: Inpatient   Certification Statement: The patient will continue to require additional inpatient hospital stay due to Appendicitis    Discharge Plan:  Discharge tomorrow    Code Status: Level 1 - Full Code      Subjective:   No events reported,  Feels okay  Tolerating diet    Objective:     Vitals:   Temp (24hrs), Av 2 °F (37 3 °C), Min:98 8 °F (37 1 °C), Max:99 7 °F (37 6 °C)    Temp:  [98 8 °F (37 1 °C)-99 7 °F (37 6 °C)] 98 8 °F (37 1 °C)  HR:  [63-82] 67  Resp:  [18] 18  BP: (101-149)/(54-63) 101/54  SpO2:  [96 %-97 %] 96 %  Body mass index is 27 63 kg/m²  Input and Output Summary (last 24 hours): Intake/Output Summary (Last 24 hours) at 2019 1217  Last data filed at 2019 2300  Gross per 24 hour   Intake 350 ml   Output 825 ml   Net -475 ml       Physical Exam:     Physical Exam     Gen -Patient comfortable   Neck- Supple  No thyromegaly or lymphadenopathy  Lungs-Clear bilaterally without any wheeze or rales   Heart S1-S2, regular rate and rhythm, no murmurs  Abdomen-soft nontender, no organomegaly  Bowel sounds present  Extremities-no cyanosi,  clubbing or edema  Skin- no rash  Neuro-nonfocal       Additional Data:     Labs:    Results from last 7 days   Lab Units 19  1202   WBC Thousand/uL 13 16*   HEMOGLOBIN g/dL 12 0   HEMATOCRIT % 37 6   PLATELETS Thousands/uL 337   NEUTROS PCT % 80*   LYMPHS PCT % 9*   MONOS PCT % 8   EOS PCT % 2     Results from last 7 days   Lab Units 19  0450  19  2208   SODIUM mmol/L 140   < > 135*   POTASSIUM mmol/L 3 6   < > 4 0   CHLORIDE mmol/L 104   < > 101   CO2 mmol/L 26   < > 23   BUN mg/dL 7   < > 15   CREATININE mg/dL 1 15   < > 1 10   ANION GAP mmol/L 10   < > 11   CALCIUM mg/dL 8 2*   < > 8 3   ALBUMIN g/dL  --   --  2 5*   TOTAL BILIRUBIN mg/dL  --   --  0 70   ALK PHOS U/L  --   --  112   ALT U/L  --   --  61   AST U/L  --   --  43   GLUCOSE RANDOM mg/dL 94   < > 111    < > = values in this interval not displayed       Results from last 7 days   Lab Units 19  0956   INR  0 98             Results from last 7 days   Lab Units 11/14/19  2208   LACTIC ACID mmol/L 0 8           * I Have Reviewed All Lab Data Listed Above  * Additional Pertinent Lab Tests Reviewed: All Labs Within Last 24 Hours Reviewed    Imaging:    Imaging Reports Reviewed Today Include:   Imaging Personally Reviewed by Myself Includes:      Recent Cultures (last 7 days):           Last 24 Hours Medication List:     Current Facility-Administered Medications:  acetaminophen 650 mg Oral Q6H PRN Reji Marcial MD    atorvastatin 40 mg Oral Daily Reji Marcial MD    calcium carbonate 1,000 mg Oral Daily PRN Reji Marcial MD    enoxaparin 40 mg Subcutaneous Q24H [address] Reji Marcial MD    HYDROmorphone 0 2 mg Intravenous Q4H PRN Hanny Romero PA-C    HYDROmorphone 0 5 mg Intravenous Q4H PRN Hanny Romero PA-C    HYDROmorphone 1 mg Intravenous Q4H PRN Hanny Romero PA-C    metoprolol succinate 50 mg Oral Q12H Shankar Simon MD    ondansetron 4 mg Intravenous Q6H PRN Reji Marcial MD    piperacillin-tazobactam 3 375 g Intravenous Q6H Milan Green DO Last Rate: 3 375 g (11/18/19 9468)        Today, Patient Was Seen By: Quiana Chauhan MD    ** Please Note: Dictation voice to text software may have been used in the creation of this document   **

## 2019-11-18 NOTE — ASSESSMENT & PLAN NOTE
· CT scan performed last night showed acute appendicitis with possible perforation with local peritonitis  · Seen by surgical team  · Continue IV antibiotics-Zosyn  · Repeat CT abdomen pelvis as per per surgery

## 2019-11-18 NOTE — PROGRESS NOTES
Progress Note - General Surgery   Jose Mckee 69 y o  male MRN: [id number]  Unit/Bed#: S -01 Encounter: [id number]    Assessment:  [de-identified] male with perforated appendicitis    - WBC improvin 9 from 18 13 on admission  - Ambulating hallways  - Tolerating diet    Plan:  Continue care per primary  Continue Abx - transition to PO abx for 14 total days   If WBC elevated - repeat CT AP prior to D/C  Interval appendectomy anticipated in 6-8 weeks time    Subjective/Objective   Chief Complaint:     Subjective:  No acute events over the weekend  Patient sitting comfortably in bedside chair watching TV  States that he ambulates always 10 times a day  Objective:     Blood pressure 101/54, pulse 67, temperature 98 8 °F (37 1 °C), temperature source Oral, resp  rate 18, height 6' 2" (1 88 m), weight 97 6 kg (215 lb 3 2 oz), SpO2 96 %  ,Body mass index is 27 63 kg/m²  Intake/Output Summary (Last 24 hours) at 2019 0852  Last data filed at 2019 2300  Gross per 24 hour   Intake 800 ml   Output 825 ml   Net -25 ml       Invasive Devices     Peripheral Intravenous Line            Peripheral IV 19 Distal;Dorsal (posterior); Left Forearm less than 1 day                Physical Exam: General: AAOx3  Head: normocephalic, atraumatic  Neck: supple, trachea midline  Respiratory: BS b/l  Abdomen: Soft, tender in RLQ (minimally), non distended  Heart: RRR, S1s2  Ext: Warm no cyanosis   Pulse: 2+ radial      Lab, Imaging and other studies:I have personally reviewed pertinent lab results    , CBC: No results found for: WBC, HGB, HCT, MCV, PLT, ADJUSTEDWBC, MCH, MCHC, RDW, MPV, NRBC, CMP: No results found for: SODIUM, K, CL, CO2, ANIONGAP, BUN, CREATININE, GLUCOSE, CALCIUM, AST, ALT, ALKPHOS, PROT, BILITOT, EGFR  VTE Pharmacologic Prophylaxis: Heparin  VTE Mechanical Prophylaxis: sequential compression device

## 2019-11-18 NOTE — PLAN OF CARE
Problem: CARDIOVASCULAR - ADULT  Goal: Maintains optimal cardiac output and hemodynamic stability  Description  INTERVENTIONS:  - Monitor I/O, vital signs and rhythm  - Monitor for S/S and trends of decreased cardiac output  - Administer and titrate ordered vasoactive medications to optimize hemodynamic stability  - Assess quality of pulses, skin color and temperature  - Assess for signs of decreased coronary artery perfusion  - Instruct patient to report change in severity of symptoms  Outcome: Progressing  Goal: Absence of cardiac dysrhythmias or at baseline rhythm  Description  INTERVENTIONS:  - Continuous cardiac monitoring, vital signs, obtain 12 lead EKG if ordered  - Administer antiarrhythmic and heart rate control medications as ordered  - Monitor electrolytes and administer replacement therapy as ordered  Outcome: Progressing     Problem: PAIN - ADULT  Goal: Verbalizes/displays adequate comfort level or baseline comfort level  Description  Interventions:  - Encourage patient to monitor pain and request assistance  - Assess pain using appropriate pain scale  - Administer analgesics based on type and severity of pain and evaluate response  - Implement non-pharmacological measures as appropriate and evaluate response  - Consider cultural and social influences on pain and pain management  - Notify physician/advanced practitioner if interventions unsuccessful or patient reports new pain  Outcome: Progressing     Problem: INFECTION - ADULT  Goal: Absence or prevention of progression during hospitalization  Description  INTERVENTIONS:  - Assess and monitor for signs and symptoms of infection  - Monitor lab/diagnostic results  - Monitor all insertion sites, i e  indwelling lines, tubes, and drains  - Monitor endotracheal if appropriate and nasal secretions for changes in amount and color  - [place] appropriate cooling/warming therapies per order  - Administer medications as ordered  - Instruct and encourage patient and family to use good hand hygiene technique  - Identify and instruct in appropriate isolation precautions for identified infection/condition  Outcome: Progressing  Goal: Absence of fever/infection during neutropenic period  Description  INTERVENTIONS:  - Monitor WBC    Outcome: Progressing     Problem: SAFETY ADULT  Goal: Patient will remain free of falls  Description  INTERVENTIONS:  - Assess patient frequently for physical needs  -  Identify cognitive and physical deficits and behaviors that affect risk of falls    -  [place] fall precautions as indicated by assessment   - Educate patient/family on patient safety including physical limitations  - Instruct patient to call for assistance with activity based on assessment  - Modify environment to reduce risk of injury  - Consider OT/PT consult to assist with strengthening/mobility  Outcome: Progressing  Goal: Maintain or return to baseline ADL function  Description  INTERVENTIONS:  -  Assess patient's ability to carry out ADLs; assess patient's baseline for ADL function and identify physical deficits which impact ability to perform ADLs (bathing, care of mouth/teeth, toileting, grooming, dressing, etc )  - Assess/evaluate cause of self-care deficits   - Assess range of motion  - Assess patient's mobility; develop plan if impaired  - Assess patient's need for assistive devices and provide as appropriate  - Encourage maximum independence but intervene and supervise when necessary  - Involve family in performance of ADLs  - Assess for home care needs following discharge   - Consider OT consult to assist with ADL evaluation and planning for discharge  - Provide patient education as appropriate  Outcome: Progressing  Goal: Maintain or return mobility status to optimal level  Description  INTERVENTIONS:  - Assess patient's baseline mobility status (ambulation, transfers, stairs, etc )    - Identify cognitive and physical deficits and behaviors that affect mobility  - Identify mobility aids required to assist with transfers and/or ambulation (gait belt, sit-to-stand, lift, walker, cane, etc )  - [place] fall precautions as indicated by assessment  - Record patient progress and toleration of activity level on Mobility SBAR; progress patient to next Phase/Stage  - Instruct patient to call for assistance with activity based on assessment  - Consider rehabilitation consult to assist with strengthening/weightbearing, etc   Outcome: Progressing     Problem: DISCHARGE PLANNING  Goal: Discharge to home or other facility with appropriate resources  Description  INTERVENTIONS:  - Identify barriers to discharge w/patient and caregiver  - Arrange for needed discharge resources and transportation as appropriate  - Identify discharge learning needs (meds, wound care, etc )  - Arrange for interpretive services to assist at discharge as needed  - Refer to Case Management Department for coordinating discharge planning if the patient needs post-hospital services based on physician/advanced practitioner order or complex needs related to functional status, cognitive ability, or social support system  Outcome: Progressing     Problem: Knowledge Deficit  Goal: Patient/family/caregiver demonstrates understanding of disease process, treatment plan, medications, and discharge instructions  Description  Complete learning assessment and assess knowledge base  Interventions:  - Provide teaching at level of understanding  - Provide teaching via preferred learning methods  Outcome: Progressing     Problem: Nutrition/Hydration-ADULT  Goal: Nutrient/Hydration intake appropriate for improving, restoring or maintaining nutritional needs  Description  Monitor and assess patient's nutrition/hydration status for malnutrition  Collaborate with interdisciplinary team and initiate plan and interventions as ordered  Monitor patient's weight and dietary intake as ordered or per policy   Utilize nutrition screening tool and intervene as necessary  Determine patient's food preferences and provide high-protein, high-caloric foods as appropriate  INTERVENTIONS:  - Monitor oral intake, urinary output, labs, and treatment plans  - Assess nutrition and hydration status and recommend course of action  - Evaluate amount of meals eaten  - Assist patient with eating if necessary   - Allow adequate time for meals  - Recommend/ encourage appropriate diets, oral nutritional supplements, and vitamin/mineral supplements  - Order, calculate, and assess calorie counts as needed  - Recommend, monitor, and adjust tube feedings and TPN/PPN based on assessed needs  - Assess need for intravenous fluids  - Provide specific nutrition/hydration education as appropriate  - Include patient/family/caregiver in decisions related to nutrition  Outcome: Progressing     Problem: Potential for Falls  Goal: Patient will remain free of falls  Description  INTERVENTIONS:  - Assess patient frequently for physical needs  -  Identify cognitive and physical deficits and behaviors that affect risk of falls    -  [place] fall precautions as indicated by assessment   - Educate patient/family on patient safety including physical limitations  - Instruct patient to call for assistance with activity based on assessment  - Modify environment to reduce risk of injury  - Consider OT/PT consult to assist with strengthening/mobility  Outcome: Progressing

## 2019-11-19 ENCOUNTER — APPOINTMENT (INPATIENT)
Dept: CT IMAGING | Facility: HOSPITAL | Age: 74
DRG: 308 | End: 2019-11-19
Attending: SURGERY
Payer: COMMERCIAL

## 2019-11-19 LAB
ERYTHROCYTE [DISTWIDTH] IN BLOOD BY AUTOMATED COUNT: 13.5 % (ref 11.6–15.1)
GLUCOSE SERPL-MCNC: 137 MG/DL (ref 65–140)
HCT VFR BLD AUTO: 35.4 % (ref 36.5–49.3)
HGB BLD-MCNC: 11.7 G/DL (ref 12–17)
MCH RBC QN AUTO: 30.9 PG (ref 26.8–34.3)
MCHC RBC AUTO-ENTMCNC: 33.1 G/DL (ref 31.4–37.4)
MCV RBC AUTO: 93 FL (ref 82–98)
PLATELET # BLD AUTO: 337 THOUSANDS/UL (ref 149–390)
PMV BLD AUTO: 9.6 FL (ref 8.9–12.7)
RBC # BLD AUTO: 3.79 MILLION/UL (ref 3.88–5.62)
WBC # BLD AUTO: 9.64 THOUSAND/UL (ref 4.31–10.16)

## 2019-11-19 PROCEDURE — 87205 SMEAR GRAM STAIN: CPT | Performed by: SURGERY

## 2019-11-19 PROCEDURE — 99233 SBSQ HOSP IP/OBS HIGH 50: CPT | Performed by: INTERNAL MEDICINE

## 2019-11-19 PROCEDURE — 85027 COMPLETE CBC AUTOMATED: CPT | Performed by: INTERNAL MEDICINE

## 2019-11-19 PROCEDURE — 99152 MOD SED SAME PHYS/QHP 5/>YRS: CPT

## 2019-11-19 PROCEDURE — 99153 MOD SED SAME PHYS/QHP EA: CPT

## 2019-11-19 PROCEDURE — 87186 SC STD MICRODIL/AGAR DIL: CPT | Performed by: SURGERY

## 2019-11-19 PROCEDURE — 87077 CULTURE AEROBIC IDENTIFY: CPT | Performed by: SURGERY

## 2019-11-19 PROCEDURE — 0D9J30Z DRAINAGE OF APPENDIX WITH DRAINAGE DEVICE, PERCUTANEOUS APPROACH: ICD-10-PCS | Performed by: FAMILY MEDICINE

## 2019-11-19 PROCEDURE — 93005 ELECTROCARDIOGRAM TRACING: CPT

## 2019-11-19 PROCEDURE — 82948 REAGENT STRIP/BLOOD GLUCOSE: CPT

## 2019-11-19 PROCEDURE — 49406 IMAGE CATH FLUID PERI/RETRO: CPT | Performed by: RADIOLOGY

## 2019-11-19 PROCEDURE — 49406 IMAGE CATH FLUID PERI/RETRO: CPT

## 2019-11-19 PROCEDURE — 99233 SBSQ HOSP IP/OBS HIGH 50: CPT | Performed by: FAMILY MEDICINE

## 2019-11-19 PROCEDURE — 99152 MOD SED SAME PHYS/QHP 5/>YRS: CPT | Performed by: RADIOLOGY

## 2019-11-19 PROCEDURE — 87070 CULTURE OTHR SPECIMN AEROBIC: CPT | Performed by: SURGERY

## 2019-11-19 RX ORDER — FLECAINIDE ACETATE 50 MG/1
300 TABLET ORAL ONCE
Status: COMPLETED | OUTPATIENT
Start: 2019-11-19 | End: 2019-11-19

## 2019-11-19 RX ORDER — FENTANYL CITRATE 50 UG/ML
INJECTION, SOLUTION INTRAMUSCULAR; INTRAVENOUS CODE/TRAUMA/SEDATION MEDICATION
Status: COMPLETED | OUTPATIENT
Start: 2019-11-19 | End: 2019-11-19

## 2019-11-19 RX ORDER — MIDAZOLAM HYDROCHLORIDE 2 MG/2ML
INJECTION, SOLUTION INTRAMUSCULAR; INTRAVENOUS CODE/TRAUMA/SEDATION MEDICATION
Status: COMPLETED | OUTPATIENT
Start: 2019-11-19 | End: 2019-11-19

## 2019-11-19 RX ORDER — FLECAINIDE ACETATE 50 MG/1
50 TABLET ORAL EVERY 12 HOURS SCHEDULED
Status: DISCONTINUED | OUTPATIENT
Start: 2019-11-20 | End: 2019-11-20 | Stop reason: HOSPADM

## 2019-11-19 RX ADMIN — FLECAINIDE ACETATE 300 MG: 50 TABLET ORAL at 17:05

## 2019-11-19 RX ADMIN — MIDAZOLAM HYDROCHLORIDE 0.5 MG: 1 INJECTION, SOLUTION INTRAMUSCULAR; INTRAVENOUS at 11:34

## 2019-11-19 RX ADMIN — FENTANYL CITRATE 25 MCG: 50 INJECTION, SOLUTION INTRAMUSCULAR; INTRAVENOUS at 11:17

## 2019-11-19 RX ADMIN — FENTANYL CITRATE 25 MCG: 50 INJECTION, SOLUTION INTRAMUSCULAR; INTRAVENOUS at 11:34

## 2019-11-19 RX ADMIN — DESMOPRESSIN ACETATE 40 MG: 0.2 TABLET ORAL at 09:10

## 2019-11-19 RX ADMIN — PIPERACILLIN SODIUM AND TAZOBACTAM SODIUM 3.38 G: 36; 4.5 INJECTION, POWDER, FOR SOLUTION INTRAVENOUS at 19:22

## 2019-11-19 RX ADMIN — PIPERACILLIN SODIUM AND TAZOBACTAM SODIUM 3.38 G: 36; 4.5 INJECTION, POWDER, FOR SOLUTION INTRAVENOUS at 01:52

## 2019-11-19 RX ADMIN — PIPERACILLIN SODIUM AND TAZOBACTAM SODIUM 3.38 G: 36; 4.5 INJECTION, POWDER, FOR SOLUTION INTRAVENOUS at 13:48

## 2019-11-19 RX ADMIN — PIPERACILLIN SODIUM AND TAZOBACTAM SODIUM 3.38 G: 36; 4.5 INJECTION, POWDER, FOR SOLUTION INTRAVENOUS at 09:09

## 2019-11-19 RX ADMIN — MIDAZOLAM HYDROCHLORIDE 0.5 MG: 1 INJECTION, SOLUTION INTRAMUSCULAR; INTRAVENOUS at 11:19

## 2019-11-19 RX ADMIN — METOPROLOL SUCCINATE 50 MG: 50 TABLET, FILM COATED, EXTENDED RELEASE ORAL at 09:09

## 2019-11-19 RX ADMIN — MIDAZOLAM HYDROCHLORIDE 1 MG: 1 INJECTION, SOLUTION INTRAMUSCULAR; INTRAVENOUS at 11:11

## 2019-11-19 RX ADMIN — METOPROLOL SUCCINATE 50 MG: 50 TABLET, FILM COATED, EXTENDED RELEASE ORAL at 21:18

## 2019-11-19 RX ADMIN — FENTANYL CITRATE 25 MCG: 50 INJECTION, SOLUTION INTRAMUSCULAR; INTRAVENOUS at 11:25

## 2019-11-19 NOTE — BRIEF OP NOTE (RAD/CATH)
IR IMAGE GUIDED ASPIRATION / DRAINAGE W TUBE Procedure Note    PATIENT NAME: Russ Sullivan  : 1945  MRN: [id number]    Pre-op Diagnosis:   1  Atrial fibrillation with rapid ventricular response ([address] )    2  Right lower quadrant abdominal pain      Post-op Diagnosis:   1  Atrial fibrillation with rapid ventricular response ([address] )    2  Right lower quadrant abdominal pain        Surgeon:   Sara Shah MD  Assistants:     No qualified resident was available, Resident is only observing    Estimated Blood Loss: minimal     Findings:     Right appendiceal abscess with retained stone, 10 [place] drain placed  Purulent material returned       Specimens:  10 ml of purulent material     Complications:  none    Anesthesia: Conscious sedation and Elan Ceja MD     Date: 2019  Time: 11:57 AM

## 2019-11-19 NOTE — PLAN OF CARE
Problem: CARDIOVASCULAR - ADULT  Goal: Maintains optimal cardiac output and hemodynamic stability  Description  INTERVENTIONS:  - Monitor I/O, vital signs and rhythm  - Monitor for S/S and trends of decreased cardiac output  - Administer and titrate ordered vasoactive medications to optimize hemodynamic stability  - Assess quality of pulses, skin color and temperature  - Assess for signs of decreased coronary artery perfusion  - Instruct patient to report change in severity of symptoms  Outcome: Progressing  Goal: Absence of cardiac dysrhythmias or at baseline rhythm  Description  INTERVENTIONS:  - Continuous cardiac monitoring, vital signs, obtain 12 lead EKG if ordered  - Administer antiarrhythmic and heart rate control medications as ordered  - Monitor electrolytes and administer replacement therapy as ordered  Outcome: Progressing     Problem: PAIN - ADULT  Goal: Verbalizes/displays adequate comfort level or baseline comfort level  Description  Interventions:  - Encourage patient to monitor pain and request assistance  - Assess pain using appropriate pain scale  - Administer analgesics based on type and severity of pain and evaluate response  - Implement non-pharmacological measures as appropriate and evaluate response  - Consider cultural and social influences on pain and pain management  - Notify physician/advanced practitioner if interventions unsuccessful or patient reports new pain  Outcome: Progressing     Problem: INFECTION - ADULT  Goal: Absence or prevention of progression during hospitalization  Description  INTERVENTIONS:  - Assess and monitor for signs and symptoms of infection  - Monitor lab/diagnostic results  - Monitor all insertion sites, i e  indwelling lines, tubes, and drains  - Monitor endotracheal if appropriate and nasal secretions for changes in amount and color  - [place] appropriate cooling/warming therapies per order  - Administer medications as ordered  - Instruct and encourage patient and family to use good hand hygiene technique  - Identify and instruct in appropriate isolation precautions for identified infection/condition  Outcome: Progressing  Goal: Absence of fever/infection during neutropenic period  Description  INTERVENTIONS:  - Monitor WBC    Outcome: Progressing     Problem: SAFETY ADULT  Goal: Patient will remain free of falls  Description  INTERVENTIONS:  - Assess patient frequently for physical needs  -  Identify cognitive and physical deficits and behaviors that affect risk of falls    -  [place] fall precautions as indicated by assessment   - Educate patient/family on patient safety including physical limitations  - Instruct patient to call for assistance with activity based on assessment  - Modify environment to reduce risk of injury  - Consider OT/PT consult to assist with strengthening/mobility  Outcome: Progressing  Goal: Maintain or return to baseline ADL function  Description  INTERVENTIONS:  -  Assess patient's ability to carry out ADLs; assess patient's baseline for ADL function and identify physical deficits which impact ability to perform ADLs (bathing, care of mouth/teeth, toileting, grooming, dressing, etc )  - Assess/evaluate cause of self-care deficits   - Assess range of motion  - Assess patient's mobility; develop plan if impaired  - Assess patient's need for assistive devices and provide as appropriate  - Encourage maximum independence but intervene and supervise when necessary  - Involve family in performance of ADLs  - Assess for home care needs following discharge   - Consider OT consult to assist with ADL evaluation and planning for discharge  - Provide patient education as appropriate  Outcome: Progressing  Goal: Maintain or return mobility status to optimal level  Description  INTERVENTIONS:  - Assess patient's baseline mobility status (ambulation, transfers, stairs, etc )    - Identify cognitive and physical deficits and behaviors that affect mobility  - Identify mobility aids required to assist with transfers and/or ambulation (gait belt, sit-to-stand, lift, walker, cane, etc )  - [place] fall precautions as indicated by assessment  - Record patient progress and toleration of activity level on Mobility SBAR; progress patient to next Phase/Stage  - Instruct patient to call for assistance with activity based on assessment  - Consider rehabilitation consult to assist with strengthening/weightbearing, etc   Outcome: Progressing     Problem: DISCHARGE PLANNING  Goal: Discharge to home or other facility with appropriate resources  Description  INTERVENTIONS:  - Identify barriers to discharge w/patient and caregiver  - Arrange for needed discharge resources and transportation as appropriate  - Identify discharge learning needs (meds, wound care, etc )  - Arrange for interpretive services to assist at discharge as needed  - Refer to Case Management Department for coordinating discharge planning if the patient needs post-hospital services based on physician/advanced practitioner order or complex needs related to functional status, cognitive ability, or social support system  Outcome: Progressing     Problem: Knowledge Deficit  Goal: Patient/family/caregiver demonstrates understanding of disease process, treatment plan, medications, and discharge instructions  Description  Complete learning assessment and assess knowledge base  Interventions:  - Provide teaching at level of understanding  - Provide teaching via preferred learning methods  Outcome: Progressing     Problem: Nutrition/Hydration-ADULT  Goal: Nutrient/Hydration intake appropriate for improving, restoring or maintaining nutritional needs  Description  Monitor and assess patient's nutrition/hydration status for malnutrition  Collaborate with interdisciplinary team and initiate plan and interventions as ordered  Monitor patient's weight and dietary intake as ordered or per policy   Utilize nutrition screening tool and intervene as necessary  Determine patient's food preferences and provide high-protein, high-caloric foods as appropriate  INTERVENTIONS:  - Monitor oral intake, urinary output, labs, and treatment plans  - Assess nutrition and hydration status and recommend course of action  - Evaluate amount of meals eaten  - Assist patient with eating if necessary   - Allow adequate time for meals  - Recommend/ encourage appropriate diets, oral nutritional supplements, and vitamin/mineral supplements  - Order, calculate, and assess calorie counts as needed  - Recommend, monitor, and adjust tube feedings and TPN/PPN based on assessed needs  - Assess need for intravenous fluids  - Provide specific nutrition/hydration education as appropriate  - Include patient/family/caregiver in decisions related to nutrition  Outcome: Progressing     Problem: Potential for Falls  Goal: Patient will remain free of falls  Description  INTERVENTIONS:  - Assess patient frequently for physical needs  -  Identify cognitive and physical deficits and behaviors that affect risk of falls    -  [place] fall precautions as indicated by assessment   - Educate patient/family on patient safety including physical limitations  - Instruct patient to call for assistance with activity based on assessment  - Modify environment to reduce risk of injury  - Consider OT/PT consult to assist with strengthening/mobility  Outcome: Progressing

## 2019-11-19 NOTE — PROGRESS NOTES
Patient converted to Normal Sinus rhythm on his own  Now sinus rhythm to sinus charlee on the monitor  Cardiology informed  Will continue to monitor   Rj Barnard RN

## 2019-11-19 NOTE — ASSESSMENT & PLAN NOTE
· POA: Fever leukocytosis and tachycardia  · White blood cell count normal   Patient is still going in and out of AFib with RVR  Continue antibiotics

## 2019-11-19 NOTE — PROGRESS NOTES
Progress Note - Yanely Lozano 1945, 69 y o  male MRN: [id number]    Unit/Bed#: S -01 Encounter: [id number]    Primary Care Provider: Donte Alves DO   Date and time admitted to hospital: 11/14/2019  9:38 AM        Appendicitis  Assessment & Plan  · CT scan last night showed possible perforation/abscess  Surgery notes reviewed  Patient went for percutaneous drain placement for appendiceal abscess  Continue Zosyn  Patient is doing well post drain placement  Plan is for appendectomy as an outpatient  Leukocytosis (leucocytosis)  Assessment & Plan  · Likely due to appendicitis  Resolved  Dyslipidemia  Assessment & Plan  · Continue statin    Paroxysmal atrial fibrillation [facility])  Assessment & Plan  Patient went back into AFib before the procedure this morning  He has been going in and out of AFib throughout the day today  Discussed with Cardiology  Will continue the Toprol-XL  Flecainide to be started tonight  Patient is still not agreeable to going on anticoagulation for stroke prophylaxis  Sepsis ([address] )  Assessment & Plan  · POA: Fever leukocytosis and tachycardia  · White blood cell count normal   Patient is still going in and out of AFib with RVR  Continue antibiotics  Left foot drop  Assessment & Plan  · Due to lumbar disc herniation status post laminectomy  · Continue foot brace          Subjective/Objective     Subjective:   Patient seen and examined this evening  He underwent percutaneous drain placement for his appendiceal abscess earlier today  He has been going in and out of AFib today but has been doing fine otherwise  Objective:  Vitals: Blood pressure (!) 94/47, pulse 65, temperature (!) 97 4 °F (36 3 °C), temperature source Oral, resp  rate 18, height 6' 2" (1 88 m), weight 97 6 kg (215 lb 3 2 oz), SpO2 96 %  ,Body mass index is 27 63 kg/m²        Intake/Output Summary (Last 24 hours) at 11/19/2019 1704  Last data filed at 11/19/2019 1357  Gross per 24 hour Intake 580 ml   Output 900 ml   Net -320 ml       Invasive Devices     Peripheral Intravenous Line            Peripheral IV 11/17/19 Distal;Dorsal (posterior); Left Forearm 2 days          Drain            Closed/Suction Drain Right RLQ Bulb 10 Fr  less than 1 day                Physical Exam: BP (!) 94/47 (BP Location: Right arm) Comment: SLIM aware  Pulse 65   Temp (!) 97 4 °F (36 3 °C) (Oral)   Resp 18   Ht 6' 2" (1 88 m)   Wt 97 6 kg (215 lb 3 2 oz)   SpO2 96%   BMI 27 63 kg/m²   General appearance: alert and oriented, in no acute distress  Head: Normocephalic, without obvious abnormality, atraumatic  Eyes: No scleral icterus  Lungs: clear to auscultation bilaterally  Heart: Tachycardic with irregularly irregular rhythm  Abdomen: Drain in place in right lower quadrant with serosanguineous drainage noted  Extremities: extremities normal, warm and well-perfused; no cyanosis, clubbing, or edema  Neurologic: Grossly normal    Lab, Imaging and other studies: I have personally reviewed pertinent reports      VTE Pharmacologic Prophylaxis: Enoxaparin (Lovenox)  VTE Mechanical Prophylaxis: sequential compression device

## 2019-11-19 NOTE — PROGRESS NOTES
Patient returned from IR procedure  IR nurse reported that patient was in Afib on the monitor during procedure  EKG obtained  Patient in rapid afib with RVR  SLIM informed  Cardiology re-consulted and cardiology informed  Patient placed on telemetry- order requested  Patient ALLYSON drain dressing clean dry and intact  Bed rest for one hour  Bed locked; call bell within reach  Will continue to monitor   Bg Conde RN

## 2019-11-19 NOTE — UTILIZATION REVIEW
Continued Stay Review    Date: 11/19/2019                      Current Patient Class: inpatient  Current Level of Care: med surg    HPI:74 y o  male initially admitted on 11/14/2019 inpatient due to atrial fibrillation with rapid ventricular response and right lower quadrant abdominal pain  11/15 surgery consult - Acute appendicitis with a large phlegmonous process in the right lower quadrant   Plan is antibiotics  Assessment/Plan:  Patient  Converted spontaneously back to NSR on 11/15  Today 11/19,  repeat CT showing abscess with appendicitis  IV antibiotics continue and IR consulted for drainage  Pertinent Labs/Diagnostic Results:   11/19/2019 CT abdomen - Redemonstrated markedly dilated and irregular appendix containing appendicoliths with a thickened enhancing rim measuring approximately 3 3 x 5 4 x 7 2 cm in total, likely representing contained perforation/abscess in the setting of appendicitis  [place] is extensive surrounding inflammatory change and marked thickening of the adjacent cecum which is likely reactive      11/14/2019 CT abdomen - Extensive right lower quadrant inflammation obscuring the retrocecal appendix   Distention of the appendix with several phleboliths, consistent with acute appendicitis   No free intraperitoneal air or organized fluid collection however findings are concerning for early contained perforation, likely at the base of the appendix    11/14/2019 US abdomen - No significant sonographic abnormality identified  Results from last 7 days   Lab Units 11/19/19  0522 11/18/19  1202 11/17/19  0450 11/16/19  0603 11/15/19  0429 11/14/19  2208   WBC Thousand/uL 9 64 13 16* 13 90* 15 66* 18 13* 15 48*   HEMOGLOBIN g/dL 11 7* 12 0 12 3 13 0 13 8 12 4   HEMATOCRIT % 35 4* 37 6 38 3 40 2 43 9 38 0   PLATELETS Thousands/uL 337 337 312 350 398* 344   NEUTROS ABS Thousands/µL  --  10 61*  --  13 13*  --  12 34*     Results from last 7 days   Lab Units 11/17/19  0450 11/16/19  0603 11/15/19  0427 11/14/19  2208 11/14/19  0955   SODIUM mmol/L 140 136 137 135* 136   POTASSIUM mmol/L 3 6 3 9 3 7 4 0 4 1   CHLORIDE mmol/L 104 101 100 101 99*   CO2 mmol/L 26 24 26 23 27   ANION GAP mmol/L 10 11 11 11 10   BUN mg/dL 7 9 14 15 13   CREATININE mg/dL 1 15 1 11 1 27 1 10 1 16   EGFR ml/min/1 73sq m 63 66 56 66 62   CALCIUM mg/dL 8 2* 8 5 8 7 8 3 9 0   MAGNESIUM mg/dL  --   --  2 2  --   --      Results from last 7 days   Lab Units 11/14/19  2208 11/14/19  1324   AST U/L 43 41   ALT U/L 61 68   ALK PHOS U/L 112 120*   TOTAL PROTEIN g/dL 6 3* 7 4   ALBUMIN g/dL 2 5* 3 1*   TOTAL BILIRUBIN mg/dL 0 70 0 90   BILIRUBIN DIRECT mg/dL  --  0 24*         Results from last 7 days   Lab Units 11/17/19  0450 11/16/19  0603 11/15/19  0427 11/14/19 2208 11/14/19  0955   GLUCOSE RANDOM mg/dL 94 106 106 111 98     Results from last 7 days   Lab Units 11/14/19  1956 11/14/19  1651 11/14/19  1324 11/14/19  0955   TROPONIN I ng/mL <0 02 <0 02 <0 02 <0 02     Results from last 7 days   Lab Units 11/14/19  0956   PROTIME seconds 12 4   INR  0 98   PTT seconds 39*     Results from last 7 days   Lab Units 11/14/19  1324   TSH 3RD GENERATON uIU/mL 2 539     Results from last 7 days   Lab Units 11/14/19  2208   LACTIC ACID mmol/L 0 8     Results from last 7 days   Lab Units 11/14/19  1651   CLARITY UA  Clear   COLOR UA  Yellow   SPEC GRAV UA  1 010   PH UA  6 0   GLUCOSE UA mg/dl Negative   KETONES UA mg/dl Negative   BLOOD UA  Trace-Intact*   PROTEIN UA mg/dl Negative   NITRITE UA  Negative   BILIRUBIN UA  Negative   UROBILINOGEN UA E U /dl 0 2   LEUKOCYTES UA  Negative   WBC UA /hpf 0-1*   RBC UA /hpf 0-1*   BACTERIA UA /hpf Occasional   EPITHELIAL CELLS WET PREP /hpf None Seen     Vital Signs:   11/19/19 0700  98 5 °F (36 9 °C)  92  18  122/62  83  97 %  None (Room air)  Sitting   11/18/19 2200  100 1 °F (37 8 °C)  70  18  123/59  --  --  --  Lying   11/18/19 1500  98 4 °F (36 9 °C)  68  18  128/62  --  99 % None (Room air)  Sitting   11/18/19 0745  --  --  --  --  --  --  None          Medications:   Scheduled Medications:  Medications:  atorvastatin 40 mg Oral Daily   enoxaparin 40 mg Subcutaneous Q24H ROSEANN   metoprolol succinate 50 mg Oral Q12H   piperacillin-tazobactam 3 375 g Intravenous Q6H     Continuous IV Infusions:none     PRN Meds: not used   acetaminophen 650 mg Oral Q6H PRN   calcium carbonate 1,000 mg Oral Daily PRN   HYDROmorphone 0 2 mg Intravenous Q4H PRN   HYDROmorphone 0 5 mg Intravenous Q4H PRN   HYDROmorphone 1 mg Intravenous Q4H PRN   iohexol 50 mL Oral Once in imaging   ondansetron 4 mg Intravenous Q6H PRN       Discharge Plan: to be determined  Lives with spouse in a ranch home  Network Utilization Review Department  [email or web address]  ATTENTION: Please call with any questions or concerns to [phone] and carefully listen to the prompts so that you are directed to the right person  All voicemails are confidential   Nathaniel Sharma all requests for admission clinical reviews, approved or denied determinations and any other requests to dedicated fax number below belonging to the campus where the patient is receiving treatment    FACILITY NAME UR FAX NUMBER   ADMISSION DENIALS (Administrative/Medical Necessity) [address] [facility] (Maternity/NICU/Pediatrics) [phone]   [facility] [id number] [place] [address] [address] [facility] [phone]   [facility] [address] [facility] [phone]   [facility] Cole [address] [phone]

## 2019-11-19 NOTE — PROGRESS NOTES
General Cardiology   Progress Note -  [facility]   Ramo Nunesr 69 y o  male MRN: [id number]    Unit/Bed#: S -01 Encounter: [id number]    Assessment/ Plan:    1  Paroxysmal atrial fibrillation/flutter: now back in SR  This is all in the setting of infection and a perforated appendicitis with abscess  He is a bit bradycardic while in SR with rates in 40s after converting so would not increase his BB at this time; continue metoprolol succinate 50mg BID  We may initiate flecainide (pill in pocket) eventually if needed but will monitor overnight and re-evaluate in AM    His CHADS-Vasc is 1; ok to hold of on [facility] for now but if he has recurrent episodes or more prolonged episodes this will need to be addressed  Eventual OP EP eval for possible ablation  Subjective : Pt seen for follow up  We had seen him earlier in hospitalization for PAF in the setting of acute perforated appendicitis  He has hx of PAF in the past, one prior episode that occurred in the setting of PNA  He is currently hospitalized  Getting IV abx and had a ALLYSON drain placed today for perf appendicitis with abscess; eventual surgery planned in 4-6 weeks  He has been feeling well; was ambulating the halls this AM; walked about 10 laps and noted that his HR was a bit higher than normal; he checked his own pulse and it was irregular to him  He has been off tele  While getting his ALLYSON drained placed in IR they noted afib with RVR  We were asked to see him again regarding the afib  At time of my exam pt is resting comfortably in bed, no complaints  He tells me he feels like he went back into a normal rhythm about an hour which I can confirm on telemetry  He is now back in SR; ekg from earlier today at 12:23 shows atrial fibrillation  Prior ekg this admission showed atrial flutter  Review of Systems   Constitution: Negative for decreased appetite and fever     Cardiovascular: Negative for chest pain, dyspnea on exertion, leg swelling and palpitations  Respiratory: Negative for cough and shortness of breath  Musculoskeletal: Negative for back pain  Gastrointestinal: Negative for nausea and vomiting  Genitourinary: Negative for dysuria  Neurological: Negative for dizziness and light-headedness  Psychiatric/Behavioral: Negative for altered mental status  All other systems reviewed and are negative  Objective:   Vitals: Blood pressure 111/59, pulse 89, temperature 97 6 °F (36 4 °C), temperature source Oral, resp  rate 20, height 6' 2" (1 88 m), weight 97 6 kg (215 lb 3 2 oz), SpO2 98 %  ,       Body mass index is 27 63 kg/m²  ,     Systolic (06GFT), ZL , Min:88 , UTK:758     Diastolic (49VSE), GST:61, Min:49, Max:71      Intake/Output Summary (Last 24 hours) at 2019 1409  Last data filed at 2019 1357  Gross per 24 hour   Intake 580 ml   Output 900 ml   Net -320 ml     Weight (last 2 days)     None        Telemetry Review: No significant arrhythmias seen on telemetry review  Telemetry restarted on 12:31pm today; initially atrial fibrillatino with RVR; rate 100-120; converted spontaneously to SR at 12:52pm    EKG personally reviewed by ANNIE Cleveland    2019   Atrial fibrillation;     Physical Exam   Constitutional: He is oriented to person, place, and time  No distress  Pt sitting up in bed in NAD; alert and cooperative   HENT:   Head: Normocephalic and atraumatic  Neck: No JVD present  Cardiovascular: Normal rate, regular rhythm, S1 normal and S2 normal    No murmur heard  No LE edema   Pulmonary/Chest: Effort normal and breath sounds normal    LS CTA, on RA   Abdominal:   R sided ALLYSON drain noted   Musculoskeletal: He exhibits no edema  Neurological: He is alert and oriented to person, place, and time  Skin: Skin is warm  He is not diaphoretic  Psychiatric: He has a normal mood and affect  His behavior is normal    Nursing note and vitals reviewed      LABORATORY RESULTS  Results from last 7 days   Lab Units 11/14/19 1956 11/14/19  1651 11/14/19  1324   TROPONIN I ng/mL <0 02 <0 02 <0 02     CBC with diff:   Results from last 7 days   Lab Units 11/19/19  0522 11/18/19  1202 11/17/19 0450 11/16/19  0603 11/15/19  0429 11/14/19 2208 11/14/19  1651 11/14/19  0955   WBC Thousand/uL 9 64 13 16* 13 90* 15 66* 18 13* 15 48*  --  16 08*   HEMOGLOBIN g/dL 11 7* 12 0 12 3 13 0 13 8 12 4  --  14 4   HEMATOCRIT % 35 4* 37 6 38 3 40 2 43 9 38 0  --  44 4   MCV fL 93 95 95 95 98 94  --  95   PLATELETS Thousands/uL 337 337 312 350 398* 344 404* 361   MCH pg 30 9 30 4 30 5 30 7 30 7 30 7  --  30 7   MCHC g/dL 33 1 31 9 32 1 32 3 31 4 32 6  --  32 4   RDW % 13 5 13 4 13 8 13 9 13 8 13 9  --  13 7   MPV fL 9 6 9 7 9 9 9 8 10 3 9 9 9 9 10 1   NRBC AUTO /100 WBCs  --  0  --  0  --  0  --  0     CMP:  Results from last 7 days   Lab Units 11/17/19  0450 11/16/19  0603 11/15/19  0427 11/14/19 2208 11/14/19  1324 11/14/19  0955   POTASSIUM mmol/L 3 6 3 9 3 7 4 0  --  4 1   CHLORIDE mmol/L 104 101 100 101  --  99*   CO2 mmol/L 26 24 26 23  --  27   BUN mg/dL 7 9 14 15  --  13   CREATININE mg/dL 1 15 1 11 1 27 1 10  --  1 16   CALCIUM mg/dL 8 2* 8 5 8 7 8 3  --  9 0   AST U/L  --   --   --  43 41  --    ALT U/L  --   --   --  61 68  --    ALK PHOS U/L  --   --   --  112 120*  --    EGFR ml/min/1 73sq m 63 66 56 66  --  62     BMP:  Results from last 7 days   Lab Units 11/17/19 0450 11/16/19  0603 11/15/19  0427 11/14/19  2208 11/14/19  0955   POTASSIUM mmol/L 3 6 3 9 3 7 4 0 4 1   CHLORIDE mmol/L 104 101 100 101 99*   CO2 mmol/L 26 24 26 23 27   BUN mg/dL 7 9 14 15 13   CREATININE mg/dL 1 15 1 11 1 27 1 10 1 16   CALCIUM mg/dL 8 2* 8 5 8 7 8 3 9 0     Lab Results   Component Value Date    NTBN 3,566 (H) 02/26/2019     Results from last 7 days   Lab Units 11/15/19  0427   MAGNESIUM mg/dL 2 2     Results from last 7 days   Lab Units 11/14/19  1324   TSH 3RD GENERATON uIU/mL 2 539     Results from last 7 days   Lab Units 19  0956   INR  0 98     Lipid Profile:   No results found for: CHOL  Lab Results   Component Value Date    HDL 36 (L) 2019    HDL 43 2018     Lab Results   Component Value Date    LDLCALC 58 2019    LDLCALC 114 (H) 2018     Lab Results   Component Value Date    TRIG 48 2019    TRIG 85 2018     Cardiac testing:   Results for orders placed during the hospital encounter of 19   Echo complete with contrast if indicated    Narrative [place] [address], [address] [place] [address]  [phone]    Transthoracic Echocardiogram  2D, M-mode, Doppler, and Color Doppler    Study date:  2019    Patient: Vimal Patrick  MR number: [id number]  Account number: [de-identified]  : 1945  Age: 68 years  Gender: Male  Status: Inpatient  Location: Bedside  Height: 74 in  Weight: 223 5 lb  BP: 107/ 50 mmHg    Indications: Congestive heart failure  Diagnoses: I50 9 - Heart failure, unspecified    Sonographer:  Lisa Murphy RDCS  Primary Physician:  Thelma Pina MD  Referring Physician:  Angélica Le PA-C  Group:  [facility]  Interpreting Physician:  DO HUMAIRA Balderrama    LEFT VENTRICLE:  Systolic function was normal  Ejection fraction was estimated to be 60 %  There were no regional wall motion abnormalities  Wall thickness was at the upper limits of normal   Left ventricular diastolic function parameters were normal     RIGHT VENTRICLE:  The ventricle was moderately dilated  Systolic function was normal     LEFT ATRIUM:  The atrium was moderately dilated  RIGHT ATRIUM:  The atrium was mildly dilated  MITRAL VALVE:  There was mild regurgitation  AORTIC VALVE:  There was moderate regurgitation  TRICUSPID VALVE:  There was mild regurgitation  PULMONIC VALVE:  There was mild to moderate regurgitation  HISTORY: PRIOR HISTORY: Patient has no history of cardiovascular disease      PROCEDURE: The procedure was performed at the bedside  This was a routine study  The transthoracic approach was used  The study included complete 2D imaging, M-mode, complete spectral Doppler, and color Doppler  The heart rate was 90 bpm,  at the start of the study  Image quality was adequate  LEFT VENTRICLE: Size was normal  Systolic function was normal  Ejection fraction was estimated to be 60 %  There were no regional wall motion abnormalities  Wall thickness was at the upper limits of normal  DOPPLER: Left ventricular  diastolic function parameters were normal     RIGHT VENTRICLE: The ventricle was moderately dilated  Systolic function was normal     LEFT ATRIUM: The atrium was moderately dilated  RIGHT ATRIUM: The atrium was mildly dilated  MITRAL VALVE: Valve structure was normal  There was normal leaflet separation  DOPPLER: The transmitral velocity was within the normal range  There was no evidence for stenosis  There was mild regurgitation  AORTIC VALVE: The valve was trileaflet  Leaflets exhibited normal thickness and normal cuspal separation  DOPPLER: Transaortic velocity was within the normal range  There was no evidence for stenosis  There was moderate regurgitation  TRICUSPID VALVE: The valve structure was normal  There was normal leaflet separation  DOPPLER: The transtricuspid velocity was within the normal range  There was no evidence for stenosis  There was mild regurgitation  The tricuspid jet  envelope definition was inadequate for estimation of RV systolic pressure  There are no indirect findings (abnormal RV volume or geometry, altered pulmonary flow velocity profile, or leftward septal displacement) which would suggest  moderate or severe pulmonary hypertension  PULMONIC VALVE: Leaflets exhibited normal thickness, no calcification, and normal cuspal separation  DOPPLER: The transpulmonic velocity was within the normal range  There was mild to moderate regurgitation      PERICARDIUM: There was no pericardial effusion  The pericardium was normal in appearance  AORTA: The root exhibited normal size      SYSTEMIC VEINS: IVC: Respirophasic changes were normal     SYSTEM MEASUREMENT TABLES    2D  %FS: 44 85 %  Ao Diam: 3 91 cm  EDV(Teich): 172 72 ml  EF(Teich): 75 38 %  ESV(Teich): 42 53 ml  IVSd: 1 04 cm  LA Area: 36 18 cm2  LA Diam: 4 94 cm  LVEDV MOD A4C: 192 68 ml  LVEF MOD A4C: 61 13 %  LVESV MOD A4C: 74 9 ml  LVIDd: 5 89 cm  LVIDs: 3 25 cm  LVLd A4C: 9 84 cm  LVLs A4C: 7 63 cm  LVPWd: 1 03 cm  RA Area: 22 72 cm2  RVIDd: 5 86 cm  SV MOD A4C: 117 78 ml  SV(Teich): 130 2 ml    CW  TR MaxP 85 mmHg  TR Vmax: 3 16 m/s    MM  TAPSE: 2 86 cm    PW  MV A Juan: 0 79 m/s  MV Dec [place]: 4 82 m/s2  MV DecT: 216 07 ms  MV E Juan: 1 04 m/s  MV E/A Ratio: 1 32  MV PHT: 62 66 ms  MVA By PHT: 3 51 cm2    [facility]    Prepared and electronically signed by    Gianna Aranda DO  Signed [id number] 06:58:25       Meds/Allergies   current meds:   Current Facility-Administered Medications   Medication Dose Route Frequency    acetaminophen (TYLENOL) tablet 650 mg  650 mg Oral Q6H PRN    atorvastatin (LIPITOR) tablet 40 mg  40 mg Oral Daily    calcium carbonate (TUMS) chewable tablet 1,000 mg  1,000 mg Oral Daily PRN    enoxaparin (LOVENOX) subcutaneous injection 40 mg  40 mg Subcutaneous Q24H ROSEANN    HYDROmorphone (DILAUDID) injection 0 2 mg  0 2 mg Intravenous Q4H PRN    HYDROmorphone (DILAUDID) injection 0 5 mg  0 5 mg Intravenous Q4H PRN    HYDROmorphone (DILAUDID) injection 1 mg  1 mg Intravenous Q4H PRN    iohexol (OMNIPAQUE) 240 MG/ML solution 50 mL  50 mL Oral Once in imaging    metoprolol succinate (TOPROL-XL) 24 hr tablet 50 mg  50 mg Oral Q12H    ondansetron (ZOFRAN) injection 4 mg  4 mg Intravenous Q6H PRN    piperacillin-tazobactam (ZOSYN) 3 375 g in sodium chloride 0 9 % 50 mL IVPB  3 375 g Intravenous Q6H     Medications Prior to Admission Medication    atorvastatin (LIPITOR) 40 mg tablet    Calcium-Magnesium-Vitamin D (CALCIUM MAGNESIUM PO)    chlorhexidine (PERIDEX) 0 12 % solution    Cholecalciferol (VITAMIN D PO)    Cyanocobalamin (VITAMIN B12 PO)    doxycycline (ORACEA) 40 MG capsule    doxycycline (PERIOSTAT) 20 MG tablet    metoprolol succinate (TOPROL-XL) 50 mg 24 hr tablet     Assessment:  Active Problems:    Left foot drop    Sepsis (HCC)    Dyslipidemia    Leukocytosis (leucocytosis)    Appendicitis    Counseling / Coordination of Care  Total floor / unit time spent today 20 minutes  Greater than 50% of total time was spent with the patient and / or family counseling and / or coordination of care  ** Please Note: Dragon 360 Dictation voice to text software may have been used in the creation of this document   **

## 2019-11-19 NOTE — ASSESSMENT & PLAN NOTE
· CT scan last night showed possible perforation/abscess  Surgery notes reviewed  Patient went for percutaneous drain placement for appendiceal abscess  Continue Zosyn  Patient is doing well post drain placement  Plan is for appendectomy as an outpatient

## 2019-11-19 NOTE — ASSESSMENT & PLAN NOTE
Patient went back into AFib before the procedure this morning  He has been going in and out of AFib throughout the day today  Discussed with Cardiology  Will continue the Toprol-XL  Flecainide to be started tonight  Patient is still not agreeable to going on anticoagulation for stroke prophylaxis

## 2019-11-19 NOTE — PROGRESS NOTES
Progress Note - General Surgery   Yanely Lozano 69 y o  male MRN: [id number]  Unit/Bed#: S -01 Encounter: [id number]    Assessment:  69 y o  M w/ perforated appendicitis    CT scan w/ abscess    Plan:  Cont abx  IR consult for drainage    Subjective/Objective   Chief Complaint:     Subjective:     Objective:     Blood pressure 122/62, pulse 92, temperature 98 5 °F (36 9 °C), temperature source Oral, resp  rate 18, height 6' 2" (1 88 m), weight 97 6 kg (215 lb 3 2 oz), SpO2 97 %  ,Body mass index is 27 63 kg/m²  Intake/Output Summary (Last 24 hours) at 11/19/2019 1011  Last data filed at 11/18/2019 1944  Gross per 24 hour   Intake 580 ml   Output --   Net 580 ml       Invasive Devices     Peripheral Intravenous Line            Peripheral IV 11/17/19 Distal;Dorsal (posterior); Left Forearm 1 day                Physical Exam:   NAD  CV RRR  Pulm normal effort  ABd soft, nondistended, mildly tender RLQ    Lab, Imaging and other studies:  I have personally reviewed pertinent lab results    , CBC:   Lab Results   Component Value Date    WBC 9 64 11/19/2019    HGB 11 7 (L) 11/19/2019    HCT 35 4 (L) 11/19/2019    MCV 93 11/19/2019     11/19/2019    MCH 30 9 11/19/2019    MCHC 33 1 11/19/2019    RDW 13 5 11/19/2019    MPV 9 6 11/19/2019    NRBC 0 11/18/2019   , CMP:   No results found for: SODIUM, K, CL, CO2, ANIONGAP, BUN, CREATININE, GLUCOSE, CALCIUM, AST, ALT, ALKPHOS, PROT, BILITOT, EGFR, Coagulation: No results found for: PT, INR, APTT  VTE Pharmacologic Prophylaxis: Heparin  VTE Mechanical Prophylaxis: sequential compression device

## 2019-11-19 NOTE — PLAN OF CARE
Problem: CARDIOVASCULAR - ADULT  Goal: Maintains optimal cardiac output and hemodynamic stability  Description  INTERVENTIONS:  - Monitor I/O, vital signs and rhythm  - Monitor for S/S and trends of decreased cardiac output  - Administer and titrate ordered vasoactive medications to optimize hemodynamic stability  - Assess quality of pulses, skin color and temperature  - Assess for signs of decreased coronary artery perfusion  - Instruct patient to report change in severity of symptoms  Outcome: Progressing  Goal: Absence of cardiac dysrhythmias or at baseline rhythm  Description  INTERVENTIONS:  - Continuous cardiac monitoring, vital signs, obtain 12 lead EKG if ordered  - Administer antiarrhythmic and heart rate control medications as ordered  - Monitor electrolytes and administer replacement therapy as ordered  Outcome: Progressing     Problem: PAIN - ADULT  Goal: Verbalizes/displays adequate comfort level or baseline comfort level  Description  Interventions:  - Encourage patient to monitor pain and request assistance  - Assess pain using appropriate pain scale  - Administer analgesics based on type and severity of pain and evaluate response  - Implement non-pharmacological measures as appropriate and evaluate response  - Consider cultural and social influences on pain and pain management  - Notify physician/advanced practitioner if interventions unsuccessful or patient reports new pain  Outcome: Progressing     Problem: SAFETY ADULT  Goal: Patient will remain free of falls  Description  INTERVENTIONS:  - Assess patient frequently for physical needs  -  Identify cognitive and physical deficits and behaviors that affect risk of falls    -  [place] fall precautions as indicated by assessment   - Educate patient/family on patient safety including physical limitations  - Instruct patient to call for assistance with activity based on assessment  - Modify environment to reduce risk of injury  - Consider OT/PT consult to assist with strengthening/mobility  Outcome: Progressing  Goal: Maintain or return to baseline ADL function  Description  INTERVENTIONS:  -  Assess patient's ability to carry out ADLs; assess patient's baseline for ADL function and identify physical deficits which impact ability to perform ADLs (bathing, care of mouth/teeth, toileting, grooming, dressing, etc )  - Assess/evaluate cause of self-care deficits   - Assess range of motion  - Assess patient's mobility; develop plan if impaired  - Assess patient's need for assistive devices and provide as appropriate  - Encourage maximum independence but intervene and supervise when necessary  - Involve family in performance of ADLs  - Assess for home care needs following discharge   - Consider OT consult to assist with ADL evaluation and planning for discharge  - Provide patient education as appropriate  Outcome: Progressing  Goal: Maintain or return mobility status to optimal level  Description  INTERVENTIONS:  - Assess patient's baseline mobility status (ambulation, transfers, stairs, etc )    - Identify cognitive and physical deficits and behaviors that affect mobility  - Identify mobility aids required to assist with transfers and/or ambulation (gait belt, sit-to-stand, lift, walker, cane, etc )  - [place] fall precautions as indicated by assessment  - Record patient progress and toleration of activity level on Mobility SBAR; progress patient to next Phase/Stage  - Instruct patient to call for assistance with activity based on assessment  - Consider rehabilitation consult to assist with strengthening/weightbearing, etc   Outcome: Progressing     Problem: DISCHARGE PLANNING  Goal: Discharge to home or other facility with appropriate resources  Description  INTERVENTIONS:  - Identify barriers to discharge w/patient and caregiver  - Arrange for needed discharge resources and transportation as appropriate  - Identify discharge learning needs (meds, wound care, etc )  - Arrange for interpretive services to assist at discharge as needed  - Refer to Case Management Department for coordinating discharge planning if the patient needs post-hospital services based on physician/advanced practitioner order or complex needs related to functional status, cognitive ability, or social support system  Outcome: Progressing     Problem: Knowledge Deficit  Goal: Patient/family/caregiver demonstrates understanding of disease process, treatment plan, medications, and discharge instructions  Description  Complete learning assessment and assess knowledge base  Interventions:  - Provide teaching at level of understanding  - Provide teaching via preferred learning methods  Outcome: Progressing     Problem: Nutrition/Hydration-ADULT  Goal: Nutrient/Hydration intake appropriate for improving, restoring or maintaining nutritional needs  Description  Monitor and assess patient's nutrition/hydration status for malnutrition  Collaborate with interdisciplinary team and initiate plan and interventions as ordered  Monitor patient's weight and dietary intake as ordered or per policy  Utilize nutrition screening tool and intervene as necessary  Determine patient's food preferences and provide high-protein, high-caloric foods as appropriate       INTERVENTIONS:  - Monitor oral intake, urinary output, labs, and treatment plans  - Assess nutrition and hydration status and recommend course of action  - Evaluate amount of meals eaten  - Assist patient with eating if necessary   - Allow adequate time for meals  - Recommend/ encourage appropriate diets, oral nutritional supplements, and vitamin/mineral supplements  - Order, calculate, and assess calorie counts as needed  - Recommend, monitor, and adjust tube feedings and TPN/PPN based on assessed needs  - Assess need for intravenous fluids  - Provide specific nutrition/hydration education as appropriate  - Include patient/family/caregiver in decisions related to nutrition  Outcome: Progressing     Problem: Potential for Falls  Goal: Patient will remain free of falls  Description  INTERVENTIONS:  - Assess patient frequently for physical needs  -  Identify cognitive and physical deficits and behaviors that affect risk of falls    -  [place] fall precautions as indicated by assessment   - Educate patient/family on patient safety including physical limitations  - Instruct patient to call for assistance with activity based on assessment  - Modify environment to reduce risk of injury  - Consider OT/PT consult to assist with strengthening/mobility  Outcome: Progressing     Problem: INFECTION - ADULT  Goal: Absence or prevention of progression during hospitalization  Description  INTERVENTIONS:  - Assess and monitor for signs and symptoms of infection  - Monitor lab/diagnostic results  - Monitor all insertion sites, i e  indwelling lines, tubes, and drains  - Monitor endotracheal if appropriate and nasal secretions for changes in amount and color  - [place] appropriate cooling/warming therapies per order  - Administer medications as ordered  - Instruct and encourage patient and family to use good hand hygiene technique  - Identify and instruct in appropriate isolation precautions for identified infection/condition  Outcome: Not Progressing  Goal: Absence of fever/infection during neutropenic period  Description  INTERVENTIONS:  - Monitor WBC    Outcome: Not Progressing

## 2019-11-20 VITALS
HEIGHT: 74 IN | SYSTOLIC BLOOD PRESSURE: 132 MMHG | WEIGHT: 215.2 LBS | DIASTOLIC BLOOD PRESSURE: 60 MMHG | HEART RATE: 59 BPM | BODY MASS INDEX: 27.62 KG/M2 | TEMPERATURE: 98.3 F | RESPIRATION RATE: 18 BRPM | OXYGEN SATURATION: 96 %

## 2019-11-20 PROBLEM — D72.829 LEUKOCYTOSIS (LEUCOCYTOSIS): Status: RESOLVED | Noted: 2019-11-14 | Resolved: 2019-11-20

## 2019-11-20 PROBLEM — A41.9 SEPSIS (HCC): Status: RESOLVED | Noted: 2019-02-26 | Resolved: 2019-11-20

## 2019-11-20 PROCEDURE — 99232 SBSQ HOSP IP/OBS MODERATE 35: CPT | Performed by: INTERNAL MEDICINE

## 2019-11-20 PROCEDURE — 99239 HOSP IP/OBS DSCHRG MGMT >30: CPT | Performed by: FAMILY MEDICINE

## 2019-11-20 RX ORDER — METOPROLOL SUCCINATE 50 MG/1
50 TABLET, EXTENDED RELEASE ORAL EVERY 12 HOURS
Qty: 60 TABLET | Refills: 1 | Status: SHIPPED | OUTPATIENT
Start: 2019-11-20 | End: 2020-01-08 | Stop reason: HOSPADM

## 2019-11-20 RX ORDER — METRONIDAZOLE 250 MG/1
250 TABLET ORAL EVERY 8 HOURS SCHEDULED
Qty: 42 TABLET | Refills: 0 | Status: SHIPPED | OUTPATIENT
Start: 2019-11-20 | End: 2019-12-04

## 2019-11-20 RX ORDER — AMOXICILLIN AND CLAVULANATE POTASSIUM 875; 125 MG/1; MG/1
1 TABLET, FILM COATED ORAL EVERY 12 HOURS SCHEDULED
Qty: 28 TABLET | Refills: 0 | Status: SHIPPED | OUTPATIENT
Start: 2019-11-20 | End: 2019-12-04

## 2019-11-20 RX ORDER — ASPIRIN 81 MG/1
81 TABLET, CHEWABLE ORAL DAILY
Qty: 30 TABLET | Refills: 1 | Status: SHIPPED | OUTPATIENT
Start: 2019-11-20 | End: 2020-05-21 | Stop reason: HOSPADM

## 2019-11-20 RX ORDER — FLECAINIDE ACETATE 50 MG/1
50 TABLET ORAL EVERY 12 HOURS SCHEDULED
Qty: 60 TABLET | Refills: 1 | Status: SHIPPED | OUTPATIENT
Start: 2019-11-20 | End: 2020-01-08 | Stop reason: HOSPADM

## 2019-11-20 RX ORDER — SODIUM CHLORIDE 9 MG/ML
10 INJECTION INTRAVENOUS DAILY
Qty: 30 SYRINGE | Refills: 1 | Status: SHIPPED | OUTPATIENT
Start: 2019-11-20 | End: 2020-01-08 | Stop reason: HOSPADM

## 2019-11-20 RX ADMIN — METOPROLOL SUCCINATE 50 MG: 50 TABLET, FILM COATED, EXTENDED RELEASE ORAL at 09:34

## 2019-11-20 RX ADMIN — PIPERACILLIN SODIUM AND TAZOBACTAM SODIUM 3.38 G: 36; 4.5 INJECTION, POWDER, FOR SOLUTION INTRAVENOUS at 00:38

## 2019-11-20 RX ADMIN — PIPERACILLIN SODIUM AND TAZOBACTAM SODIUM 3.38 G: 36; 4.5 INJECTION, POWDER, FOR SOLUTION INTRAVENOUS at 06:18

## 2019-11-20 RX ADMIN — FLECAINIDE ACETATE 50 MG: 50 TABLET ORAL at 09:35

## 2019-11-20 RX ADMIN — DESMOPRESSIN ACETATE 40 MG: 0.2 TABLET ORAL at 09:35

## 2019-11-20 RX ADMIN — ENOXAPARIN SODIUM 40 MG: 40 INJECTION SUBCUTANEOUS at 09:35

## 2019-11-20 NOTE — PLAN OF CARE
Problem: CARDIOVASCULAR - ADULT  Goal: Maintains optimal cardiac output and hemodynamic stability  Description  INTERVENTIONS:  - Monitor I/O, vital signs and rhythm  - Monitor for S/S and trends of decreased cardiac output  - Administer and titrate ordered vasoactive medications to optimize hemodynamic stability  - Assess quality of pulses, skin color and temperature  - Assess for signs of decreased coronary artery perfusion  - Instruct patient to report change in severity of symptoms  Outcome: Progressing  Goal: Absence of cardiac dysrhythmias or at baseline rhythm  Description  INTERVENTIONS:  - Continuous cardiac monitoring, vital signs, obtain 12 lead EKG if ordered  - Administer antiarrhythmic and heart rate control medications as ordered  - Monitor electrolytes and administer replacement therapy as ordered  Outcome: Progressing     Problem: PAIN - ADULT  Goal: Verbalizes/displays adequate comfort level or baseline comfort level  Description  Interventions:  - Encourage patient to monitor pain and request assistance  - Assess pain using appropriate pain scale  - Administer analgesics based on type and severity of pain and evaluate response  - Implement non-pharmacological measures as appropriate and evaluate response  - Consider cultural and social influences on pain and pain management  - Notify physician/advanced practitioner if interventions unsuccessful or patient reports new pain  Outcome: Progressing     Problem: INFECTION - ADULT  Goal: Absence or prevention of progression during hospitalization  Description  INTERVENTIONS:  - Assess and monitor for signs and symptoms of infection  - Monitor lab/diagnostic results  - Monitor all insertion sites, i e  indwelling lines, tubes, and drains  - Monitor endotracheal if appropriate and nasal secretions for changes in amount and color  - [place] appropriate cooling/warming therapies per order  - Administer medications as ordered  - Instruct and encourage patient and family to use good hand hygiene technique  - Identify and instruct in appropriate isolation precautions for identified infection/condition  Outcome: Progressing  Goal: Absence of fever/infection during neutropenic period  Description  INTERVENTIONS:  - Monitor WBC    Outcome: Progressing     Problem: SAFETY ADULT  Goal: Patient will remain free of falls  Description  INTERVENTIONS:  - Assess patient frequently for physical needs  -  Identify cognitive and physical deficits and behaviors that affect risk of falls    -  [place] fall precautions as indicated by assessment   - Educate patient/family on patient safety including physical limitations  - Instruct patient to call for assistance with activity based on assessment  - Modify environment to reduce risk of injury  - Consider OT/PT consult to assist with strengthening/mobility  Outcome: Progressing  Goal: Maintain or return to baseline ADL function  Description  INTERVENTIONS:  -  Assess patient's ability to carry out ADLs; assess patient's baseline for ADL function and identify physical deficits which impact ability to perform ADLs (bathing, care of mouth/teeth, toileting, grooming, dressing, etc )  - Assess/evaluate cause of self-care deficits   - Assess range of motion  - Assess patient's mobility; develop plan if impaired  - Assess patient's need for assistive devices and provide as appropriate  - Encourage maximum independence but intervene and supervise when necessary  - Involve family in performance of ADLs  - Assess for home care needs following discharge   - Consider OT consult to assist with ADL evaluation and planning for discharge  - Provide patient education as appropriate  Outcome: Progressing  Goal: Maintain or return mobility status to optimal level  Description  INTERVENTIONS:  - Assess patient's baseline mobility status (ambulation, transfers, stairs, etc )    - Identify cognitive and physical deficits and behaviors that affect mobility  - Identify mobility aids required to assist with transfers and/or ambulation (gait belt, sit-to-stand, lift, walker, cane, etc )  - [place] fall precautions as indicated by assessment  - Record patient progress and toleration of activity level on Mobility SBAR; progress patient to next Phase/Stage  - Instruct patient to call for assistance with activity based on assessment  - Consider rehabilitation consult to assist with strengthening/weightbearing, etc   Outcome: Progressing     Problem: DISCHARGE PLANNING  Goal: Discharge to home or other facility with appropriate resources  Description  INTERVENTIONS:  - Identify barriers to discharge w/patient and caregiver  - Arrange for needed discharge resources and transportation as appropriate  - Identify discharge learning needs (meds, wound care, etc )  - Arrange for interpretive services to assist at discharge as needed  - Refer to Case Management Department for coordinating discharge planning if the patient needs post-hospital services based on physician/advanced practitioner order or complex needs related to functional status, cognitive ability, or social support system  Outcome: Progressing     Problem: Knowledge Deficit  Goal: Patient/family/caregiver demonstrates understanding of disease process, treatment plan, medications, and discharge instructions  Description  Complete learning assessment and assess knowledge base  Interventions:  - Provide teaching at level of understanding  - Provide teaching via preferred learning methods  Outcome: Progressing     Problem: Nutrition/Hydration-ADULT  Goal: Nutrient/Hydration intake appropriate for improving, restoring or maintaining nutritional needs  Description  Monitor and assess patient's nutrition/hydration status for malnutrition  Collaborate with interdisciplinary team and initiate plan and interventions as ordered  Monitor patient's weight and dietary intake as ordered or per policy   Utilize nutrition screening tool and intervene as necessary  Determine patient's food preferences and provide high-protein, high-caloric foods as appropriate  INTERVENTIONS:  - Monitor oral intake, urinary output, labs, and treatment plans  - Assess nutrition and hydration status and recommend course of action  - Evaluate amount of meals eaten  - Assist patient with eating if necessary   - Allow adequate time for meals  - Recommend/ encourage appropriate diets, oral nutritional supplements, and vitamin/mineral supplements  - Order, calculate, and assess calorie counts as needed  - Recommend, monitor, and adjust tube feedings and TPN/PPN based on assessed needs  - Assess need for intravenous fluids  - Provide specific nutrition/hydration education as appropriate  - Include patient/family/caregiver in decisions related to nutrition  Outcome: Progressing     Problem: Potential for Falls  Goal: Patient will remain free of falls  Description  INTERVENTIONS:  - Assess patient frequently for physical needs  -  Identify cognitive and physical deficits and behaviors that affect risk of falls    -  [place] fall precautions as indicated by assessment   - Educate patient/family on patient safety including physical limitations  - Instruct patient to call for assistance with activity based on assessment  - Modify environment to reduce risk of injury  - Consider OT/PT consult to assist with strengthening/mobility  Outcome: Progressing

## 2019-11-20 NOTE — DISCHARGE SUMMARY
Discharge- Betito Thomas 1945, 69 y o  male MRN: [id number]    Unit/Bed#: S -01 Encounter: [id number]    Primary Care Provider: Santiago Forte DO   Date and time admitted to hospital: 11/14/2019  9:38 AM      Appendicitis  Assessment & Plan  · CT scan last from 11/18 showed possible perforation/abscess  Surgery notes reviewed  Patient went for percutaneous drain placement for appendiceal abscess yesterday  He is doing well  Plan is for drain to be continued for 8 weeks with eventual outpatient appendectomy  Patient was getting Zosyn here but he will be transitioned to p o  Flagyl and Augmentin for 2 weeks as an outpatient  Dyslipidemia  Assessment & Plan  · Continue statin    Paroxysmal atrial fibrillation [facility])  Assessment & Plan  Patient was in AFib upon admission and his dose of Toprol-XL was increased  Yesterday prior to his percutaneous drain placement for his appendiceal abscess he went back into AFib and was in an out of AFib during the day  He was started on flecainide by Cardiology  He is now in sinus bradycardia with heart rate in the upper 50s  He is doing fine overall  Will discharged on increased regimen of Toprol-XL and on flecainide as well as baby aspirin as patient is resistant to going on anticoagulation  He will follow up with EP an outpatient for possible ablation  Left foot drop  Assessment & Plan  · Due to lumbar disc herniation status post laminectomy  · Continue foot brace    Leukocytosis (leucocytosis)-resolved as of 11/20/2019  Assessment & Plan  · Likely due to appendicitis  Resolved  Sepsis (HCC)-resolved as of 11/20/2019  Assessment & Plan  · POA: Fever leukocytosis and tachycardia  Secondary to appendicitis with appendiceal abscess  · White blood cell count normal   Patient now in normal sinus rhythm  2 more weeks of antibiotics prescribed by surgery          Disposition:     Home    Reason for Admission:  AFib with RVR    Discharge Diagnoses: Principal Problem (Resolved):    Atrial fibrillation with rapid ventricular response (HCC)  Active Problems:    Left foot drop    Paroxysmal atrial fibrillation ([facility] [address] )    Dyslipidemia    Appendicitis  Resolved Problems:    Sepsis ([facility] [address] )    Leukocytosis (leucocytosis)      Consultations During Hospital Stay:  · GI, Cardiology    Procedures Performed:     · Percutaneous drain placement for right appendiceal abscess performed on 11/19/19  Significant Findings / Test Results:     CT abdomen pelvis w contrast [[id number]] Collected: 11/19/19 0104   Order Status: Completed Updated: 11/19/19 0113   Narrative:     CT ABDOMEN AND PELVIS WITH IV CONTRAST    INDICATION:   Abdominal pain, acute, nonlocalized  COMPARISON:  CT of the abdomen and pelvis on November 14, 2019  TECHNIQUE:  CT examination of the abdomen and pelvis was performed  Axial, sagittal, and coronal 2D reformatted images were created from the source data and submitted for interpretation  Radiation dose length product (DLP) for this visit:  486 mGy-cm    This examination, like all CT scans performed in the [facility], was performed utilizing techniques to minimize radiation dose exposure, including the use of iterative   reconstruction and automated exposure control  IV Contrast:  100 mL of iohexol (OMNIPAQUE)  was administered intravenously without immediate adverse reaction  Enteric Contrast:  Enteric contrast was administered  FINDINGS:    ABDOMEN    LOWER CHEST:  Linear opacities at the bilateral lower lobes compatible with atelectasis /scarring  LIVER/BILIARY TREE:  Unremarkable  GALLBLADDER:  No calcified gallstones  No pericholecystic inflammatory change  SPLEEN:  Unremarkable  PANCREAS:  Unremarkable  ADRENAL GLANDS:  Unremarkable  KIDNEYS/URETERS:  Unremarkable  No hydronephrosis      STOMACH AND BOWEL:  No bowel obstruction  Blanca Coyne is significant eccentric wall thickening of the cecum, likely reactive  APPENDIX:  The appendix is markedly dilated and appears irregular with a thickened enhancing rim (series 601, image 51) measuring approximately 3 3 x 5 4 x 7 2 cm, likely representing contained perforation/abscess  Irvin Yemassee is extensive surrounding   inflammatory change   Appendicoliths are again noted (series 601, image 53)  ABDOMINOPELVIC CAVITY:  No ascites or free intraperitoneal air   Stable 1 3 x 1 0 cm gastrohepatic lymph node (series 2, image 18)       VESSELS:  Unremarkable for patient's age  PELVIS    REPRODUCTIVE ORGANS:  Unremarkable for patient's age  URINARY BLADDER:  Unremarkable  ABDOMINAL WALL/INGUINAL REGIONS:  Unremarkable  OSSEOUS STRUCTURES:  No acute fracture or destructive osseous lesion  Impression:       Redemonstrated markedly dilated and irregular appendix containing appendicoliths with a thickened enhancing rim measuring approximately 3 3 x 5 4 x 7 2 cm in total, likely representing contained perforation/abscess in the setting of appendicitis  Irvin Yemassee   is extensive surrounding inflammatory change and marked thickening of the adjacent cecum which is likely reactive  Workstation performed: [id number]   CT abdomen pelvis wo contrast [[id number]] Collected: 11/14/19 2224   Order Status: Completed Updated: 11/15/19 0017   Narrative:     CT ABDOMEN AND PELVIS WITHOUT IV CONTRAST    INDICATION:   Tachycardia, fever and right lower quadrant pain  COMPARISON:  11/14/2019  TECHNIQUE:  CT examination of the abdomen and pelvis was performed without intravenous contrast   Axial, sagittal, and coronal 2D reformatted images were created from the source data and submitted for interpretation       Radiation dose length product (DLP) for this visit:  501 mGy-cm    This examination, like all CT scans performed in the [facility], was performed utilizing techniques to minimize radiation dose exposure, including the use of iterative   reconstruction and automated exposure control  Enteric contrast was administered  FINDINGS:    ABDOMEN    LOWER CHEST:  Minimal subsegmental atelectasis at the lung bases  LIVER/BILIARY TREE:  Unremarkable  GALLBLADDER:  No calcified gallstones  No pericholecystic inflammatory change  SPLEEN:  Unremarkable  PANCREAS:  Unremarkable  ADRENAL GLANDS:  Unremarkable  KIDNEYS/URETERS:  No nephrolithiasis or obstructive uropathy  STOMACH AND BOWEL:  Inflammation in the cecum   No evidence of bowel obstruction   Mild diverticulosis throughout the colon without diverticulitis  APPENDIX:  Extensive and inflammation in the right lower quadrant obscuring the appendix   Radiopaque densities likely representing phleboliths within a retrocecal appendix   Phleboliths measure measure between 3 and 16 mm   Appendix measures   approximately 14 in diameter   No organized fluid collection or free intraperitoneal air    ABDOMINOPELVIC CAVITY:  No ascites or free intraperitoneal air  No lymphadenopathy  VESSELS:  Unremarkable for patient's age  PELVIS    REPRODUCTIVE ORGANS:  No prostate enlargement  URINARY BLADDER:  Unremarkable  ABDOMINAL WALL/INGUINAL REGIONS:  Unremarkable  OSSEOUS STRUCTURES:  No acute fracture or destructive osseous lesion     Impression:         Extensive right lower quadrant inflammation obscuring the retrocecal appendix   Distention of the appendix with several phleboliths, consistent with acute appendicitis   No free intraperitoneal air or organized fluid collection however findings are   concerning for early contained perforation, likely at the base of the appendix          I personally discussed this study with Julisa One Month on 11/14/2019 at 10:46 PM           Workstation performed: [id number]   US abdomen limited [[id number]] Collected: 11/14/19 1746   Order Status: Completed Updated: 11/14/19 1751   Narrative:     RIGHT UPPER QUADRANT ULTRASOUND    INDICATION:    ABDOMINAL PAIN, RLQ     COMPARISON:  CT 2/26/2019    TECHNIQUE:   Real-time ultrasound of the right upper quadrant was performed with a curvilinear transducer with both volumetric sweeps and still imaging techniques  FINDINGS:    PANCREAS:  Visualized portions of the pancreas are within normal limits  AORTA AND IVC:  Visualized portions are normal for patient age  LIVER:  Size:  Top normal size  Contour:  Surface contour is smooth  Parenchyma:  Echogenicity and echotexture are within normal limits  No evidence of suspicious mass  Limited imaging of the main portal vein shows it to be patent and hepatopetal      BILIARY:  The gallbladder is underdistended but otherwise unremarkable  No intrahepatic biliary dilatation  CBD measures 4 mm  No choledocholithiasis  KIDNEY:   Right kidney measures 11 4 x 4 8 cm  Within normal limits  ASCITES:   None  Impression:       No significant sonographic abnormality identified  ·     Incidental Findings:   · None    Test Results Pending at Discharge (will require follow up): · None     Outpatient Tests Requested:  · None    Complications:  None    Hospital Course:     Patient was initially admitted on 11/14 for AFib with RVR  He was started on a Cardizem drip and Cardiology was consulted  He converted to normal sinus rhythm and his metoprolol was increased  He was found to be septic with leukocytosis and tachycardia  He also spiked a fever of 101 9 the night of admission  Because he was having right lower quadrant abdominal pain a CT of the abdomen/pelvis was done revealing appendicitis  General surgery was consulted and Zosyn was started  Because he had the appendicitis for quite some time before he was admitted decision was made to treat appendicitis medically and plan for surgery down the line as an outpatient  While patient improved clinically repeat CT scan on 11/18 did show that an appendiceal abscess developed    For that reason he underwent percutaneous drain placement on 11/19 for his appendiceal abscess  On 11/19 he went back in AFib with RVR and he was seen again by Cardiology  Cardiology started patient on flecainide and he converted back into sinus rhythm  He did fine otherwise  He was then discharged home  Patient is very resistant to being started on anticoagulation for stroke prophylaxis  He was instructed to at least take a baby aspirin and to stop taking it 5-7 days before the day he has his appendectomy scheduled  The drain will be maintained for 8 weeks  He will continue antibiotics for 2 more weeks as an outpatient  Condition at Discharge: stable     Discharge Day Visit / Exam:     Subjective:  Patient was seen and examined with wife at bedside this afternoon  He expressed concern about caring for his drain at home but is doing fine otherwise and is eager to go home  After my visit surgery did go back over drain care with him including flushing his drain  We will set up home health for him as well  Vitals: Blood Pressure: 132/60 (11/20/19 1100)  Pulse: 59 (11/20/19 1100)  Temperature: 98 3 °F (36 8 °C) (11/20/19 0700)  Temp Source: Oral (11/20/19 0700)  Respirations: 18 (11/20/19 0700)  Height: 6' 2" (188 cm) (11/14/19 1140)  Weight - Scale: 97 6 kg (215 lb 3 2 oz) (11/14/19 1140)  SpO2: 96 % (11/20/19 0700)  Exam:   Physical Exam   Constitutional: He is oriented to person, place, and time  He appears well-developed and well-nourished  No distress  HENT:   Head: Normocephalic and atraumatic  Eyes: No scleral icterus  Pulmonary/Chest: No respiratory distress  Abdominal:   Drain in place in right lower quadrant with serosanguineous drainage  Neurological: He is alert and oriented to person, place, and time  Psychiatric: He has a normal mood and affect  His behavior is normal        Discussion with Family:  Discussed with wife at bedside      Discharge instructions/Information to patient and family:   See after visit summary for information provided to patient and family  Provisions for Follow-Up Care:  See after visit summary for information related to follow-up care and any pertinent home health orders  Planned Readmission:  None     Discharge Statement:  I spent 45 minutes discharging the patient  This time was spent on the day of discharge  I had direct contact with the patient on the day of discharge  Greater than 50% of the total time was spent examining patient, answering all patient questions, arranging and discussing plan of care with patient as well as directly providing post-discharge instructions  Additional time then spent on discharge activities  Discharge Medications:  See after visit summary for reconciled discharge medications provided to patient and family        ** Please Note: This note has been constructed using a voice recognition system **

## 2019-11-20 NOTE — DISCHARGE INSTR - AVS FIRST PAGE
Your regimen of metoprolol succinate XL (Toprol-XL) has been increased to twice a day  Start taking flecainide as directed  Also, take the antibiotics metronidazole and amoxicillin/clavulanic acid (Augmentin) as directed until completion  Because atrial fibrillation increases your risk of stroke please take a baby aspirin every day with food to lower your chances of suffering a stroke  Whenever your surgery is scheduled please stop taking aspirin 5-7 days before the surgery date

## 2019-11-20 NOTE — PROGRESS NOTES
General Cardiology   Progress Note -  [facility]   River Jaimes 69 y o  male MRN: [id number]    Unit/Bed#: S -01 Encounter: [id number]    Assessment/ Plan:    1  Paroxysmal atrial fibrillation/flutter: pt went back into atrial fibrillation last evening after I saw him  He was started on Flecainide went back into sinus rhythm bu 6pm yesterday and has maintained SR overnight  Continue current beta blocker dose metoprolol succinate 50mg BID and Flecainide 50mg BID  His CHADS2 Vasc is 1; he is resistant to anticoagulation and this is ok at this time as his occurrences have been in the setting of acute illness  We will set him up for eventual consultation with Electrophysiology as OP  Will need eventual stress test to r/o CAD with use of flecainide but can be done as OP  Subjective: Pt seen for follow up; no current complaints  He did have more PAF lst evening and Flecainide was started and now back in SR  He is feeling well, no chest pain, palpitations of SOB  Has been ambulating the halls without any symptoms  Review of Systems   Constitution: Negative for decreased appetite and fever  Cardiovascular: Negative for chest pain, dyspnea on exertion, leg swelling, orthopnea and palpitations  Respiratory: Negative for cough, shortness of breath and wheezing  Gastrointestinal: Negative for abdominal pain, nausea and vomiting  Tolerating PO diet   Genitourinary: Negative for dysuria  Neurological: Negative for dizziness and light-headedness  Psychiatric/Behavioral: Negative for altered mental status  Objective:   Vitals: Blood pressure 118/57, pulse 59, temperature 98 3 °F (36 8 °C), temperature source Oral, resp  rate 18, height 6' 2" (1 88 m), weight 97 6 kg (215 lb 3 2 oz), SpO2 96 %  ,   Body mass index is 27 63 kg/m²  ,     Systolic (70DNL), WHO:641 , Min:88 , KUI:057     Diastolic (58SGS), OMN:72, Min:47, Max:71      Intake/Output Summary (Last 24 hours) at 11/20/2019 1112  Last data filed at 11/20/2019 0625  Gross per 24 hour   Intake 20 ml   Output 1460 ml   Net -1440 ml     Weight (last 2 days)     None        Telemetry Review:   Pt currently back in SR; was in afib with RVR yesterday from 15:45 to 1800  No PAF overnight  Physical Exam   Constitutional: He is oriented to person, place, and time  No distress  Pt sitting up in chair in NAD; alert, pleasant and cooperative   HENT:   Head: Normocephalic and atraumatic  Neck: No JVD present  Cardiovascular: Normal rate, regular rhythm, S1 normal and S2 normal    No murmur heard  No LE edema   Pulmonary/Chest: Effort normal and breath sounds normal  He has no wheezes  He has no rales  LS CTA on RA   Abdominal: Soft  Musculoskeletal: He exhibits no edema  Neurological: He is alert and oriented to person, place, and time  Skin: Skin is warm and dry  He is not diaphoretic  Psychiatric: He has a normal mood and affect  His behavior is normal    Nursing note and vitals reviewed      LABORATORY RESULTS  Results from last 7 days   Lab Units 11/14/19  1956 11/14/19  1651 11/14/19  1324   TROPONIN I ng/mL <0 02 <0 02 <0 02     CBC with diff:   Results from last 7 days   Lab Units 11/19/19  0522 11/18/19  1202 11/17/19  0450 11/16/19  0603 11/15/19  0429 11/14/19  2208 11/14/19  1651 11/14/19  0955   WBC Thousand/uL 9 64 13 16* 13 90* 15 66* 18 13* 15 48*  --  16 08*   HEMOGLOBIN g/dL 11 7* 12 0 12 3 13 0 13 8 12 4  --  14 4   HEMATOCRIT % 35 4* 37 6 38 3 40 2 43 9 38 0  --  44 4   MCV fL 93 95 95 95 98 94  --  95   PLATELETS Thousands/uL 337 337 312 350 398* 344 404* 361   MCH pg 30 9 30 4 30 5 30 7 30 7 30 7  --  30 7   MCHC g/dL 33 1 31 9 32 1 32 3 31 4 32 6  --  32 4   RDW % 13 5 13 4 13 8 13 9 13 8 13 9  --  13 7   MPV fL 9 6 9 7 9 9 9 8 10 3 9 9 9 9 10 1   NRBC AUTO /100 WBCs  --  0  --  0  --  0  --  0     CMP:  Results from last 7 days   Lab Units 11/17/19  0450 11/16/19  0603 11/15/19  0427 11/14/19  2208 11/14/19  1327 19  0955   POTASSIUM mmol/L 3 6 3 9 3 7 4 0  --  4 1   CHLORIDE mmol/L 104 101 100 101  --  99*   CO2 mmol/L 26 24 26 23  --  27   BUN mg/dL 7 9 14 15  --  13   CREATININE mg/dL 1 15 1 11 1 27 1 10  --  1 16   CALCIUM mg/dL 8 2* 8 5 8 7 8 3  --  9 0   AST U/L  --   --   --  43 41  --    ALT U/L  --   --   --  61 68  --    ALK PHOS U/L  --   --   --  112 120*  --    EGFR ml/min/1 73sq m 63 66 56 66  --  62     BMP:  Results from last 7 days   Lab Units 19  0450 19  0603 11/15/19  0427 19  2208 19  0955   POTASSIUM mmol/L 3 6 3 9 3 7 4 0 4 1   CHLORIDE mmol/L 104 101 100 101 99*   CO2 mmol/L 26 24 26 23 27   BUN mg/dL 7 9 14 15 13   CREATININE mg/dL 1 15 1 11 1 27 1 10 1 16   CALCIUM mg/dL 8 2* 8 5 8 7 8 3 9 0     Lab Results   Component Value Date    NTBNP 3,566 (H) 2019      Results from last 7 days   Lab Units 11/15/19  0427   MAGNESIUM mg/dL 2 2      Results from last 7 days   Lab Units 19  1324   TSH 3RD GENERATON uIU/mL 2 539     Results from last 7 days   Lab Units 19  0956   INR  0 98     Lipid Profile:   No results found for: CHOL  Lab Results   Component Value Date    HDL 36 (L) 2019    HDL 43 2018     Lab Results   Component Value Date    LDLCALC 58 2019    LDLCALC 114 (H) 2018     Lab Results   Component Value Date    TRIG 48 2019    TRIG 85 2018     Cardiac testing:   Results for orders placed during the hospital encounter of 19   Echo complete with contrast if indicated    Narrative [address], [address] [facility]  [phone]    Transthoracic Echocardiogram  2D, M-mode, Doppler, and Color Doppler    Study date:  2019    Patient: Vimal Celina  MR number: [id number]  Account number: [de-identified]  : 1945  Age: 68 years  Gender: Male  Status: Inpatient  Location: Bedside  Height: 74 in  Weight: 223 5 lb  BP: 107/ 50 mmHg    Indications: Congestive heart failure  Diagnoses: I50 9 - Heart failure, unspecified    Sonographer:  Ashleigh Magaña RDCS  Primary Physician:  Rosa Bryant MD  Referring Physician:  Robby Zazueta PA-C  Group:  [facility]  Interpreting Physician:  Pat Marcus DO    SUMMARY    LEFT VENTRICLE:  Systolic function was normal  Ejection fraction was estimated to be 60 %  There were no regional wall motion abnormalities  Wall thickness was at the upper limits of normal   Left ventricular diastolic function parameters were normal     RIGHT VENTRICLE:  The ventricle was moderately dilated  Systolic function was normal     LEFT ATRIUM:  The atrium was moderately dilated  RIGHT ATRIUM:  The atrium was mildly dilated  MITRAL VALVE:  There was mild regurgitation  AORTIC VALVE:  There was moderate regurgitation  TRICUSPID VALVE:  There was mild regurgitation  PULMONIC VALVE:  There was mild to moderate regurgitation  HISTORY: PRIOR HISTORY: Patient has no history of cardiovascular disease  PROCEDURE: The procedure was performed at the bedside  This was a routine study  The transthoracic approach was used  The study included complete 2D imaging, M-mode, complete spectral Doppler, and color Doppler  The heart rate was 90 bpm,  at the start of the study  Image quality was adequate  LEFT VENTRICLE: Size was normal  Systolic function was normal  Ejection fraction was estimated to be 60 %  There were no regional wall motion abnormalities  Wall thickness was at the upper limits of normal  DOPPLER: Left ventricular  diastolic function parameters were normal     RIGHT VENTRICLE: The ventricle was moderately dilated  Systolic function was normal     LEFT ATRIUM: The atrium was moderately dilated  RIGHT ATRIUM: The atrium was mildly dilated  MITRAL VALVE: Valve structure was normal  There was normal leaflet separation  DOPPLER: The transmitral velocity was within the normal range   There was no evidence for stenosis  There was mild regurgitation  AORTIC VALVE: The valve was trileaflet  Leaflets exhibited normal thickness and normal cuspal separation  DOPPLER: Transaortic velocity was within the normal range  There was no evidence for stenosis  There was moderate regurgitation  TRICUSPID VALVE: The valve structure was normal  There was normal leaflet separation  DOPPLER: The transtricuspid velocity was within the normal range  There was no evidence for stenosis  There was mild regurgitation  The tricuspid jet  envelope definition was inadequate for estimation of RV systolic pressure  There are no indirect findings (abnormal RV volume or geometry, altered pulmonary flow velocity profile, or leftward septal displacement) which would suggest  moderate or severe pulmonary hypertension  PULMONIC VALVE: Leaflets exhibited normal thickness, no calcification, and normal cuspal separation  DOPPLER: The transpulmonic velocity was within the normal range  There was mild to moderate regurgitation  PERICARDIUM: There was no pericardial effusion  The pericardium was normal in appearance  AORTA: The root exhibited normal size      SYSTEMIC VEINS: IVC: Respirophasic changes were normal     SYSTEM MEASUREMENT TABLES    2D  %FS: 44 85 %  Ao Diam: 3 91 cm  EDV(Teich): 172 72 ml  EF(Teich): 75 38 %  ESV(Teich): 42 53 ml  IVSd: 1 04 cm  LA Area: 36 18 cm2  LA Diam: 4 94 cm  LVEDV MOD A4C: 192 68 ml  LVEF MOD A4C: 61 13 %  LVESV MOD A4C: 74 9 ml  LVIDd: 5 89 cm  LVIDs: 3 25 cm  LVLd A4C: 9 84 cm  LVLs A4C: 7 63 cm  LVPWd: 1 03 cm  RA Area: 22 72 cm2  RVIDd: 5 86 cm  SV MOD A4C: 117 78 ml  SV(Teich): 130 2 ml    CW  TR MaxP 85 mmHg  TR Vmax: 3 16 m/s    MM  TAPSE: 2 86 cm    PW  MV A Juan: 0 79 m/s  MV Dec [place]: 4 82 m/s2  MV DecT: 216 07 ms  MV E Juan: 1 04 m/s  MV E/A Ratio: 1 32  MV PHT: 62 66 ms  MVA By PHT: 3 51 cm2    [employer] Accredited Echocardiography Laboratory    Prepared and electronically signed by    Tristin Clifford DO  Signed [id number] 24:98:47       Meds/Allergies   current meds:   Current Facility-Administered Medications   Medication Dose Route Frequency    acetaminophen (TYLENOL) tablet 650 mg  650 mg Oral Q6H PRN    atorvastatin (LIPITOR) tablet 40 mg  40 mg Oral Daily    calcium carbonate (TUMS) chewable tablet 1,000 mg  1,000 mg Oral Daily PRN    enoxaparin (LOVENOX) subcutaneous injection 40 mg  40 mg Subcutaneous Q24H ROSEANN    flecainide (TAMBOCOR) tablet 50 mg  50 mg Oral Q12H [address]    HYDROmorphone (DILAUDID) injection 0 2 mg  0 2 mg Intravenous Q4H PRN    HYDROmorphone (DILAUDID) injection 0 5 mg  0 5 mg Intravenous Q4H PRN    HYDROmorphone (DILAUDID) injection 1 mg  1 mg Intravenous Q4H PRN    iohexol (OMNIPAQUE) 240 MG/ML solution 50 mL  50 mL Oral Once in imaging    metoprolol succinate (TOPROL-XL) 24 hr tablet 50 mg  50 mg Oral Q12H    ondansetron (ZOFRAN) injection 4 mg  4 mg Intravenous Q6H PRN    piperacillin-tazobactam (ZOSYN) 3 375 g in sodium chloride 0 9 % 50 mL IVPB  3 375 g Intravenous Q6H     Medications Prior to Admission   Medication    atorvastatin (LIPITOR) 40 mg tablet    Calcium-Magnesium-Vitamin D (CALCIUM MAGNESIUM PO)    chlorhexidine (PERIDEX) 0 12 % solution    Cholecalciferol (VITAMIN D PO)    Cyanocobalamin (VITAMIN B12 PO)    doxycycline (ORACEA) 40 MG capsule    doxycycline (PERIOSTAT) 20 MG tablet    metoprolol succinate (TOPROL-XL) 50 mg 24 hr tablet     Assessment:  Active Problems:    Left foot drop    Sepsis (HCC)    Paroxysmal atrial fibrillation (HCC)    Dyslipidemia    Leukocytosis (leucocytosis)    Appendicitis    Counseling / Coordination of Care  Total floor / unit time spent today 20 minutes  Greater than 50% of total time was spent with the patient and / or family counseling and / or coordination of care  ** Please Note: Dragon 360 Dictation voice to text software may have been used in the creation of this document   **

## 2019-11-20 NOTE — SOCIAL WORK
A post acute care recommendation was made by your care team for [facility]  Discussed [place] of Choice with patient  List of agencies given to patient via in person  patient aware the list is custom filtered for them by preference  and that [facility]'s post acute providers are designated  [facility] referral sent to [facility] per preference  [facility] accepted, added to follow up providers  Patient and [facility] aware of same

## 2019-11-20 NOTE — ASSESSMENT & PLAN NOTE
Patient was in AFib upon admission and his dose of Toprol-XL was increased  Yesterday prior to his percutaneous drain placement for his appendiceal abscess he went back into AFib and was in an out of AFib during the day  He was started on flecainide by Cardiology  He is now in sinus bradycardia with heart rate in the upper 50s  He is doing fine overall  Will discharged on increased regimen of Toprol-XL and on flecainide as well as baby aspirin as patient is resistant to going on anticoagulation  He will follow up with EP an outpatient for possible ablation

## 2019-11-20 NOTE — ASSESSMENT & PLAN NOTE
· CT scan last from 11/18 showed possible perforation/abscess  Surgery notes reviewed  Patient went for percutaneous drain placement for appendiceal abscess yesterday  He is doing well  Plan is for drain to be continued for 8 weeks with eventual outpatient appendectomy  Patient was getting Zosyn here but he will be transitioned to p o  Flagyl and Augmentin for 2 weeks as an outpatient

## 2019-11-20 NOTE — DISCHARGE INSTRUCTIONS
General Surgery instructions:    - Complete oral antibiotics as prescribed  - Follow-up with Dr Aidee Olivo in 3 weeks, plan for outpatient appendectomy in 6-8 weeks  TUBE CARE INSTRUCTIONS    Care after your procedure:    Resume your normal diet  Small sips of flat soda will help with nausea  1  The properly functioning catheter should be forward flushed once (1x) daily with 10ml of normal saline using clean technique  You will be given a prescription for flushes  To flush the tube, clean both connections with alcohol swab  Twist off the drainage bag/ bulb  tubing and twist the saline syringe into the drainage tube and flush  Remove the syringe and twist the drainage bag / bulb tubing tubing back on     2  The drainage bag/bulb may be emptied as necessary  Keep a record of the amount of fluid you drain from your tube  This should be done with clean technique as well  3  A fresh dressing should be applied daily over the tube insertion site  4  As the tube is secured to the skin with only a suture,try not to pull on your tube  Tub baths are not permitted  Showers are permitted if the patient's skin entry site is prevented from getting wet  Similarly, washcloth "baths" are acceptable  Contact Interventional Radiology at [phone] [place] PATIENTS: Contact Interventional Radiology at [phone]) Akilah Leal PATIENTS: Contact Interventional Radiology at [phone]) if:    1  Leakage or large amounts of liquid around the catheter  2  Fever of 101 degrees lasting several hours without other obvious cause (such as sore throat, flu, etc)  3  Persistent nausea or vomiting  4  Diminished drainage, which may be associated with pressure or pain  Or when the     drainage from your tube is less than 10mls for 48 hours  5  Catheter pulled back or falls out  The following pharmacies carry the flush syringes         [address] [facility] [address] [place] [place] [address]                         [address] [place] [address]  Phone [phone]            Phone [phone] [address] [facility]                                [phone]  [address] [place] [address] Eglon Aleshia KAUR                      [address] [facility]  Phone [phone]            Phone [phone]                      Cori Somers                                                                                                          [phone]  [facility]  [address]  Phone [phone]

## 2019-11-20 NOTE — PROGRESS NOTES
Progress Note -Surgery PA  Giovanna Tapia 69 y o  male MRN: [id number]  Unit/Bed#: S -01 Encounter: [id number]    ASSESSMENT/PLAN:  Problem List     Hamstring tightness of both lower extremities    Left foot drop    Overview Signed 11/9/2018 11:26 AM by Alana Pal MD     Added automatically from request for surgery [id number]         Closed nondisplaced fracture of second metatarsal bone of left foot    History of lumbar laminectomy    Sepsis (HCC)    Paroxysmal atrial fibrillation (HCC)    Onychomycosis    Tinea pedis of both feet    Moderate aortic insufficiency    Right ventricular enlargement    Dyslipidemia    Coronary artery calcification seen on CAT scan    Closed nondisplaced fracture of distal phalanx of left great toe    Closed nondisplaced fracture of fifth left metatarsal bone    Left foot pain    Leukocytosis (leucocytosis)    Appendicitis    Perforated appendicitis        71-year-old male perforated appendicitis  One day s/p IR drain placement  Doing well today, no abdominal pain and tolerating normal diet  1 episode Afib yesterday, Cardiology on board  - diet as tolerated  - local wound/drain care as needed  - continue Abx  - Discussed plan of outpatient appendectomy, f/u Dr Aidee Olivo in 3 weeks  - Stable from surgical standpoint  - Will need outpatient abx, Augmentin and Flagyl 250mg TID for 2 weeks    VTE Pharmacologic Prophylaxis: Sequential compression device (Venodyne)     Subjective/Objective     Subjective:  71-year-old male with perforated appendicitis  One day status post IR drain placement  States he is doing well overall, improved abdominal pain  Denies fevers or chills  States he has progressed from a liquid to a solid diet without nausea or vomiting  Normal bowel and bladder function  States he is doing well out of bed, walked 10 laps this morning  Requesting indication on drain care        Objective/Physical Exam: Blood pressure 118/57, pulse 59, temperature 98 3 °F (36 8 °C), temperature source Oral, resp  rate 18, height 6' 2" (1 88 m), weight 97 6 kg (215 lb 3 2 oz), SpO2 96 %  ,Body mass index is 27 63 kg/m²  General appearance: alert and oriented, in no acute distress  Heart: regular rate and rhythm, S1, S2 normal, no murmur, click, rub or gallop  Lungs: clear to auscultation bilaterally  Abdomen: Soft nondistended abdomen  Positive bowel sounds throughout  No tenderness to palpation, no rigidity or guarding  Right lower quadrant IR drain in place without surrounding erythema  Roughly 5 cc purulent drainage current we in [place] drain, 35cc overnight     Neurological: normal without focal findings and mental status, speech normal, alert and oriented x3      Current Facility-Administered Medications:     acetaminophen (TYLENOL) tablet 650 mg, 650 mg, Oral, Q6H PRN, Reji Marcial MD, 650 mg at 11/16/19 2112    atorvastatin (LIPITOR) tablet 40 mg, 40 mg, Oral, Daily, Reji Marcial MD, 40 mg at 11/19/19 0910    calcium carbonate (TUMS) chewable tablet 1,000 mg, 1,000 mg, Oral, Daily PRN, Reji Marcial MD    enoxaparin (LOVENOX) subcutaneous injection 40 mg, 40 mg, Subcutaneous, Q24H [address], Reji Marcial MD, Stopped at 11/19/19 0909    flecainide (TAMBOCOR) tablet 50 mg, 50 mg, Oral, Q12H [address], Ivelisse Dior MD    HYDROmorphone (DILAUDID) injection 0 2 mg, 0 2 mg, Intravenous, Q4H PRN, Marianela Stuart PA-C    HYDROmorphone (DILAUDID) injection 0 5 mg, 0 5 mg, Intravenous, Q4H PRN, Marianela Stuart PA-C    HYDROmorphone (DILAUDID) injection 1 mg, 1 mg, Intravenous, Q4H PRN, Marianela Stuart PA-C    iohexol (OMNIPAQUE) 240 MG/ML solution 50 mL, 50 mL, Oral, Once in imaging, [place] Green,     metoprolol succinate (TOPROL-XL) 24 hr tablet 50 mg, 50 mg, Oral, Q12H, Yousif Feliz MD, 50 mg at 11/19/19 2118    ondansetron (ZOFRAN) injection 4 mg, 4 mg, Intravenous, Q6H PRN, Reji Marcial MD    piperacillin-tazobactam (ZOSYN) 3 375 g in sodium chloride 0 9 % 50 mL IVPB, 3 375 g, Intravenous, Q6H, Milan Green, DO, Last Rate: 100 mL/hr at 11/20/19 0618, 3 375 g at 11/20/19 0618      Intake/Output Summary (Last 24 hours) at 11/20/2019 0929  Last data filed at 11/20/2019 0625  Gross per 24 hour   Intake 20 ml   Output 1460 ml   Net -1440 ml       Invasive Devices     Peripheral Intravenous Line            Peripheral IV 11/17/19 Distal;Dorsal (posterior); Left Forearm 2 days          Drain            Closed/Suction Drain Right RLQ Bulb 10 Fr  less than 1 day                          Lab, Imaging and other studies:  No results displayed because visit has over 200 results

## 2019-11-21 ENCOUNTER — TRANSITIONAL CARE MANAGEMENT (OUTPATIENT)
Dept: FAMILY MEDICINE CLINIC | Facility: CLINIC | Age: 74
End: 2019-11-21

## 2019-11-21 LAB
ATRIAL RATE: 119 BPM
BACTERIA SPEC BFLD CULT: ABNORMAL
GRAM STN SPEC: ABNORMAL
GRAM STN SPEC: ABNORMAL
QRS AXIS: -42 DEGREES
QRSD INTERVAL: 92 MS
QT INTERVAL: 316 MS
QTC INTERVAL: 417 MS
T WAVE AXIS: 41 DEGREES
VENTRICULAR RATE: 105 BPM

## 2019-11-21 PROCEDURE — 93010 ELECTROCARDIOGRAM REPORT: CPT | Performed by: INTERNAL MEDICINE

## 2019-11-21 NOTE — UTILIZATION REVIEW
Sunitha Aparicio RN   Registered Nurse   Utilization Review   Utilization Review   Signed   Date of Service:  11/19/2019 11:09 AM               Signed             Show:Clear all  [x]Manual[x]Template[x]Copied    Added by:  Pilo Lopez RN    []Raphaelver for details  Continued Stay Review     Date: 11/19/2019                       Current Patient Class: inpatient       Current Level of Care: med surg     HPI:74 y o  male initially admitted on 11/14/2019 inpatient due to atrial fibrillation with rapid ventricular response and right lower quadrant abdominal pain       11/15 surgery consult - Acute appendicitis with a large phlegmonous process in the right lower quadrant   Plan is antibiotics         Assessment/Plan:  Patient  Converted spontaneously back to NSR on 11/15  Today 11/19,  repeat CT showing abscess with appendicitis    IV antibiotics continue and IR consulted for drainage       Pertinent Labs/Diagnostic Results:   11/19/2019 CT abdomen - Redemonstrated markedly dilated and irregular appendix containing appendicoliths with a thickened enhancing rim measuring approximately 3 3 x 5 4 x 7 2 cm in total, likely representing contained perforation/abscess in the setting of appendicitis  Valeen Parkinson is extensive surrounding inflammatory change and marked thickening of the adjacent cecum which is likely reactive      11/14/2019 CT abdomen - Extensive right lower quadrant inflammation obscuring the retrocecal appendix   Distention of the appendix with several phleboliths, consistent with acute appendicitis   No free intraperitoneal air or organized fluid collection however findings are concerning for early contained perforation, likely at the base of the appendix     11/14/2019 US abdomen - No significant sonographic abnormality identified            Results from last 7 days   Lab Units 11/19/19  0522 11/18/19  1202 11/17/19  0450 11/16/19  0603 11/15/19  0429 11/14/19  2208   WBC Thousand/uL 9 64 13 16* 13 90* 15 66* 18 13* 15 48*   HEMOGLOBIN g/dL 11 7* 12 0 12 3 13 0 13 8 12 4   HEMATOCRIT % 35 4* 37 6 38 3 40 2 43 9 38 0   PLATELETS Thousands/uL 337 337 312 350 398* 344   NEUTROS ABS Thousands/µL  --  10 61*  --  13 13*  --  12 34*               Results from last 7 days   Lab Units 11/17/19  0450 11/16/19  0603 11/15/19  0427 11/14/19 2208 11/14/19  0955   SODIUM mmol/L 140 136 137 135* 136   POTASSIUM mmol/L 3 6 3 9 3 7 4 0 4 1   CHLORIDE mmol/L 104 101 100 101 99*   CO2 mmol/L 26 24 26 23 27   ANION GAP mmol/L 10 11 11 11 10   BUN mg/dL 7 9 14 15 13   CREATININE mg/dL 1 15 1 11 1 27 1 10 1 16   EGFR ml/min/1 73sq m 63 66 56 66 62   CALCIUM mg/dL 8 2* 8 5 8 7 8 3 9 0   MAGNESIUM mg/dL  --   --  2 2  --   --             Results from last 7 days   Lab Units 11/14/19 2208 11/14/19  1324   AST U/L 43 41   ALT U/L 61 68   ALK PHOS U/L 112 120*   TOTAL PROTEIN g/dL 6 3* 7 4   ALBUMIN g/dL 2 5* 3 1*   TOTAL BILIRUBIN mg/dL 0 70 0 90   BILIRUBIN DIRECT mg/dL  --  0 24*                   Results from last 7 days   Lab Units 11/17/19  0450 11/16/19  0603 11/15/19  0427 11/14/19 2208 11/14/19  0955   GLUCOSE RANDOM mg/dL 94 106 106 111 98              Results from last 7 days   Lab Units 11/14/19  1956 11/14/19  1651 11/14/19  1324 11/14/19  0955   TROPONIN I ng/mL <0 02 <0 02 <0 02 <0 02           Results from last 7 days   Lab Units 11/14/19  0956   PROTIME seconds 12 4   INR   0 98   PTT seconds 39*           Results from last 7 days   Lab Units 11/14/19  1324   TSH 3RD GENERATON uIU/mL 2 539           Results from last 7 days   Lab Units 11/14/19 2208   LACTIC ACID mmol/L 0 8           Results from last 7 days   Lab Units 11/14/19  1651   CLARITY UA   Clear   COLOR UA   Yellow   SPEC GRAV UA   1 010   PH UA   6 0   GLUCOSE UA mg/dl Negative   KETONES UA mg/dl Negative   BLOOD UA   Trace-Intact*   PROTEIN UA mg/dl Negative   NITRITE UA   Negative   BILIRUBIN UA   Negative   UROBILINOGEN UA E U /dl 0 2   LEUKOCYTES UA   Negative   WBC UA /hpf 0-1*   RBC UA /hpf 0-1*   BACTERIA UA /hpf Occasional   EPITHELIAL CELLS WET PREP /hpf None Seen      Vital Signs:   11/19/19 0700   98 5 °F (36 9 °C)   92   18   122/62   83   97 %   None (Room air)   Sitting   11/18/19 2200   100 1 °F (37 8 °C)   70   18   123/59   --   --   --   Lying   11/18/19 1500   98 4 °F (36 9 °C)   68   18   128/62   --   99 %   None (Room air)   Sitting   11/18/19 0745   --   --   --   --   --   --   None              Medications:   Scheduled Medications:  Medications:  atorvastatin 40 mg Oral Daily   enoxaparin 40 mg Subcutaneous Q24H ROSEANN   metoprolol succinate 50 mg Oral Q12H   piperacillin-tazobactam 3 375 g Intravenous Q6H      Continuous IV Infusions:none  PRN Meds: not used   acetaminophen 650 mg Oral Q6H PRN   calcium carbonate 1,000 mg Oral Daily PRN   HYDROmorphone 0 2 mg Intravenous Q4H PRN   HYDROmorphone 0 5 mg Intravenous Q4H PRN   HYDROmorphone 1 mg Intravenous Q4H PRN   iohexol 50 mL Oral Once in imaging   ondansetron 4 mg Intravenous Q6H PRN         Discharge Plan: to be determined  Lives with spouse in a ranch home       Network Utilization Review Department  [email or web address]  ATTENTION: Please call with any questions or concerns to [phone] and carefully listen to the prompts so that you are directed to the right person  All voicemails are confidential   Nam Rousseau all requests for admission clinical reviews, approved or denied determinations and any other requests to dedicated fax number below belonging to the [facility] where the patient is receiving treatment    FACILITY NAME UR FAX NUMBER   ADMISSION DENIALS (Administrative/Medical Necessity) [phone]   PARENT CHILD HEALTH (Maternity/NICU/Pediatrics) [phone]   [facility] [phone]   [facility] [address] [facility] [address] [phone]

## 2019-11-21 NOTE — PROGRESS NOTES
Patient called back and I finished the TCM encounter  He scheduled his follow up appointment for next week with Dr Soledad Bardales

## 2019-11-26 RX ORDER — SODIUM CHLORIDE 0.9 % (FLUSH) 0.9 %
SYRINGE (ML) INJECTION
Refills: 1 | COMMUNITY
Start: 2019-11-20 | End: 2020-01-08 | Stop reason: HOSPADM

## 2019-11-27 ENCOUNTER — OFFICE VISIT (OUTPATIENT)
Dept: FAMILY MEDICINE CLINIC | Facility: CLINIC | Age: 74
End: 2019-11-27
Payer: COMMERCIAL

## 2019-11-27 VITALS
SYSTOLIC BLOOD PRESSURE: 120 MMHG | TEMPERATURE: 97.1 F | HEART RATE: 58 BPM | OXYGEN SATURATION: 98 % | WEIGHT: 205.19 LBS | DIASTOLIC BLOOD PRESSURE: 60 MMHG | BODY MASS INDEX: 26.34 KG/M2

## 2019-11-27 DIAGNOSIS — Z09 HOSPITAL DISCHARGE FOLLOW-UP: ICD-10-CM

## 2019-11-27 DIAGNOSIS — K35.32 PERFORATED APPENDICITIS: ICD-10-CM

## 2019-11-27 DIAGNOSIS — I48.0 PAROXYSMAL ATRIAL FIBRILLATION (HCC): Primary | ICD-10-CM

## 2019-11-27 PROCEDURE — 99496 TRANSJ CARE MGMT HIGH F2F 7D: CPT | Performed by: FAMILY MEDICINE

## 2019-11-27 NOTE — PROGRESS NOTES
Assessment/Plan:       Diagnoses and all orders for this visit:    Paroxysmal atrial fibrillation ([address] )  -patient continues to refuse anticoagulation however states that to continue baby aspirin  -patient states that he is compliant with his medication and follow up with his cardiologist in January  -patient seems to go into AFib with RVR when he has an infection, advised him that should he feel any changes within his body that he should get checked out sooner than later, patient now agrees  Perforated appendicitis   -patient follow-up with General surgery 12/11, he has continued his antibiotics Augmentin Flagyl daily for another 7 days  -patient's drain is not draining anything currently, however he would like it out, I explained to him that per surgery recommendations and better to leave it in during entire course a recommended so as to produce a better outcome for surgery, patient states understanding  Hospital discharge follow-up            TCM Call (since 10/27/2019)     Date and time call was made  11/21/2019 10:13 AM    Hospital care reviewed  Records reviewed    Patient was hospitialized at  [address] [place] [address]    Date of Admission  11/14/19    Date of discharge  11/20/19    Diagnosis  Atrial fibrillation with rapid ventricular response     Disposition  Home; Home health services    Current Symptoms  None      TCM Call (since 10/27/2019)     Post hospital issues  None    Should patient be enrolled in anticoag monitoring? No    Scheduled for follow up? Yes    I have advised the patient to call PCP with any new or worsening symptoms  LIBBY Mckinley    Living Arrangements  Family members            Subjective:      Patient ID: Hector Lyons is a 69 y o  male  HPI   71-year-old male with extensive past medical history including paroxysmal AFib, hyperlipidemia, aortic insufficiency, CAD and recently diagnosed perforated appendicitis presents today for hospital discharge follow-up    Patient was hospitalized from 11/14 11/20  Hospital course below  Patient tolerating p o  intake well, he states he is excited for Thanksgiving, however maintains a very healthy lifestyle walking still 2 miles per day and monitoring his caloric intake  Patient having BM in urinating at baseline  Patient still continues to refuse to start anticoagulation, but states he will continue the baby aspirin daily  Hospital Course:      Patient was initially admitted on 11/14 for AFib with RVR  He was started on a Cardizem drip and Cardiology was consulted  He converted to normal sinus rhythm and his metoprolol was increased  He was found to be septic with leukocytosis and tachycardia  He also spiked a fever of 101 9 the night of admission  Because he was having right lower quadrant abdominal pain a CT of the abdomen/pelvis was done revealing appendicitis  General surgery was consulted and Zosyn was started  Because he had the appendicitis for quite some time before he was admitted decision was made to treat appendicitis medically and plan for surgery down the line as an outpatient      While patient improved clinically repeat CT scan on 11/18 did show that an appendiceal abscess developed  For that reason he underwent percutaneous drain placement on 11/19 for his appendiceal abscess  On 11/19 he went back in AFib with RVR and he was seen again by Cardiology      Cardiology started patient on flecainide and he converted back into sinus rhythm  He did fine otherwise  He was then discharged home      Patient is very resistant to being started on anticoagulation for stroke prophylaxis  He was instructed to at least take a baby aspirin and to stop taking it 5-7 days before the day he has his appendectomy scheduled  The drain will be maintained for 8 weeks  He will continue antibiotics for 2 more weeks as an outpatient      The following portions of the patient's history were reviewed and updated as appropriate: allergies, current medications, past family history, past medical history, past social history, past surgical history and problem list     Review of Systems   Constitutional: Negative for activity change, chills, fatigue and fever  Eyes: Negative  Respiratory: Negative for cough, chest tightness, shortness of breath and wheezing  Cardiovascular: Negative for chest pain and palpitations  Gastrointestinal: Negative for abdominal pain, constipation, diarrhea, nausea and vomiting  Genitourinary: Negative for difficulty urinating, dysuria, frequency, hematuria and urgency  Musculoskeletal: Negative for arthralgias, back pain and neck pain  Skin: Negative  Neurological: Negative for dizziness, weakness, light-headedness, numbness and headaches  Objective:      /60 (BP Location: Left arm, Patient Position: Sitting, Cuff Size: Adult)   Pulse 58   Temp (!) 97 1 °F (36 2 °C) (Tympanic)   Wt 93 1 kg (205 lb 3 oz)   SpO2 98%   BMI 26 34 kg/m²          Physical Exam   Constitutional: He is oriented to person, place, and time  He appears well-developed and well-nourished  No distress  HENT:   Head: Normocephalic and atraumatic  Right Ear: External ear normal    Left Ear: External ear normal    Mouth/Throat: Oropharynx is clear and moist    Neck: Normal range of motion  Cardiovascular: Normal rate, regular rhythm and normal heart sounds  No murmur heard  Pulmonary/Chest: Effort normal and breath sounds normal  No respiratory distress  He has no wheezes  Abdominal: Soft  Bowel sounds are normal  He exhibits no distension and no mass  There is no tenderness  Drain in place in right lower quadrant with NO drainage, no signs of infection   Lymphadenopathy:     He has no cervical adenopathy  Neurological: He is alert and oriented to person, place, and time  Skin: He is not diaphoretic

## 2019-11-27 NOTE — PATIENT INSTRUCTIONS

## 2019-12-03 ENCOUNTER — HOSPITAL ENCOUNTER (EMERGENCY)
Facility: HOSPITAL | Age: 74
Discharge: HOME | End: 2019-12-03
Attending: EMERGENCY MEDICINE | Admitting: EMERGENCY MEDICINE
Payer: COMMERCIAL

## 2019-12-03 ENCOUNTER — APPOINTMENT (EMERGENCY)
Dept: CT IMAGING | Facility: HOSPITAL | Age: 74
End: 2019-12-03
Payer: COMMERCIAL

## 2019-12-03 VITALS
TEMPERATURE: 97.5 F | OXYGEN SATURATION: 98 % | RESPIRATION RATE: 16 BRPM | BODY MASS INDEX: 26.66 KG/M2 | HEART RATE: 52 BPM | WEIGHT: 207.67 LBS | DIASTOLIC BLOOD PRESSURE: 68 MMHG | SYSTOLIC BLOOD PRESSURE: 153 MMHG

## 2019-12-03 DIAGNOSIS — R19.7 DIARRHEA, UNSPECIFIED TYPE: Primary | ICD-10-CM

## 2019-12-03 PROCEDURE — 99284 EMERGENCY DEPT VISIT MOD MDM: CPT

## 2019-12-03 PROCEDURE — 99283 EMERGENCY DEPT VISIT LOW MDM: CPT | Performed by: EMERGENCY MEDICINE

## 2019-12-03 PROCEDURE — 74176 CT ABD & PELVIS W/O CONTRAST: CPT

## 2019-12-03 NOTE — ED PROVIDER NOTES
History  Chief Complaint   Patient presents with    Abdominal Pain     Patient reports having frequent bowel movements and some bowel discomfort, patient recently discharged for appendix drain placement 2 weeks ago  Patient denies NVD/CP/SOB  Patient has a drain placed currently, patient reports "I am not sure if it is anxiety either, i did not want to wait "     This is a 69 y o  old male who presents to the ED for evaluation of diarrhea  Since yesterday afternoon, has had diarrhea  About every 3 hours  Was fine until that point  Since early this AM noted to have vague abdominal pain  No nausea or vomiting  Otherwise, patient denies fevers, chills, night sweats, cough, congestion, rhinorrhea, CP, dyspnea, diarrhea, constipation, urinary symptoms, leg pain or swelling  Prior to Admission Medications   Prescriptions Last Dose Informant Patient Reported? Taking? Calcium-Magnesium-Vitamin D (CALCIUM MAGNESIUM PO)  Self Yes No   Sig: Take by mouth   Cholecalciferol (VITAMIN D PO)  Self Yes No   Sig: Take by mouth   Cyanocobalamin (VITAMIN B12 PO)  Self Yes No   Sig: Take by mouth   Sodium Chloride Flush (NORMAL SALINE FLUSH) 0 9 % SOLN   Yes No   Sig: INFUSE 10 MILLILITERS INTO A VENOUS CATHETER EVERY DAY   amoxicillin-clavulanate (AUGMENTIN) 875-125 mg per tablet   No No   Sig: Take 1 tablet by mouth every 12 (twelve) hours for 14 days   aspirin 81 mg chewable tablet   No No   Sig: Chew 1 tablet (81 mg total) daily Stop taking 5-7 days before surgery  Resume after surgery     atorvastatin (LIPITOR) 40 mg tablet  Self No No   Sig: Take 1 tablet (40 mg total) by mouth daily   chlorhexidine (PERIDEX) 0 12 % solution  Self Yes No   Sig: RINSE WITH 1/2 OZ TWO TIMES DAILY FOLLOWING BRUSHING AND FLOSSING   doxycycline (ORACEA) 40 MG capsule  Self Yes No   Sig: Take 40 mg by mouth every morning   doxycycline (PERIOSTAT) 20 MG tablet  Self Yes No   Sig: Take 20 mg by mouth 2 (two) times a day   flecainide (TAMBOCOR) 50 mg tablet   No No   Sig: Take 1 tablet (50 mg total) by mouth every 12 (twelve) hours   metoprolol succinate (TOPROL-XL) 50 mg 24 hr tablet   No No   Sig: Take 1 tablet (50 mg total) by mouth every 12 (twelve) hours   metroNIDAZOLE (FLAGYL) 250 mg tablet   No No   Sig: Take 1 tablet (250 mg total) by mouth every 8 (eight) hours for 14 days   sodium chloride, PF, 0 9 %   No No   Sig: Infuse 10 mL into a venous catheter daily      Facility-Administered Medications: None     Past Medical History:   Diagnosis Date    Anemia     Closed nondisplaced fracture of second metatarsal bone of left foot 11/11/2018    Foot drop     Left    Left foot pain 7/11/2019    Left inguinal hernia     Managed By Sunitha Pennington / last assessed 3/27/15     Pain in both feet 4/2/2019    Paroxysmal atrial fibrillation ([facility] [address] ) 2/27/2019    Pleural effusion, left 2/27/2019    Pneumonia 2/26/2019    Skin lesion     Weakness of left foot 11/7/2018    Well adult exam 1/5/2019     Past Surgical History:   Procedure Laterality Date    HERNIA REPAIR      IR IMAGE GUIDED ASPIRATION / DRAINAGE W TUBE  11/19/2019    IR THORACENTESIS  3/1/2019    LUMBAR LAMINECTOMY Left 11/9/2018    Procedure: Metrx L4-5 left hemilaminectomy and bilateral foraminotomy, Metrx L5-S1 left hemilaminectomy and microdiskectomy;  Surgeon: Eduar Cordero MD;  Location: [facility];  Service: Neurosurgery     Family History   Problem Relation Age of Onset    Heart disease Mother     No Known Problems Father     No Known Problems Sister     No Known Problems Brother     No Known Problems Family     Anemia Neg Hx     Arrhythmia Neg Hx     Clotting disorder Neg Hx     Heart attack Neg Hx     Heart failure Neg Hx     Hyperlipidemia Neg Hx     Hypertension Neg Hx     Fainting Neg Hx     Asthma Neg Hx      I have reviewed and agree with the history as documented      Social History     Tobacco Use    Smoking status: Never Smoker    Smokeless tobacco: Never Used   Substance Use Topics    Alcohol use: Yes     Binge frequency: Less than monthly     Comment: social     Drug use: No      Review of Systems   Constitutional: Negative for chills, fatigue, fever and unexpected weight change  HENT: Negative for congestion, rhinorrhea and sore throat  Eyes: Negative for redness and visual disturbance  Respiratory: Negative for cough and shortness of breath  Cardiovascular: Negative for chest pain and leg swelling  Gastrointestinal: Positive for diarrhea  Negative for abdominal pain, constipation, nausea and vomiting  Endocrine: Negative for cold intolerance and heat intolerance  Genitourinary: Negative for dysuria, frequency and urgency  Musculoskeletal: Negative for back pain  Skin: Negative for rash  Neurological: Negative for dizziness, syncope and numbness  All other systems reviewed and are negative  Physical Exam  Physical Exam   Constitutional: He is oriented to person, place, and time  He appears well-developed and well-nourished  No distress  HENT:   Head: Normocephalic and atraumatic  Nose: Nose normal    Eyes: Pupils are equal, round, and reactive to light  Conjunctivae and EOM are normal    Neck: Normal range of motion  Neck supple  Cardiovascular: Normal rate, regular rhythm and normal heart sounds  Exam reveals no gallop  No murmur heard  Pulmonary/Chest: Effort normal and breath sounds normal  No respiratory distress  He has no wheezes  He has no rales  Abdominal: Soft  Bowel sounds are normal  He exhibits no distension and no mass  There is no tenderness  There is no rebound and no guarding  RLQ drain present   Musculoskeletal: Normal range of motion  He exhibits no edema or deformity  Lymphadenopathy:     He has no cervical adenopathy  Neurological: He is alert and oriented to person, place, and time  No cranial nerve deficit  Skin: Skin is warm and dry  No rash noted  He is not diaphoretic  No erythema  Psychiatric: He has a normal mood and affect  Nursing note and vitals reviewed  Vital Signs  ED Triage Vitals [12/03/19 0456]   Temperature Pulse Respirations Blood Pressure SpO2   97 5 °F (36 4 °C) (!) 52 16 153/68 98 %      Temp Source Heart Rate Source Patient Position - Orthostatic VS BP Location FiO2 (%)   Oral Monitor Lying Right arm --      Pain Score       --         ED Medications  Medications - No data to display    Diagnostic Studies  Results Reviewed     None        CT abdomen pelvis wo contrast   Final Result by Ezekiel Bowen MD (12/03 6654)      Indwelling right lower quadrant pigtail catheter drain  Significant interval improvement in diffuse right colonic thickening and soft tissue infiltration at the site of the appendix  No loculated fluid collections evident on this unenhanced exam   Mild    persistent fluid stranding and infiltration in the region surrounding the catheter  Workstation performed: [id number]           Procedures  Procedures    ED Course        A/P: This is a 69 y o  male who presents to the ED for evaluation of diarrhea  Patient is very concerned to make sure nothing his going on exam  Has absolutely normal exam  Will get noncon CT ensure drainage of his abscess and no other collections  Suspect ABX assoc diarrhea vs viral syndrome  Reevaluate and dispo accordingly  5024 CT negative for acute changes  Improving abscess  Suspect viral v abx assoc  Probiotic, fiber, surgery follow up  I personally discussed return precautions with this patient  I provided the patient with written discharge instructions and particularly highlighted specific areas of interest to this patient, including but not limited to: medications for symptom managment, follow up recommendations, and return precautions  Patient is in agreement with this plan as outlined above      MDM    Disposition  Final diagnoses:   Diarrhea, unspecified type     Time reflects when diagnosis was documented in both MDM as applicable and the Disposition within this note     Time User Action Codes Description Comment    12/3/2019  5:53 AM Fidelia Nakul Add [R19 7] Diarrhea, unspecified type       ED Disposition     ED Disposition Condition Date/Time Comment    Discharge Stable Tue Dec 3, 2019  5:53 AM Marisol Later discharge to home/self care  Follow-up Information     Follow up With Specialties Details Why Contact Info Additional Information    Joshua Guardian, DO General Surgery Go to  as scheduled for continued care  Call for any concerns [address] [place] [address] Emergency Department Emergency Medicine Go to  If symptoms worsen [address] [facility]  [facility] ED, [address], [place], [place], [address]          Patient's Medications   Discharge Prescriptions    No medications on file     No discharge procedures on file      ED Provider  Electronically Signed by           Shayy Broderick MD  12/03/19 0632

## 2019-12-16 ENCOUNTER — HOSPITAL ENCOUNTER (OUTPATIENT)
Dept: RADIOLOGY | Facility: HOSPITAL | Age: 74
Discharge: HOME | End: 2019-12-16
Attending: SURGERY
Payer: COMMERCIAL

## 2019-12-16 DIAGNOSIS — L02.91 ABSCESS: ICD-10-CM

## 2019-12-16 PROCEDURE — 49424 ASSESS CYST CONTRAST INJECT: CPT | Performed by: RADIOLOGY

## 2019-12-16 PROCEDURE — 76080 X-RAY EXAM OF FISTULA: CPT

## 2019-12-16 PROCEDURE — 49424 ASSESS CYST CONTRAST INJECT: CPT

## 2019-12-16 PROCEDURE — 76080 X-RAY EXAM OF FISTULA: CPT | Performed by: RADIOLOGY

## 2019-12-16 RX ADMIN — IOHEXOL 3 ML: 350 INJECTION, SOLUTION INTRAVENOUS at 13:05

## 2020-01-08 ENCOUNTER — APPOINTMENT (EMERGENCY)
Dept: CT IMAGING | Facility: HOSPITAL | Age: 75
End: 2020-01-08
Payer: COMMERCIAL

## 2020-01-08 ENCOUNTER — HOSPITAL ENCOUNTER (OUTPATIENT)
Facility: HOSPITAL | Age: 75
Setting detail: OBSERVATION
Discharge: HOME | End: 2020-01-08
Attending: EMERGENCY MEDICINE | Admitting: INTERNAL MEDICINE
Payer: COMMERCIAL

## 2020-01-08 ENCOUNTER — APPOINTMENT (EMERGENCY)
Dept: RADIOLOGY | Facility: HOSPITAL | Age: 75
End: 2020-01-08
Payer: COMMERCIAL

## 2020-01-08 VITALS
TEMPERATURE: 97.6 F | HEART RATE: 73 BPM | RESPIRATION RATE: 16 BRPM | SYSTOLIC BLOOD PRESSURE: 118 MMHG | DIASTOLIC BLOOD PRESSURE: 65 MMHG | BODY MASS INDEX: 26.86 KG/M2 | WEIGHT: 212 LBS | OXYGEN SATURATION: 99 %

## 2020-01-08 DIAGNOSIS — R79.89 RAISED TSH LEVEL: ICD-10-CM

## 2020-01-08 DIAGNOSIS — I48.0 PAROXYSMAL ATRIAL FIBRILLATION (HCC): ICD-10-CM

## 2020-01-08 DIAGNOSIS — R00.2 PALPITATIONS: Primary | ICD-10-CM

## 2020-01-08 DIAGNOSIS — I48.91 ATRIAL FIBRILLATION (HCC): ICD-10-CM

## 2020-01-08 LAB
ALBUMIN SERPL BCP-MCNC: 3.7 G/DL (ref 3.5–5)
ALP SERPL-CCNC: 107 U/L (ref 46–116)
ALT SERPL W P-5'-P-CCNC: 52 U/L (ref 12–78)
ANION GAP SERPL CALCULATED.3IONS-SCNC: 9 MMOL/L (ref 4–13)
APTT PPP: 37 SECONDS (ref 23–37)
AST SERPL W P-5'-P-CCNC: 41 U/L (ref 5–45)
ATRIAL RATE: 99 BPM
BASOPHILS # BLD AUTO: 0.03 THOUSANDS/ΜL (ref 0–0.1)
BASOPHILS NFR BLD AUTO: 1 % (ref 0–1)
BILIRUB SERPL-MCNC: 0.34 MG/DL (ref 0.2–1)
BILIRUB UR QL STRIP: NEGATIVE
BUN SERPL-MCNC: 23 MG/DL (ref 5–25)
CALCIUM SERPL-MCNC: 9.5 MG/DL (ref 8.3–10.1)
CHLORIDE SERPL-SCNC: 104 MMOL/L (ref 100–108)
CLARITY UR: CLEAR
CO2 SERPL-SCNC: 27 MMOL/L (ref 21–32)
COLOR UR: YELLOW
CREAT SERPL-MCNC: 1.17 MG/DL (ref 0.6–1.3)
D DIMER PPP FEU-MCNC: <0.27 UG/ML FEU
EOSINOPHIL # BLD AUTO: 0.21 THOUSAND/ΜL (ref 0–0.61)
EOSINOPHIL NFR BLD AUTO: 3 % (ref 0–6)
ERYTHROCYTE [DISTWIDTH] IN BLOOD BY AUTOMATED COUNT: 14.3 % (ref 11.6–15.1)
GFR SERPL CREATININE-BSD FRML MDRD: 61 ML/MIN/1.73SQ M
GLUCOSE SERPL-MCNC: 108 MG/DL (ref 65–140)
GLUCOSE UR STRIP-MCNC: NEGATIVE MG/DL
HCT VFR BLD AUTO: 44.2 % (ref 36.5–49.3)
HGB BLD-MCNC: 14.3 G/DL (ref 12–17)
HGB UR QL STRIP.AUTO: NEGATIVE
IMM GRANULOCYTES # BLD AUTO: 0.01 THOUSAND/UL (ref 0–0.2)
IMM GRANULOCYTES NFR BLD AUTO: 0 % (ref 0–2)
INR PPP: 1 (ref 0.84–1.19)
KETONES UR STRIP-MCNC: NEGATIVE MG/DL
LACTATE SERPL-SCNC: 0.8 MMOL/L (ref 0.5–2)
LEUKOCYTE ESTERASE UR QL STRIP: NEGATIVE
LYMPHOCYTES # BLD AUTO: 2.1 THOUSANDS/ΜL (ref 0.6–4.47)
LYMPHOCYTES NFR BLD AUTO: 34 % (ref 14–44)
MAGNESIUM SERPL-MCNC: 1.8 MG/DL (ref 1.6–2.6)
MCH RBC QN AUTO: 30.9 PG (ref 26.8–34.3)
MCHC RBC AUTO-ENTMCNC: 32.4 G/DL (ref 31.4–37.4)
MCV RBC AUTO: 96 FL (ref 82–98)
MONOCYTES # BLD AUTO: 0.73 THOUSAND/ΜL (ref 0.17–1.22)
MONOCYTES NFR BLD AUTO: 12 % (ref 4–12)
NEUTROPHILS # BLD AUTO: 3.12 THOUSANDS/ΜL (ref 1.85–7.62)
NEUTS SEG NFR BLD AUTO: 50 % (ref 43–75)
NITRITE UR QL STRIP: NEGATIVE
NRBC BLD AUTO-RTO: 0 /100 WBCS
PH UR STRIP.AUTO: 6 [PH] (ref 4.5–8)
PLATELET # BLD AUTO: 218 THOUSANDS/UL (ref 149–390)
PMV BLD AUTO: 11.7 FL (ref 8.9–12.7)
POTASSIUM SERPL-SCNC: 3.9 MMOL/L (ref 3.5–5.3)
PROT SERPL-MCNC: 7.4 G/DL (ref 6.4–8.2)
PROT UR STRIP-MCNC: NEGATIVE MG/DL
PROTHROMBIN TIME: 12.6 SECONDS (ref 11.6–14.5)
QRS AXIS: -70 DEGREES
QRSD INTERVAL: 94 MS
QT INTERVAL: 376 MS
QTC INTERVAL: 471 MS
RBC # BLD AUTO: 4.63 MILLION/UL (ref 3.88–5.62)
SODIUM SERPL-SCNC: 140 MMOL/L (ref 136–145)
SP GR UR STRIP.AUTO: 1.01 (ref 1–1.03)
T WAVE AXIS: 28 DEGREES
T4 FREE SERPL-MCNC: 0.77 NG/DL (ref 0.76–1.46)
T4 FREE SERPL-MCNC: 0.79 NG/DL (ref 0.76–1.46)
TROPONIN I SERPL-MCNC: <0.02 NG/ML
TSH SERPL DL<=0.05 MIU/L-ACNC: 6 UIU/ML (ref 0.36–3.74)
UROBILINOGEN UR QL STRIP.AUTO: 0.2 E.U./DL
VENTRICULAR RATE: 94 BPM
WBC # BLD AUTO: 6.2 THOUSAND/UL (ref 4.31–10.16)

## 2020-01-08 PROCEDURE — 99217 PR OBSERVATION CARE DISCHARGE MANAGEMENT: CPT | Performed by: INTERNAL MEDICINE

## 2020-01-08 PROCEDURE — 85379 FIBRIN DEGRADATION QUANT: CPT | Performed by: EMERGENCY MEDICINE

## 2020-01-08 PROCEDURE — 93010 ELECTROCARDIOGRAM REPORT: CPT | Performed by: INTERNAL MEDICINE

## 2020-01-08 PROCEDURE — 85025 COMPLETE CBC W/AUTO DIFF WBC: CPT | Performed by: EMERGENCY MEDICINE

## 2020-01-08 PROCEDURE — 93005 ELECTROCARDIOGRAM TRACING: CPT

## 2020-01-08 PROCEDURE — 99285 EMERGENCY DEPT VISIT HI MDM: CPT

## 2020-01-08 PROCEDURE — 85730 THROMBOPLASTIN TIME PARTIAL: CPT | Performed by: EMERGENCY MEDICINE

## 2020-01-08 PROCEDURE — 87040 BLOOD CULTURE FOR BACTERIA: CPT | Performed by: EMERGENCY MEDICINE

## 2020-01-08 PROCEDURE — 83605 ASSAY OF LACTIC ACID: CPT | Performed by: EMERGENCY MEDICINE

## 2020-01-08 PROCEDURE — 99284 EMERGENCY DEPT VISIT MOD MDM: CPT | Performed by: EMERGENCY MEDICINE

## 2020-01-08 PROCEDURE — 74177 CT ABD & PELVIS W/CONTRAST: CPT

## 2020-01-08 PROCEDURE — 84439 ASSAY OF FREE THYROXINE: CPT | Performed by: EMERGENCY MEDICINE

## 2020-01-08 PROCEDURE — 81003 URINALYSIS AUTO W/O SCOPE: CPT

## 2020-01-08 PROCEDURE — 71260 CT THORAX DX C+: CPT

## 2020-01-08 PROCEDURE — 84443 ASSAY THYROID STIM HORMONE: CPT | Performed by: EMERGENCY MEDICINE

## 2020-01-08 PROCEDURE — 36415 COLL VENOUS BLD VENIPUNCTURE: CPT | Performed by: EMERGENCY MEDICINE

## 2020-01-08 PROCEDURE — 71045 X-RAY EXAM CHEST 1 VIEW: CPT

## 2020-01-08 PROCEDURE — 83735 ASSAY OF MAGNESIUM: CPT | Performed by: NURSE PRACTITIONER

## 2020-01-08 PROCEDURE — 84484 ASSAY OF TROPONIN QUANT: CPT | Performed by: EMERGENCY MEDICINE

## 2020-01-08 PROCEDURE — 80053 COMPREHEN METABOLIC PANEL: CPT | Performed by: EMERGENCY MEDICINE

## 2020-01-08 PROCEDURE — 99204 OFFICE O/P NEW MOD 45 MIN: CPT | Performed by: INTERNAL MEDICINE

## 2020-01-08 PROCEDURE — 85610 PROTHROMBIN TIME: CPT | Performed by: EMERGENCY MEDICINE

## 2020-01-08 RX ORDER — SODIUM CHLORIDE 9 MG/ML
125 INJECTION, SOLUTION INTRAVENOUS CONTINUOUS
Status: DISCONTINUED | OUTPATIENT
Start: 2020-01-08 | End: 2020-01-08 | Stop reason: HOSPADM

## 2020-01-08 RX ORDER — FLECAINIDE ACETATE 50 MG/1
100 TABLET ORAL EVERY 12 HOURS SCHEDULED
Status: DISCONTINUED | OUTPATIENT
Start: 2020-01-08 | End: 2020-01-08 | Stop reason: HOSPADM

## 2020-01-08 RX ORDER — FLECAINIDE ACETATE 50 MG/1
50 TABLET ORAL EVERY 12 HOURS SCHEDULED
Status: DISCONTINUED | OUTPATIENT
Start: 2020-01-08 | End: 2020-01-08

## 2020-01-08 RX ORDER — ATORVASTATIN CALCIUM 40 MG/1
40 TABLET, FILM COATED ORAL DAILY
Status: DISCONTINUED | OUTPATIENT
Start: 2020-01-08 | End: 2020-01-08 | Stop reason: HOSPADM

## 2020-01-08 RX ORDER — DOXYCYCLINE HYCLATE 20 MG
20 TABLET ORAL 2 TIMES DAILY
Status: DISCONTINUED | OUTPATIENT
Start: 2020-01-08 | End: 2020-01-08 | Stop reason: HOSPADM

## 2020-01-08 RX ORDER — METOPROLOL SUCCINATE 25 MG/1
75 TABLET, EXTENDED RELEASE ORAL EVERY 12 HOURS
Qty: 180 TABLET | Refills: 0 | Status: SHIPPED | OUTPATIENT
Start: 2020-01-08 | End: 2020-03-11 | Stop reason: SDUPTHER

## 2020-01-08 RX ORDER — ASPIRIN 81 MG/1
81 TABLET, CHEWABLE ORAL DAILY
Status: DISCONTINUED | OUTPATIENT
Start: 2020-01-08 | End: 2020-01-08 | Stop reason: HOSPADM

## 2020-01-08 RX ORDER — METOPROLOL SUCCINATE 50 MG/1
50 TABLET, EXTENDED RELEASE ORAL EVERY 12 HOURS
Status: DISCONTINUED | OUTPATIENT
Start: 2020-01-08 | End: 2020-01-08

## 2020-01-08 RX ORDER — FLECAINIDE ACETATE 100 MG/1
100 TABLET ORAL EVERY 12 HOURS SCHEDULED
Qty: 60 TABLET | Refills: 0 | Status: SHIPPED | OUTPATIENT
Start: 2020-01-08 | End: 2020-02-10 | Stop reason: SDUPTHER

## 2020-01-08 RX ORDER — B-COMPLEX WITH VITAMIN C
1 TABLET ORAL
Status: DISCONTINUED | OUTPATIENT
Start: 2020-01-08 | End: 2020-01-08 | Stop reason: HOSPADM

## 2020-01-08 RX ADMIN — IOHEXOL 100 ML: 350 INJECTION, SOLUTION INTRAVENOUS at 02:38

## 2020-01-08 RX ADMIN — ASPIRIN 81 MG 81 MG: 81 TABLET ORAL at 08:10

## 2020-01-08 RX ADMIN — ATORVASTATIN CALCIUM 40 MG: 40 TABLET, FILM COATED ORAL at 08:10

## 2020-01-08 RX ADMIN — OYSTER SHELL CALCIUM WITH VITAMIN D 1 TABLET: 500; 200 TABLET, FILM COATED ORAL at 08:10

## 2020-01-08 RX ADMIN — VITAM B12 100 MCG: 100 TAB at 08:10

## 2020-01-08 RX ADMIN — FLECAINIDE ACETATE 50 MG: 50 TABLET ORAL at 08:10

## 2020-01-08 RX ADMIN — SODIUM CHLORIDE 250 ML: 0.9 INJECTION, SOLUTION INTRAVENOUS at 03:40

## 2020-01-08 RX ADMIN — ENOXAPARIN SODIUM 40 MG: 40 INJECTION SUBCUTANEOUS at 08:10

## 2020-01-08 RX ADMIN — METOPROLOL SUCCINATE 50 MG: 50 TABLET, EXTENDED RELEASE ORAL at 06:11

## 2020-01-08 NOTE — CONSULTS
Consultation - Cardiology Team One  Dickson Montalvo 69 y o  male MRN: [id number]  Unit/Bed#: ED 14 Encounter: [id number]    Inpatient consult to Cardiology  Consult performed by: ANNIE Herring  Consult ordered by: ANNIE Gomez      Physician Requesting Consult: Abbey Springer MD  Reason for Consult / Principal Problem: Paroxysmal atrial fibrillation/flutter       Assessment/ Plan    1  Paroxysmal atrial fibrillation/flutter-   ECG and telemetry reviewed showing atrial fibrillation HR 90s  He was recently started on flecainide 50 mg PO BID 11/2019 and continued on metoprolol XL 50 mg PO BID  Patient reports no symptoms including palpitations, chest pain, SOB or dizziness  Continue medical management until post appendectomy   [id number] VASC score: 1  Continue aspirin   Follow up with primary cardiologist, Dr Letha Martínez as scheduled 1/29/20     History of Present Illness   HPI: Dickson Montalvo is a 69y o  year old male who has a history of paroxysmal atrial fibrillation/flutter, perforated appendicitis s/p drain, dyslipidemia and moderate AI  He  follows with cardiologist Dr Letha Martínez  He presents to [facility] ER 1/8/20 with report of irregular and elevated HR  As noted above, he has a history of atrial fibrillation/flutter  He takes his HR about 3 times a day with BP machine  Last night, while in bed, he reports he manually checked his HR and it was regular around 60s but a couple hours later he reports he felt like his heart rate was irregular  He took his BP and HR on machine and it was irregular and 90s  He denies palpitations, dizziness, lightheaded or chest pain  He came into the ER, due to knowing he was in atrial fibrillation  He has been taking all his medications as prescribed including flecainide and metoprolol XL  He reports no fever or chills  He was recently admitted for perforated appendicitis s/p drain and placed on antibiotics       Echocardiogram 2/27/19 showed EF 60%, no regional wall motion abnormalities, dilated R and L atrium, mild MR, moderate AI, mild TR and mild to moderate pulmonic regurgitation  EKG reviewed personally:  Atrial fibrillation   Ventricular rate 94 bpm  QRSD 94 ms  QT interval 376 ms  QTc interval 471 ms     Telemetry reviewed personally:   Atrial fibrillation HR 80-90s    Review of Systems   Constitution: Negative for chills and fever  Cardiovascular: Negative for chest pain, dyspnea on exertion, leg swelling and palpitations  Respiratory: Positive for cough  Negative for shortness of breath  Musculoskeletal: Negative for falls  Gastrointestinal: Negative for bloating, nausea and vomiting  Neurological: Negative for dizziness and light-headedness  Psychiatric/Behavioral: Negative for altered mental status  All other systems reviewed and are negative      Historical Information   Past Medical History:   Diagnosis Date    Anemia     Closed nondisplaced fracture of second metatarsal bone of left foot 11/11/2018    Foot drop     Left    Left foot pain 7/11/2019    Left inguinal hernia     Managed By Beverley William / last assessed 3/27/15     Pain in both feet 4/2/2019    Paroxysmal atrial fibrillation ([facility] [address] ) 2/27/2019    Pleural effusion, left 2/27/2019    Pneumonia 2/26/2019    Skin lesion     Weakness of left foot 11/7/2018    Well adult exam 1/5/2019     Past Surgical History:   Procedure Laterality Date    HERNIA REPAIR      IR IMAGE GUIDED ASPIRATION / DRAINAGE W TUBE  11/19/2019    IR THORACENTESIS  3/1/2019    LUMBAR LAMINECTOMY Left 11/9/2018    Procedure: Metrx L4-5 left hemilaminectomy and bilateral foraminotomy, Metrx L5-S1 left hemilaminectomy and microdiskectomy;  Surgeon: Olayinka West MD;  Location: [facility];  Service: Neurosurgery     Social History     Substance and Sexual Activity   Alcohol Use Yes    Binge frequency: Less than monthly    Comment: social      Social History     Substance and Sexual Activity   Drug Use No Social History     Tobacco Use   Smoking Status Never Smoker   Smokeless Tobacco Never Used     Family History:   Family History   Problem Relation Age of Onset    Heart disease Mother     No Known Problems Father     No Known Problems Sister     No Known Problems Brother     No Known Problems Family     Anemia Neg Hx     Arrhythmia Neg Hx     Clotting disorder Neg Hx     Heart attack Neg Hx     Heart failure Neg Hx     Hyperlipidemia Neg Hx     Hypertension Neg Hx     Fainting Neg Hx     Asthma Neg Hx        Meds/Allergies   all current active meds have been reviewed and current meds:   Current Facility-Administered Medications   Medication Dose Route Frequency    aspirin chewable tablet 81 mg  81 mg Oral Daily    atorvastatin (LIPITOR) tablet 40 mg  40 mg Oral Daily    calcium carbonate-vitamin D (OSCAL-D) 500 mg-200 units per tablet 1 tablet  1 tablet Oral Daily With Breakfast    cyanocobalamin (VITAMIN B-12) tablet 100 mcg  100 mcg Oral Daily    doxycycline (PERIOSTAT) tablet 20 mg  20 mg Oral BID    enoxaparin (LOVENOX) subcutaneous injection 40 mg  40 mg Subcutaneous Daily    flecainide (TAMBOCOR) tablet 50 mg  50 mg Oral Q12H [facility]    metoprolol succinate (TOPROL-XL) 24 hr tablet 50 mg  50 mg Oral Q12H    sodium chloride 0 9 % infusion  125 mL/hr Intravenous Continuous       sodium chloride 125 mL/hr       No Known Allergies    Objective   Vitals: Blood pressure 133/60, pulse 77, temperature 97 6 °F (36 4 °C), temperature source Oral, resp  rate 19, weight 96 2 kg (212 lb), SpO2 98 %  ,     Body mass index is 26 86 kg/m²  ,     Systolic (58UHX), CFE:022 , Min:121 , KRISTINA:015     Diastolic (96KCQ), YNQ:65, Min:58, Max:81          No intake or output data in the 24 hours ending 01/08/20 0900  Weight (last 2 days)     Date/Time   Weight    01/08/20 0129   96 2 (212)            Invasive Devices     Peripheral Intravenous Line            Peripheral IV 01/08/20 Right Antecubital less than 1 day Physical Exam   Constitutional: He is oriented to person, place, and time  He appears well-developed  No distress  HENT:   Head: Normocephalic  Neck: Neck supple  Cardiovascular: Normal rate, normal heart sounds and intact distal pulses  Irregular rhythm    Pulmonary/Chest: Effort normal and breath sounds normal  No stridor  No respiratory distress  Abdominal: Soft  Bowel sounds are normal    Musculoskeletal: He exhibits no edema  Neurological: He is alert and oriented to person, place, and time  Skin: Skin is warm and dry  He is not diaphoretic  Psychiatric: He has a normal mood and affect   His behavior is normal          LABORATORY RESULTS:  Results from last 7 days   Lab Units 01/08/20  0144   TROPONIN I ng/mL <0 02     CBC with diff: Results from last 7 days   Lab Units 01/08/20  0144   WBC Thousand/uL 6 20   HEMOGLOBIN g/dL 14 3   HEMATOCRIT % 44 2   MCV fL 96   PLATELETS Thousands/uL 218   MCH pg 30 9   MCHC g/dL 32 4   RDW % 14 3   MPV fL 11 7   NRBC AUTO /100 WBCs 0       CMP:  Results from last 7 days   Lab Units 01/08/20  0144   POTASSIUM mmol/L 3 9   CHLORIDE mmol/L 104   CO2 mmol/L 27   BUN mg/dL 23   CREATININE mg/dL 1 17   CALCIUM mg/dL 9 5   AST U/L 41   ALT U/L 52   ALK PHOS U/L 107   EGFR ml/min/1 73sq m 61       BMP:  Results from last 7 days   Lab Units 01/08/20  0144   POTASSIUM mmol/L 3 9   CHLORIDE mmol/L 104   CO2 mmol/L 27   BUN mg/dL 23   CREATININE mg/dL 1 17   CALCIUM mg/dL 9 5          Lab Results   Component Value Date    NTBNP 3,566 (H) 02/26/2019            Results from last 7 days   Lab Units 01/08/20  0144   MAGNESIUM mg/dL 1 8               Results from last 7 days   Lab Units 01/08/20  0144   TSH 3RD GENERATON uIU/mL 6 005*   FREE T4 ng/dL 0 79       Results from last 7 days   Lab Units 01/08/20  0144   INR  1 00     Lipid Profile:   No results found for: CHOL  Lab Results   Component Value Date    HDL 36 (L) 06/26/2019    HDL 43 11/08/2018     Lab Results   Component Value Date    LDLCALC 58 2019    LDLCALC 114 (H) 2018     Lab Results   Component Value Date    TRIG 48 2019    TRIG 85 2018         Cardiac testing:   Results for orders placed during the hospital encounter of 19   Echo complete with contrast if indicated    Narrative [address] [facility]  [phone]    Transthoracic Echocardiogram  2D, M-mode, Doppler, and Color Doppler    Study date:  2019    Patient: Mague Brady  MR number: [id number]  Account number: [de-identified]  : 1945  Age: 68 years  Gender: Male  Status: Inpatient  Location: Bedside  Height: 74 in  Weight: 223 5 lb  BP: 107/ 50 mmHg    Indications: Congestive heart failure  Diagnoses: I50 9 - Heart failure, unspecified    Sonographer:  Ramesh Combs RDCS  Primary Physician:  Estela Collins MD  Referring Physician:  Hayder Santoyo PA-C  Group:  [facility]  Interpreting Physician:  DO HUMAIRA Rivera    LEFT VENTRICLE:  Systolic function was normal  Ejection fraction was estimated to be 60 %  There were no regional wall motion abnormalities  Wall thickness was at the upper limits of normal   Left ventricular diastolic function parameters were normal     RIGHT VENTRICLE:  The ventricle was moderately dilated  Systolic function was normal     LEFT ATRIUM:  The atrium was moderately dilated  RIGHT ATRIUM:  The atrium was mildly dilated  MITRAL VALVE:  There was mild regurgitation  AORTIC VALVE:  There was moderate regurgitation  TRICUSPID VALVE:  There was mild regurgitation  PULMONIC VALVE:  There was mild to moderate regurgitation  HISTORY: PRIOR HISTORY: Patient has no history of cardiovascular disease  PROCEDURE: The procedure was performed at the bedside  This was a routine study  The transthoracic approach was used   The study included complete 2D imaging, M-mode, complete spectral Doppler, and color Doppler  The heart rate was 90 bpm,  at the start of the study  Image quality was adequate  LEFT VENTRICLE: Size was normal  Systolic function was normal  Ejection fraction was estimated to be 60 %  There were no regional wall motion abnormalities  Wall thickness was at the upper limits of normal  DOPPLER: Left ventricular  diastolic function parameters were normal     RIGHT VENTRICLE: The ventricle was moderately dilated  Systolic function was normal     LEFT ATRIUM: The atrium was moderately dilated  RIGHT ATRIUM: The atrium was mildly dilated  MITRAL VALVE: Valve structure was normal  There was normal leaflet separation  DOPPLER: The transmitral velocity was within the normal range  There was no evidence for stenosis  There was mild regurgitation  AORTIC VALVE: The valve was trileaflet  Leaflets exhibited normal thickness and normal cuspal separation  DOPPLER: Transaortic velocity was within the normal range  There was no evidence for stenosis  There was moderate regurgitation  TRICUSPID VALVE: The valve structure was normal  There was normal leaflet separation  DOPPLER: The transtricuspid velocity was within the normal range  There was no evidence for stenosis  There was mild regurgitation  The tricuspid jet  envelope definition was inadequate for estimation of RV systolic pressure  There are no indirect findings (abnormal RV volume or geometry, altered pulmonary flow velocity profile, or leftward septal displacement) which would suggest  moderate or severe pulmonary hypertension  PULMONIC VALVE: Leaflets exhibited normal thickness, no calcification, and normal cuspal separation  DOPPLER: The transpulmonic velocity was within the normal range  There was mild to moderate regurgitation  PERICARDIUM: There was no pericardial effusion  The pericardium was normal in appearance  AORTA: The root exhibited normal size      SYSTEMIC VEINS: IVC: Respirophasic changes were normal     SYSTEM MEASUREMENT TABLES    2D  %FS: 44 85 %  Ao Diam: 3 91 cm  EDV(Teich): 172 72 ml  EF(Teich): 75 38 %  ESV(Teich): 42 53 ml  IVSd: 1 04 cm  LA Area: 36 18 cm2  LA Diam: 4 94 cm  LVEDV MOD A4C: 192 68 ml  LVEF MOD A4C: 61 13 %  LVESV MOD A4C: 74 9 ml  LVIDd: 5 89 cm  LVIDs: 3 25 cm  LVLd A4C: 9 84 cm  LVLs A4C: 7 63 cm  LVPWd: 1 03 cm  RA Area: 22 72 cm2  RVIDd: 5 86 cm  SV MOD A4C: 117 78 ml  SV(Teich): 130 2 ml    CW  TR MaxP 85 mmHg  TR Vmax: 3 16 m/s    MM  TAPSE: 2 86 cm    PW  MV A Juan: 0 79 m/s  MV Dec [place]: 4 82 m/s2  MV DecT: 216 07 ms  MV E Juan: 1 04 m/s  MV E/A Ratio: 1 32  MV PHT: 62 66 ms  MVA By PHT: 3 51 cm2    [employer] Accredited Echocardiography Laboratory    Prepared and electronically signed by    Roque Church DO  Signed 2019 06:58:25       Imaging:     Ct Chest Abdomen Pelvis W Contrast    Result Date: 2020  Narrative: CT CHEST, ABDOMEN AND PELVIS WITH IV CONTRAST INDICATION:   palpitations, atrial fibrillation, recent perforated appendicitis, possible infiltrate on CXR  COMPARISON:  12/3/2019  TECHNIQUE: CT examination of the chest, abdomen and pelvis was performed  Axial, sagittal, and coronal 2D reformatted images were created from the source data and submitted for interpretation  Radiation dose length product (DLP) for this visit:  650 mGy-cm   This examination, like all CT scans performed in the [facility], was performed utilizing techniques to minimize radiation dose exposure, including the use of iterative reconstruction and automated exposure control  IV Contrast:  100 mL of iohexol (OMNIPAQUE) Enteric Contrast: Enteric contrast was not administered  FINDINGS: CHEST LUNGS:  Subsegmental atelectasis versus linear scarring in the posterior lower lobes  Cluster of nodular branching densities in the right middle lobe compatible with bronchiolitis i e infectious or inflammatory  There is no tracheal or endobronchial lesion  PLEURA:  Unremarkable  HEART/GREAT VESSELS:  Unremarkable for patient's age  MEDIASTINUM AND TONY:  Unremarkable  CHEST WALL AND LOWER NECK:   Mild bilateral gynecomastia  ABDOMEN LIVER/BILIARY TREE:  Unremarkable  GALLBLADDER:  No calcified gallstones  No pericholecystic inflammatory change  SPLEEN:  Unremarkable  PANCREAS:  Unremarkable  ADRENAL GLANDS:  Unremarkable  KIDNEYS/URETERS:  Unremarkable  No hydronephrosis  STOMACH AND BOWEL:  Moderate fecal stasis  No bowel obstruction  APPENDIX:  Interval removal of drainage catheter in the right lower quadrant for perforated appendicitis/abscess  There is significant improvement of inflammatory changes in the region of the previous drainage catheter  No residual drainable fluid collection  ABDOMINOPELVIC CAVITY:  No ascites or free intraperitoneal air  No lymphadenopathy  VESSELS:  Unremarkable for patient's age  PELVIS REPRODUCTIVE ORGANS:  Unremarkable for patient's age  URINARY BLADDER:  Unremarkable  ABDOMINAL WALL/INGUINAL REGIONS:  Unremarkable  OSSEOUS STRUCTURES:  No acute fracture or destructive osseous lesion  Degenerative changes in the thoracolumbar spine  Impression: 1  Cluster of nodular branching opacities in the right middle lobe compatible with infectious or inflammatory bronchiolitis  2   Interval removal of drainage catheter for perforated appendicitis/abscess  Significant improvement of inflammatory changes in the region of the previous drainage catheter  No residual drainable fluid collection  Workstation performed: [id number]     Thank you for allowing us to participate in this patient's care  This pt will follow up with Dr Gonzalo Howard once discharged  Counseling / Coordination of Care  Total floor / unit time spent today 45 minutes  Greater than 50% of total time was spent with the patient and / or family counseling and / or coordination of care    A description of the counseling / coordination of care: Review of history, current assessment, development of a plan  Code Status: Level 1 - Full Code    ** Please Note: Dragon 360 Dictation voice to text software may have been used in the creation of this document   **

## 2020-01-08 NOTE — DISCHARGE SUMMARY
Discharge Summary - [facility] Internal Medicine    Patient Information: Zeb Wang 69 y o  male MRN: [id number]  Unit/Bed#: ED 14 Encounter: [id number]    Discharging Physician / Practitioner: Namrata Escamilla MD  PCP: Miles Sutton DO  Admission Date: 1/8/2020  Discharge Date: 01/08/20    Disposition:     Home     Reason for Admission:  Palpitations    Discharge Diagnoses:     Principal Problem:    Paroxysmal atrial fibrillation [facility])  Active Problems:    Appendicitis    Left foot pain  Resolved Problems:    * No resolved hospital problems  *      Consultations During Hospital Stay:  · Cardiology    Procedures Performed:     CT abdomen and pelvis: 1  Cluster of nodular branching opacities in the right middle lobe compatible with infectious or inflammatory bronchiolitis    2   Interval removal of drainage catheter for perforated appendicitis/abscess  Significant improvement of inflammatory changes in the region of the previous drainage catheter  No residual drainable fluid collection  Chest x-ray:  No active pulmonary disease    Significant Findings / Test Results:     · As above    Hospital Course:     Zeb Wang is a 69 y o  male patient who originally presented to the hospital on 1/8/2020 due to palpitations  He was noted to have AFib with RVR  Patient does have a history of paroxysmal atrial fibrillation on flecainide and metoprolol  The dosing of metoprolol increased to 75 mg b i d  And flecainide dose was increased to 100 mg b i d   Patient is being discharged stable condition he will follow with outpatient cardiologist     Condition at Discharge: good     Discharge Day Visit / Exam:     * Please refer to separate progress note for these details *    Discussion with Family:       Discharge instructions/Information to patient and family:   See after visit summary for information provided to patient and family        Provisions for Follow-Up Care:  See after visit summary for information related to follow-up care and any pertinent home health orders  Planned Readmission:  No    Discharge Statement:  I spent 35 minutes discharging the patient  This time was spent on the day of discharge  I had direct contact with the patient on the day of discharge  Greater than 50% of the total time was spent examining patient, answering all patient questions, arranging and discussing plan of care with patient as well as directly providing post-discharge instructions  Additional time then spent on discharge activities  Discharge Medications:  See after visit summary for reconciled discharge medications provided to patient and family  ** Please Note: This note has been constructed using a voice recognition system   **

## 2020-01-08 NOTE — UTILIZATION REVIEW
Initial Clinical Review    Admission: Date/Time/Statement: Observation 1/8/20 @0334  Orders Placed This Encounter   Procedures    Place in Observation     Telemetry     Standing Status:   Standing     Number of Occurrences:   1     Order Specific Question:   Admitting Physician     Answer:   Jaky Marcus     Order Specific Question:   Level of Care     Answer:   Med Surg [16]     Order Specific Question:   Bed request comments     Answer:   telemetry     ED Arrival Information     Expected Arrival 70 [address] Zendejas of Arrival Escorted By Service Admission Type    - 1/8/2020 01:11 Urgent Walk-In Self Hospitalist Urgent    Arrival Complaint    Rapid heart beat,high BP        Chief Complaint   Patient presents with    Rapid Heart Rate     Pt arrives with c/o waking up with "feeling funny", states he felt his heart racing  Pt denies SOB, CP, dizziness  Hx A-fib     Assessment/Plan: 69year old male to the ED from home with complaints of heart palpitations  Admitted under observation for afib  Recent h/o appendicitis with drain removal, plans for lap appy in the next month or so  He awoke with palpitations  EKG shows afib with rate in 90s  He has h/o afib, with prior episodes in the setting of pneumonia and appendicitis  No shortness of breath  Cycle troponins, cards consult  Cardiology consult 1/8:  Currently no symptoms  Recently started on flecainide and continue metoprolol  Follow up with cardiology  Outpatient      ED Triage Vitals   Temperature Pulse Respirations Blood Pressure SpO2   01/08/20 0140 01/08/20 0129 01/08/20 0129 01/08/20 0129 01/08/20 0129   98 1 °F (36 7 °C) 89 18 160/70 98 %      Temp Source Heart Rate Source Patient Position - Orthostatic VS BP Location FiO2 (%)   01/08/20 0129 01/08/20 0129 01/08/20 0129 01/08/20 0129 --   Oral Monitor Lying Right arm       Pain Score       01/08/20 0129       No Pain        Wt Readings from Last 1 Encounters:   01/08/20 96 2 kg (212 lb) Additional Vital Signs:   Date/Time  Temp  Pulse  Resp  BP  SpO2  O2 Device  Patient Position - Orthostatic VS   01/08/20 0754  97 6 °F (36 4 °C)  77  19  133/60  98 %  None (Room air)  Lying   01/08/20 0707  97 7 °F (36 5 °C)  93  18  121/69  99 %  None (Room air)  Lying   01/08/20 0611  --  80  --  127/67  --  --  --   01/08/20 0400  --  88  18  126/61  98 %  --  --   01/08/20 0341  --  89  18  149/81  100 %  None (Room air)  Sitting   01/08/20 0330  --  88  --  129/58  97 %  --  Sitting   01/08/20 0140  98 1 °F (36 7 °C)  --  --  --  --  --  --   01/08/20 0129  --  89  18  160/70             Pertinent Labs/Diagnostic Test Results:   CT C/A/P 1/8;    Cluster of nodular branching opacities in the right middle lobe compatible with infectious or inflammatory bronchiolitis    Interval removal of drainage catheter for perforated appendicitis/abscess   Significant improvement of inflammatory changes in the region of the previous drainage catheter   No residual drainable fluid collection  CXR 1/8:  No acute cardiopulmonary disease    EKG 1/8:  Rate:     ECG rate:  94    ECG rate assessment: normal    Rhythm:     Rhythm: atrial fibrillation    Ectopy:     Ectopy: none    QRS:     QRS axis:  Left  Conduction:     Conduction: abnormal      Abnormal conduction: LAFB    ST segments:     ST segments:  Normal  T waves:     T waves: normal    Comments:   Results from last 7 days   Lab Units 01/08/20  0144   WBC Thousand/uL 6 20   HEMOGLOBIN g/dL 14 3   HEMATOCRIT % 44 2   PLATELETS Thousands/uL 218   NEUTROS ABS Thousands/µL 3 12         Results from last 7 days   Lab Units 01/08/20  0144   SODIUM mmol/L 140   POTASSIUM mmol/L 3 9   CHLORIDE mmol/L 104   CO2 mmol/L 27   ANION GAP mmol/L 9   BUN mg/dL 23   CREATININE mg/dL 1 17   EGFR ml/min/1 73sq m 61   CALCIUM mg/dL 9 5   MAGNESIUM mg/dL 1 8     Results from last 7 days   Lab Units 01/08/20  0144   AST U/L 41   ALT U/L 52   ALK PHOS U/L 107   TOTAL PROTEIN g/dL 7 4 ALBUMIN g/dL 3 7   TOTAL BILIRUBIN mg/dL 0 34         Results from last 7 days   Lab Units 01/08/20  0144   GLUCOSE RANDOM mg/dL 108     Results from last 7 days   Lab Units 01/08/20  0144   TROPONIN I ng/mL <0 02     Results from last 7 days   Lab Units 01/08/20  0144   D-DIMER QUANTITATIVE ug/ml FEU <0 27     Results from last 7 days   Lab Units 01/08/20  0144   PROTIME seconds 12 6   INR  1 00   PTT seconds 37     Results from last 7 days   Lab Units 01/08/20  0144   TSH 3RD GENERATON uIU/mL 6 005*         Results from last 7 days   Lab Units 01/08/20  0144   LACTIC ACID mmol/L 0 8     Results from last 7 days   Lab Units 01/08/20  0349   CLARITY UA  Clear   COLOR UA  Yellow   SPEC GRAV UA  1 010   PH UA  6 0   GLUCOSE UA mg/dl Negative   KETONES UA mg/dl Negative   BLOOD UA  Negative   PROTEIN UA mg/dl Negative   NITRITE UA  Negative   BILIRUBIN UA  Negative   UROBILINOGEN UA E U /dl 0 2   LEUKOCYTES UA  Negative     Results from last 7 days   Lab Units 01/08/20  0153 01/08/20  0144   BLOOD CULTURE  Received in Microbiology Lab  Culture in Progress  Received in Microbiology Lab  Culture in Progress       ED Treatment:   Medication Administration from 01/08/2020 0111 to 01/08/2020 1103       Date/Time Order Dose Route Action     01/08/2020 0340 sodium chloride 0 9 % bolus 250 mL 250 mL Intravenous New Bag     01/08/2020 0810 aspirin chewable tablet 81 mg 81 mg Oral Given     01/08/2020 0810 atorvastatin (LIPITOR) tablet 40 mg 40 mg Oral Given     01/08/2020 0810 calcium carbonate-vitamin D (OSCAL-D) 500 mg-200 units per tablet 1 tablet 1 tablet Oral Given     01/08/2020 0810 cyanocobalamin (VITAMIN B-12) tablet 100 mcg 100 mcg Oral Given     01/08/2020 0816 doxycycline (PERIOSTAT) tablet 20 mg 20 mg Oral Not Given     01/08/2020 0810 flecainide (TAMBOCOR) tablet 50 mg 50 mg Oral Given     01/08/2020 0611 metoprolol succinate (TOPROL-XL) 24 hr tablet 50 mg 50 mg Oral Given     01/08/2020 0810 enoxaparin (LOVENOX) subcutaneous injection 40 mg 40 mg Subcutaneous Given        Past Medical History:   Diagnosis Date    Anemia     Closed nondisplaced fracture of second metatarsal bone of left foot 11/11/2018    Foot drop     Left    Left foot pain 7/11/2019    Left inguinal hernia     Managed By Tierney Zhang / last assessed 3/27/15     Pain in both feet 4/2/2019    Paroxysmal atrial fibrillation (HCC) 2/27/2019    Pleural effusion, left 2/27/2019    Pneumonia 2/26/2019    Skin lesion     Weakness of left foot 11/7/2018    Well adult exam 1/5/2019     Present on Admission:   Paroxysmal atrial fibrillation (HCC)   Appendicitis   Left foot pain    Admitting Diagnosis: Rapid heart rate [R00 0]  High blood pressure [I10]  Age/Sex: 69 y o  male  Admission Orders:  Tele  BMp  Scheduled Medications:    Medications:  aspirin 81 mg Oral Daily   atorvastatin 40 mg Oral Daily   calcium carbonate-vitamin D 1 tablet Oral Daily With Breakfast   vitamin B-12 100 mcg Oral Daily   doxycycline 20 mg Oral BID   enoxaparin 40 mg Subcutaneous Daily   flecainide 50 mg Oral Q12H ROSEANN   metoprolol succinate 50 mg Oral Q12H     Continuous IV Infusions:    sodium chloride 125 mL/hr Intravenous Continuous     PRN Meds:       IP CONSULT TO CARDIOLOGY    Network Utilization Review Department  [email or web address]  ATTENTION: Please call with any questions or concerns to [phone] and carefully listen to the prompts so that you are directed to the right person  All voicemails are confidential   Ivana Muss all requests for admission clinical reviews, approved or denied determinations and any other requests to dedicated fax number below belonging to the campus where the patient is receiving treatment   List of dedicated fax numbers for the Facilities:  [address] DENIALS (Administrative/Medical Necessity) [phone]   [address] (Maternity/NICU/Pediatrics) [phone]   Shantelle Flowers [place] [phone]   Gisela Marshall [phone]   Adrian Richter [phone]   [address] [phone]   [facility]  [phone]   [address] [facility] [address] [facility]  [address] [facility] [address] [facility] [phone]

## 2020-01-08 NOTE — ASSESSMENT & PLAN NOTE
· Presents to the hospital after noticing HRs irregular in the 90s  He reports his HR is generally below 50 and regular  He reports only prior episodes of a fib have been in the setting of pneumonia and appendicitis  Currently rate controlled in 90s-100  · TSH elevated, follow up T4  Follow up magnesium  · Denies any hx of GENNY  · Home regimen: metoprolol XL 50 mg BID, flecainide 50 mg bid - continue  · Follows with Dr Carmen Anguiano, next appointment end of January  Reports ablation has been discussed     · Patient adamantly requesting cardiology consult while inpatient   · Chadsvasc 1, not on anticoagulation

## 2020-01-08 NOTE — ED NOTES
PT awake and alert, no distress noted  No other questions upon d/c       April Kennedy Galeazzi, RN  01/08/20 4214

## 2020-01-08 NOTE — H&P
H&P- Adriana Franklin 1945, 69 y o  male MRN: [id number]    Unit/Bed#: FT 03 Encounter: [id number]    Primary Care Provider: Ne Banda DO   Date and time admitted to hospital: 1/8/2020  1:20 AM        * Paroxysmal atrial fibrillation ([address] )  Assessment & Plan  · Presents to the hospital after noticing HRs irregular in the 90s  He reports his HR is generally below 50 and regular  He reports only prior episodes of a fib have been in the setting of pneumonia and appendicitis  Currently rate controlled in 90s-100  · TSH elevated, follow up T4  Follow up magnesium  · Denies any hx of GENNY  · Home regimen: metoprolol XL 50 mg BID, flecainide 50 mg bid - continue  · Follows with Dr Gonzalo Howard, next appointment end of January  Reports ablation has been discussed  · Patient adamantly requesting cardiology consult while inpatient   · Chadsvasc 1, not on anticoagulation    Appendicitis  Assessment & Plan  · Diagnosed in November   · Completed antibiotics  Drain has been removed  Follows up with surgery next week - plans for laproscopic appendectomy within the next month or so  · Denies any further abdominal pain    Left foot pain  Assessment & Plan  · Continue foot brace       VTE Prophylaxis: Enoxaparin (Lovenox)  / reason for no mechanical VTE prophylaxis low risk   Code Status: Level 1   POLST: POLST is not applicable to this patient  Discussion with family: patient     Anticipated Length of Stay:  Patient will be admitted on an Observation basis with an anticipated length of stay of  Less than 2 midnights  Justification for Hospital Stay: pending cardiology consult     Total Time for Visit, including Counseling / Coordination of Care: 45 minutes  Greater than 50% of this total time spent on direct patient counseling and coordination of care      Chief Complaint:   Palpitations     History of Present Illness:    Adriana Franklin is a 69 y o  male who presents with palpitations that began shortly after waking up in the evening  He denies chest pain, shortness of breath  He denies any recent illnesses  He denies any history of sleep apnea  Past medical history significant for paroxysmal atrial fibrillation, left footdrop, perforated appendicitis with plans for laparoscopic appendectomy in about a month  On arrival to emergency department, patient is hemodynamically stable  EKG is atrial fibrillation with heart rate in the 90s  Review of blood work shows elevated TSH  Follow up on free T4  Follow-up on magnesium  He is scheduled to see Dr Mcbride Moder the end of January  He reports there has been discussions for possible ablation in the future  Patient will be admitted as an observation pending consult Cardiology  Review of Systems:    Review of Systems   Constitutional: Negative  HENT: Positive for dental problem (chronic doxycycline use)  Eyes: Negative  Respiratory: Negative  Cardiovascular: Positive for palpitations  Negative for chest pain and leg swelling  Gastrointestinal: Negative  Genitourinary: Negative  Musculoskeletal: Negative  Neurological: Negative  Hematological: Negative  Psychiatric/Behavioral: Negative          Past Medical and Surgical History:     Past Medical History:   Diagnosis Date    Anemia     Closed nondisplaced fracture of second metatarsal bone of left foot 11/11/2018    Foot drop     Left    Left foot pain 7/11/2019    Left inguinal hernia     Managed By Merced Almanza / last assessed 3/27/15     Pain in both feet 4/2/2019    Paroxysmal atrial fibrillation ([address] ) 2/27/2019    Pleural effusion, left 2/27/2019    Pneumonia 2/26/2019    Skin lesion     Weakness of left foot 11/7/2018    Well adult exam 1/5/2019       Past Surgical History:   Procedure Laterality Date    HERNIA REPAIR      IR IMAGE GUIDED ASPIRATION / DRAINAGE W TUBE  11/19/2019    IR THORACENTESIS  3/1/2019    LUMBAR LAMINECTOMY Left 11/9/2018    Procedure: Metrx L4-5 left hemilaminectomy and bilateral foraminotomy, Metrx L5-S1 left hemilaminectomy and microdiskectomy;  Surgeon: Gail Bai MD;  Location: [facility];  Service: Neurosurgery       Meds/Allergies:    Prior to Admission medications    Medication Sig Start Date End Date Taking? Authorizing Provider   aspirin 81 mg chewable tablet Chew 1 tablet (81 mg total) daily Stop taking 5-7 days before surgery  Resume after surgery  11/20/19 1/19/20  Pauline Mcleod DO   atorvastatin (LIPITOR) 40 mg tablet Take 1 tablet (40 mg total) by mouth daily 4/26/19   Kassandra Scruggs DO   Calcium-Magnesium-Vitamin D (CALCIUM MAGNESIUM PO) Take by mouth    Historical Provider, MD   chlorhexidine (PERIDEX) 0 12 % solution RINSE WITH 1/2 OZ TWO TIMES DAILY FOLLOWING BRUSHING AND FLOSSING 5/15/18   Historical Provider, MD   Cholecalciferol (VITAMIN D PO) Take by mouth    Historical Provider, MD   Cyanocobalamin (VITAMIN B12 PO) Take by mouth    Historical Provider, MD   doxycycline (ORACEA) 40 MG capsule Take 40 mg by mouth every morning    Historical Provider, MD   doxycycline (PERIOSTAT) 20 MG tablet Take 20 mg by mouth 2 (two) times a day 7/9/19   Historical Provider, MD   flecainide (TAMBOCOR) 50 mg tablet Take 1 tablet (50 mg total) by mouth every 12 (twelve) hours 11/20/19 12/20/19  Pauline Mcleod DO   metoprolol succinate (TOPROL-XL) 50 mg 24 hr tablet Take 1 tablet (50 mg total) by mouth every 12 (twelve) hours 11/20/19 12/20/19  Jaspal Mcleod DO   Sodium Chloride Flush (NORMAL SALINE FLUSH) 0 9 % SOLN INFUSE 10 MILLILITERS INTO A VENOUS CATHETER EVERY DAY 11/20/19   Historical Provider, MD   sodium chloride, PF, 0 9 % Infuse 10 mL into a venous catheter daily 11/20/19   Florentino Liriano PA-C     I have reviewed home medications with patient personally      Allergies: No Known Allergies    Social History:     Marital Status: /Civil Union     Substance Use History:   Social History     Substance and Sexual Activity   Alcohol Use Yes  Binge frequency: Less than monthly    Comment: social      Social History     Tobacco Use   Smoking Status Never Smoker   Smokeless Tobacco Never Used     Social History     Substance and Sexual Activity   Drug Use No       Family History:    non-contributory    Physical Exam:     Vitals:   Blood Pressure: 126/61 (01/08/20 0400)  Pulse: 88 (01/08/20 0400)  Temperature: 98 1 °F (36 7 °C) (01/08/20 0140)  Temp Source: Oral (01/08/20 0140)  Respirations: 18 (01/08/20 0400)  Weight - Scale: 96 2 kg (212 lb) (01/08/20 0129)  SpO2: 98 % (01/08/20 0400)    Physical Exam   Constitutional: He is oriented to person, place, and time  He appears well-developed and well-nourished  No distress  HENT:   Head: Normocephalic and atraumatic  Eyes: EOM are normal    Neck: Normal range of motion  Neck supple  No thyromegaly present  Cardiovascular: Normal heart sounds and intact distal pulses  Exam reveals no friction rub  No murmur heard  Irregular rhythm   Pulmonary/Chest: Effort normal and breath sounds normal  No respiratory distress  He has no wheezes  He has no rales  Abdominal: Soft  Bowel sounds are normal  He exhibits no distension  There is no tenderness  Musculoskeletal: Normal range of motion  He exhibits no edema or tenderness  Neurological: He is alert and oriented to person, place, and time  Skin: Skin is warm and dry  Capillary refill takes less than 2 seconds  Psychiatric: He has a normal mood and affect  Additional Data:     Lab Results: I have personally reviewed pertinent reports        Results from last 7 days   Lab Units 01/08/20  0144   WBC Thousand/uL 6 20   HEMOGLOBIN g/dL 14 3   HEMATOCRIT % 44 2   PLATELETS Thousands/uL 218   NEUTROS PCT % 50   LYMPHS PCT % 34   MONOS PCT % 12   EOS PCT % 3     Results from last 7 days   Lab Units 01/08/20  0144   SODIUM mmol/L 140   POTASSIUM mmol/L 3 9   CHLORIDE mmol/L 104   CO2 mmol/L 27   BUN mg/dL 23   CREATININE mg/dL 1 17   ANION GAP mmol/L 9   CALCIUM mg/dL 9 5   ALBUMIN g/dL 3 7   TOTAL BILIRUBIN mg/dL 0 34   ALK PHOS U/L 107   ALT U/L 52   AST U/L 41   GLUCOSE RANDOM mg/dL 108     Results from last 7 days   Lab Units 01/08/20  0144   INR  1 00             Results from last 7 days   Lab Units 01/08/20  0144   LACTIC ACID mmol/L 0 8       Imaging: I have personally reviewed pertinent reports  CT chest abdomen pelvis w contrast   ED Interpretation by Tatiana Lopez MD (01/08 4223)   FINDINGS:      CHEST      LUNGS:  Subsegmental atelectasis versus linear scarring in the posterior lower lobes   Cluster of nodular branching densities in the right middle lobe compatible with bronchiolitis i e infectious or inflammatory   There is no tracheal or endobronchial    lesion  PLEURA:  Unremarkable  HEART/GREAT VESSELS:  Unremarkable for patient's age  MEDIASTINUM AND TONY:  Unremarkable  CHEST WALL AND LOWER NECK:   Mild bilateral gynecomastia  ABDOMEN      LIVER/BILIARY TREE:  Unremarkable  GALLBLADDER:  No calcified gallstones  No pericholecystic inflammatory change  SPLEEN:  Unremarkable  PANCREAS:  Unremarkable  ADRENAL GLANDS:  Unremarkable  KIDNEYS/URETERS:  Unremarkable  No hydronephrosis  STOMACH AND BOWEL:  Moderate fecal stasis   No bowel obstruction  APPENDIX:  Interval removal of drainage catheter in the right lower quadrant for perforated appendicitis/abscess  [facility] is significant improvement of inflammatory changes in the region of the previous drainage catheter   No residual drainable fluid    collection  ABDOMINOPELVIC CAVITY:  No ascites or free intraperitoneal air  No lymphadenopathy  VESSELS:  Unremarkable for patient's age  PELVIS      REPRODUCTIVE ORGANS:  Unremarkable for patient's age  URINARY BLADDER:  Unremarkable  ABDOMINAL WALL/INGUINAL REGIONS:  Unremarkable  OSSEOUS STRUCTURES:  No acute fracture or destructive osseous lesion   Degenerative changes in the thoracolumbar spine  Impression:        1   Cluster of nodular branching opacities in the right middle lobe compatible with infectious or inflammatory bronchiolitis  2   Interval removal of drainage catheter for perforated appendicitis/abscess   Significant improvement of inflammatory changes in the region of the previous drainage catheter   No residual drainable fluid collection  Workstation performed: [id number]         Final Result by Yulisa Escoto MD (01/08 5284)      1  Cluster of nodular branching opacities in the right middle lobe compatible with infectious or inflammatory bronchiolitis  2   Interval removal of drainage catheter for perforated appendicitis/abscess  Significant improvement of inflammatory changes in the region of the previous drainage catheter  No residual drainable fluid collection  Workstation performed: [id number]         XR chest 1 view portable   ED Interpretation by Julienne Forman MD (01/08 0210)   ? R perihilar infiltrate read by me  EKG, Pathology, and Other Studies Reviewed on Admission:   · EKG:  AFib heart rate 94    Allscripts / Epic Records Reviewed: Yes     ** Please Note: This note has been constructed using a voice recognition system   **

## 2020-01-08 NOTE — ED PROVIDER NOTES
History  Chief Complaint   Patient presents with    Rapid Heart Rate     Pt arrives with c/o waking up with "feeling funny", states he felt his heart racing  Pt denies SOB, CP, dizziness  Hx A-fib     Patient is a 69year old male with palpitations tonight about 1 5 hours after going to bed last night  No sob  No chest pain  No fever  No N/v  No urinary sx  No abdominal pain  Was last seen in this ED on 12/3/19 for diarrhea  Had perforated appendicitis with abscess and had drain placed which was removed  Has h/o a  Fib and is on low dose ASA  No travel  [facility] website checked on this patient and no Rx found  History provided by:  Patient   used: No    Rapid Heart Rate   Associated symptoms: no chest pain, no nausea, no shortness of breath and no vomiting        Prior to Admission Medications   Prescriptions Last Dose Informant Patient Reported? Taking? Calcium-Magnesium-Vitamin D (CALCIUM MAGNESIUM PO)  Self Yes No   Sig: Take by mouth   Cholecalciferol (VITAMIN D PO)  Self Yes No   Sig: Take by mouth   Cyanocobalamin (VITAMIN B12 PO)  Self Yes No   Sig: Take by mouth   Sodium Chloride Flush (NORMAL SALINE FLUSH) 0 9 % SOLN   Yes No   Sig: INFUSE 10 MILLILITERS INTO A VENOUS CATHETER EVERY DAY   aspirin 81 mg chewable tablet   No No   Sig: Chew 1 tablet (81 mg total) daily Stop taking 5-7 days before surgery  Resume after surgery     atorvastatin (LIPITOR) 40 mg tablet  Self No No   Sig: Take 1 tablet (40 mg total) by mouth daily   chlorhexidine (PERIDEX) 0 12 % solution  Self Yes No   Sig: RINSE WITH 1/2 OZ TWO TIMES DAILY FOLLOWING BRUSHING AND FLOSSING   doxycycline (ORACEA) 40 MG capsule  Self Yes No   Sig: Take 40 mg by mouth every morning   doxycycline (PERIOSTAT) 20 MG tablet  Self Yes No   Sig: Take 20 mg by mouth 2 (two) times a day   flecainide (TAMBOCOR) 50 mg tablet   No No   Sig: Take 1 tablet (50 mg total) by mouth every 12 (twelve) hours   metoprolol succinate (TOPROL-XL) 50 mg 24 hr tablet   No No   Sig: Take 1 tablet (50 mg total) by mouth every 12 (twelve) hours   sodium chloride, PF, 0 9 %   No No   Sig: Infuse 10 mL into a venous catheter daily      Facility-Administered Medications: None       Past Medical History:   Diagnosis Date    Anemia     Closed nondisplaced fracture of second metatarsal bone of left foot 11/11/2018    Foot drop     Left    Left foot pain 7/11/2019    Left inguinal hernia     Managed By Mark Dumont / last assessed 3/27/15     Pain in both feet 4/2/2019    Paroxysmal atrial fibrillation ([facility] [address] ) 2/27/2019    Pleural effusion, left 2/27/2019    Pneumonia 2/26/2019    Skin lesion     Weakness of left foot 11/7/2018    Well adult exam 1/5/2019       Past Surgical History:   Procedure Laterality Date    HERNIA REPAIR      IR IMAGE GUIDED ASPIRATION / DRAINAGE W TUBE  11/19/2019    IR THORACENTESIS  3/1/2019    LUMBAR LAMINECTOMY Left 11/9/2018    Procedure: Metrx L4-5 left hemilaminectomy and bilateral foraminotomy, Metrx L5-S1 left hemilaminectomy and microdiskectomy;  Surgeon: Walt Thomas MD;  Location: [facility];  Service: Neurosurgery       Family History   Problem Relation Age of Onset    Heart disease Mother     No Known Problems Father     No Known Problems Sister     No Known Problems Brother     No Known Problems Family     Anemia Neg Hx     Arrhythmia Neg Hx     Clotting disorder Neg Hx     Heart attack Neg Hx     Heart failure Neg Hx     Hyperlipidemia Neg Hx     Hypertension Neg Hx     Fainting Neg Hx     Asthma Neg Hx      I have reviewed and agree with the history as documented  Social History     Tobacco Use    Smoking status: Never Smoker    Smokeless tobacco: Never Used   Substance Use Topics    Alcohol use: Yes     Binge frequency: Less than monthly     Comment: social     Drug use: No        Review of Systems   Constitutional: Negative for fever     Respiratory: Negative for shortness of breath  Cardiovascular: Positive for palpitations  Negative for chest pain  Gastrointestinal: Negative for abdominal pain, diarrhea, nausea and vomiting  Genitourinary: Negative for difficulty urinating  All other systems reviewed and are negative  Physical Exam  Physical Exam   Constitutional: He is oriented to person, place, and time  He appears well-developed and well-nourished  He appears distressed (moderate)  HENT:   Head: Normocephalic and atraumatic  Mouth/Throat: Oropharynx is clear and moist    Eyes: No scleral icterus  Neck: Normal range of motion  Neck supple  No tracheal deviation present  Cardiovascular: Normal rate and normal heart sounds  No murmur heard  Irregularly irregular rhythm     Pulmonary/Chest: Effort normal and breath sounds normal  No stridor  No respiratory distress  He has no wheezes  He has no rales  Abdominal: Soft  Bowel sounds are normal  He exhibits no distension  There is no tenderness  Musculoskeletal: He exhibits no edema or deformity  Neurological: He is alert and oriented to person, place, and time  Skin: Skin is warm and dry  No rash noted  Psychiatric: He has a normal mood and affect  Nursing note and vitals reviewed        Vital Signs  ED Triage Vitals   Temperature Pulse Respirations Blood Pressure SpO2   01/08/20 0140 01/08/20 0129 01/08/20 0129 01/08/20 0129 01/08/20 0129   98 1 °F (36 7 °C) 89 18 160/70 98 %      Temp Source Heart Rate Source Patient Position - Orthostatic VS BP Location FiO2 (%)   01/08/20 0129 01/08/20 0129 01/08/20 0129 01/08/20 0129 --   Oral Monitor Lying Right arm       Pain Score       01/08/20 0129       No Pain           Vitals:    01/08/20 0129 01/08/20 0330 01/08/20 0341   BP: 160/70 129/58 149/81   Pulse: 89 88 89   Patient Position - Orthostatic VS: Lying Sitting Sitting         Visual Acuity      ED Medications  Medications   sodium chloride 0 9 % bolus 250 mL (250 mL Intravenous New Bag 1/8/20 0340) sodium chloride 0 9 % infusion (has no administration in time range)   iohexol (OMNIPAQUE) 350 MG/ML injection (MULTI-DOSE) 100 mL (100 mL Intravenous Given 1/8/20 0238)       Diagnostic Studies  Results Reviewed     Procedure Component Value Units Date/Time    Urine Macroscopic, POC [[id number]] Collected:  01/08/20 0349    Lab Status:  Final result Specimen:  Urine Updated:  01/08/20 0347     Color, UA Yellow     Clarity, UA Clear     pH, UA 6 0     Leukocytes, UA Negative     Nitrite, UA Negative     Protein, UA Negative mg/dl      Glucose, UA Negative mg/dl      Ketones, UA Negative mg/dl      Urobilinogen, UA 0 2 E U /dl      Bilirubin, UA Negative     Blood, UA Negative     Specific Gravity, UA 1 010    Narrative:       CLINITEK RESULT    TSH, 3rd generation with Free T4 reflex [[id number]]  (Abnormal) Collected:  01/08/20 0144    Lab Status:  Final result Specimen:  Blood from Arm, Right Updated:  01/08/20 0220     TSH 3RD GENERATON 6 005 uIU/mL     Narrative:       Patients undergoing fluorescein dye angiography may retain small amounts of fluorescein in the body for 48-72 hours post procedure  Samples containing fluorescein can produce falsely depressed TSH values  If the patient had this procedure,a specimen should be resubmitted post fluorescein clearance  cat Combs [[id number]] Collected:  01/08/20 0144    Lab Status: In process Specimen:  Blood from Arm, Right Updated:  01/08/20 0220    Lactic acid, plasma [[id number]]  (Normal) Collected:  01/08/20 0144    Lab Status:  Final result Specimen:  Blood from Arm, Right Updated:  01/08/20 0216     LACTIC ACID 0 8 mmol/L     Narrative:       Result may be elevated if tourniquet was used during collection      Troponin I [[id number]]  (Normal) Collected:  01/08/20 0144    Lab Status:  Final result Specimen:  Blood from Arm, Right Updated:  01/08/20 0211     Troponin I <0 02 ng/mL     Comprehensive metabolic panel [[id number]] Collected:  01/08/20 0144    Lab Status:  Final result Specimen:  Blood from Arm, Right Updated:  01/08/20 0210     Sodium 140 mmol/L      Potassium 3 9 mmol/L      Chloride 104 mmol/L      CO2 27 mmol/L      ANION GAP 9 mmol/L      BUN 23 mg/dL      Creatinine 1 17 mg/dL      Glucose 108 mg/dL      Calcium 9 5 mg/dL      AST 41 U/L      ALT 52 U/L      Alkaline Phosphatase 107 U/L      Total Protein 7 4 g/dL      Albumin 3 7 g/dL      Total Bilirubin 0 34 mg/dL      eGFR 61 ml/min/1 73sq m     Narrative:       [place] guidelines for Chronic Kidney Disease (CKD):     Stage 1 with normal or high GFR (GFR > 90 mL/min/1 73 square meters)    Stage 2 Mild CKD (GFR = 60-89 mL/min/1 73 square meters)    Stage 3A Moderate CKD (GFR = 45-59 mL/min/1 73 square meters)    Stage 3B Moderate CKD (GFR = 30-44 mL/min/1 73 square meters)    Stage 4 Severe CKD (GFR = 15-29 mL/min/1 73 square meters)    Stage 5 End Stage CKD (GFR <15 mL/min/1 73 square meters)  Note: GFR calculation is accurate only with a steady state creatinine    D-Dimer [[id number]]  (Normal) Collected:  01/08/20 0144    Lab Status:  Final result Specimen:  Blood from Arm, Right Updated:  01/08/20 0207     D-Dimer, Quant <0 27 ug/ml FEU     Protime-INR [[id number]]  (Normal) Collected:  01/08/20 0144    Lab Status:  Final result Specimen:  Blood from Arm, Right Updated:  01/08/20 0203     Protime 12 6 seconds      INR 1 00    APTT [[id number]]  (Normal) Collected:  01/08/20 0144    Lab Status:  Final result Specimen:  Blood from Arm, Right Updated:  01/08/20 0203     PTT 37 seconds     Blood culture #1 [[id number]] Collected:  01/08/20 0153    Lab Status:   In process Specimen:  Blood from Arm, Left Updated:  01/08/20 0159    CBC and differential [[id number]] Collected:  01/08/20 0144    Lab Status:  Final result Specimen:  Blood from Arm, Right Updated:  01/08/20 0154     WBC 6 20 Thousand/uL      RBC 4 63 Million/uL      Hemoglobin 14 3 g/dL      Hematocrit 44 2 % MCV 96 fL      MCH 30 9 pg      MCHC 32 4 g/dL      RDW 14 3 %      MPV 11 7 fL      Platelets 238 Thousands/uL      nRBC 0 /100 WBCs      Neutrophils Relative 50 %      Immat GRANS % 0 %      Lymphocytes Relative 34 %      Monocytes Relative 12 %      Eosinophils Relative 3 %      Basophils Relative 1 %      Neutrophils Absolute 3 12 Thousands/µL      Immature Grans Absolute 0 01 Thousand/uL      Lymphocytes Absolute 2 10 Thousands/µL      Monocytes Absolute 0 73 Thousand/µL      Eosinophils Absolute 0 21 Thousand/µL      Basophils Absolute 0 03 Thousands/µL     Blood culture #2 [[id number]] Collected:  01/08/20 0144    Lab Status: In process Specimen:  Blood from Arm, Right Updated:  01/08/20 0152                 CT chest abdomen pelvis w contrast   ED Interpretation by Wilian Ruiz MD (01/08 0324)   FINDINGS:      CHEST      LUNGS:  Subsegmental atelectasis versus linear scarring in the posterior lower lobes   Cluster of nodular branching densities in the right middle lobe compatible with bronchiolitis i e infectious or inflammatory   There is no tracheal or endobronchial    lesion  PLEURA:  Unremarkable  HEART/GREAT VESSELS:  Unremarkable for patient's age  MEDIASTINUM AND TONY:  Unremarkable  CHEST WALL AND LOWER NECK:   Mild bilateral gynecomastia  ABDOMEN      LIVER/BILIARY TREE:  Unremarkable  GALLBLADDER:  No calcified gallstones  No pericholecystic inflammatory change  SPLEEN:  Unremarkable  PANCREAS:  Unremarkable  ADRENAL GLANDS:  Unremarkable  KIDNEYS/URETERS:  Unremarkable  No hydronephrosis  STOMACH AND BOWEL:  Moderate fecal stasis   No bowel obstruction  APPENDIX:  Interval removal of drainage catheter in the right lower quadrant for perforated appendicitis/abscess  Miguelina Spice is significant improvement of inflammatory changes in the region of the previous drainage catheter   No residual drainable fluid    collection  ABDOMINOPELVIC CAVITY:  No ascites or free intraperitoneal air  No lymphadenopathy  VESSELS:  Unremarkable for patient's age  PELVIS      REPRODUCTIVE ORGANS:  Unremarkable for patient's age  URINARY BLADDER:  Unremarkable  ABDOMINAL WALL/INGUINAL REGIONS:  Unremarkable  OSSEOUS STRUCTURES:  No acute fracture or destructive osseous lesion   Degenerative changes in the thoracolumbar spine  Impression:        1   Cluster of nodular branching opacities in the right middle lobe compatible with infectious or inflammatory bronchiolitis  2   Interval removal of drainage catheter for perforated appendicitis/abscess   Significant improvement of inflammatory changes in the region of the previous drainage catheter   No residual drainable fluid collection  Workstation performed: [id number]         Final Result by Jeff Colindres MD (01/08 0970)      1  Cluster of nodular branching opacities in the right middle lobe compatible with infectious or inflammatory bronchiolitis  2   Interval removal of drainage catheter for perforated appendicitis/abscess  Significant improvement of inflammatory changes in the region of the previous drainage catheter  No residual drainable fluid collection  Workstation performed: [id number]         XR chest 1 view portable   ED Interpretation by Ivonne Conway MD (01/08 0210)   ? R perihilar infiltrate read by me                    Procedures  ECG 12 Lead Documentation Only  Date/Time: 1/8/2020 1:43 AM  Performed by: Ivonne Conway MD  Authorized by: Ivonne Conway MD     Indications / Diagnosis:  Palpitations  ECG reviewed by me, the ED Provider: yes    Patient location:  ED  Previous ECG:     Previous ECG:  Compared to current    Comparison ECG info:  11/19/19    Similarity:  Changes noted (not tachy now)  Quality:     Tracing quality:  Limited by artifact  Rate:     ECG rate:  94    ECG rate assessment: normal    Rhythm: Rhythm: atrial fibrillation    Ectopy:     Ectopy: none    QRS:     QRS axis:  Left  Conduction:     Conduction: abnormal      Abnormal conduction: LAFB    ST segments:     ST segments:  Normal  T waves:     T waves: normal    Comments:      I do not agree with computer reading of PVC             ED Course  ED Course as of Jan 08 0352   Wed Jan 08, 2020 0225 Labs, CXR, EKG d/w patient  CT ordered to rule out pneumonia or other infectious etiology  HEART Risk Score      Most Recent Value   History  1 Filed at: 01/08/2020 0228   ECG  1 Filed at: 01/08/2020 8993   Age  2 Filed at: 01/08/2020 2800   Risk Factors  1 Filed at: 01/08/2020 0228   Troponin  0 Filed at: 01/08/2020 0228   Heart Score Risk Calculator   History  1 Filed at: 01/08/2020 0228   ECG  1 Filed at: 01/08/2020 7037   Age  2 Filed at: 01/08/2020 5897   Risk Factors  1 Filed at: 01/08/2020 0228   Troponin  0 Filed at: 01/08/2020 1534   HEART Score  5 Filed at: 01/08/2020 0228   HEART Score  5 Filed at: 01/08/2020 0228                            MDM  Number of Diagnoses or Management Options  Diagnosis management comments: DDx including but not limited to: metabolic abnormality, cardiac arrhythmia, atrial fibrillation, ACS, MI,  thyroid disease, PE, anxiety, adverse reaction, sepsis; doubt acute surgical intraabdominal process          Amount and/or Complexity of Data Reviewed  Clinical lab tests: ordered and reviewed  Tests in the radiology section of CPT®: reviewed and ordered  Decide to obtain previous medical records or to obtain history from someone other than the patient: yes  Review and summarize past medical records: yes  Discuss the patient with other providers: yes  Independent visualization of images, tracings, or specimens: yes          Disposition  Final diagnoses:   Palpitations   Atrial fibrillation ([address] )   Raised TSH level     Time reflects when diagnosis was documented in both MDM as applicable and the Disposition within this note     Time User Action Codes Description Comment    1/8/2020  3:27 AM [place] Short Add [R00 2] Palpitations     1/8/2020  3:27 AM [place] Short Add [I48 91] Atrial fibrillation ([address] )     1/8/2020  3:27 AM [place] Short Add [R79 89] Raised TSH level       ED Disposition     ED Disposition Condition Date/Time Comment    Admit Stable Wed Jan 8, 2020  3:33 AM Case was discussed with NATASHA Fitzpatrick and the patient's admission status was agreed to be Admission Status: observation status to the service of Dr Saad Mejias   Follow-up Information    None         Patient's Medications   Discharge Prescriptions    No medications on file     No discharge procedures on file      ED Provider  Electronically Signed by           Dixon Senior MD  01/08/20 93 Rosaline Garvey MD  01/08/20 5198

## 2020-01-08 NOTE — PLAN OF CARE
Problem: CARDIOVASCULAR - ADULT  Goal: Absence of cardiac dysrhythmias or at baseline rhythm  Description  INTERVENTIONS:  - Continuous cardiac monitoring, vital signs, obtain 12 lead EKG if ordered  - Administer antiarrhythmic and heart rate control medications as ordered  - Monitor electrolytes and administer replacement therapy as ordered  Outcome: Progressing

## 2020-01-13 LAB
BACTERIA BLD CULT: NORMAL
BACTERIA BLD CULT: NORMAL

## 2020-01-15 ENCOUNTER — OFFICE VISIT (OUTPATIENT)
Dept: FAMILY MEDICINE CLINIC | Facility: CLINIC | Age: 75
End: 2020-01-15
Payer: COMMERCIAL

## 2020-01-15 VITALS
HEART RATE: 61 BPM | BODY MASS INDEX: 26.36 KG/M2 | SYSTOLIC BLOOD PRESSURE: 100 MMHG | RESPIRATION RATE: 18 BRPM | TEMPERATURE: 97 F | WEIGHT: 212 LBS | OXYGEN SATURATION: 99 % | HEIGHT: 75 IN | DIASTOLIC BLOOD PRESSURE: 70 MMHG

## 2020-01-15 DIAGNOSIS — I48.0 PAROXYSMAL ATRIAL FIBRILLATION (HCC): ICD-10-CM

## 2020-01-15 DIAGNOSIS — E03.8 SUBCLINICAL HYPOTHYROIDISM: Primary | ICD-10-CM

## 2020-01-15 PROCEDURE — 99214 OFFICE O/P EST MOD 30 MIN: CPT | Performed by: FAMILY MEDICINE

## 2020-01-15 PROCEDURE — 1160F RVW MEDS BY RX/DR IN RCRD: CPT | Performed by: FAMILY MEDICINE

## 2020-01-15 PROCEDURE — 3008F BODY MASS INDEX DOCD: CPT | Performed by: FAMILY MEDICINE

## 2020-01-15 RX ORDER — CHLORHEXIDINE GLUCONATE 0.12 MG/ML
15 RINSE ORAL
COMMUNITY
Start: 2020-01-13

## 2020-01-15 NOTE — PATIENT INSTRUCTIONS
A-fib (Atrial Fibrillation)   WHAT YOU NEED TO KNOW:   A-fib may come and go, or it may be a long-term condition  A-fib can cause blood clots, stroke, or heart failure  These conditions may become life-threatening  It is important to treat and manage a-fib to help prevent a blood clot, stroke, or heart failure  DISCHARGE INSTRUCTIONS:   Call 911 for any of the following:   · You have any of the following signs of a heart attack:      ¨ Squeezing, pressure, or pain in your chest that lasts longer than 5 minutes or returns    ¨ Discomfort or pain in your back, neck, jaw, stomach, or arm     ¨ Trouble breathing    ¨ Nausea or vomiting    ¨ Lightheadedness or a sudden cold sweat, especially with chest pain or trouble breathing    · You have any of the following signs of a stroke:      ¨ Numbness or drooping on one side of your face     ¨ Weakness in an arm or leg    ¨ Confusion or difficulty speaking    ¨ Dizziness, a severe headache, or vision loss  Return to the emergency department if:  You have any of the following signs of a blood clot:  · You feel lightheaded, are short of breath, and have chest pain  · You cough up blood  · You have swelling, redness, pain, or warmth in your arm or leg  Contact your cardiologist or healthcare provider if:   · Your target heart rate is not in the range it should be  · You have new or worsening swelling in your legs, feet, ankles, or abdomen  · You are short of breath, even at rest      · You have questions or concerns about your condition or care  Medicines: You may need any of the following:  · Heart medicines  help control your heart rate and rhythm  You may need more than one medicine to treat your symptoms  · Blood thinners    help prevent blood clots  Examples of blood thinners include heparin and warfarin  Clots can cause strokes, heart attacks, and death   The following are general safety guidelines to follow while you are taking a blood thinner:    ¨ Watch for bleeding and bruising while you take blood thinners  Watch for bleeding from your gums or nose  Watch for blood in your urine and bowel movements  Use a soft washcloth on your skin, and a soft toothbrush to brush your teeth  This can keep your skin and gums from bleeding  If you shave, use an electric shaver  Do not play contact sports  ¨ Tell your dentist and other healthcare providers that you take anticoagulants  Wear a bracelet or necklace that says you take this medicine  ¨ Do not start or stop any medicines unless your healthcare provider tells you to  Many medicines cannot be used with blood thinners  ¨ Tell your healthcare provider right away if you forget to take the medicine, or if you take too much  ¨ Warfarin  is a blood thinner that you may need to take  The following are things you should be aware of if you take warfarin  § Foods and medicines can affect the amount of warfarin in your blood  Do not make major changes to your diet while you take warfarin  Warfarin works best when you eat about the same amount of vitamin K every day  Vitamin K is found in green leafy vegetables and certain other foods  Ask for more information about what to eat when you are taking warfarin  § You will need to see your healthcare provider for follow-up visits when you are on warfarin  You will need regular blood tests  These tests are used to decide how much medicine you need  · Antiplatelets , such as aspirin, help prevent blood clots  Take your antiplatelet medicine exactly as directed  These medicines make it more likely for you to bleed or bruise  If you are told to take aspirin, do not take acetaminophen or ibuprofen instead  · Take your medicine as directed  Contact your healthcare provider if you think your medicine is not helping or if you have side effects  Tell him or her if you are allergic to any medicine   Keep a list of the medicines, vitamins, and herbs you take  Include the amounts, and when and why you take them  Bring the list or the pill bottles to follow-up visits  Carry your medicine list with you in case of an emergency  Follow up with your cardiologist as directed: You will need regular blood tests and monitoring  Write down your questions so you remember to ask them during your visits  Manage A-fib:   · Know your target heart rate  Learn how to take your pulse and monitor your heart rate  · Manage other health conditions  This includes high blood pressure, sleep apnea, thyroid disease, diabetes, and other heart conditions  Take medicine as directed and follow your treatment plan  · Limit or do not drink alcohol  Alcohol can make a-fib hard to manage  Ask your healthcare provider if it is safe for you to drink alcohol  A drink of alcohol is 12 ounces of beer, 5 ounces of wine, or 1½ ounces of liquor  · Do not smoke  Nicotine and other chemicals in cigarettes and cigars can cause heart and lung damage  Ask your healthcare provider for information if you currently smoke and need help to quit  E-cigarettes or smokeless tobacco still contain nicotine  Talk to your healthcare provider before you use these products  · Eat heart-healthy foods  Heart healthy foods will help keep your cholesterol low  These include fruits, vegetables, whole-grain breads, low-fat dairy products, beans, lean meats, and fish  Replace butter and margarine with heart-healthy oils such as olive oil and canola oil  · Maintain a healthy weight  Ask your healthcare provider how much you should weigh  Ask him to help you create a weight loss plan if you are overweight  · Exercise for 30 minutes  most days of the week  Ask your healthcare provider about the best exercise plan for you  © 2017 [employer] Information is for End User's use only and may not be sold, redistributed or otherwise used for commercial purposes   All illustrations and images included in CareNotes® are the copyrighted property of [employer]  or Jabier Betts  The above information is an  only  It is not intended as medical advice for individual conditions or treatments  Talk to your doctor, nurse or pharmacist before following any medical regimen to see if it is safe and effective for you

## 2020-01-15 NOTE — ASSESSMENT & PLAN NOTE
· Patient recent lab work consistent with subclinical hypothyroidism  · TSH 6 005, T4 WNL  · As patient is asymptomatic at this time, will continue to monitor, follow-up repeat lab work in 3 months

## 2020-01-15 NOTE — PROGRESS NOTES
Assessment/Plan:       Problem List Items Addressed This Visit        Endocrine    Subclinical hypothyroidism - Primary     · Patient recent lab work consistent with subclinical hypothyroidism  · TSH 6 005, T4 WNL  · As patient is asymptomatic at this time, will continue to monitor, follow-up repeat lab work in 3 months         Relevant Orders    TSH, 3rd generation with Free T4 reflex    T4, free       Cardiovascular and Mediastinum    Paroxysmal atrial fibrillation ([address] )     · Patient has follow-up appointment with cardiologist upcoming  · Continue current medications                 Subjective:      Patient ID: Kam Holman is a 69 y o  male  HPI    The following portions of the patient's history were reviewed and updated as appropriate: current medications, past family history, past medical history, past social history, past surgical history and problem list     50-year-old male presents for ED follow-up  Patient was seen in the ED on 01/08/2020 for palpitations  TSH was elevated at 6 005, T4 WNL  EKG revealed atrial fibrillation, 94 b p m  Upon discharge, ED increased patient's home medications for paroxysmal atrial fibrillation- flecainide 100 mg p o  b i d  and metoprolol succinate 75 mg p o  b i d  Patient follows with Cardiologist Dr Brittani Childs, has appointment with Dr Brittani Childs  1/29  [id number] score 1  Patient denies depressed mood, weight gain, fatigue, and heat or cold intolerance  Patient denies any tobacco or alcohol use  Patient currently denies any chest pain or shortness of breath  Denies any lower extremity edema or orthopnea  CT chest abdomen pelvis revealed "cluster of nodular branching opacities in the right middle lobe compatible with infectious or inflammatory bronchiolitis and interval removal of drainage catheter for perforated appendicitis/abscess  Significant improvement of inflammatory changes in the region of the previous drainage catheter    No residual drainable fluid collection"  Past Medical History:   Diagnosis Date    Anemia     Closed nondisplaced fracture of second metatarsal bone of left foot 11/11/2018    Foot drop     Left    Left foot pain 7/11/2019    Left inguinal hernia     Managed By Falguni Levy / last assessed 3/27/15     Pain in both feet 4/2/2019    Paroxysmal atrial fibrillation ([facility] [address] ) 2/27/2019    Pleural effusion, left 2/27/2019    Pneumonia 2/26/2019    Skin lesion     Subclinical hypothyroidism 1/15/2020    Weakness of left foot 11/7/2018    Well adult exam 1/5/2019     Past Surgical History:   Procedure Laterality Date    HERNIA REPAIR      IR IMAGE GUIDED ASPIRATION / DRAINAGE W TUBE  11/19/2019    IR THORACENTESIS  3/1/2019    LUMBAR LAMINECTOMY Left 11/9/2018    Procedure: Metrx L4-5 left hemilaminectomy and bilateral foraminotomy, Metrx L5-S1 left hemilaminectomy and microdiskectomy;  Surgeon: Kahlil Joyner MD;  Location: [facility];  Service: Neurosurgery       Current Outpatient Medications:     aspirin 81 mg chewable tablet, Chew 1 tablet (81 mg total) daily Stop taking 5-7 days before surgery  Resume after surgery  , Disp: 30 tablet, Rfl: 1    atorvastatin (LIPITOR) 40 mg tablet, Take 1 tablet (40 mg total) by mouth daily, Disp: 90 tablet, Rfl: 3    Calcium-Magnesium-Vitamin D (CALCIUM MAGNESIUM PO), Take by mouth, Disp: , Rfl:     chlorhexidine (PERIDEX) 0 12 % solution, RINSE WITH 1/2 OUNCE TWO TIMES DAILY FOLLOWING BRUSHING OR FLOSSING, Disp: , Rfl:     Cyanocobalamin (VITAMIN B12 PO), Take by mouth, Disp: , Rfl:     doxycycline (PERIOSTAT) 20 MG tablet, Take 20 mg by mouth 2 (two) times a day, Disp: , Rfl: 2    flecainide (TAMBOCOR) 100 mg tablet, Take 1 tablet (100 mg total) by mouth every 12 (twelve) hours, Disp: 60 tablet, Rfl: 0    metoprolol succinate (TOPROL-XL) 25 mg 24 hr tablet, Take 3 tablets (75 mg total) by mouth every 12 (twelve) hours, Disp: 180 tablet, Rfl: 0      Review of Systems      As noted in HPI    Objective:      /70 (BP Location: Left arm, Patient Position: Sitting, Cuff Size: Adult)   Pulse 61   Temp (!) 97 °F (36 1 °C) (Tympanic)   Resp 18   Ht 6' 2 5" (1 892 m)   Wt 96 2 kg (212 lb)   SpO2 99%   BMI 26 86 kg/m²          Physical Exam   Constitutional: He is oriented to person, place, and time  He appears well-developed and well-nourished  HENT:   Head: Normocephalic and atraumatic  Right Ear: External ear normal    Left Ear: External ear normal    Nose: Nose normal    Mouth/Throat: Oropharynx is clear and moist    Eyes: Pupils are equal, round, and reactive to light  Conjunctivae and EOM are normal    Neck: Normal range of motion  Neck supple  Cardiovascular: Normal rate, normal heart sounds and intact distal pulses  No murmur heard  Pulmonary/Chest: Effort normal and breath sounds normal    Abdominal: Soft  Bowel sounds are normal  There is no tenderness  Neurological: He is alert and oriented to person, place, and time  Skin: Skin is warm and dry     Psychiatric: Thought content normal

## 2020-01-28 NOTE — PRE-PROCEDURE INSTRUCTIONS
Pre-Surgery Instructions:   Medication Instructions    aspirin 81 mg chewable tablet Patient was instructed by Physician and understands   atorvastatin (LIPITOR) 40 mg tablet Instructed patient per Anesthesia Guidelines   Calcium-Magnesium-Vitamin D (CALCIUM MAGNESIUM PO) Instructed patient per Anesthesia Guidelines   chlorhexidine (PERIDEX) 0 12 % solution Instructed patient per Anesthesia Guidelines   Cyanocobalamin (VITAMIN B12 PO) Instructed patient per Anesthesia Guidelines   doxycycline (PERIOSTAT) 20 MG tablet Instructed patient per Anesthesia Guidelines   flecainide (TAMBOCOR) 100 mg tablet Instructed patient per Anesthesia Guidelines   metoprolol succinate (TOPROL-XL) 25 mg 24 hr tablet Instructed patient per Anesthesia Guidelines      Pre op,medications and showering instructions reviewed-Patient has hibiclens

## 2020-01-28 NOTE — PROGRESS NOTES
Cardiology Follow Up    Kiet Chou  1945  [id number]  [address] [facility]  [address]  [address]  [phone]    1  Paroxysmal atrial fibrillation (HCC)  POCT ECG    Ambulatory referral to Cardiac Electrophysiology   2  Moderate aortic insufficiency  POCT ECG    Echo complete with contrast if indicated   3  Coronary artery calcification seen on CAT scan  POCT ECG   4  Dyslipidemia  Lipid Panel with Direct LDL reflex       Discussion/Summary:  Mr Richard Aguiar is a pleasant 71-year-old gentleman who presents to the office today for hospital follow-up  He continues to maintain normal sinus rhythm  He is bradycardic in the office today but with this he is asymptomatic  We discussed reduction in his metoprolol dose but for now he wishes to remain on his current regimen  He is also on flecainide  He is requesting an evaluation by one of our electrophysiologists regarding an ablation  Therefore he was referred to Dr Larissa Stevens  He remains on flecainide he will need an ischemic evaluation with a stress test   We again discussed a sleep study which he has not had performed  He declines  He is maintained on metoprolol not for hypertension but due to his arrhythmia  Therefore his [id number] score is technically one  Again we discussed systemic anticoagulation which he declines  On his echocardiogram from last year he was noted to have moderate aortic insufficiency  I have requested a repeat  Otherwise he was noted to have coronary artery calcifications on a CT scan  After his last visit he was placed on statin therapy  His most recent lipids from last year reveal acceptable numbers  I have asked that he undergo reassessment in the near future      Regarding his upcoming appendectomy from a cardiac perspective there is no cardiac contraindication and he can proceed at acceptable risk without any further testing  I will see him back in the office in six months or sooner if deemed necessary  Interval History:  Mr Bjorn Jerez is a pleasant 52-year-old gentleman who presents to the office today for hospital follow-up  Since his last visit with me he was admitted on a couple occasions with atrial fibrillation  On one occasion he was noted to have a perforated appendix and was seen by one of my associates due to atrial fibrillation  He was given 300 mg dose of flecainide and started on a maintenance dose  Anticoagulation was discussed but due to patient preference and the fact that it was again provoked by an acute illness he just remained on aspirin  He was seen again in early January after he noted his pulse was more rapid than usual and irregular  He was seen again by one of my associates  At that time he was again in rate controlled atrial fibrillation  His flecainide dose was increased  Again per his preference and low [id number] score he remained on aspirin only  Since discharge he has been feeling pretty well  He walks on a daily basis for at least a mile and a half  He can do so without any chest pain or shortness of breath  He denies any signs or symptoms of congestive heart failure including increasing lower extremity edema, paroxysmal nocturnal dyspnea, orthopnea, acute weight gain or increasing abdominal girth  He denies lightheadedness, syncope or presyncope  He denies palpitations or symptoms of claudication  He is scheduled to undergo an appendectomy in the near future under general anesthesia      Problem List     Hamstring tightness of both lower extremities    Weakness of left foot    Left foot drop    Overview Signed 11/9/2018 11:26 AM by Meryle Pluck, MD     Added automatically from request for surgery [id number]         Closed nondisplaced fracture of second metatarsal bone of left foot    Well adult exam    History of lumbar laminectomy    Pneumonia    NSTEMI (non-ST elevated myocardial infarction) (HCC)    Atrial fibrillation (HCC)    Pleural effusion, left    Acquired deformity of foot    Pain in both feet    Peripheral arteriosclerosis ([facility] [address] )    Onychomycosis    Tinea pedis of both feet        Past Medical History:   Diagnosis Date    Anemia     Internal Hemorroids    Closed nondisplaced fracture of second metatarsal bone of left foot 11/11/2018    Foot drop     Left    Heart murmur     MVP    Hyperlipidemia     Irregular heart beat     Left foot pain 7/11/2019    Left inguinal hernia     Managed By No Marquez / last assessed 3/27/15     Pain in both feet 4/2/2019    Paroxysmal atrial fibrillation ([facility] [address] ) 02/27/2019    Pleural effusion, left 2/27/2019    Pneumonia 2/26/2019    Skin lesion     Subclinical hypothyroidism 1/15/2020    Weakness of left foot 11/7/2018     Social History     Socioeconomic History    Marital status: /Civil Union     Spouse name: Not on file    Number of children: Not on file    Years of education: Not on file    Highest education level: Not on file   Occupational History    Not on file   Social Needs    Financial resource strain: Not on file    Food insecurity:     Worry: Not on file     Inability: Not on file    Transportation needs:     Medical: Not on file     Non-medical: Not on file   Tobacco Use    Smoking status: Never Smoker    Smokeless tobacco: Never Used   Substance and Sexual Activity    Alcohol use: Yes     Frequency: Monthly or less     Drinks per session: 1 or 2     Binge frequency: Never     Comment: Rare socially    Drug use: No    Sexual activity: Not on file   Lifestyle    Physical activity:     Days per week: Not on file     Minutes per session: Not on file    Stress: Not on file   Relationships    Social connections:     Talks on phone: Not on file     Gets together: Not on file     Attends Sabianist service: Not on file     Active member of club or organization: Not on file     Attends meetings of clubs or organizations: Not on file     Relationship status: Not on file    Intimate partner violence:     Fear of current or ex partner: Not on file     Emotionally abused: Not on file     Physically abused: Not on file     Forced sexual activity: Not on file   Other Topics Concern    Not on file   Social History Narrative    Not on file      Family History   Problem Relation Age of Onset    Heart disease Mother     No Known Problems Father     No Known Problems Sister     No Known Problems Brother     No Known Problems Family     Anemia Neg Hx     Arrhythmia Neg Hx     Clotting disorder Neg Hx     Heart attack Neg Hx     Heart failure Neg Hx     Hyperlipidemia Neg Hx     Hypertension Neg Hx     Fainting Neg Hx     Asthma Neg Hx      Past Surgical History:   Procedure Laterality Date    BACK SURGERY      COLONOSCOPY      HEMORROIDECTOMY      HERNIA REPAIR      IR IMAGE GUIDED ASPIRATION / DRAINAGE W TUBE  11/19/2019    IR THORACENTESIS  3/1/2019    LUMBAR LAMINECTOMY Left 11/9/2018    Procedure: Metrx L4-5 left hemilaminectomy and bilateral foraminotomy, Metrx L5-S1 left hemilaminectomy and microdiskectomy;  Surgeon: Helen Sykes MD;  [facility];  Service: Neurosurgery       Current Outpatient Medications:     aspirin 81 mg chewable tablet, Chew 1 tablet (81 mg total) daily Stop taking 5-7 days before surgery  Resume after surgery  , Disp: 30 tablet, Rfl: 1    atorvastatin (LIPITOR) 40 mg tablet, Take 1 tablet (40 mg total) by mouth daily (Patient taking differently: Take 40 mg by mouth daily in the early morning ), Disp: 90 tablet, Rfl: 3    Calcium-Magnesium-Vitamin D (CALCIUM MAGNESIUM PO), Take by mouth, Disp: , Rfl:     chlorhexidine (PERIDEX) 0 12 % solution, 15 mL daily at bedtime , Disp: , Rfl:     Cyanocobalamin (VITAMIN B12 PO), Take by mouth, Disp: , Rfl:     doxycycline (PERIOSTAT) 20 MG tablet, Take 20 mg by mouth daily in the early morning Takes for Gums, Disp: , Rfl: 2    flecainide (TAMBOCOR) 100 mg tablet, Take 1 tablet (100 mg total) by mouth every 12 (twelve) hours, Disp: 60 tablet, Rfl: 0    metoprolol succinate (TOPROL-XL) 25 mg 24 hr tablet, Take 3 tablets (75 mg total) by mouth every 12 (twelve) hours, Disp: 180 tablet, Rfl: 0    saccharomyces boulardii (FLORASTOR) 250 mg capsule, Take 250 mg by mouth 2 (two) times a day, Disp: , Rfl:   No Known Allergies    Labs:     Chemistry        Component Value Date/Time    K 3 9 01/08/2020 0144     01/08/2020 0144    CO2 27 01/08/2020 0144    CO2 29 11/07/2018 0601    BUN 23 01/08/2020 0144    CREATININE 1 17 01/08/2020 0144        Component Value Date/Time    CALCIUM 9 5 01/08/2020 0144    ALKPHOS 107 01/08/2020 0144    AST 41 01/08/2020 0144    ALT 52 01/08/2020 0144            No results found for: CHOL  Lab Results   Component Value Date    HDL 36 (L) 06/26/2019    HDL 43 11/08/2018     Lab Results   Component Value Date    LDLCALC 58 06/26/2019    LDLCALC 114 (H) 11/08/2018     Lab Results   Component Value Date    TRIG 48 06/26/2019    TRIG 85 11/08/2018     No results found for: CHOLHDL    Imaging: Xr Chest Pa & Lateral    Result Date: 4/17/2019  Narrative: CHEST INDICATION:   J90: Pleural effusion, not elsewhere classified J18 1: Lobar pneumonia, unspecified organism  COMPARISON:  Two-view chest 3/2/2019 EXAM PERFORMED/VIEWS:  XR CHEST PA & LATERAL  The frontal view was performed utilizing dual energy radiographic technique  FINDINGS: Normal cardiac silhouette  Aortic calcification is present  Unfolded aorta  Markedly improved left lower lobe airspace consolidation with minimal residual opacification  Markedly improved left pleural effusion  No pneumothorax or pulmonary edema  Multilevel thoracic spondylosis  Impression: Markedly improved left lower lobe airspace consolidation and left pleural effusion   Workstation performed: [id number]         Review of Systems   Cardiovascular: Negative for chest pain, claudication, cyanosis, dyspnea on exertion, irregular heartbeat, leg swelling and near-syncope  All other systems reviewed and are negative  Vitals:    01/29/20 0818   BP: 132/60   Pulse: (!) 46   SpO2: 97%     Vitals:    01/29/20 0818   Weight: 96 6 kg (213 lb)     Height: 6' 2 5" (189 2 cm)   Body mass index is 26 98 kg/m²      Physical Exam:  General:  Alert and cooperative, appears stated age  HEENT:  PERRLA, EOMI, no scleral icterus, no conjunctival pallor  Neck:  No lymphadenopathy, no thyromegaly, no carotid bruits, no elevated JVP  Heart:  Regular rate and rhythm, normal S1/S2, no S3/S4, no murmur  Lungs:  Clear to auscultation bilaterally   Abdomen:  Soft, non-tender, positive bowel sounds, no rebound or guarding,   no organomegaly   Extremities:  No clubbing, cyanosis or edema   Vascular:  2+ pedal pulses  Skin:  No rashes or lesions on exposed skin  Neurologic:  Cranial nerves II-XII grossly intact without focal deficits

## 2020-01-29 ENCOUNTER — OFFICE VISIT (OUTPATIENT)
Dept: CARDIOLOGY CLINIC | Facility: CLINIC | Age: 75
End: 2020-01-29
Payer: COMMERCIAL

## 2020-01-29 VITALS
SYSTOLIC BLOOD PRESSURE: 132 MMHG | HEIGHT: 75 IN | DIASTOLIC BLOOD PRESSURE: 60 MMHG | OXYGEN SATURATION: 97 % | WEIGHT: 213 LBS | BODY MASS INDEX: 26.49 KG/M2 | HEART RATE: 46 BPM

## 2020-01-29 DIAGNOSIS — I35.1 MODERATE AORTIC INSUFFICIENCY: ICD-10-CM

## 2020-01-29 DIAGNOSIS — I25.10 CORONARY ARTERY CALCIFICATION SEEN ON CAT SCAN: ICD-10-CM

## 2020-01-29 DIAGNOSIS — E78.5 DYSLIPIDEMIA: ICD-10-CM

## 2020-01-29 DIAGNOSIS — I48.0 PAROXYSMAL ATRIAL FIBRILLATION (HCC): Primary | ICD-10-CM

## 2020-01-29 PROCEDURE — 1160F RVW MEDS BY RX/DR IN RCRD: CPT | Performed by: INTERNAL MEDICINE

## 2020-01-29 PROCEDURE — 1111F DSCHRG MED/CURRENT MED MERGE: CPT | Performed by: INTERNAL MEDICINE

## 2020-01-29 PROCEDURE — 93000 ELECTROCARDIOGRAM COMPLETE: CPT | Performed by: INTERNAL MEDICINE

## 2020-01-29 PROCEDURE — 99214 OFFICE O/P EST MOD 30 MIN: CPT | Performed by: INTERNAL MEDICINE

## 2020-01-29 RX ORDER — SACCHAROMYCES BOULARDII 250 MG
250 CAPSULE ORAL 2 TIMES DAILY
COMMUNITY

## 2020-02-10 DIAGNOSIS — I48.91 ATRIAL FIBRILLATION (HCC): ICD-10-CM

## 2020-02-10 RX ORDER — FLECAINIDE ACETATE 100 MG/1
100 TABLET ORAL EVERY 12 HOURS SCHEDULED
Qty: 180 TABLET | Refills: 3 | Status: SHIPPED | OUTPATIENT
Start: 2020-02-10 | End: 2020-06-26

## 2020-02-20 ENCOUNTER — HOSPITAL ENCOUNTER (OUTPATIENT)
Facility: HOSPITAL | Age: 75
Setting detail: OUTPATIENT SURGERY
Discharge: HOME | End: 2020-02-20
Attending: SURGERY | Admitting: SURGERY
Payer: COMMERCIAL

## 2020-02-20 ENCOUNTER — ANESTHESIA (OUTPATIENT)
Dept: PERIOP | Facility: HOSPITAL | Age: 75
End: 2020-02-20
Payer: COMMERCIAL

## 2020-02-20 ENCOUNTER — ANESTHESIA EVENT (OUTPATIENT)
Dept: PERIOP | Facility: HOSPITAL | Age: 75
End: 2020-02-20
Payer: COMMERCIAL

## 2020-02-20 VITALS
BODY MASS INDEX: 25.49 KG/M2 | HEIGHT: 75 IN | HEART RATE: 44 BPM | SYSTOLIC BLOOD PRESSURE: 153 MMHG | RESPIRATION RATE: 16 BRPM | DIASTOLIC BLOOD PRESSURE: 66 MMHG | OXYGEN SATURATION: 95 % | WEIGHT: 205 LBS | TEMPERATURE: 97.8 F

## 2020-02-20 DIAGNOSIS — K37 APPENDICITIS: ICD-10-CM

## 2020-02-20 PROCEDURE — 88341 IMHCHEM/IMCYTCHM EA ADD ANTB: CPT | Performed by: PATHOLOGY

## 2020-02-20 PROCEDURE — 88304 TISSUE EXAM BY PATHOLOGIST: CPT | Performed by: PATHOLOGY

## 2020-02-20 PROCEDURE — 44970 LAPAROSCOPY APPENDECTOMY: CPT | Performed by: SURGERY

## 2020-02-20 PROCEDURE — 88342 IMHCHEM/IMCYTCHM 1ST ANTB: CPT | Performed by: PATHOLOGY

## 2020-02-20 PROCEDURE — NC001 PR NO CHARGE: Performed by: PHYSICIAN ASSISTANT

## 2020-02-20 RX ORDER — SODIUM CHLORIDE, SODIUM LACTATE, POTASSIUM CHLORIDE, CALCIUM CHLORIDE 600; 310; 30; 20 MG/100ML; MG/100ML; MG/100ML; MG/100ML
125 INJECTION, SOLUTION INTRAVENOUS CONTINUOUS
Status: DISCONTINUED | OUTPATIENT
Start: 2020-02-20 | End: 2020-02-20 | Stop reason: HOSPADM

## 2020-02-20 RX ORDER — FENTANYL CITRATE/PF 50 MCG/ML
25 SYRINGE (ML) INJECTION
Status: DISCONTINUED | OUTPATIENT
Start: 2020-02-20 | End: 2020-02-20 | Stop reason: HOSPADM

## 2020-02-20 RX ORDER — LIDOCAINE HYDROCHLORIDE 10 MG/ML
INJECTION, SOLUTION EPIDURAL; INFILTRATION; INTRACAUDAL; PERINEURAL AS NEEDED
Status: DISCONTINUED | OUTPATIENT
Start: 2020-02-20 | End: 2020-02-20 | Stop reason: SURG

## 2020-02-20 RX ORDER — MORPHINE SULFATE 10 MG/ML
4 INJECTION, SOLUTION INTRAMUSCULAR; INTRAVENOUS
Status: DISCONTINUED | OUTPATIENT
Start: 2020-02-20 | End: 2020-02-20 | Stop reason: HOSPADM

## 2020-02-20 RX ORDER — ROCURONIUM BROMIDE 10 MG/ML
INJECTION, SOLUTION INTRAVENOUS AS NEEDED
Status: DISCONTINUED | OUTPATIENT
Start: 2020-02-20 | End: 2020-02-20 | Stop reason: SURG

## 2020-02-20 RX ORDER — NEOSTIGMINE METHYLSULFATE 1 MG/ML
INJECTION INTRAVENOUS AS NEEDED
Status: DISCONTINUED | OUTPATIENT
Start: 2020-02-20 | End: 2020-02-20 | Stop reason: SURG

## 2020-02-20 RX ORDER — MAGNESIUM HYDROXIDE 1200 MG/15ML
LIQUID ORAL AS NEEDED
Status: DISCONTINUED | OUTPATIENT
Start: 2020-02-20 | End: 2020-02-20 | Stop reason: HOSPADM

## 2020-02-20 RX ORDER — OXYCODONE HYDROCHLORIDE 5 MG/1
5 TABLET ORAL EVERY 4 HOURS PRN
Qty: 10 TABLET | Refills: 0 | Status: SHIPPED | OUTPATIENT
Start: 2020-02-20 | End: 2020-03-01

## 2020-02-20 RX ORDER — FENTANYL CITRATE 50 UG/ML
INJECTION, SOLUTION INTRAMUSCULAR; INTRAVENOUS AS NEEDED
Status: DISCONTINUED | OUTPATIENT
Start: 2020-02-20 | End: 2020-02-20 | Stop reason: SURG

## 2020-02-20 RX ORDER — CEFAZOLIN SODIUM 2 G/50ML
SOLUTION INTRAVENOUS AS NEEDED
Status: DISCONTINUED | OUTPATIENT
Start: 2020-02-20 | End: 2020-02-20 | Stop reason: SURG

## 2020-02-20 RX ORDER — GLYCOPYRROLATE 0.2 MG/ML
INJECTION INTRAMUSCULAR; INTRAVENOUS AS NEEDED
Status: DISCONTINUED | OUTPATIENT
Start: 2020-02-20 | End: 2020-02-20 | Stop reason: SURG

## 2020-02-20 RX ORDER — OXYCODONE HYDROCHLORIDE AND ACETAMINOPHEN 5; 325 MG/1; MG/1
1 TABLET ORAL EVERY 4 HOURS PRN
Status: DISCONTINUED | OUTPATIENT
Start: 2020-02-20 | End: 2020-02-20 | Stop reason: HOSPADM

## 2020-02-20 RX ORDER — BUPIVACAINE HYDROCHLORIDE 2.5 MG/ML
INJECTION, SOLUTION EPIDURAL; INFILTRATION; INTRACAUDAL AS NEEDED
Status: DISCONTINUED | OUTPATIENT
Start: 2020-02-20 | End: 2020-02-20 | Stop reason: HOSPADM

## 2020-02-20 RX ORDER — SODIUM CHLORIDE 9 MG/ML
INJECTION, SOLUTION INTRAVENOUS AS NEEDED
Status: DISCONTINUED | OUTPATIENT
Start: 2020-02-20 | End: 2020-02-20 | Stop reason: HOSPADM

## 2020-02-20 RX ORDER — PROPOFOL 10 MG/ML
INJECTION, EMULSION INTRAVENOUS AS NEEDED
Status: DISCONTINUED | OUTPATIENT
Start: 2020-02-20 | End: 2020-02-20 | Stop reason: SURG

## 2020-02-20 RX ORDER — EPHEDRINE SULFATE 50 MG/ML
INJECTION INTRAVENOUS AS NEEDED
Status: DISCONTINUED | OUTPATIENT
Start: 2020-02-20 | End: 2020-02-20 | Stop reason: SURG

## 2020-02-20 RX ORDER — SUCCINYLCHOLINE/SOD CL,ISO/PF 100 MG/5ML
SYRINGE (ML) INTRAVENOUS AS NEEDED
Status: DISCONTINUED | OUTPATIENT
Start: 2020-02-20 | End: 2020-02-20 | Stop reason: SURG

## 2020-02-20 RX ORDER — ONDANSETRON 2 MG/ML
INJECTION INTRAMUSCULAR; INTRAVENOUS AS NEEDED
Status: DISCONTINUED | OUTPATIENT
Start: 2020-02-20 | End: 2020-02-20 | Stop reason: SURG

## 2020-02-20 RX ORDER — ONDANSETRON 2 MG/ML
4 INJECTION INTRAMUSCULAR; INTRAVENOUS EVERY 4 HOURS PRN
Status: DISCONTINUED | OUTPATIENT
Start: 2020-02-20 | End: 2020-02-20 | Stop reason: HOSPADM

## 2020-02-20 RX ORDER — DEXAMETHASONE SODIUM PHOSPHATE 4 MG/ML
INJECTION, SOLUTION INTRA-ARTICULAR; INTRALESIONAL; INTRAMUSCULAR; INTRAVENOUS; SOFT TISSUE AS NEEDED
Status: DISCONTINUED | OUTPATIENT
Start: 2020-02-20 | End: 2020-02-20 | Stop reason: SURG

## 2020-02-20 RX ADMIN — DEXAMETHASONE SODIUM PHOSPHATE 4 MG: 4 INJECTION, SOLUTION INTRAMUSCULAR; INTRAVENOUS at 10:55

## 2020-02-20 RX ADMIN — ROCURONIUM BROMIDE 20 MG: 10 SOLUTION INTRAVENOUS at 10:48

## 2020-02-20 RX ADMIN — NEOSTIGMINE METHYLSULFATE 3 MG: 1 INJECTION INTRAVENOUS at 11:20

## 2020-02-20 RX ADMIN — FENTANYL CITRATE 50 MCG: 50 INJECTION INTRAMUSCULAR; INTRAVENOUS at 10:44

## 2020-02-20 RX ADMIN — FENTANYL CITRATE 50 MCG: 50 INJECTION INTRAMUSCULAR; INTRAVENOUS at 10:39

## 2020-02-20 RX ADMIN — ONDANSETRON 4 MG: 2 INJECTION INTRAMUSCULAR; INTRAVENOUS at 11:18

## 2020-02-20 RX ADMIN — FENTANYL CITRATE 25 MCG: 50 INJECTION, SOLUTION INTRAMUSCULAR; INTRAVENOUS at 11:45

## 2020-02-20 RX ADMIN — GLYCOPYRROLATE 0.4 MG: 0.2 INJECTION, SOLUTION INTRAMUSCULAR; INTRAVENOUS at 11:20

## 2020-02-20 RX ADMIN — SODIUM CHLORIDE, SODIUM LACTATE, POTASSIUM CHLORIDE, AND CALCIUM CHLORIDE: .6; .31; .03; .02 INJECTION, SOLUTION INTRAVENOUS at 11:25

## 2020-02-20 RX ADMIN — Medication 100 MG: at 10:44

## 2020-02-20 RX ADMIN — EPHEDRINE SULFATE 10 MG: 50 INJECTION, SOLUTION INTRAVENOUS at 10:56

## 2020-02-20 RX ADMIN — LIDOCAINE HYDROCHLORIDE 50 MG: 10 INJECTION, SOLUTION EPIDURAL; INFILTRATION; INTRACAUDAL; PERINEURAL at 10:44

## 2020-02-20 RX ADMIN — CEFAZOLIN SODIUM 2000 MG: 2 SOLUTION INTRAVENOUS at 10:38

## 2020-02-20 RX ADMIN — SODIUM CHLORIDE, SODIUM LACTATE, POTASSIUM CHLORIDE, AND CALCIUM CHLORIDE: .6; .31; .03; .02 INJECTION, SOLUTION INTRAVENOUS at 10:36

## 2020-02-20 RX ADMIN — GLYCOPYRROLATE 0.2 MG: 0.2 INJECTION, SOLUTION INTRAMUSCULAR; INTRAVENOUS at 10:53

## 2020-02-20 RX ADMIN — PROPOFOL 200 MG: 10 INJECTION, EMULSION INTRAVENOUS at 10:44

## 2020-02-20 NOTE — ANESTHESIA PREPROCEDURE EVALUATION
Review of Systems/Medical History  Patient summary reviewed  Chart reviewed      Cardiovascular  EKG reviewed, Exercise tolerance (METS): >4,  Hyperlipidemia, CAD , Dysrhythmias , atrial fibrillation,   Comment: Atrial fibrillation with premature ventricular or aberrantly conducted complexes  Left anterior fascicular block  Confirmed by Laure Escamilla ([id number]) on 1/8/2020 2:06:16 PM,  Pulmonary  Pneumonia,        GI/Hepatic    No GERD ,        Negative  ROS        Endo/Other  History of thyroid disease , hypothyroidism,      GYN       Hematology  Anemia ,     Musculoskeletal  Negative musculoskeletal ROS        Neurology  Negative neurology ROS      Psychology   Negative psychology ROS              Physical Exam    Airway    Mallampati score: II  TM Distance: >3 FB  Neck ROM: full     Dental   No notable dental hx     Cardiovascular  Cardiovascular exam normal    Pulmonary  Pulmonary exam normal     Other Findings        Anesthesia Plan  ASA Score- 3     Anesthesia Type- general with ASA Monitors  Additional Monitors:   Airway Plan: ETT  Plan Factors-  Patient did not smoke on day of surgery  Induction- intravenous  Postoperative Plan-     Informed Consent- Anesthetic plan and risks discussed with patient  I personally reviewed this patient with the CRNA  Discussed and agreed on the Anesthesia Plan with the CRNA  Josiane Valente

## 2020-02-20 NOTE — H&P
History and Physical -General Surgical Care   Waleska Brown 69 y o  male MRN: [id number]  Unit/Bed#: OR POOL Encounter: [id number]       Principal Problem: Appendicitis    HPI: Waleska Brown is a 69y o  year old male who presents with Appendicitis  Initial consultation with the patient was in mid November when he was admitted with atrial fibrillation  During that workup he had been complaining of some vague abdominal pain for approximately 10 days prior to admission  CT scan and pelvis had revealed a perforated appendicitis with contained abscess  Overall clinically he looks quite well  Given the time course of his symptoms, a percutaneous drain was placed  He was able to be discharged home with this  This was eventually removed  He had been seen back in the office and discussing interval appendectomy  He has been doing well since his drain was removed      Review of Systems    Historical Information   Past Medical History:   Diagnosis Date    Anemia     Internal Hemorroids    Closed nondisplaced fracture of second metatarsal bone of left foot 11/11/2018    Foot drop     Left    Heart murmur     MVP    Hyperlipidemia     Irregular heart beat     Left foot pain 7/11/2019    Left inguinal hernia     Managed By Fredrick Solange / last assessed 3/27/15     Pain in both feet 4/2/2019    Paroxysmal atrial fibrillation ([facility] [address] ) 02/27/2019    Pleural effusion, left 2/27/2019    Pneumonia 2/26/2019    Skin lesion     Subclinical hypothyroidism 1/15/2020    Weakness of left foot 11/7/2018     Past Surgical History:   Procedure Laterality Date    BACK SURGERY      COLONOSCOPY      HEMORROIDECTOMY      HERNIA REPAIR      IR IMAGE GUIDED ASPIRATION / DRAINAGE W TUBE  11/19/2019    IR THORACENTESIS  3/1/2019    LUMBAR LAMINECTOMY Left 11/9/2018    Procedure: Metrx L4-5 left hemilaminectomy and bilateral foraminotomy, Metrx L5-S1 left hemilaminectomy and microdiskectomy;  Surgeon: Christian Ugarte MD; [facility];  Service: Neurosurgery     Social History   Social History     Substance and Sexual Activity   Alcohol Use Yes    Frequency: Monthly or less    Drinks per session: 1 or 2    Binge frequency: Never    Comment: Rare socially     Social History     Substance and Sexual Activity   Drug Use No     Social History     Tobacco Use   Smoking Status Never Smoker   Smokeless Tobacco Never Used     Family History   Problem Relation Age of Onset    Heart disease Mother     No Known Problems Father     No Known Problems Sister     No Known Problems Brother     No Known Problems Family     Anemia Neg Hx     Arrhythmia Neg Hx     Clotting disorder Neg Hx     Heart attack Neg Hx     Heart failure Neg Hx     Hyperlipidemia Neg Hx     Hypertension Neg Hx     Fainting Neg Hx     Asthma Neg Hx        Meds/Allergies     Medications Prior to Admission   Medication    aspirin 81 mg chewable tablet    atorvastatin (LIPITOR) 40 mg tablet    Calcium-Magnesium-Vitamin D (CALCIUM MAGNESIUM PO)    chlorhexidine (PERIDEX) 0 12 % solution    Cyanocobalamin (VITAMIN B12 PO)    doxycycline (PERIOSTAT) 20 MG tablet    flecainide (TAMBOCOR) 100 mg tablet    metoprolol succinate (TOPROL-XL) 25 mg 24 hr tablet    saccharomyces boulardii (FLORASTOR) 250 mg capsule     Current Facility-Administered Medications   Medication Dose Route Frequency    lactated ringers infusion  125 mL/hr Intravenous Continuous       No Known Allergies        Blood pressure 143/66, pulse (!) 46, temperature (!) 97 °F (36 1 °C), temperature source Temporal, resp  rate 18, height 6' 2 5" (1 892 m), weight 93 kg (205 lb), SpO2 98 %      No intake or output data in the 24 hours ending 02/20/20 0832    PHYSICAL EXAM  General appearance: alert and oriented, in no acute distress  Lungs: clear to auscultation bilaterally  Heart: regular rate and rhythm, S1, S2 normal, no murmur, click, rub or gallop  Abdomen: soft, non-tender; bowel sounds normal; no masses,  no organomegaly  Rectal: deferred  Skin: Skin color, texture, turgor normal  No rashes or lesions   He wears a brace on his left leg for history of left foot drop    Lab Results:   No visits with results within 1 Day(s) from this visit  Latest known visit with results is:   Admission on 01/08/2020, Discharged on 01/08/2020   Component Date Value    Protime 01/08/2020 12 6     INR 01/08/2020 1 00     PTT 01/08/2020 37     WBC 01/08/2020 6 20     RBC 01/08/2020 4 63     Hemoglobin 01/08/2020 14 3     Hematocrit 01/08/2020 44 2     MCV 01/08/2020 96     MCH 01/08/2020 30 9     MCHC 01/08/2020 32 4     RDW 01/08/2020 14 3     MPV 01/08/2020 11 7     Platelets 87/08/6346 218     nRBC 01/08/2020 0     Neutrophils Relative 01/08/2020 50     Immat GRANS % 01/08/2020 0     Lymphocytes Relative 01/08/2020 34     Monocytes Relative 01/08/2020 12     Eosinophils Relative 01/08/2020 3     Basophils Relative 01/08/2020 1     Neutrophils Absolute 01/08/2020 3 12     Immature Grans Absolute 01/08/2020 0 01     Lymphocytes Absolute 01/08/2020 2 10     Monocytes Absolute 01/08/2020 0 73     Eosinophils Absolute 01/08/2020 0 21     Basophils Absolute 01/08/2020 0 03     Blood Culture 01/08/2020 No Growth After 5 Days   Blood Culture 01/08/2020 No Growth After 5 Days       Sodium 01/08/2020 140     Potassium 01/08/2020 3 9     Chloride 01/08/2020 104     CO2 01/08/2020 27     ANION GAP 01/08/2020 9     BUN 01/08/2020 23     Creatinine 01/08/2020 1 17     Glucose 01/08/2020 108     Calcium 01/08/2020 9 5     AST 01/08/2020 41     ALT 01/08/2020 52     Alkaline Phosphatase 01/08/2020 107     Total Protein 01/08/2020 7 4     Albumin 01/08/2020 3 7     Total Bilirubin 01/08/2020 0 34     eGFR 01/08/2020 61     TSH 3RD GENERATON 01/08/2020 6 005*    LACTIC ACID 01/08/2020 0 8     D-Dimer, Quant 01/08/2020 <0 27     Troponin I 01/08/2020 <0 02     Free T4 01/08/2020 0 79     Color, UA 01/08/2020 Yellow     Clarity, UA 01/08/2020 Clear     pH, UA 01/08/2020 6 0     Leukocytes, UA 01/08/2020 Negative     Nitrite, UA 01/08/2020 Negative     Protein, UA 01/08/2020 Negative     Glucose, UA 01/08/2020 Negative     Ketones, UA 01/08/2020 Negative     Urobilinogen, UA 01/08/2020 0 2     Bilirubin, UA 01/08/2020 Negative     Blood, UA 01/08/2020 Negative     Specific Gravity, UA 01/08/2020 1 010     Free T4 01/08/2020 0 77     Magnesium 01/08/2020 1 8     Ventricular Rate 01/08/2020 94     Atrial Rate 01/08/2020 99     QRSD Interval 01/08/2020 94     QT Interval 01/08/2020 376     QTC Interval 01/08/2020 471     QRS Axis 01/08/2020 -79     T Wave Camargo 01/08/2020 28      Imaging Studies: I have personally reviewed pertinent films in PACS    ASSESSMENT:  History of appendiceal abscess status post percutaneous drainage in November    PLAN:  Risks and benefits of an interval laparoscopic appendectomy discussed including the potential for bowel injury or need for open surgery and agrees to proceed  Counseling / Coordination of Care  Total time spent today  20 minutes  Greater than 50% of total time was spent with the patient and / or family counseling and / or coordination of care

## 2020-02-20 NOTE — DISCHARGE INSTRUCTIONS
Laparoscopic Appendectomy      WHAT YOU SHOULD KNOW:    Laparoscopic appendectomy is surgery to remove your appendix  During this surgery, small incisions are made in your abdomen  A small scope and special tools are inserted through these incisions  Your appendix is removed from you bowels and taken out of your abdomen  The incisions are closed with sutures and a small amount of glue is applied over top incisions to help reinforce the incisions to optimize healing            AFTER YOU LEAVE: Following discharge from the hospital, you may have some questions about your procedure, your activities or your general condition  These instructions may answer some of your questions and help you adjust during the first few days following your operation  You can expect to be sore and tender mostly around the incisions  This pain should last approximally 5 days and gradually improve daily  Incisions: Your doctor may have chosen to use a type of adhesive glue, to close your incision  The glue is used to cover the incision, assist in closure, and prevent contamination so to optimize healing  This adhesive glue will darken and may appear as a purple film over the incision  Do not pick at the glue, it will peel away on its own within one to two weeks  You may apply ice to the incisions to help with pain  Avoid heat as this my make the glue tacky   It is normal to have some bruising, swelling or mild discoloration around the incision  If increasing redness or pain develops, call our office immediately  You may wash the incision gently with soap and water then pat dry  Do not apply any creams or ointments  Dressings: You do not usually need to keep incisions covered with a dressing  A dressing is only required if you had a drain following your surgery and the drain was removed prior to discharge  If a dressing is required the doctor will discuss the dressing with prior to leaving   You may remove the dressing for showering, but reapply when dry  Bathing: You may shower daily with soap and water the day after the procedure  It is OK to GENTLY wash the incision with soap and water then pat dry  Do NOT soak incision in a tub, pool, or hot tub for 2 weeks  Diet: Resume your normal diet unless specified otherwise  We recommend you slowly advance your diet  Try to start with softer bland foods and gradually advance as tolerated  Be sure to consume plenty of water  Avoid alcohol  Activity/Restrictions: The evening following the procedure you should rest as much as possible, sitting, lying or reclining  you should be sure someone remains with you until the next morning  Gradually increase your activity daily  Walking 3-4 daily is good and  stairs are ok  Listen to your body  If you start to get tired or sore then rest    No strenuous activity or exercise for 3-4 weeks  No driving for 5 days or while taking narcotics for pain  Return or work: You may return to work or other activities as soon as your pain is controlled and you feel comfortable  For many people, this is 5 to 7 days after surgery  If your job requires heavy lifting you will need to be on light duty for 2-3 weeks  Follow up appointment: following discharge from the hospital call the office in 1-2 days to set up a post operative appointment to be seen in 2-3 weeks  The phone number and address should be provided in your discharge paper work  Medication: Please take all medications as prescribed  Call your healthcare provider if you think your medicine is not helping or if you have side effects  If you were given a prescription for Percocet, Norco, or Vicodin for pain be sure to eat prior to taking as these medications as they may cause nausea and vomiting on an empty stomach  DO NOT take Tylenol with these medication for a fever or for further pain control as these medications already contain Tylenol in them  Contact your healthcare provider if:   · You have a fever over 101°F (38°C) or chills  · You have pain or nausea that is not relieved by medicine  · You have redness and swelling around your incisions, or blood or pus is leaking             from your incisions  · You are constipated or have diarrhea  · Your skin or eyes are yellow, or your bowel movements are pale  · You have questions or concerns about your surgery, condition, or care  Seek care immediately or call 911 if:   · You cannot stop vomiting  · Your bowel movements are black or bloody  · You have pain in your abdomen and it is swollen or hard  · Your arm or leg feels warm, tender, and painful  It may look swollen and red  · You feel lightheaded, short of breath, and have chest pain  · You cough up blood

## 2020-02-20 NOTE — ANESTHESIA POSTPROCEDURE EVALUATION
Post-Op Assessment Note    CV Status:  Stable    Pain management: adequate     Mental Status:  Alert   Hydration Status:  Euvolemic   PONV Controlled:  Controlled   Airway Patency:  Patent   Post Op Vitals Reviewed: Yes      Staff: CRNA           BP      Temp     Pulse    Resp      SpO2

## 2020-02-27 ENCOUNTER — APPOINTMENT (OUTPATIENT)
Dept: LAB | Facility: MEDICAL CENTER | Age: 75
End: 2020-02-27
Payer: COMMERCIAL

## 2020-02-27 DIAGNOSIS — E78.5 DYSLIPIDEMIA: ICD-10-CM

## 2020-02-27 LAB
CHOLEST SERPL-MCNC: 122 MG/DL (ref 50–200)
HDLC SERPL-MCNC: 44 MG/DL
LDLC SERPL CALC-MCNC: 63 MG/DL (ref 0–100)
TRIGL SERPL-MCNC: 76 MG/DL

## 2020-02-27 PROCEDURE — 36415 COLL VENOUS BLD VENIPUNCTURE: CPT

## 2020-02-27 PROCEDURE — 80061 LIPID PANEL: CPT

## 2020-03-11 DIAGNOSIS — I48.91 ATRIAL FIBRILLATION (HCC): ICD-10-CM

## 2020-03-11 RX ORDER — METOPROLOL SUCCINATE 25 MG/1
75 TABLET, EXTENDED RELEASE ORAL EVERY 12 HOURS
Qty: 540 TABLET | Refills: 2 | Status: SHIPPED | OUTPATIENT
Start: 2020-03-11 | End: 2022-06-02

## 2020-03-17 ENCOUNTER — HOSPITAL ENCOUNTER (OUTPATIENT)
Dept: NON INVASIVE DIAGNOSTICS | Facility: CLINIC | Age: 75
Discharge: HOME | End: 2020-03-17
Payer: COMMERCIAL

## 2020-03-17 DIAGNOSIS — I35.1 MODERATE AORTIC INSUFFICIENCY: ICD-10-CM

## 2020-03-17 PROCEDURE — 93306 TTE W/DOPPLER COMPLETE: CPT

## 2020-03-17 PROCEDURE — 93306 TTE W/DOPPLER COMPLETE: CPT | Performed by: INTERNAL MEDICINE

## 2020-03-26 ENCOUNTER — TELEMEDICINE (OUTPATIENT)
Dept: CARDIOLOGY CLINIC | Facility: CLINIC | Age: 75
End: 2020-03-26
Payer: COMMERCIAL

## 2020-03-26 VITALS
HEIGHT: 75 IN | SYSTOLIC BLOOD PRESSURE: 137 MMHG | HEART RATE: 49 BPM | WEIGHT: 209 LBS | DIASTOLIC BLOOD PRESSURE: 62 MMHG | BODY MASS INDEX: 25.99 KG/M2

## 2020-03-26 DIAGNOSIS — I48.0 PAROXYSMAL ATRIAL FIBRILLATION (HCC): ICD-10-CM

## 2020-03-26 PROCEDURE — G2012 BRIEF CHECK IN BY MD/QHP: HCPCS | Performed by: INTERNAL MEDICINE

## 2020-03-26 NOTE — PROGRESS NOTES
Virtual Regular Visit    Problem List Items Addressed This Visit        Cardiovascular and Mediastinum    Paroxysmal atrial fibrillation (HCC)      1) Paroxysmal afib and Typical aflutter  He has had multiople episodes over past year and hafl  Triggered by Pneumonia and by recent appendectomy but also has had spontaneously  He is symptmatic with palpiations and racing heart  HR is 150s during flutter  [id number] (age) and on aspirin  He is taking flecianide 75mg bid currently and has had breakthrough on flecainide w afib episodes  ALso on metoprolol  2  Echo shows normal EF and biatrial enlargement  3  HE is a  and very active and fit  Plan  1) Recommend afib ablation and aflutter ablation for typical flutter  INFORMED CONSENT: Risks, benefits, and alternatives to atrial fibrillation ablation with possibly a combination of cryoballoon and radiofrequency ablation, and associated procedures such as transesophageal echocardiogram and atrial flutter ablation discussed  Alternative treatment with medical management has been tried and/or discussed  Usual pre and post operative expectations were discussed  Estimated complication rate is <6%  Atrial fibrillation ablation was explained to be a treatment that reduces burden of atrial fibrillation but is not a cure and medications and repeat ablations are often required  Although most patients derive improvement in symptoms and burden in atrial fibrillation, there are some patients that have not improved following this procedure  Blood thinners will not be discontinued immediately after ablation and may be required long term regardless of result  The patient understood risks include but not limited to death, esophageal injury, phrenic nerve injury (diaphragm), stroke, bleeding and vascular complication, bleeding around the heart and open heart surgery  2  We are NOT doing Elective cases due to COVID 19   He will follow up in office in 1 month and we will hopefully set up ablation then  Reason for visit is Paroxysmal afib  Encounter provider Marylouise Nyhan, MD    Provider located at [address] [place] [address] [address] [facility]      Recent Visits  No visits were found meeting these conditions  Showing recent visits within past 7 days and meeting all other requirements     Today's Visits  Date Type Provider Dept   03/26/20 Telemedicine Marylouise Nyhan, MD [facility] [facility] today's visits and meeting all other requirements     Future Appointments  No visits were found meeting these conditions  Showing future appointments within next 150 days and meeting all other requirements        After connecting through Home[facility]Account, the patient was identified by name and date of birth  Waleska Brown was informed that this is a telemedicine visit and that the visit is being conducted through telephone which may not be secure and therefore, might not be HIPAA-compliant  My office door was closed  No one else was in the room  He acknowledged consent and understanding of privacy and security of the video platform  The patient has agreed to participate and understands they can discontinue the visit at any time  Subjective  Waleska Brown is a 69 y o  male:  1) Paroxysmal afib and Typical aflutter  He has had multiople episodes over past year and hafl  Triggered by Pneumonia and by recent appendectomy but also has had spontaneously  He is symptmatic with palpiations and racing heart  HR is 150s during flutter  [id number] (age) and on aspirin  He is taking flecianide 75mg bid currently and has had breakthrough on flecainide w afib episodes  ALso on metoprolol           Past Medical History:   Diagnosis Date    Anemia     Internal Hemorroids    Closed nondisplaced fracture of second metatarsal bone of left foot 11/11/2018    Foot drop     Left    Heart murmur     MVP    Hyperlipidemia  Irregular heart beat     Left foot pain 7/11/2019    Left inguinal hernia     Managed By Julianna Lincoln / last assessed 3/27/15     Pain in both feet 4/2/2019    Paroxysmal atrial fibrillation ([address] ) 02/27/2019    Pleural effusion, left 2/27/2019    Pneumonia 2/26/2019    Skin lesion     Subclinical hypothyroidism 1/15/2020    Weakness of left foot 11/7/2018       Past Surgical History:   Procedure Laterality Date    BACK SURGERY      COLONOSCOPY      HEMORROIDECTOMY      HERNIA REPAIR      IR IMAGE GUIDED ASPIRATION / DRAINAGE W TUBE  11/19/2019    IR THORACENTESIS  3/1/2019    LUMBAR LAMINECTOMY Left 11/9/2018    Procedure: Metrx L4-5 left hemilaminectomy and bilateral foraminotomy, Metrx L5-S1 left hemilaminectomy and microdiskectomy;  Surgeon: Omar Patrick MD;  [facility];  Service: Neurosurgery    [place] LAP,APPENDECTOMY N/A 2/20/2020    Procedure: LAPAROSCOPIC APPENDECTOMY;  Surgeon: Gian Jara DO;  [facility];  Service: General       Current Outpatient Medications   Medication Sig Dispense Refill    aspirin 81 mg chewable tablet Chew 1 tablet (81 mg total) daily Stop taking 5-7 days before surgery  Resume after surgery   30 tablet 1    atorvastatin (LIPITOR) 40 mg tablet Take 1 tablet (40 mg total) by mouth daily (Patient taking differently: Take 40 mg by mouth daily in the early morning ) 90 tablet 3    Calcium-Magnesium-Vitamin D (CALCIUM MAGNESIUM PO) Take by mouth      chlorhexidine (PERIDEX) 0 12 % solution 15 mL daily at bedtime       Cyanocobalamin (VITAMIN B12 PO) Take by mouth      doxycycline (PERIOSTAT) 20 MG tablet Take 20 mg by mouth daily in the early morning Takes for Gums  2    flecainide (TAMBOCOR) 100 mg tablet Take 1 tablet (100 mg total) by mouth every 12 (twelve) hours (Patient taking differently: Take 75 mg by mouth every 12 (twelve) hours ) 180 tablet 3    metoprolol succinate (TOPROL-XL) 25 mg 24 hr tablet Take 3 tablets (75 mg total) by mouth every 12 (twelve) hours 540 tablet 2    saccharomyces boulardii (FLORASTOR) 250 mg capsule Take 250 mg by mouth 2 (two) times a day       No current facility-administered medications for this visit  No Known Allergies  Complete 12 point ROS reviewed and otherwise non pertinent or negative except as per HPI  Please see paper chart for outpatient clinic patients where the patient completed the 12 point ROS survey  Vitals:    03/26/20 0829   BP: 137/62   Pulse: (!) 49         I spent 50 minutes with the patient during this visit

## 2020-04-10 DIAGNOSIS — E78.5 DYSLIPIDEMIA: ICD-10-CM

## 2020-04-10 RX ORDER — ATORVASTATIN CALCIUM 40 MG/1
40 TABLET, FILM COATED ORAL DAILY
Qty: 90 TABLET | Refills: 3 | Status: SHIPPED | OUTPATIENT
Start: 2020-04-10 | End: 2021-01-15 | Stop reason: SDUPTHER

## 2020-04-20 ENCOUNTER — TELEPHONE (OUTPATIENT)
Dept: CARDIOLOGY CLINIC | Facility: CLINIC | Age: 75
End: 2020-04-20

## 2020-04-20 DIAGNOSIS — I48.0 PAROXYSMAL ATRIAL FIBRILLATION (HCC): Primary | ICD-10-CM

## 2020-05-07 ENCOUNTER — TRANSCRIBE ORDERS (OUTPATIENT)
Dept: LAB | Facility: CLINIC | Age: 75
End: 2020-05-07

## 2020-05-07 ENCOUNTER — TELEPHONE (OUTPATIENT)
Dept: OTHER | Facility: HOSPITAL | Age: 75
End: 2020-05-07

## 2020-05-07 ENCOUNTER — APPOINTMENT (OUTPATIENT)
Dept: LAB | Facility: CLINIC | Age: 75
End: 2020-05-07
Payer: COMMERCIAL

## 2020-05-07 DIAGNOSIS — E03.8 SUBCLINICAL HYPOTHYROIDISM: ICD-10-CM

## 2020-05-07 DIAGNOSIS — I48.0 PAROXYSMAL ATRIAL FIBRILLATION (HCC): ICD-10-CM

## 2020-05-07 LAB
ALBUMIN SERPL BCP-MCNC: 3.8 G/DL (ref 3.5–5)
ALP SERPL-CCNC: 79 U/L (ref 46–116)
ALT SERPL W P-5'-P-CCNC: 51 U/L (ref 12–78)
ANION GAP SERPL CALCULATED.3IONS-SCNC: 7 MMOL/L (ref 4–13)
AST SERPL W P-5'-P-CCNC: 38 U/L (ref 5–45)
BASOPHILS # BLD AUTO: 0.03 THOUSANDS/ΜL (ref 0–0.1)
BASOPHILS NFR BLD AUTO: 1 % (ref 0–1)
BILIRUB SERPL-MCNC: 0.93 MG/DL (ref 0.2–1)
BUN SERPL-MCNC: 23 MG/DL (ref 5–25)
CALCIUM SERPL-MCNC: 8.9 MG/DL (ref 8.3–10.1)
CHLORIDE SERPL-SCNC: 102 MMOL/L (ref 100–108)
CO2 SERPL-SCNC: 28 MMOL/L (ref 21–32)
CREAT SERPL-MCNC: 1.25 MG/DL (ref 0.6–1.3)
EOSINOPHIL # BLD AUTO: 0.16 THOUSAND/ΜL (ref 0–0.61)
EOSINOPHIL NFR BLD AUTO: 3 % (ref 0–6)
ERYTHROCYTE [DISTWIDTH] IN BLOOD BY AUTOMATED COUNT: 13.6 % (ref 11.6–15.1)
GFR SERPL CREATININE-BSD FRML MDRD: 57 ML/MIN/1.73SQ M
GLUCOSE P FAST SERPL-MCNC: 104 MG/DL (ref 65–99)
HCT VFR BLD AUTO: 45.2 % (ref 36.5–49.3)
HGB BLD-MCNC: 14.7 G/DL (ref 12–17)
IMM GRANULOCYTES # BLD AUTO: 0.01 THOUSAND/UL (ref 0–0.2)
IMM GRANULOCYTES NFR BLD AUTO: 0 % (ref 0–2)
INR PPP: 1.06 (ref 0.84–1.19)
LYMPHOCYTES # BLD AUTO: 1.57 THOUSANDS/ΜL (ref 0.6–4.47)
LYMPHOCYTES NFR BLD AUTO: 28 % (ref 14–44)
MCH RBC QN AUTO: 30.8 PG (ref 26.8–34.3)
MCHC RBC AUTO-ENTMCNC: 32.5 G/DL (ref 31.4–37.4)
MCV RBC AUTO: 95 FL (ref 82–98)
MONOCYTES # BLD AUTO: 0.58 THOUSAND/ΜL (ref 0.17–1.22)
MONOCYTES NFR BLD AUTO: 10 % (ref 4–12)
NEUTROPHILS # BLD AUTO: 3.31 THOUSANDS/ΜL (ref 1.85–7.62)
NEUTS SEG NFR BLD AUTO: 58 % (ref 43–75)
NRBC BLD AUTO-RTO: 0 /100 WBCS
PLATELET # BLD AUTO: 203 THOUSANDS/UL (ref 149–390)
PMV BLD AUTO: 11.6 FL (ref 8.9–12.7)
POTASSIUM SERPL-SCNC: 4.8 MMOL/L (ref 3.5–5.3)
PROT SERPL-MCNC: 7.2 G/DL (ref 6.4–8.2)
PROTHROMBIN TIME: 13.2 SECONDS (ref 11.6–14.5)
RBC # BLD AUTO: 4.78 MILLION/UL (ref 3.88–5.62)
SODIUM SERPL-SCNC: 137 MMOL/L (ref 136–145)
T4 FREE SERPL-MCNC: 0.83 NG/DL (ref 0.76–1.46)
TSH SERPL DL<=0.05 MIU/L-ACNC: 3.66 UIU/ML (ref 0.36–3.74)
WBC # BLD AUTO: 5.66 THOUSAND/UL (ref 4.31–10.16)

## 2020-05-07 PROCEDURE — 80053 COMPREHEN METABOLIC PANEL: CPT

## 2020-05-07 PROCEDURE — 84439 ASSAY OF FREE THYROXINE: CPT

## 2020-05-07 PROCEDURE — 36415 COLL VENOUS BLD VENIPUNCTURE: CPT

## 2020-05-07 PROCEDURE — 84443 ASSAY THYROID STIM HORMONE: CPT

## 2020-05-07 PROCEDURE — 85610 PROTHROMBIN TIME: CPT

## 2020-05-07 PROCEDURE — 85025 COMPLETE CBC W/AUTO DIFF WBC: CPT

## 2020-05-12 ENCOUNTER — HOSPITAL ENCOUNTER (OUTPATIENT)
Dept: CT IMAGING | Facility: HOSPITAL | Age: 75
Discharge: HOME | End: 2020-05-12
Attending: INTERNAL MEDICINE
Payer: COMMERCIAL

## 2020-05-12 DIAGNOSIS — I48.0 PAROXYSMAL ATRIAL FIBRILLATION (HCC): ICD-10-CM

## 2020-05-12 PROCEDURE — 75572 CT HRT W/3D IMAGE: CPT

## 2020-05-12 RX ADMIN — IODIXANOL 120 ML: 320 INJECTION, SOLUTION INTRAVASCULAR at 08:20

## 2020-05-14 ENCOUNTER — PREP FOR PROCEDURE (OUTPATIENT)
Dept: CARDIOLOGY CLINIC | Facility: CLINIC | Age: 75
End: 2020-05-14

## 2020-05-14 DIAGNOSIS — Z11.59 SPECIAL SCREENING EXAMINATION FOR VIRAL DISEASE: Primary | ICD-10-CM

## 2020-05-15 DIAGNOSIS — Z11.59 SPECIAL SCREENING EXAMINATION FOR VIRAL DISEASE: ICD-10-CM

## 2020-05-15 PROCEDURE — U0003 INFECTIOUS AGENT DETECTION BY NUCLEIC ACID (DNA OR RNA); SEVERE ACUTE RESPIRATORY SYNDROME CORONAVIRUS 2 (SARS-COV-2) (CORONAVIRUS DISEASE [COVID-19]), AMPLIFIED PROBE TECHNIQUE, MAKING USE OF HIGH THROUGHPUT TECHNOLOGIES AS DESCRIBED BY CMS-2020-01-R: HCPCS | Performed by: INTERNAL MEDICINE

## 2020-05-17 LAB — SARS-COV-2 RNA SPEC QL NAA+PROBE: NOT DETECTED

## 2020-05-19 ENCOUNTER — TELEPHONE (OUTPATIENT)
Dept: INPATIENT UNIT | Facility: HOSPITAL | Age: 75
End: 2020-05-19

## 2020-05-19 RX ORDER — PANTOPRAZOLE SODIUM 40 MG/1
40 INJECTION, POWDER, FOR SOLUTION INTRAVENOUS ONCE
Status: CANCELLED | OUTPATIENT
Start: 2020-05-19 | End: 2020-05-19

## 2020-05-20 ENCOUNTER — HOSPITAL ENCOUNTER (OUTPATIENT)
Dept: NON INVASIVE DIAGNOSTICS | Facility: HOSPITAL | Age: 75
Discharge: HOME | End: 2020-05-21
Attending: INTERNAL MEDICINE | Admitting: INTERNAL MEDICINE
Payer: COMMERCIAL

## 2020-05-20 ENCOUNTER — ANESTHESIA EVENT (OUTPATIENT)
Dept: NON INVASIVE DIAGNOSTICS | Facility: HOSPITAL | Age: 75
End: 2020-05-20
Payer: COMMERCIAL

## 2020-05-20 ENCOUNTER — ANESTHESIA (OUTPATIENT)
Dept: NON INVASIVE DIAGNOSTICS | Facility: HOSPITAL | Age: 75
End: 2020-05-20
Payer: COMMERCIAL

## 2020-05-20 ENCOUNTER — APPOINTMENT (OUTPATIENT)
Dept: NON INVASIVE DIAGNOSTICS | Facility: HOSPITAL | Age: 75
End: 2020-05-20
Attending: INTERNAL MEDICINE
Payer: COMMERCIAL

## 2020-05-20 DIAGNOSIS — I48.0 PAROXYSMAL ATRIAL FIBRILLATION (HCC): ICD-10-CM

## 2020-05-20 LAB
ANION GAP SERPL CALCULATED.3IONS-SCNC: 4 MMOL/L (ref 4–13)
ATRIAL RATE: 43 BPM
ATRIAL RATE: 61 BPM
BASOPHILS # BLD AUTO: 0.03 THOUSANDS/ΜL (ref 0–0.1)
BASOPHILS NFR BLD AUTO: 1 % (ref 0–1)
BUN SERPL-MCNC: 16 MG/DL (ref 5–25)
CALCIUM SERPL-MCNC: 9.2 MG/DL (ref 8.3–10.1)
CHLORIDE SERPL-SCNC: 110 MMOL/L (ref 100–108)
CO2 SERPL-SCNC: 25 MMOL/L (ref 21–32)
CREAT SERPL-MCNC: 1.08 MG/DL (ref 0.6–1.3)
EOSINOPHIL # BLD AUTO: 0.12 THOUSAND/ΜL (ref 0–0.61)
EOSINOPHIL NFR BLD AUTO: 2 % (ref 0–6)
ERYTHROCYTE [DISTWIDTH] IN BLOOD BY AUTOMATED COUNT: 13.8 % (ref 11.6–15.1)
GFR SERPL CREATININE-BSD FRML MDRD: 68 ML/MIN/1.73SQ M
GLUCOSE P FAST SERPL-MCNC: 98 MG/DL (ref 65–99)
GLUCOSE SERPL-MCNC: 98 MG/DL (ref 65–140)
HCT VFR BLD AUTO: 43.5 % (ref 36.5–49.3)
HGB BLD-MCNC: 14.2 G/DL (ref 12–17)
IMM GRANULOCYTES # BLD AUTO: 0.01 THOUSAND/UL (ref 0–0.2)
IMM GRANULOCYTES NFR BLD AUTO: 0 % (ref 0–2)
INR PPP: 1.01 (ref 0.84–1.19)
KCT BLD-ACNC: 155 SEC (ref 89–137)
KCT BLD-ACNC: 213 SEC (ref 89–137)
KCT BLD-ACNC: 293 SEC (ref 89–137)
KCT BLD-ACNC: 312 SEC (ref 89–137)
KCT BLD-ACNC: 325 SEC (ref 89–137)
KCT BLD-ACNC: 338 SEC (ref 89–137)
KCT BLD-ACNC: 344 SEC (ref 89–137)
KCT BLD-ACNC: 371 SEC (ref 89–137)
LYMPHOCYTES # BLD AUTO: 1.46 THOUSANDS/ΜL (ref 0.6–4.47)
LYMPHOCYTES NFR BLD AUTO: 26 % (ref 14–44)
MCH RBC QN AUTO: 30.6 PG (ref 26.8–34.3)
MCHC RBC AUTO-ENTMCNC: 32.6 G/DL (ref 31.4–37.4)
MCV RBC AUTO: 94 FL (ref 82–98)
MONOCYTES # BLD AUTO: 0.63 THOUSAND/ΜL (ref 0.17–1.22)
MONOCYTES NFR BLD AUTO: 11 % (ref 4–12)
NEUTROPHILS # BLD AUTO: 3.32 THOUSANDS/ΜL (ref 1.85–7.62)
NEUTS SEG NFR BLD AUTO: 60 % (ref 43–75)
NRBC BLD AUTO-RTO: 0 /100 WBCS
P AXIS: 40 DEGREES
P AXIS: 70 DEGREES
PLATELET # BLD AUTO: 220 THOUSANDS/UL (ref 149–390)
PMV BLD AUTO: 11.2 FL (ref 8.9–12.7)
POTASSIUM SERPL-SCNC: 4.3 MMOL/L (ref 3.5–5.3)
PR INTERVAL: 163 MS
PR INTERVAL: 186 MS
PROTHROMBIN TIME: 12.9 SECONDS (ref 11.6–14.5)
QRS AXIS: -51 DEGREES
QRS AXIS: -59 DEGREES
QRSD INTERVAL: 92 MS
QRSD INTERVAL: 96 MS
QT INTERVAL: 450 MS
QT INTERVAL: 496 MS
QTC INTERVAL: 419 MS
QTC INTERVAL: 454 MS
RBC # BLD AUTO: 4.64 MILLION/UL (ref 3.88–5.62)
SODIUM SERPL-SCNC: 139 MMOL/L (ref 136–145)
SPECIMEN SOURCE: ABNORMAL
T WAVE AXIS: 15 DEGREES
T WAVE AXIS: 40 DEGREES
VENTRICULAR RATE: 43 BPM
VENTRICULAR RATE: 61 BPM
WBC # BLD AUTO: 5.57 THOUSAND/UL (ref 4.31–10.16)

## 2020-05-20 PROCEDURE — 93656 COMPRE EP EVAL ABLTJ ATR FIB: CPT | Performed by: INTERNAL MEDICINE

## 2020-05-20 PROCEDURE — C1893 INTRO/SHEATH, FIXED,NON-PEEL: HCPCS | Performed by: INTERNAL MEDICINE

## 2020-05-20 PROCEDURE — 85610 PROTHROMBIN TIME: CPT | Performed by: PHYSICIAN ASSISTANT

## 2020-05-20 PROCEDURE — 93657 TX L/R ATRIAL FIB ADDL: CPT | Performed by: INTERNAL MEDICINE

## 2020-05-20 PROCEDURE — C9113 INJ PANTOPRAZOLE SODIUM, VIA: HCPCS | Performed by: PHYSICIAN ASSISTANT

## 2020-05-20 PROCEDURE — 76937 US GUIDE VASCULAR ACCESS: CPT | Performed by: INTERNAL MEDICINE

## 2020-05-20 PROCEDURE — NC001 PR NO CHARGE: Performed by: PHYSICIAN ASSISTANT

## 2020-05-20 PROCEDURE — C1759 CATH, INTRA ECHOCARDIOGRAPHY: HCPCS | Performed by: INTERNAL MEDICINE

## 2020-05-20 PROCEDURE — C1730 CATH, EP, 19 OR FEW ELECT: HCPCS | Performed by: INTERNAL MEDICINE

## 2020-05-20 PROCEDURE — 93613 INTRACARDIAC EPHYS 3D MAPG: CPT | Performed by: INTERNAL MEDICINE

## 2020-05-20 PROCEDURE — 93010 ELECTROCARDIOGRAM REPORT: CPT | Performed by: INTERNAL MEDICINE

## 2020-05-20 PROCEDURE — 80048 BASIC METABOLIC PNL TOTAL CA: CPT | Performed by: PHYSICIAN ASSISTANT

## 2020-05-20 PROCEDURE — C1894 INTRO/SHEATH, NON-LASER: HCPCS | Performed by: INTERNAL MEDICINE

## 2020-05-20 PROCEDURE — 93655 ICAR CATH ABLTJ DSCRT ARRHYT: CPT | Performed by: INTERNAL MEDICINE

## 2020-05-20 PROCEDURE — 93005 ELECTROCARDIOGRAM TRACING: CPT

## 2020-05-20 PROCEDURE — 93312 ECHO TRANSESOPHAGEAL: CPT

## 2020-05-20 PROCEDURE — C1769 GUIDE WIRE: HCPCS | Performed by: INTERNAL MEDICINE

## 2020-05-20 PROCEDURE — C1733 CATH, EP, OTHR THAN COOL-TIP: HCPCS | Performed by: INTERNAL MEDICINE

## 2020-05-20 PROCEDURE — C1732 CATH, EP, DIAG/ABL, 3D/VECT: HCPCS | Performed by: INTERNAL MEDICINE

## 2020-05-20 PROCEDURE — 92960 CARDIOVERSION ELECTRIC EXT: CPT | Performed by: INTERNAL MEDICINE

## 2020-05-20 PROCEDURE — 93662 INTRACARDIAC ECG (ICE): CPT | Performed by: INTERNAL MEDICINE

## 2020-05-20 PROCEDURE — 85025 COMPLETE CBC W/AUTO DIFF WBC: CPT | Performed by: PHYSICIAN ASSISTANT

## 2020-05-20 PROCEDURE — 85347 COAGULATION TIME ACTIVATED: CPT

## 2020-05-20 RX ORDER — HYDROMORPHONE HCL/PF 1 MG/ML
0.5 SYRINGE (ML) INJECTION
Status: DISCONTINUED | OUTPATIENT
Start: 2020-05-20 | End: 2020-05-20 | Stop reason: HOSPADM

## 2020-05-20 RX ORDER — DOCUSATE SODIUM 100 MG/1
100 CAPSULE, LIQUID FILLED ORAL 2 TIMES DAILY PRN
Status: DISCONTINUED | OUTPATIENT
Start: 2020-05-20 | End: 2020-05-21 | Stop reason: HOSPADM

## 2020-05-20 RX ORDER — GLYCOPYRROLATE 0.2 MG/ML
INJECTION INTRAMUSCULAR; INTRAVENOUS AS NEEDED
Status: DISCONTINUED | OUTPATIENT
Start: 2020-05-20 | End: 2020-05-20 | Stop reason: SURG

## 2020-05-20 RX ORDER — ONDANSETRON 2 MG/ML
INJECTION INTRAMUSCULAR; INTRAVENOUS AS NEEDED
Status: DISCONTINUED | OUTPATIENT
Start: 2020-05-20 | End: 2020-05-20 | Stop reason: SURG

## 2020-05-20 RX ORDER — FENTANYL CITRATE 50 UG/ML
INJECTION, SOLUTION INTRAMUSCULAR; INTRAVENOUS AS NEEDED
Status: DISCONTINUED | OUTPATIENT
Start: 2020-05-20 | End: 2020-05-20 | Stop reason: SURG

## 2020-05-20 RX ORDER — NEOSTIGMINE METHYLSULFATE 1 MG/ML
INJECTION INTRAVENOUS AS NEEDED
Status: DISCONTINUED | OUTPATIENT
Start: 2020-05-20 | End: 2020-05-20 | Stop reason: SURG

## 2020-05-20 RX ORDER — ONDANSETRON 2 MG/ML
4 INJECTION INTRAMUSCULAR; INTRAVENOUS ONCE AS NEEDED
Status: DISCONTINUED | OUTPATIENT
Start: 2020-05-20 | End: 2020-05-20 | Stop reason: HOSPADM

## 2020-05-20 RX ORDER — HEPARIN SODIUM 1000 [USP'U]/ML
INJECTION, SOLUTION INTRAVENOUS; SUBCUTANEOUS CODE/TRAUMA/SEDATION MEDICATION
Status: COMPLETED | OUTPATIENT
Start: 2020-05-20 | End: 2020-05-20

## 2020-05-20 RX ORDER — ROCURONIUM BROMIDE 10 MG/ML
INJECTION, SOLUTION INTRAVENOUS AS NEEDED
Status: DISCONTINUED | OUTPATIENT
Start: 2020-05-20 | End: 2020-05-20 | Stop reason: SURG

## 2020-05-20 RX ORDER — SODIUM CHLORIDE 9 MG/ML
INJECTION, SOLUTION INTRAVENOUS CONTINUOUS PRN
Status: DISCONTINUED | OUTPATIENT
Start: 2020-05-20 | End: 2020-05-20 | Stop reason: SURG

## 2020-05-20 RX ORDER — PANTOPRAZOLE SODIUM 40 MG/1
40 TABLET, DELAYED RELEASE ORAL
Status: DISCONTINUED | OUTPATIENT
Start: 2020-05-21 | End: 2020-05-21 | Stop reason: HOSPADM

## 2020-05-20 RX ORDER — SACCHAROMYCES BOULARDII 250 MG
250 CAPSULE ORAL 2 TIMES DAILY
Status: DISCONTINUED | OUTPATIENT
Start: 2020-05-20 | End: 2020-05-21 | Stop reason: HOSPADM

## 2020-05-20 RX ORDER — HYDROXYZINE 50 MG/1
50 TABLET, FILM COATED ORAL EVERY 6 HOURS PRN
Status: DISCONTINUED | OUTPATIENT
Start: 2020-05-20 | End: 2020-05-21 | Stop reason: HOSPADM

## 2020-05-20 RX ORDER — DOXYCYCLINE HYCLATE 20 MG
20 TABLET ORAL
Status: DISCONTINUED | OUTPATIENT
Start: 2020-05-21 | End: 2020-05-20 | Stop reason: RX

## 2020-05-20 RX ORDER — PROPOFOL 10 MG/ML
INJECTION, EMULSION INTRAVENOUS AS NEEDED
Status: DISCONTINUED | OUTPATIENT
Start: 2020-05-20 | End: 2020-05-20 | Stop reason: SURG

## 2020-05-20 RX ORDER — LANOLIN ALCOHOL/MO/W.PET/CERES
3 CREAM (GRAM) TOPICAL
Status: DISCONTINUED | OUTPATIENT
Start: 2020-05-20 | End: 2020-05-21 | Stop reason: HOSPADM

## 2020-05-20 RX ORDER — DEXAMETHASONE SODIUM PHOSPHATE 10 MG/ML
INJECTION, SOLUTION INTRAMUSCULAR; INTRAVENOUS AS NEEDED
Status: DISCONTINUED | OUTPATIENT
Start: 2020-05-20 | End: 2020-05-20 | Stop reason: SURG

## 2020-05-20 RX ORDER — ACETAMINOPHEN 325 MG/1
650 TABLET ORAL EVERY 4 HOURS PRN
Status: DISCONTINUED | OUTPATIENT
Start: 2020-05-20 | End: 2020-05-21 | Stop reason: HOSPADM

## 2020-05-20 RX ORDER — SUCCINYLCHOLINE/SOD CL,ISO/PF 100 MG/5ML
SYRINGE (ML) INTRAVENOUS AS NEEDED
Status: DISCONTINUED | OUTPATIENT
Start: 2020-05-20 | End: 2020-05-20 | Stop reason: SURG

## 2020-05-20 RX ORDER — EPHEDRINE SULFATE 50 MG/ML
INJECTION INTRAVENOUS AS NEEDED
Status: DISCONTINUED | OUTPATIENT
Start: 2020-05-20 | End: 2020-05-20 | Stop reason: SURG

## 2020-05-20 RX ORDER — FLECAINIDE ACETATE 50 MG/1
75 TABLET ORAL EVERY 12 HOURS SCHEDULED
Status: DISCONTINUED | OUTPATIENT
Start: 2020-05-20 | End: 2020-05-21 | Stop reason: HOSPADM

## 2020-05-20 RX ORDER — ATORVASTATIN CALCIUM 40 MG/1
40 TABLET, FILM COATED ORAL
Status: DISCONTINUED | OUTPATIENT
Start: 2020-05-20 | End: 2020-05-21 | Stop reason: HOSPADM

## 2020-05-20 RX ORDER — HEPARIN SODIUM 10000 [USP'U]/100ML
INJECTION, SOLUTION INTRAVENOUS
Status: COMPLETED | OUTPATIENT
Start: 2020-05-20 | End: 2020-05-20

## 2020-05-20 RX ORDER — PROTAMINE SULFATE 10 MG/ML
INJECTION, SOLUTION INTRAVENOUS AS NEEDED
Status: DISCONTINUED | OUTPATIENT
Start: 2020-05-20 | End: 2020-05-20 | Stop reason: SURG

## 2020-05-20 RX ORDER — PANTOPRAZOLE SODIUM 40 MG/1
40 INJECTION, POWDER, FOR SOLUTION INTRAVENOUS ONCE
Status: COMPLETED | OUTPATIENT
Start: 2020-05-20 | End: 2020-05-20

## 2020-05-20 RX ADMIN — HEPARIN SODIUM AND DEXTROSE 3500 UNITS/HR: 10000; 5 INJECTION INTRAVENOUS at 09:51

## 2020-05-20 RX ADMIN — HEPARIN SODIUM 10000 UNITS: 1000 INJECTION INTRAVENOUS; SUBCUTANEOUS at 09:50

## 2020-05-20 RX ADMIN — PANTOPRAZOLE SODIUM 40 MG: 40 INJECTION, POWDER, FOR SOLUTION INTRAVENOUS at 08:57

## 2020-05-20 RX ADMIN — PROTAMINE SULFATE 5 MG: 10 INJECTION, SOLUTION INTRAVENOUS at 13:07

## 2020-05-20 RX ADMIN — SODIUM CHLORIDE: 0.9 INJECTION, SOLUTION INTRAVENOUS at 10:00

## 2020-05-20 RX ADMIN — HEPARIN SODIUM 3000 UNITS: 1000 INJECTION INTRAVENOUS; SUBCUTANEOUS at 10:30

## 2020-05-20 RX ADMIN — DEXAMETHASONE SODIUM PHOSPHATE 5 MG: 10 INJECTION, SOLUTION INTRAMUSCULAR; INTRAVENOUS at 08:58

## 2020-05-20 RX ADMIN — EPHEDRINE SULFATE 10 MG: 50 INJECTION, SOLUTION INTRAVENOUS at 09:33

## 2020-05-20 RX ADMIN — Medication 100 MG: at 08:45

## 2020-05-20 RX ADMIN — ROCURONIUM BROMIDE 30 MG: 10 INJECTION, SOLUTION INTRAVENOUS at 09:13

## 2020-05-20 RX ADMIN — PHENYLEPHRINE HYDROCHLORIDE 10 MCG/MIN: 10 INJECTION INTRAVENOUS at 09:17

## 2020-05-20 RX ADMIN — HEPARIN SODIUM 1000 UNITS: 1000 INJECTION INTRAVENOUS; SUBCUTANEOUS at 10:47

## 2020-05-20 RX ADMIN — FLECAINIDE ACETATE 75 MG: 50 TABLET ORAL at 22:19

## 2020-05-20 RX ADMIN — GLYCOPYRROLATE 0.2 MG: 0.2 INJECTION, SOLUTION INTRAMUSCULAR; INTRAVENOUS at 08:38

## 2020-05-20 RX ADMIN — EPHEDRINE SULFATE 10 MG: 50 INJECTION, SOLUTION INTRAVENOUS at 09:55

## 2020-05-20 RX ADMIN — HEPARIN SODIUM 7000 UNITS: 1000 INJECTION INTRAVENOUS; SUBCUTANEOUS at 10:08

## 2020-05-20 RX ADMIN — NEOSTIGMINE METHYLSULFATE 3 MG: 1 INJECTION, SOLUTION INTRAVENOUS at 13:07

## 2020-05-20 RX ADMIN — PROPOFOL 150 MG: 10 INJECTION, EMULSION INTRAVENOUS at 08:43

## 2020-05-20 RX ADMIN — PROTAMINE SULFATE 10 MG: 10 INJECTION, SOLUTION INTRAVENOUS at 13:04

## 2020-05-20 RX ADMIN — GLYCOPYRROLATE 0.4 MG: 0.2 INJECTION, SOLUTION INTRAMUSCULAR; INTRAVENOUS at 13:07

## 2020-05-20 RX ADMIN — ATORVASTATIN CALCIUM 40 MG: 40 TABLET, FILM COATED ORAL at 18:01

## 2020-05-20 RX ADMIN — PROTAMINE SULFATE 10 MG: 10 INJECTION, SOLUTION INTRAVENOUS at 13:03

## 2020-05-20 RX ADMIN — ROCURONIUM BROMIDE 5 MG: 10 INJECTION, SOLUTION INTRAVENOUS at 08:43

## 2020-05-20 RX ADMIN — PROPOFOL 50 MG: 10 INJECTION, EMULSION INTRAVENOUS at 08:44

## 2020-05-20 RX ADMIN — METOPROLOL SUCCINATE 75 MG: 50 TABLET, EXTENDED RELEASE ORAL at 22:21

## 2020-05-20 RX ADMIN — APIXABAN 5 MG: 5 TABLET, FILM COATED ORAL at 18:01

## 2020-05-20 RX ADMIN — EPHEDRINE SULFATE 10 MG: 50 INJECTION, SOLUTION INTRAVENOUS at 10:40

## 2020-05-20 RX ADMIN — PROTAMINE SULFATE 10 MG: 10 INJECTION, SOLUTION INTRAVENOUS at 13:06

## 2020-05-20 RX ADMIN — SODIUM CHLORIDE: 0.9 INJECTION, SOLUTION INTRAVENOUS at 08:30

## 2020-05-20 RX ADMIN — IOHEXOL 60 ML: 350 INJECTION, SOLUTION INTRAVENOUS at 12:53

## 2020-05-20 RX ADMIN — FENTANYL CITRATE 100 MCG: 50 INJECTION, SOLUTION INTRAMUSCULAR; INTRAVENOUS at 08:43

## 2020-05-20 RX ADMIN — ONDANSETRON 4 MG: 2 INJECTION INTRAMUSCULAR; INTRAVENOUS at 13:39

## 2020-05-20 RX ADMIN — PROTAMINE SULFATE 10 MG: 10 INJECTION, SOLUTION INTRAVENOUS at 13:05

## 2020-05-20 RX ADMIN — EPHEDRINE SULFATE 10 MG: 50 INJECTION, SOLUTION INTRAVENOUS at 08:44

## 2020-05-20 RX ADMIN — Medication 250 MG: at 18:01

## 2020-05-21 VITALS
BODY MASS INDEX: 26.95 KG/M2 | DIASTOLIC BLOOD PRESSURE: 57 MMHG | SYSTOLIC BLOOD PRESSURE: 117 MMHG | HEART RATE: 61 BPM | RESPIRATION RATE: 23 BRPM | TEMPERATURE: 97.8 F | WEIGHT: 210 LBS | OXYGEN SATURATION: 98 % | HEIGHT: 74 IN

## 2020-05-21 LAB
ANION GAP SERPL CALCULATED.3IONS-SCNC: 3 MMOL/L (ref 4–13)
BUN SERPL-MCNC: 13 MG/DL (ref 5–25)
CALCIUM SERPL-MCNC: 8.6 MG/DL (ref 8.3–10.1)
CHLORIDE SERPL-SCNC: 110 MMOL/L (ref 100–108)
CO2 SERPL-SCNC: 26 MMOL/L (ref 21–32)
CREAT SERPL-MCNC: 0.99 MG/DL (ref 0.6–1.3)
ERYTHROCYTE [DISTWIDTH] IN BLOOD BY AUTOMATED COUNT: 14 % (ref 11.6–15.1)
GFR SERPL CREATININE-BSD FRML MDRD: 75 ML/MIN/1.73SQ M
GLUCOSE P FAST SERPL-MCNC: 116 MG/DL (ref 65–99)
GLUCOSE SERPL-MCNC: 116 MG/DL (ref 65–140)
HCT VFR BLD AUTO: 40.4 % (ref 36.5–49.3)
HGB BLD-MCNC: 13.4 G/DL (ref 12–17)
MCH RBC QN AUTO: 31.2 PG (ref 26.8–34.3)
MCHC RBC AUTO-ENTMCNC: 33.2 G/DL (ref 31.4–37.4)
MCV RBC AUTO: 94 FL (ref 82–98)
PLATELET # BLD AUTO: 210 THOUSANDS/UL (ref 149–390)
PMV BLD AUTO: 11.1 FL (ref 8.9–12.7)
POTASSIUM SERPL-SCNC: 4.1 MMOL/L (ref 3.5–5.3)
RBC # BLD AUTO: 4.29 MILLION/UL (ref 3.88–5.62)
SODIUM SERPL-SCNC: 139 MMOL/L (ref 136–145)
WBC # BLD AUTO: 10.53 THOUSAND/UL (ref 4.31–10.16)

## 2020-05-21 PROCEDURE — NC001 PR NO CHARGE: Performed by: INTERNAL MEDICINE

## 2020-05-21 PROCEDURE — 85027 COMPLETE CBC AUTOMATED: CPT | Performed by: PHYSICIAN ASSISTANT

## 2020-05-21 PROCEDURE — 80048 BASIC METABOLIC PNL TOTAL CA: CPT | Performed by: PHYSICIAN ASSISTANT

## 2020-05-21 RX ORDER — PANTOPRAZOLE SODIUM 40 MG/1
40 TABLET, DELAYED RELEASE ORAL
Qty: 30 TABLET | Refills: 0 | Status: SHIPPED | OUTPATIENT
Start: 2020-05-22 | End: 2022-06-02

## 2020-05-21 RX ADMIN — APIXABAN 5 MG: 5 TABLET, FILM COATED ORAL at 09:49

## 2020-05-21 RX ADMIN — FLECAINIDE ACETATE 75 MG: 50 TABLET ORAL at 09:50

## 2020-05-21 RX ADMIN — Medication 250 MG: at 09:49

## 2020-05-21 RX ADMIN — METOPROLOL SUCCINATE 75 MG: 50 TABLET, EXTENDED RELEASE ORAL at 09:49

## 2020-05-21 RX ADMIN — PANTOPRAZOLE SODIUM 40 MG: 40 TABLET, DELAYED RELEASE ORAL at 05:18

## 2020-05-22 ENCOUNTER — TELEPHONE (OUTPATIENT)
Dept: CARDIOLOGY CLINIC | Facility: CLINIC | Age: 75
End: 2020-05-22

## 2020-05-22 PROCEDURE — 93325 DOPPLER ECHO COLOR FLOW MAPG: CPT | Performed by: INTERNAL MEDICINE

## 2020-05-22 PROCEDURE — 93321 DOPPLER ECHO F-UP/LMTD STD: CPT | Performed by: INTERNAL MEDICINE

## 2020-05-22 PROCEDURE — 93312 ECHO TRANSESOPHAGEAL: CPT | Performed by: INTERNAL MEDICINE

## 2020-05-26 RX ORDER — SACCHAROMYCES BOULARDII 250 MG
250 CAPSULE ORAL 2 TIMES DAILY
Qty: 180 CAPSULE | Refills: 3 | OUTPATIENT
Start: 2020-05-26

## 2020-06-15 ENCOUNTER — TELEPHONE (OUTPATIENT)
Dept: CARDIOLOGY CLINIC | Facility: CLINIC | Age: 75
End: 2020-06-15

## 2020-06-15 DIAGNOSIS — I48.0 PAROXYSMAL ATRIAL FIBRILLATION (HCC): ICD-10-CM

## 2020-06-17 DIAGNOSIS — I48.0 PAROXYSMAL ATRIAL FIBRILLATION (HCC): ICD-10-CM

## 2020-06-26 ENCOUNTER — OFFICE VISIT (OUTPATIENT)
Dept: CARDIOLOGY CLINIC | Facility: CLINIC | Age: 75
End: 2020-06-26
Payer: COMMERCIAL

## 2020-06-26 VITALS
SYSTOLIC BLOOD PRESSURE: 132 MMHG | DIASTOLIC BLOOD PRESSURE: 62 MMHG | HEART RATE: 57 BPM | OXYGEN SATURATION: 97 % | BODY MASS INDEX: 27.83 KG/M2 | HEIGHT: 74 IN | WEIGHT: 216.9 LBS

## 2020-06-26 DIAGNOSIS — I48.0 PAROXYSMAL ATRIAL FIBRILLATION (HCC): Primary | ICD-10-CM

## 2020-06-26 PROCEDURE — 1160F RVW MEDS BY RX/DR IN RCRD: CPT | Performed by: INTERNAL MEDICINE

## 2020-06-26 PROCEDURE — 1036F TOBACCO NON-USER: CPT | Performed by: INTERNAL MEDICINE

## 2020-06-26 PROCEDURE — 4040F PNEUMOC VAC/ADMIN/RCVD: CPT | Performed by: INTERNAL MEDICINE

## 2020-06-26 PROCEDURE — 99214 OFFICE O/P EST MOD 30 MIN: CPT | Performed by: INTERNAL MEDICINE

## 2020-06-26 PROCEDURE — 93000 ELECTROCARDIOGRAM COMPLETE: CPT | Performed by: INTERNAL MEDICINE

## 2020-06-26 PROCEDURE — 3008F BODY MASS INDEX DOCD: CPT | Performed by: INTERNAL MEDICINE

## 2020-09-03 ENCOUNTER — TELEPHONE (OUTPATIENT)
Dept: FAMILY MEDICINE CLINIC | Facility: CLINIC | Age: 75
End: 2020-09-03

## 2020-09-03 NOTE — TELEPHONE ENCOUNTER
Patient would like a call  Back as he lives in a [address] [place] [address] and they will be providing Flu Shots to all the residents and he was informed to contact his PCP to fine accurate dosage  Would like to know if he needs High Dose   Patient can be reached at the number listed below;    [phone] Patient states you can Leave Voice mail message,

## 2020-09-03 NOTE — TELEPHONE ENCOUNTER
Called patient and LM with wife regarding high dose flu vaccine is recommended, she will relay the message to the patient

## 2020-10-18 PROBLEM — I48.92 PAROXYSMAL ATRIAL FLUTTER (HCC): Status: ACTIVE | Noted: 2020-10-18

## 2020-10-19 ENCOUNTER — OFFICE VISIT (OUTPATIENT)
Dept: CARDIOLOGY CLINIC | Facility: CLINIC | Age: 75
End: 2020-10-19
Payer: COMMERCIAL

## 2020-10-19 ENCOUNTER — TRANSCRIBE ORDERS (OUTPATIENT)
Dept: LAB | Facility: CLINIC | Age: 75
End: 2020-10-19

## 2020-10-19 ENCOUNTER — LAB (OUTPATIENT)
Dept: LAB | Facility: CLINIC | Age: 75
End: 2020-10-19
Payer: COMMERCIAL

## 2020-10-19 VITALS
HEIGHT: 74 IN | HEART RATE: 75 BPM | DIASTOLIC BLOOD PRESSURE: 60 MMHG | WEIGHT: 212 LBS | BODY MASS INDEX: 27.21 KG/M2 | OXYGEN SATURATION: 98 % | SYSTOLIC BLOOD PRESSURE: 128 MMHG

## 2020-10-19 DIAGNOSIS — E78.5 DYSLIPIDEMIA: ICD-10-CM

## 2020-10-19 DIAGNOSIS — I25.10 CORONARY ARTERY CALCIFICATION SEEN ON CAT SCAN: ICD-10-CM

## 2020-10-19 DIAGNOSIS — I48.0 PAROXYSMAL ATRIAL FIBRILLATION (HCC): Primary | ICD-10-CM

## 2020-10-19 DIAGNOSIS — I48.92 PAROXYSMAL ATRIAL FLUTTER (HCC): ICD-10-CM

## 2020-10-19 DIAGNOSIS — I35.1 MODERATE AORTIC INSUFFICIENCY: ICD-10-CM

## 2020-10-19 LAB
ALBUMIN SERPL BCP-MCNC: 3.8 G/DL (ref 3.5–5)
ALP SERPL-CCNC: 70 U/L (ref 46–116)
ALT SERPL W P-5'-P-CCNC: 64 U/L (ref 12–78)
ANION GAP SERPL CALCULATED.3IONS-SCNC: 7 MMOL/L (ref 4–13)
AST SERPL W P-5'-P-CCNC: 61 U/L (ref 5–45)
BILIRUB SERPL-MCNC: 0.86 MG/DL (ref 0.2–1)
BUN SERPL-MCNC: 13 MG/DL (ref 5–25)
CALCIUM SERPL-MCNC: 9.1 MG/DL (ref 8.3–10.1)
CHLORIDE SERPL-SCNC: 106 MMOL/L (ref 100–108)
CHOLEST SERPL-MCNC: 85 MG/DL (ref 50–200)
CO2 SERPL-SCNC: 28 MMOL/L (ref 21–32)
CREAT SERPL-MCNC: 1.05 MG/DL (ref 0.6–1.3)
GFR SERPL CREATININE-BSD FRML MDRD: 70 ML/MIN/1.73SQ M
GLUCOSE P FAST SERPL-MCNC: 98 MG/DL (ref 65–99)
HDLC SERPL-MCNC: 40 MG/DL
LDLC SERPL CALC-MCNC: 35 MG/DL (ref 0–100)
POTASSIUM SERPL-SCNC: 4.5 MMOL/L (ref 3.5–5.3)
PROT SERPL-MCNC: 7.1 G/DL (ref 6.4–8.2)
SODIUM SERPL-SCNC: 141 MMOL/L (ref 136–145)
TRIGL SERPL-MCNC: 52 MG/DL

## 2020-10-19 PROCEDURE — 1036F TOBACCO NON-USER: CPT | Performed by: INTERNAL MEDICINE

## 2020-10-19 PROCEDURE — 80061 LIPID PANEL: CPT

## 2020-10-19 PROCEDURE — 36415 COLL VENOUS BLD VENIPUNCTURE: CPT | Performed by: INTERNAL MEDICINE

## 2020-10-19 PROCEDURE — 1160F RVW MEDS BY RX/DR IN RCRD: CPT | Performed by: INTERNAL MEDICINE

## 2020-10-19 PROCEDURE — 99214 OFFICE O/P EST MOD 30 MIN: CPT | Performed by: INTERNAL MEDICINE

## 2020-10-19 PROCEDURE — 80053 COMPREHEN METABOLIC PANEL: CPT | Performed by: INTERNAL MEDICINE

## 2021-01-15 DIAGNOSIS — E78.5 DYSLIPIDEMIA: ICD-10-CM

## 2021-01-15 RX ORDER — ATORVASTATIN CALCIUM 40 MG/1
40 TABLET, FILM COATED ORAL DAILY
Qty: 90 TABLET | Refills: 3 | Status: SHIPPED | OUTPATIENT
Start: 2021-01-15 | End: 2022-01-11 | Stop reason: SDUPTHER

## 2021-02-13 DIAGNOSIS — Z23 ENCOUNTER FOR IMMUNIZATION: ICD-10-CM

## 2021-03-22 ENCOUNTER — TELEPHONE (OUTPATIENT)
Dept: FAMILY MEDICINE CLINIC | Facility: CLINIC | Age: 76
End: 2021-03-22

## 2021-03-22 NOTE — TELEPHONE ENCOUNTER
Patient called wanting name of surgeon to remove a cyst  Patient stated he saw Dr Atul Lawson over the weekend, was told to call today to get the name of a surgeon he can be referred to  Patient would like to be called back at [phone] with the surgeon name

## 2021-03-23 DIAGNOSIS — L72.3 SEBACEOUS CYST: Primary | ICD-10-CM

## 2021-03-25 ENCOUNTER — OFFICE VISIT (OUTPATIENT)
Dept: URGENT CARE | Facility: MEDICAL CENTER | Age: 76
End: 2021-03-25
Payer: COMMERCIAL

## 2021-03-25 VITALS
TEMPERATURE: 97.4 F | OXYGEN SATURATION: 98 % | DIASTOLIC BLOOD PRESSURE: 62 MMHG | SYSTOLIC BLOOD PRESSURE: 133 MMHG | RESPIRATION RATE: 16 BRPM | BODY MASS INDEX: 25.77 KG/M2 | HEART RATE: 73 BPM | WEIGHT: 207.25 LBS | HEIGHT: 75 IN

## 2021-03-25 DIAGNOSIS — L02.413 ABSCESS OF ARM, RIGHT: Primary | ICD-10-CM

## 2021-03-25 PROCEDURE — 87205 SMEAR GRAM STAIN: CPT | Performed by: PHYSICIAN ASSISTANT

## 2021-03-25 PROCEDURE — 10060 I&D ABSCESS SIMPLE/SINGLE: CPT | Performed by: PHYSICIAN ASSISTANT

## 2021-03-25 PROCEDURE — S9083 URGENT CARE CENTER GLOBAL: HCPCS | Performed by: PHYSICIAN ASSISTANT

## 2021-03-25 PROCEDURE — 99213 OFFICE O/P EST LOW 20 MIN: CPT | Performed by: PHYSICIAN ASSISTANT

## 2021-03-25 PROCEDURE — 87070 CULTURE OTHR SPECIMN AEROBIC: CPT | Performed by: PHYSICIAN ASSISTANT

## 2021-03-25 RX ORDER — ASPIRIN 81 MG/1
81 TABLET ORAL DAILY
COMMUNITY

## 2021-03-25 RX ORDER — SULFAMETHOXAZOLE AND TRIMETHOPRIM 800; 160 MG/1; MG/1
1 TABLET ORAL EVERY 12 HOURS SCHEDULED
Qty: 14 TABLET | Refills: 0 | Status: SHIPPED | OUTPATIENT
Start: 2021-03-25 | End: 2021-04-01

## 2021-03-25 NOTE — PROGRESS NOTES
[facility]        NAME: Joyce Galaviz is a 77 y o  male  : 1945    MRN: [id number]  DATE: 2021  TIME: 2:13 PM    Assessment and Plan   No primary diagnosis found  No diagnosis found  Patient Instructions     1  Keep skin clean and dry  2  Take Bactrim Ds 1 tablet twice daily x 7 days  3  Return for packing removal in 2-3 days  4  Tylenol as needed for pain  Chief Complaint     Chief Complaint   Patient presents with    Abscess     cyst on right shoulder had for a few weeks doesnt hurt  History of Present Illness       No Zendejas   Is a 41-year-old male presents with a painful lesion on his right upper arm that appeared over the past several weeks  Patient reports area has now become red, swollen and painful over the past 2 days  He has had no fever or skin drainage  Review of Systems   Review of Systems   Constitutional: Negative  HENT: Negative  Respiratory: Negative  Cardiovascular: Negative  Skin: Positive for wound           Current Medications       Current Outpatient Medications:     aspirin (ECOTRIN LOW STRENGTH) 81 mg EC tablet, Take 81 mg by mouth daily, Disp: , Rfl:     apixaban (ELIQUIS) 5 mg, Take 1 tablet (5 mg total) by mouth 2 (two) times a day (Patient not taking: Reported on 10/19/2020), Disp: 60 tablet, Rfl: 11    atorvastatin (LIPITOR) 40 mg tablet, Take 1 tablet (40 mg total) by mouth daily, Disp: 90 tablet, Rfl: 3    Calcium-Magnesium-Vitamin D (CALCIUM MAGNESIUM PO), Take by mouth, Disp: , Rfl:     chlorhexidine (PERIDEX) 0 12 % solution, 15 mL daily at bedtime , Disp: , Rfl:     Cyanocobalamin (VITAMIN B12 PO), Take by mouth, Disp: , Rfl:     doxycycline (PERIOSTAT) 20 MG tablet, Take 20 mg by mouth daily in the early morning Takes for Gums, Disp: , Rfl: 2    metoprolol succinate (TOPROL-XL) 25 mg 24 hr tablet, Take 3 tablets (75 mg total) by mouth every 12 (twelve) hours (Patient not taking: Reported on 10/19/2020), Disp: 540 tablet, Rfl: 2    pantoprazole (PROTONIX) 40 mg tablet, Take 1 tablet (40 mg total) by mouth daily in the early morning (Patient not taking: Reported on 10/19/2020), Disp: 30 tablet, Rfl: 0    saccharomyces boulardii (FLORASTOR) 250 mg capsule, Take 250 mg by mouth 2 (two) times a day, Disp: , Rfl:     Current Allergies     Allergies as of 03/25/2021    (No Known Allergies)            The following portions of the patient's history were reviewed and updated as appropriate: allergies, current medications, past family history, past medical history, past social history, past surgical history and problem list      Past Medical History:   Diagnosis Date    Anemia     Internal Hemorroids    Closed nondisplaced fracture of second metatarsal bone of left foot 11/11/2018    Foot drop     Left    Heart murmur     MVP    Hyperlipidemia     Irregular heart beat     Left foot pain 7/11/2019    Left inguinal hernia     Managed By Roma Graham / last assessed 3/27/15     Pain in both feet 4/2/2019    Paroxysmal atrial fibrillation ([facility] [address] ) 02/27/2019    Pleural effusion, left 2/27/2019    Pneumonia 2/26/2019    Skin lesion     Subclinical hypothyroidism 1/15/2020    Weakness of left foot 11/7/2018       Past Surgical History:   Procedure Laterality Date    BACK SURGERY      COLONOSCOPY      HEMORROIDECTOMY      HERNIA REPAIR      IR IMAGE GUIDED ASPIRATION / DRAINAGE W TUBE  11/19/2019    IR THORACENTESIS  3/1/2019    LUMBAR LAMINECTOMY Left 11/9/2018    Procedure: Metrx L4-5 left hemilaminectomy and bilateral foraminotomy, Metrx L5-S1 left hemilaminectomy and microdiskectomy;  Surgeon: Soco Ontiveros MD;  [facility];  Service: Neurosurgery    [place] LAP,APPENDECTOMY N/A 2/20/2020    Procedure: LAPAROSCOPIC APPENDECTOMY;  Surgeon: Eladio Branch DO;  [facility];  Service: General       Family History   Problem Relation Age of Onset    Heart disease Mother     No Known Problems Father  No Known Problems Sister     No Known Problems Brother     No Known Problems Family     Anemia Neg Hx     Arrhythmia Neg Hx     Clotting disorder Neg Hx     Heart attack Neg Hx     Heart failure Neg Hx     Hyperlipidemia Neg Hx     Hypertension Neg Hx     Fainting Neg Hx     Asthma Neg Hx          Medications have been verified  Objective   /62   Pulse 73   Temp (!) 97 4 °F (36 3 °C)   Resp 16   Ht 6' 2 5" (1 892 m)   Wt 94 kg (207 lb 4 oz)   SpO2 98%   BMI 26 25 kg/m²   No LMP for male patient  Physical Exam     Physical Exam  Constitutional:       General: He is not in acute distress  Appearance: Normal appearance  He is not ill-appearing  Cardiovascular:      Rate and Rhythm: Normal rate and regular rhythm  Heart sounds: Normal heart sounds  No murmur  Pulmonary:      Effort: Pulmonary effort is normal       Breath sounds: Normal breath sounds  Skin:         Neurological:      Mental Status: He is alert  Incision and drain    Date/Time: 3/25/2021 2:40 PM  Performed by: Julio Dent PA-C  Authorized by: Julio Dent PA-C   Universal Protocol:  Consent: Verbal consent not obtained  Consent given by: patient  Patient understanding: patient states understanding of the procedure being performed  Patient consent: the patient's understanding of the procedure matches consent given      Patient location:  Clinic  Location:     Type:  Abscess    Location:  Upper extremity    Upper extremity location:  R shoulder  Pre-procedure details:     Skin preparation:  Betadine  Anesthesia (see MAR for exact dosages): Anesthesia method:  Local infiltration    Local anesthetic:  Lidocaine 1% w/o epi  Procedure details:     Complexity:  Simple    Incision types:  Stab incision    Scalpel blade:  11    Approach:  Open    Incision depth:  Subcutaneous    Wound management:  Probed and deloculated    Drainage:  Purulent    Drainage amount:   Moderate    Wound treatment: Packing placed    Packing materials:  1/4 in iodoform gauze    Amount 1/4" iodoform:  4 inches  Post-procedure details:     Patient tolerance of procedure: Tolerated well, no immediate complications        Wound contents were expressed and sent for culture and sensitivities

## 2021-03-25 NOTE — PATIENT INSTRUCTIONS
1  Keep skin clean and dry  2  Take Bactrim Ds 1 tablet twice daily x 7 days  3  Return for packing removal in 2-3 days  4  Tylenol as needed for pain

## 2021-03-28 ENCOUNTER — OFFICE VISIT (OUTPATIENT)
Dept: URGENT CARE | Facility: MEDICAL CENTER | Age: 76
End: 2021-03-28
Payer: COMMERCIAL

## 2021-03-28 VITALS
HEIGHT: 75 IN | HEART RATE: 76 BPM | WEIGHT: 207 LBS | TEMPERATURE: 97 F | RESPIRATION RATE: 18 BRPM | OXYGEN SATURATION: 97 % | BODY MASS INDEX: 25.74 KG/M2 | DIASTOLIC BLOOD PRESSURE: 65 MMHG | SYSTOLIC BLOOD PRESSURE: 148 MMHG

## 2021-03-28 DIAGNOSIS — Z48.00 ABSCESS PACKING REMOVAL: Primary | ICD-10-CM

## 2021-03-28 LAB
BACTERIA WND AEROBE CULT: NO GROWTH
GRAM STN SPEC: NORMAL

## 2021-03-28 PROCEDURE — 99213 OFFICE O/P EST LOW 20 MIN: CPT | Performed by: PHYSICIAN ASSISTANT

## 2021-03-28 PROCEDURE — S9083 URGENT CARE CENTER GLOBAL: HCPCS | Performed by: PHYSICIAN ASSISTANT

## 2021-03-28 NOTE — PROGRESS NOTES
[facility]        NAME: Zeb Wang is a 77 y o  male  : 1945    MRN: [id number]  DATE: 2021  TIME: 10:01 AM    Assessment and Plan   Abscess packing removal [Z48 00]  1  Abscess packing removal       Area healing well  Packing removed without complication with forceps and band aid placed over wound  Advised to finish out antibiotics and keep clean and dry until wound heals  Patient Instructions     Keep area clean and dry until wound heals    Continue Bactrim  Follow up with PCP in 3-5 days  Proceed to  ER if symptoms worsen  Chief Complaint     Chief Complaint   Patient presents with    Wound Check         History of Present Illness        Patient is a 60-year-old male who presents today with complaints of abscess to his right upper arm  He reports that it was lanced and drained on 2021 and packing was placed  It had been there for a few weeks prior  Reports area is healing well with no significant drainage or erythema  Is currently on Bactrim   (      Review of Systems   Review of Systems   Constitutional: Negative for fever  Respiratory: Negative for shortness of breath  Cardiovascular: Negative for chest pain  Skin: Positive for wound           Current Medications       Current Outpatient Medications:     apixaban (ELIQUIS) 5 mg, Take 1 tablet (5 mg total) by mouth 2 (two) times a day (Patient not taking: Reported on 10/19/2020), Disp: 60 tablet, Rfl: 11    aspirin (ECOTRIN LOW STRENGTH) 81 mg EC tablet, Take 81 mg by mouth daily, Disp: , Rfl:     atorvastatin (LIPITOR) 40 mg tablet, Take 1 tablet (40 mg total) by mouth daily, Disp: 90 tablet, Rfl: 3    Calcium-Magnesium-Vitamin D (CALCIUM MAGNESIUM PO), Take by mouth, Disp: , Rfl:     chlorhexidine (PERIDEX) 0 12 % solution, 15 mL daily at bedtime , Disp: , Rfl:     Cyanocobalamin (VITAMIN B12 PO), Take by mouth, Disp: , Rfl:     doxycycline (PERIOSTAT) 20 MG tablet, Take 20 mg by mouth daily in the early morning Takes for Gums, Disp: , Rfl: 2    metoprolol succinate (TOPROL-XL) 25 mg 24 hr tablet, Take 3 tablets (75 mg total) by mouth every 12 (twelve) hours (Patient not taking: Reported on 10/19/2020), Disp: 540 tablet, Rfl: 2    pantoprazole (PROTONIX) 40 mg tablet, Take 1 tablet (40 mg total) by mouth daily in the early morning (Patient not taking: Reported on 10/19/2020), Disp: 30 tablet, Rfl: 0    saccharomyces boulardii (FLORASTOR) 250 mg capsule, Take 250 mg by mouth 2 (two) times a day, Disp: , Rfl:     sulfamethoxazole-trimethoprim (BACTRIM DS) 800-160 mg per tablet, Take 1 tablet by mouth every 12 (twelve) hours for 7 days, Disp: 14 tablet, Rfl: 0    Current Allergies     Allergies as of 03/28/2021    (No Known Allergies)            The following portions of the patient's history were reviewed and updated as appropriate: allergies, current medications, past family history, past medical history, past social history, past surgical history and problem list      Past Medical History:   Diagnosis Date    Anemia     Internal Hemorroids    Closed nondisplaced fracture of second metatarsal bone of left foot 11/11/2018    Foot drop     Left    Heart murmur     MVP    Hyperlipidemia     Irregular heart beat     Left foot pain 7/11/2019    Left inguinal hernia     Managed By Leda Bowen / last assessed 3/27/15     Pain in both feet 4/2/2019    Paroxysmal atrial fibrillation ([facility] [address] ) 02/27/2019    Pleural effusion, left 2/27/2019    Pneumonia 2/26/2019    Skin lesion     Subclinical hypothyroidism 1/15/2020    Weakness of left foot 11/7/2018       Past Surgical History:   Procedure Laterality Date    BACK SURGERY      COLONOSCOPY      HEMORROIDECTOMY      HERNIA REPAIR      IR IMAGE GUIDED ASPIRATION / DRAINAGE W TUBE  11/19/2019    IR THORACENTESIS  3/1/2019    LUMBAR LAMINECTOMY Left 11/9/2018    Procedure: Metrx L4-5 left hemilaminectomy and bilateral foraminotomy, Metrx L5-S1 left hemilaminectomy and microdiskectomy;  Surgeon: Dominguez Slater MD;  [facility];  Service: Neurosurgery    [place] LAP,APPENDECTOMY N/A 2/20/2020    Procedure: LAPAROSCOPIC APPENDECTOMY;  Surgeon: Margareth Garcia DO;  [facility];  Service: General       Family History   Problem Relation Age of Onset    Heart disease Mother     No Known Problems Father     No Known Problems Sister     No Known Problems Brother     No Known Problems Family     Anemia Neg Hx     Arrhythmia Neg Hx     Clotting disorder Neg Hx     Heart attack Neg Hx     Heart failure Neg Hx     Hyperlipidemia Neg Hx     Hypertension Neg Hx     Fainting Neg Hx     Asthma Neg Hx          Medications have been verified  Objective   /65   Pulse 76   Temp (!) 97 °F (36 1 °C)   Resp 18   Ht 6' 2 5" (1 892 m)   Wt 93 9 kg (207 lb)   SpO2 97%   BMI 26 22 kg/m²        Physical Exam     Physical Exam   Constitutional: He appears well-developed and well-nourished  Cardiovascular: Normal rate and regular rhythm  Pulmonary/Chest: Effort normal and breath sounds normal    Neurological: He is alert     Skin:

## 2021-05-10 ENCOUNTER — TELEPHONE (OUTPATIENT)
Dept: FAMILY MEDICINE CLINIC | Facility: CLINIC | Age: 76
End: 2021-05-10

## 2021-08-18 ENCOUNTER — OFFICE VISIT (OUTPATIENT)
Dept: CARDIOLOGY CLINIC | Facility: CLINIC | Age: 76
End: 2021-08-18
Payer: COMMERCIAL

## 2021-08-18 ENCOUNTER — APPOINTMENT (OUTPATIENT)
Dept: LAB | Facility: CLINIC | Age: 76
End: 2021-08-18
Payer: COMMERCIAL

## 2021-08-18 VITALS
BODY MASS INDEX: 24.87 KG/M2 | WEIGHT: 200 LBS | HEIGHT: 75 IN | DIASTOLIC BLOOD PRESSURE: 62 MMHG | OXYGEN SATURATION: 97 % | SYSTOLIC BLOOD PRESSURE: 122 MMHG | HEART RATE: 73 BPM

## 2021-08-18 DIAGNOSIS — I35.1 MODERATE AORTIC INSUFFICIENCY: ICD-10-CM

## 2021-08-18 DIAGNOSIS — I48.0 PAROXYSMAL ATRIAL FIBRILLATION (HCC): ICD-10-CM

## 2021-08-18 DIAGNOSIS — E78.5 DYSLIPIDEMIA: ICD-10-CM

## 2021-08-18 DIAGNOSIS — I48.0 PAROXYSMAL ATRIAL FIBRILLATION (HCC): Primary | ICD-10-CM

## 2021-08-18 DIAGNOSIS — I25.10 CORONARY ARTERY CALCIFICATION SEEN ON CAT SCAN: ICD-10-CM

## 2021-08-18 DIAGNOSIS — I48.92 PAROXYSMAL ATRIAL FLUTTER (HCC): ICD-10-CM

## 2021-08-18 LAB
ALBUMIN SERPL BCP-MCNC: 4 G/DL (ref 3.5–5)
ALP SERPL-CCNC: 69 U/L (ref 46–116)
ALT SERPL W P-5'-P-CCNC: 59 U/L (ref 12–78)
ANION GAP SERPL CALCULATED.3IONS-SCNC: 5 MMOL/L (ref 4–13)
AST SERPL W P-5'-P-CCNC: 40 U/L (ref 5–45)
BILIRUB SERPL-MCNC: 0.98 MG/DL (ref 0.2–1)
BUN SERPL-MCNC: 15 MG/DL (ref 5–25)
CALCIUM SERPL-MCNC: 9 MG/DL (ref 8.3–10.1)
CHLORIDE SERPL-SCNC: 105 MMOL/L (ref 100–108)
CHOLEST SERPL-MCNC: 102 MG/DL (ref 50–200)
CO2 SERPL-SCNC: 29 MMOL/L (ref 21–32)
CREAT SERPL-MCNC: 1.05 MG/DL (ref 0.6–1.3)
GFR SERPL CREATININE-BSD FRML MDRD: 69 ML/MIN/1.73SQ M
GLUCOSE P FAST SERPL-MCNC: 102 MG/DL (ref 65–99)
HDLC SERPL-MCNC: 47 MG/DL
LDLC SERPL CALC-MCNC: 45 MG/DL (ref 0–100)
POTASSIUM SERPL-SCNC: 4.4 MMOL/L (ref 3.5–5.3)
PROT SERPL-MCNC: 7.1 G/DL (ref 6.4–8.2)
SODIUM SERPL-SCNC: 139 MMOL/L (ref 136–145)
TRIGL SERPL-MCNC: 49 MG/DL

## 2021-08-18 PROCEDURE — 99214 OFFICE O/P EST MOD 30 MIN: CPT | Performed by: INTERNAL MEDICINE

## 2021-08-18 PROCEDURE — 1036F TOBACCO NON-USER: CPT | Performed by: INTERNAL MEDICINE

## 2021-08-18 PROCEDURE — 80053 COMPREHEN METABOLIC PANEL: CPT | Performed by: INTERNAL MEDICINE

## 2021-08-18 PROCEDURE — 1160F RVW MEDS BY RX/DR IN RCRD: CPT | Performed by: INTERNAL MEDICINE

## 2021-08-18 PROCEDURE — 36415 COLL VENOUS BLD VENIPUNCTURE: CPT | Performed by: INTERNAL MEDICINE

## 2021-08-18 PROCEDURE — 93000 ELECTROCARDIOGRAM COMPLETE: CPT | Performed by: INTERNAL MEDICINE

## 2021-08-18 PROCEDURE — 3008F BODY MASS INDEX DOCD: CPT | Performed by: INTERNAL MEDICINE

## 2021-08-18 PROCEDURE — 80061 LIPID PANEL: CPT

## 2021-08-18 NOTE — PROGRESS NOTES
Cardiology Follow Up    Perfecto Wild  1945  [id number]  [address] [facility] [address]  [phone]    1  Paroxysmal atrial fibrillation (HCC)  Comprehensive metabolic panel    Lipid Panel with Direct LDL reflex    POCT ECG   2  Paroxysmal atrial flutter (HCC)     3  Moderate aortic insufficiency  Echo complete with contrast if indicated   4  Coronary artery calcification seen on CAT scan     5  Dyslipidemia         Discussion/Summary:  Mr Yessi Martinez is a pleasant 23-year-old gentleman who presents to the office today for routine follow-up  He continues to maintain normal sinus rhythm after his ablation without signs or symptoms of recurrent atrial fibrillation/flutter  He is now off all medication  His blood pressure is well controlled on no antihypertensive medication  A low-salt diet was reinforced  He is maintained on aspirin and statin therapy given coronary artery calcifications noted on CT scan  I have requested repeat lipids  He has moderate aortic insufficiency and mild-to-moderate mitral insufficiency on his most recent echocardiogram from last year  I have asked that he undergo repeat echocardiogram       I will see him back in the office in six months or sooner if deemed necessary  Interval History:  Mr Yessi Martinez is a pleasant 23-year-old gentleman who presents to the office today for routine follow-up  Since his last visit he feels very well  He walks on a regular basis  He walks daily for about 4 miles which includes ascending hills  He also swims regularly  He denies any exertional chest pain or shortness of breath in doing so  He denies any signs or symptoms of congestive heart failure including increasing lower extremity edema, paroxysmal nocturnal dyspnea, orthopnea, acute weight gain or increasing abdominal girth    He denies lightheadedness, syncope or presyncope  He denies palpitations or symptoms of claudication  He checks his blood pressure regularly at home       Problem List     Hamstring tightness of both lower extremities    Weakness of left foot    Left foot drop    Overview Signed 11/9/2018 11:26 AM by Randee Hopson MD     Added automatically from request for surgery [id number]         Closed nondisplaced fracture of second metatarsal bone of left foot    Well adult exam    History of lumbar laminectomy    Pneumonia    NSTEMI (non-ST elevated myocardial infarction) [facility])    Atrial fibrillation ([facility] [address] )    Pleural effusion, left    Acquired deformity of foot    Pain in both feet    Peripheral arteriosclerosis ([facility] [address] )    Onychomycosis    Tinea pedis of both feet        Past Medical History:   Diagnosis Date    Anemia     Internal Hemorroids    Closed nondisplaced fracture of second metatarsal bone of left foot 11/11/2018    Foot drop     Left    Heart murmur     MVP    Hyperlipidemia     Irregular heart beat     Left foot pain 7/11/2019    Left inguinal hernia     Managed By Dejan Prima / last assessed 3/27/15     Pain in both feet 4/2/2019    Paroxysmal atrial fibrillation ([facility] [address] ) 02/27/2019    Pleural effusion, left 2/27/2019    Pneumonia 2/26/2019    Skin lesion     Subclinical hypothyroidism 1/15/2020    Weakness of left foot 11/7/2018     Social History     Socioeconomic History    Marital status: /Civil Union     Spouse name: Not on file    Number of children: Not on file    Years of education: Not on file    Highest education level: Not on file   Occupational History    Not on file   Tobacco Use    Smoking status: Never Smoker    Smokeless tobacco: Never Used   Vaping Use    Vaping Use: Never used   Substance and Sexual Activity    Alcohol use: Yes     Comment: Rare socially    Drug use: No    Sexual activity: Not on file   Other Topics Concern    Not on file   Social History Narrative    Not on file     Social Determinants of Health     Financial Resource Strain:     Difficulty of Paying Living Expenses:    Food Insecurity:     Worried About Running Out of Food in the Last Year:     [address] in the Last Year:    Transportation Needs:     Lack of Transportation (Medical):      Lack of Transportation (Non-Medical):    Physical Activity:     Days of Exercise per Week:     Minutes of Exercise per Session:    Stress:     Feeling of Stress :    Social Connections:     Frequency of Communication with Friends and Family:     Frequency of Social Gatherings with Friends and Family:     Attends [employer] Services:     Active Member of Clubs or Organizations:     Attends Club or Organization Meetings:     Marital Status:    Intimate Partner Violence:     Fear of Current or Ex-Partner:     Emotionally Abused:     Physically Abused:     Sexually Abused:       Family History   Problem Relation Age of Onset    Heart disease Mother     No Known Problems Father     No Known Problems Sister     No Known Problems Brother     No Known Problems Family     Anemia Neg Hx     Arrhythmia Neg Hx     Clotting disorder Neg Hx     Heart attack Neg Hx     Heart failure Neg Hx     Hyperlipidemia Neg Hx     Hypertension Neg Hx     Fainting Neg Hx     Asthma Neg Hx      Past Surgical History:   Procedure Laterality Date    BACK SURGERY      COLONOSCOPY      HEMORROIDECTOMY      HERNIA REPAIR      IR IMAGE GUIDED ASPIRATION / DRAINAGE W TUBE  11/19/2019    IR THORACENTESIS  3/1/2019    LUMBAR LAMINECTOMY Left 11/9/2018    Procedure: Metrx L4-5 left hemilaminectomy and bilateral foraminotomy, Metrx L5-S1 left hemilaminectomy and microdiskectomy;  Surgeon: Adolphus Boas, MD;  [facility];  Service: Neurosurgery    [place] LAP,APPENDECTOMY N/A 2/20/2020    Procedure: LAPAROSCOPIC APPENDECTOMY;  Surgeon: Clint Johnson DO;  [facility];  Service: General       Current Outpatient Medications:    aspirin (ECOTRIN LOW STRENGTH) 81 mg EC tablet, Take 81 mg by mouth daily, Disp: , Rfl:     atorvastatin (LIPITOR) 40 mg tablet, Take 1 tablet (40 mg total) by mouth daily, Disp: 90 tablet, Rfl: 3    Calcium-Magnesium-Vitamin D (CALCIUM MAGNESIUM PO), Take by mouth, Disp: , Rfl:     chlorhexidine (PERIDEX) 0 12 % solution, 15 mL daily at bedtime , Disp: , Rfl:     Cyanocobalamin (VITAMIN B12 PO), Take by mouth, Disp: , Rfl:     doxycycline (PERIOSTAT) 20 MG tablet, Take 20 mg by mouth daily in the early morning Takes for Gums, Disp: , Rfl: 2    saccharomyces boulardii (FLORASTOR) 250 mg capsule, Take 250 mg by mouth 2 (two) times a day, Disp: , Rfl:     apixaban (ELIQUIS) 5 mg, Take 1 tablet (5 mg total) by mouth 2 (two) times a day (Patient not taking: Reported on 10/19/2020), Disp: 60 tablet, Rfl: 11    metoprolol succinate (TOPROL-XL) 25 mg 24 hr tablet, Take 3 tablets (75 mg total) by mouth every 12 (twelve) hours (Patient not taking: Reported on 10/19/2020), Disp: 540 tablet, Rfl: 2    pantoprazole (PROTONIX) 40 mg tablet, Take 1 tablet (40 mg total) by mouth daily in the early morning (Patient not taking: Reported on 10/19/2020), Disp: 30 tablet, Rfl: 0  No Known Allergies    Labs:     Chemistry        Component Value Date/Time    K 4 5 10/19/2020 1006     10/19/2020 1006    CO2 28 10/19/2020 1006    CO2 29 11/07/2018 0601    BUN 13 10/19/2020 1006    CREATININE 1 05 10/19/2020 1006        Component Value Date/Time    CALCIUM 9 1 10/19/2020 1006    ALKPHOS 70 10/19/2020 1006    AST 61 (H) 10/19/2020 1006    ALT 64 10/19/2020 1006            No results found for: CHOL  Lab Results   Component Value Date    HDL 40 10/19/2020    HDL 44 02/27/2020    HDL 36 (L) 06/26/2019     Lab Results   Component Value Date    LDLCALC 35 10/19/2020    LDLCALC 63 02/27/2020    LDLCALC 58 06/26/2019     Lab Results   Component Value Date    TRIG 52 10/19/2020    TRIG 76 02/27/2020    TRIG 48 06/26/2019     No results found for: [place], [place]    Imaging: Xr Chest Pa & Lateral    Result Date: 4/17/2019  Narrative: CHEST INDICATION:   J90: Pleural effusion, not elsewhere classified J18 1: Lobar pneumonia, unspecified organism  COMPARISON:  Two-view chest 3/2/2019 EXAM PERFORMED/VIEWS:  XR CHEST PA & LATERAL  The frontal view was performed utilizing dual energy radiographic technique  FINDINGS: Normal cardiac silhouette  Aortic calcification is present  Unfolded aorta  Markedly improved left lower lobe airspace consolidation with minimal residual opacification  Markedly improved left pleural effusion  No pneumothorax or pulmonary edema  Multilevel thoracic spondylosis  Impression: Markedly improved left lower lobe airspace consolidation and left pleural effusion  Workstation performed: [id number]         Review of Systems   Cardiovascular: Negative for chest pain, claudication, cyanosis and leg swelling  All other systems reviewed and are negative  Vitals:    08/18/21 0845   BP: 148/62   Pulse: 73   SpO2: 97%     Vitals:    08/18/21 0845   Weight: 90 7 kg (200 lb)     Height: 6' 2 5" (189 2 cm)   Body mass index is 25 33 kg/m²      Physical Exam:  General:  Alert and cooperative, appears stated age  HEENT:  PERRLA, EOMI, no scleral icterus, no conjunctival pallor  Neck:  No lymphadenopathy, no thyromegaly, no carotid bruits, no elevated JVP  Heart:  Regular rate and rhythm, normal Y2/W5, diastolic murmur noted at right upper sternal border  Lungs:  Clear to auscultation bilaterally   Abdomen:  Soft, non-tender, positive bowel sounds, no rebound or guarding,   no organomegaly   Extremities:  No clubbing, cyanosis or edema   Vascular:  2+ pedal pulses  Skin:  No rashes or lesions on exposed skin  Neurologic:  Cranial nerves II-XII grossly intact without focal deficits

## 2021-08-23 NOTE — OP NOTE
OPERATIVE REPORT  PATIENT NAME: Amilcar William    :  1945  MRN: [id number]  Pt Location: [facility] OR ROOM 03    SURGERY DATE: 2020    Surgeon(s) and Role:     * Sarah Solis DO - Primary     * Harika Tam, [address]  No qualified resident was available  Her assistance was required for exposure retraction throughout the case    Preop Diagnosis:  Appendicitis [K37]    Post-Op Diagnosis Codes:     * Appendicitis [K37]    Procedure(s) (LRB):  LAPAROSCOPIC APPENDECTOMY (N/A), interval    Specimen(s):  ID Type Source Tests Collected by Time Destination   1 : appendiceal stump Tissue Appendix TISSUE EXAM Sarah Solis DO 2020 1114        Estimated Blood Loss:   Minimal    Drains:  Urethral Catheter Latex 16 Fr  (Active)   Number of days: 0       Anesthesia Type:   General/local    Operative Indications:  History of perforated appendicitis status post drainage of abscess with interventional Radiology  Interval appendectomy recommended    Operative Findings:  Some inflammation noted in the right lower quadrant as expected  Had a very small appendiceal stump  Entire cecum was mobilized and inspected  Only the small appendiceal stump was noted  ASA 2  Wound class 2  Height 72 in weight 97 kilos/2 and 13 lb  BMI 27    Complications:   None    Procedure and Technique:  Patient was brought the operative suite and identified by visualization, conversation, by armband  Sequential compression pumps were placed  He was given Ancef perioperatively  Once under anesthesia, Oneal catheter was placed under sterile technique  Then the abdomen was then prepped and draped in a sterile fashion  Time-out was performed assured that the prep was dry  Local was instilled at the supraumbilical fold  Small vertical skin incision was made in the subcutaneous tissues were bluntly dissected with Kocher clamps the fascia  Fascia lifted up and divided the midline    I poked through the underlying peritoneum gaining access into the abdominal cavity  A 12 mm trocar was placed under direct visualization  CO2 was attached creating pneumoperitoneum  5 mm bladeless trocar was then placed in the suprapubic as well as right lower quadrant area  He did have some flimsy adhesions of the ileocecal region along the lateral sidewall  These were carefully and bluntly mobilized so I could inspect the entire ileocecal region  Appeared to be a small appendiceal stump that was remaining  I did reflect much of the cecum and proximal ascending colon medially  There was no other signs of any further appendix along the gutter  Appendiceal stump was found at the distal end aspect of theTaenia coli  This is grasped with a laparoscopic Allis  This was transected off of the cecum with a fire of a laparoscopic Endo GI stapler  A blue load was used  This was then brought out through the 12 mm trocar site  Copious irrigation was carried out  Thoroughly inspected the ileocecal region again and no other pathology was noted  Omentum was placed over the staple line  Trocars removed releasing pneumoperitoneum  Fascia at the umbilical site was closed using 0 Vicryl in a figure-of-eight fashion  Local was instilled  Irrigation is carried out  Four Monocryl was used to close all skin incisions in a subcuticular fashion  Wounds were washed and dried  Sterile skin glue was applied  He was awakened in the operating room and returned to the recovery area in stable condition having tolerated the procedure well     I was present for the entire procedure    Patient Disposition:  PACU     SIGNATURE: Ailyn Hallman DO  DATE: February 20, 2020  TIME: 11:26 AM Show Mentalis Units: No

## 2021-10-11 ENCOUNTER — TELEPHONE (OUTPATIENT)
Dept: FAMILY MEDICINE CLINIC | Facility: CLINIC | Age: 76
End: 2021-10-11

## 2021-10-13 ENCOUNTER — HOSPITAL ENCOUNTER (OUTPATIENT)
Dept: NON INVASIVE DIAGNOSTICS | Facility: CLINIC | Age: 76
Discharge: HOME | End: 2021-10-13
Payer: COMMERCIAL

## 2021-10-13 VITALS
DIASTOLIC BLOOD PRESSURE: 62 MMHG | WEIGHT: 200 LBS | BODY MASS INDEX: 25.67 KG/M2 | SYSTOLIC BLOOD PRESSURE: 122 MMHG | HEIGHT: 74 IN | HEART RATE: 60 BPM

## 2021-10-13 DIAGNOSIS — I35.1 MODERATE AORTIC INSUFFICIENCY: ICD-10-CM

## 2021-10-13 LAB — SL CV LV EF: 55

## 2021-10-13 PROCEDURE — 93306 TTE W/DOPPLER COMPLETE: CPT | Performed by: INTERNAL MEDICINE

## 2021-10-13 PROCEDURE — 93306 TTE W/DOPPLER COMPLETE: CPT

## 2022-01-11 DIAGNOSIS — E78.5 DYSLIPIDEMIA: ICD-10-CM

## 2022-01-11 RX ORDER — ATORVASTATIN CALCIUM 40 MG/1
40 TABLET, FILM COATED ORAL DAILY
Qty: 90 TABLET | Refills: 3 | Status: SHIPPED | OUTPATIENT
Start: 2022-01-11

## 2022-03-07 ENCOUNTER — OFFICE VISIT (OUTPATIENT)
Dept: URGENT CARE | Facility: MEDICAL CENTER | Age: 77
End: 2022-03-07
Payer: COMMERCIAL

## 2022-03-07 VITALS
HEIGHT: 74 IN | RESPIRATION RATE: 18 BRPM | SYSTOLIC BLOOD PRESSURE: 122 MMHG | DIASTOLIC BLOOD PRESSURE: 72 MMHG | BODY MASS INDEX: 25.03 KG/M2 | TEMPERATURE: 97.9 F | WEIGHT: 195 LBS | OXYGEN SATURATION: 97 % | HEART RATE: 73 BPM

## 2022-03-07 DIAGNOSIS — S81.852A DOG BITE OF LEFT LOWER LEG, INITIAL ENCOUNTER: Primary | ICD-10-CM
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
## 2022-03-07 PROCEDURE — 90471 IMMUNIZATION ADMIN: CPT | Performed by: PHYSICIAN ASSISTANT

## 2022-03-07 PROCEDURE — 99213 OFFICE O/P EST LOW 20 MIN: CPT | Performed by: PHYSICIAN ASSISTANT

## 2022-03-07 PROCEDURE — S9083 URGENT CARE CENTER GLOBAL: HCPCS | Performed by: PHYSICIAN ASSISTANT

## 2022-03-07 PROCEDURE — 90715 TDAP VACCINE 7 YRS/> IM: CPT

## 2022-03-07 RX ORDER — AMOXICILLIN AND CLAVULANATE POTASSIUM 875; 125 MG/1; MG/1
1 TABLET, FILM COATED ORAL EVERY 12 HOURS SCHEDULED
Qty: 14 TABLET | Refills: 0 | Status: SHIPPED | OUTPATIENT
Start: 2022-03-07 | End: 2022-03-14

## 2022-03-07 NOTE — PROGRESS NOTES
[facility]        NAME: Betito Thomas is a 77 y o  male  : 1945    MRN: [id number]  DATE: 2022  TIME: 9:16 AM    Assessment and Plan   Dog bite of left lower leg, initial encounter [A57 146W, W54  0XXA]  1  Dog bite of left lower leg, initial encounter  Tdap Vaccine greater than or equal to 8yo    amoxicillin-clavulanate (AUGMENTIN) 875-125 mg per tablet     Area cleaned with saline and bandaged  Will place on Augmentin due to extent of wound  Advised to keep clean and dry  Tetanus updated in office  Patient Instructions     Follow up with PCP in 3-5 days  Proceed to  ER if symptoms worsen  Chief Complaint     Chief Complaint   Patient presents with    Dog Bite     dog bite to left leg; dog is up to date on rabies; patient not up to date on TDAP          History of Present Illness       Patient is a 76 y/o male who presents today with complaints of dog bite to left lower leg  It was his neighbors dog who came after him while he was out walking this morning  Dog is up to date on vaccinations  Patient not UTD on TDAP  No pain, wound slightly bleeding  Is able to walk without difficulty  Review of Systems   Review of Systems   Constitutional: Negative for fever  Musculoskeletal: Negative for joint swelling and myalgias  Skin: Positive for wound           Current Medications       Current Outpatient Medications:     amoxicillin-clavulanate (AUGMENTIN) 875-125 mg per tablet, Take 1 tablet by mouth every 12 (twelve) hours for 7 days, Disp: 14 tablet, Rfl: 0    apixaban (ELIQUIS) 5 mg, Take 1 tablet (5 mg total) by mouth 2 (two) times a day (Patient not taking: Reported on 10/19/2020), Disp: 60 tablet, Rfl: 11    aspirin (ECOTRIN LOW STRENGTH) 81 mg EC tablet, Take 81 mg by mouth daily, Disp: , Rfl:     atorvastatin (LIPITOR) 40 mg tablet, Take 1 tablet (40 mg total) by mouth daily, Disp: 90 tablet, Rfl: 3    Calcium-Magnesium-Vitamin D (CALCIUM MAGNESIUM PO), Take by mouth, Disp: , Rfl:     chlorhexidine (PERIDEX) 0 12 % solution, 15 mL daily at bedtime , Disp: , Rfl:     Cyanocobalamin (VITAMIN B12 PO), Take by mouth, Disp: , Rfl:     doxycycline (PERIOSTAT) 20 MG tablet, Take 20 mg by mouth daily in the early morning Takes for Gums, Disp: , Rfl: 2    metoprolol succinate (TOPROL-XL) 25 mg 24 hr tablet, Take 3 tablets (75 mg total) by mouth every 12 (twelve) hours (Patient not taking: Reported on 10/19/2020), Disp: 540 tablet, Rfl: 2    pantoprazole (PROTONIX) 40 mg tablet, Take 1 tablet (40 mg total) by mouth daily in the early morning (Patient not taking: Reported on 10/19/2020), Disp: 30 tablet, Rfl: 0    saccharomyces boulardii (FLORASTOR) 250 mg capsule, Take 250 mg by mouth 2 (two) times a day, Disp: , Rfl:     Current Allergies     Allergies as of 03/07/2022    (No Known Allergies)            The following portions of the patient's history were reviewed and updated as appropriate: allergies, current medications, past family history, past medical history, past social history, past surgical history and problem list      Past Medical History:   Diagnosis Date    Anemia     Internal Hemorroids    Closed nondisplaced fracture of second metatarsal bone of left foot 11/11/2018    Foot drop     Left    Heart murmur     MVP    Hyperlipidemia     Irregular heart beat     Left foot pain 7/11/2019    Left inguinal hernia     Managed By Sophia Andersen / last assessed 3/27/15     Pain in both feet 4/2/2019    Paroxysmal atrial fibrillation ([facility] [address] ) 02/27/2019    Pleural effusion, left 2/27/2019    Pneumonia 2/26/2019    Skin lesion     Subclinical hypothyroidism 1/15/2020    Weakness of left foot 11/7/2018       Past Surgical History:   Procedure Laterality Date    BACK SURGERY      COLONOSCOPY      HEMORROIDECTOMY      HERNIA REPAIR      IR IMAGE GUIDED ASPIRATION / DRAINAGE W TUBE  11/19/2019    IR THORACENTESIS  3/1/2019    LUMBAR LAMINECTOMY Left 11/9/2018 Procedure: Metrx L4-5 left hemilaminectomy and bilateral foraminotomy, Metrx L5-S1 left hemilaminectomy and microdiskectomy;  Surgeon: Malia Concepcion MD;  [facility];  Service: Neurosurgery    [place] LAP,APPENDECTOMY N/A 2/20/2020    Procedure: LAPAROSCOPIC APPENDECTOMY;  Surgeon: Kimberly Farmer DO;  [facility];  Service: General       Family History   Problem Relation Age of Onset    Heart disease Mother     No Known Problems Father     No Known Problems Sister     No Known Problems Brother     No Known Problems Family     Anemia Neg Hx     Arrhythmia Neg Hx     Clotting disorder Neg Hx     Heart attack Neg Hx     Heart failure Neg Hx     Hyperlipidemia Neg Hx     Hypertension Neg Hx     Fainting Neg Hx     Asthma Neg Hx          Medications have been verified  Objective   /72   Pulse 73   Temp 97 9 °F (36 6 °C)   Resp 18   Ht 6' 2" (1 88 m)   Wt 88 5 kg (195 lb)   SpO2 97%   BMI 25 04 kg/m²        Physical Exam     Physical Exam  Constitutional:       General: He is not in acute distress  Appearance: Normal appearance  He is normal weight  He is not ill-appearing or toxic-appearing  Cardiovascular:      Rate and Rhythm: Normal rate and regular rhythm  Pulmonary:      Effort: Pulmonary effort is normal    Skin:     General: Skin is warm and dry  Findings: Wound present  Neurological:      Mental Status: He is alert

## 2022-05-26 ENCOUNTER — RA CDI HCC (OUTPATIENT)
Dept: OTHER | Facility: HOSPITAL | Age: 77
End: 2022-05-26

## 2022-05-26 NOTE — PROGRESS NOTES
[facility]  coding opportunities       Chart reviewed, no opportunity found:   [address]        Patients Insurance     Medicare Insurance: [employer] Advantage

## 2022-06-02 ENCOUNTER — OFFICE VISIT (OUTPATIENT)
Dept: FAMILY MEDICINE CLINIC | Facility: CLINIC | Age: 77
End: 2022-06-02
Payer: COMMERCIAL

## 2022-06-02 VITALS
BODY MASS INDEX: 25.54 KG/M2 | WEIGHT: 199 LBS | OXYGEN SATURATION: 98 % | DIASTOLIC BLOOD PRESSURE: 60 MMHG | SYSTOLIC BLOOD PRESSURE: 134 MMHG | TEMPERATURE: 96.8 F | RESPIRATION RATE: 16 BRPM | HEART RATE: 80 BPM | HEIGHT: 74 IN

## 2022-06-02 DIAGNOSIS — I48.0 PAROXYSMAL ATRIAL FIBRILLATION (HCC): ICD-10-CM

## 2022-06-02 DIAGNOSIS — I25.10 CORONARY ARTERY CALCIFICATION SEEN ON CAT SCAN: ICD-10-CM

## 2022-06-02 DIAGNOSIS — Z23 ENCOUNTER FOR IMMUNIZATION: ICD-10-CM

## 2022-06-02 DIAGNOSIS — M21.372 LEFT FOOT DROP: ICD-10-CM

## 2022-06-02 DIAGNOSIS — E78.5 DYSLIPIDEMIA: ICD-10-CM

## 2022-06-02 DIAGNOSIS — Z00.00 MEDICARE ANNUAL WELLNESS VISIT, SUBSEQUENT: Primary | ICD-10-CM

## 2022-06-02 PROCEDURE — G0439 PPPS, SUBSEQ VISIT: HCPCS | Performed by: FAMILY MEDICINE

## 2022-06-02 PROCEDURE — 90677 PCV20 VACCINE IM: CPT

## 2022-06-02 PROCEDURE — 1160F RVW MEDS BY RX/DR IN RCRD: CPT | Performed by: FAMILY MEDICINE

## 2022-06-02 PROCEDURE — 1125F AMNT PAIN NOTED PAIN PRSNT: CPT | Performed by: FAMILY MEDICINE

## 2022-06-02 PROCEDURE — 1036F TOBACCO NON-USER: CPT | Performed by: FAMILY MEDICINE

## 2022-06-02 PROCEDURE — 3288F FALL RISK ASSESSMENT DOCD: CPT | Performed by: FAMILY MEDICINE

## 2022-06-02 PROCEDURE — G0009 ADMIN PNEUMOCOCCAL VACCINE: HCPCS

## 2022-06-02 PROCEDURE — 3725F SCREEN DEPRESSION PERFORMED: CPT | Performed by: FAMILY MEDICINE

## 2022-06-02 PROCEDURE — 1170F FXNL STATUS ASSESSED: CPT | Performed by: FAMILY MEDICINE

## 2022-06-02 NOTE — PROGRESS NOTES
Assessment and Plan:     Problem List Items Addressed This Visit    None     Visit Diagnoses     Encounter for immunization    -  Primary    Relevant Orders    Pneumococcal Conjugate Vaccine 20-valent (Pcv20)           Preventive health issues were discussed with patient, and age appropriate screening tests were ordered as noted in patient's After Visit Summary  Personalized health advice and appropriate referrals for health education or preventive services given if needed, as noted in patient's After Visit Summary       History of Present Illness:     Patient presents for Medicare Annual Wellness visit    Patient Care Team:  Nando Holden DO as PCP - General (Family Medicine)  Edgard Gustafson MD (Inactive)     Problem List:     Patient Active Problem List   Diagnosis    Hamstring tightness of both lower extremities    Left foot drop    Closed nondisplaced fracture of second metatarsal bone of left foot    History of lumbar laminectomy    Paroxysmal atrial fibrillation (HCC)    Onychomycosis    Tinea pedis of both feet    Moderate aortic insufficiency    Right ventricular enlargement    Dyslipidemia    Coronary artery calcification seen on CAT scan    Closed nondisplaced fracture of distal phalanx of left great toe    Closed nondisplaced fracture of fifth left metatarsal bone    Left foot pain    Appendicitis    Perforated appendicitis    Subclinical hypothyroidism    Paroxysmal atrial flutter ([facility] [address] )      Past Medical and Surgical History:     Past Medical History:   Diagnosis Date    Anemia     Internal Hemorroids    Closed nondisplaced fracture of second metatarsal bone of left foot 11/11/2018    Foot drop     Left    Heart murmur     MVP    Hyperlipidemia     Irregular heart beat     Left foot pain 7/11/2019    Left inguinal hernia     Managed By Thomas Delaney / last assessed 3/27/15     Pain in both feet 4/2/2019    Paroxysmal atrial fibrillation ([facility] [address] ) 02/27/2019    Pleural effusion, left 2/27/2019    Pneumonia 2/26/2019    Skin lesion     Subclinical hypothyroidism 1/15/2020    Weakness of left foot 11/7/2018     Past Surgical History:   Procedure Laterality Date    BACK SURGERY      COLONOSCOPY      HEMORROIDECTOMY      HERNIA REPAIR      IR IMAGE GUIDED ASPIRATION / DRAINAGE W TUBE  11/19/2019    IR THORACENTESIS  3/1/2019    LUMBAR LAMINECTOMY Left 11/9/2018    Procedure: Metrx L4-5 left hemilaminectomy and bilateral foraminotomy, Metrx L5-S1 left hemilaminectomy and microdiskectomy;  Surgeon: Helen Sykes MD;  [facility];  Service: Neurosurgery    [place] LAP,APPENDECTOMY N/A 2/20/2020    Procedure: LAPAROSCOPIC APPENDECTOMY;  Surgeon: Benton Garcia DO;  [facility];  Service: General      Family History:     Family History   Problem Relation Age of Onset    Heart disease Mother     No Known Problems Father     No Known Problems Sister     No Known Problems Brother     No Known Problems Family     Anemia Neg Hx     Arrhythmia Neg Hx     Clotting disorder Neg Hx     Heart attack Neg Hx     Heart failure Neg Hx     Hyperlipidemia Neg Hx     Hypertension Neg Hx     Fainting Neg Hx     Asthma Neg Hx       Social History:     Social History     Socioeconomic History    Marital status: /Civil Union     Spouse name: None    Number of children: None    Years of education: None    Highest education level: None   Occupational History    None   Tobacco Use    Smoking status: Never Smoker    Smokeless tobacco: Never Used   Vaping Use    Vaping Use: Never used   Substance and Sexual Activity    Alcohol use: Yes     Comment: Rare socially    Drug use: No    Sexual activity: None   Other Topics Concern    None   Social History Narrative    None     Social Determinants of Health     Financial Resource Strain: Not on file   Food Insecurity: Not on file   Transportation Needs: Not on file   Physical Activity: Not on file   Stress: Not on file Social Connections: Not on file   Intimate Partner Violence: Not on file   Housing Stability: Not on file      Medications and Allergies:     Current Outpatient Medications   Medication Sig Dispense Refill    aspirin (ECOTRIN LOW STRENGTH) 81 mg EC tablet Take 81 mg by mouth daily      atorvastatin (LIPITOR) 40 mg tablet Take 1 tablet (40 mg total) by mouth daily 90 tablet 3    Calcium-Magnesium-Vitamin D (CALCIUM MAGNESIUM PO) Take by mouth      chlorhexidine (PERIDEX) 0 12 % solution 15 mL daily at bedtime       Cyanocobalamin (VITAMIN B12 PO) Take by mouth      doxycycline (PERIOSTAT) 20 MG tablet Take 20 mg by mouth daily in the early morning Takes for Gums  2    saccharomyces boulardii (FLORASTOR) 250 mg capsule Take 250 mg by mouth 2 (two) times a day      apixaban (ELIQUIS) 5 mg Take 1 tablet (5 mg total) by mouth 2 (two) times a day (Patient not taking: No sig reported) 60 tablet 11    metoprolol succinate (TOPROL-XL) 25 mg 24 hr tablet Take 3 tablets (75 mg total) by mouth every 12 (twelve) hours (Patient not taking: No sig reported) 540 tablet 2    pantoprazole (PROTONIX) 40 mg tablet Take 1 tablet (40 mg total) by mouth daily in the early morning (Patient not taking: Reported on 10/19/2020) 30 tablet 0     No current facility-administered medications for this visit       No Known Allergies   Immunizations:     Immunization History   Administered Date(s) Administered    INFLUENZA 11/07/2018    Influenza Quadrivalent Preservative Free 3 years and older IM 10/02/2014    Influenza, high dose seasonal 0 7 mL 11/07/2018, 11/14/2019    Pneumococcal Conjugate 13-Valent 11/07/2018    Tdap 06/02/2009, 03/07/2022    Zoster 07/12/2012    Zoster Vaccine Recombinant 02/04/2020      Health Maintenance:         Topic Date Due    Hepatitis C Screening  Never done    Colorectal Cancer Screening  11/09/2027         Topic Date Due    COVID-19 Vaccine (1) Never done    Pneumococcal Vaccine: 65+ Years (2 - PPSV23 or PCV20) 11/07/2019    Influenza Vaccine (Season Ended) 09/01/2022      Medicare Health Risk Assessment:     /60 (BP Location: Left arm, Patient Position: Sitting, Cuff Size: Adult)   Pulse 80   Temp (!) 96 8 °F (36 °C) (Tympanic)   Resp 16   Ht 6' 2" (1 88 m)   Wt 90 3 kg (199 lb)   SpO2 98%   BMI 25 55 kg/m²      Lidia Macias is here for his Subsequent Wellness visit  Health Risk Assessment:   Patient rates overall health as excellent  Patient feels that their physical health rating is same  Patient is very satisfied with their life  Eyesight was rated as same  Hearing was rated as same  Patient feels that their emotional and mental health rating is same  Patients states they are never, rarely angry  Patient states they are never, rarely unusually tired/fatigued  Pain experienced in the last 7 days has been none  Patient states that he has experienced no weight loss or gain in last 6 months  Fall Risk Screening: In the past year, patient has experienced: no history of falling in past year      Home Safety:  Patient does not have trouble with stairs inside or outside of their home  Patient has working smoke alarms and has working carbon monoxide detector  Home safety hazards include: none  Nutrition:   Current diet is Regular  Medications:   Patient is currently taking over-the-counter supplements  OTC medications include: see medication list  Patient is able to manage medications  Activities of Daily Living (ADLs)/Instrumental Activities of Daily Living (IADLs):   Walk and transfer into and out of bed and chair?: Yes  Dress and groom yourself?: Yes    Bathe or shower yourself?: Yes    Feed yourself?  Yes  Do your laundry/housekeeping?: Yes  Manage your money, pay your bills and track your expenses?: Yes  Make your own meals?: Yes    Do your own shopping?: Yes    Previous Hospitalizations:   Any hospitalizations or ED visits within the last 12 months?: No      Advance Care Planning:   Living will: Yes    Durable POA for healthcare: Yes    Advanced directive: Yes      PREVENTIVE SCREENINGS      Cardiovascular Screening:    General: Screening Current      Diabetes Screening:     General: Screening Current      Colorectal Cancer Screening:     General: Screening Current      Prostate Cancer Screening:    General: Screening Not Indicated      Abdominal Aortic Aneurysm (AAA) Screening:    Risk factors include: age between 73-67 yo        Lung Cancer Screening:     General: Screening Not Indicated    Screening, Brief Intervention, and Referral to Treatment (SBIRT)    Screening  Typical number of drinks in a day: 0  Typical number of drinks in a week: 0  Interpretation: Low risk drinking behavior      Single Item Drug Screening:  How often have you used an illegal drug (including marijuana) or a prescription medication for non-medical reasons in the past year? never    Single Item Drug Screen Score: 0  Interpretation: Negative screen for possible drug use disorder      Ne Banda, DO

## 2022-06-02 NOTE — PROGRESS NOTES
Assessment/Plan:    No problem-specific Assessment & Plan notes found for this encounter  Diagnoses and all orders for this visit:    Medicare annual wellness visit, subsequent    Paroxysmal atrial fibrillation ([address] )    Left foot drop    Dyslipidemia    Coronary artery calcification seen on CAT scan    Encounter for immunization  -     Pneumococcal Conjugate Vaccine 20-valent (Pcv20)        Subjective:      Patient ID: Ethel Mcfarlane is a 77 y o  male  This was a scheduled Medicare wellness visit but the patient also has past medical history of paroxysmal atrial fibrillation  The patient had a successful cardioversion and has been in normal sinus rhythm  His last appointment with his cardiologist was last August   At that point he was taken off the Xarelto  The patient continues on daily aspirin use for his history of paroxysmal atrial fib  The patient also is on a statin because of his history of dyslipidemia  The patient does have a past history of lumbar radiculopathy and footdrop  The patient wears a foot brace to help with his footdrop  The patient is an active exerciser and walks and swims daily  He is very conscious about maintaining his well Health  He recently had a COVID infection but states he is completely resolved from all the symptoms  His main complaints with the COVID infection were GI symptoms  The patient is up-to-date with his COVID shots and booster  The following portions of the patient's history were reviewed and updated as appropriate: allergies, current medications, past family history, past medical history, past social history, past surgical history and problem list     Review of Systems   Constitutional: Negative  HENT: Negative  Eyes: Negative  Respiratory: Negative  Cardiovascular: Negative  Gastrointestinal: Negative  Endocrine: Negative  Musculoskeletal: Negative  Allergic/Immunologic: Negative  Neurological: Negative      Hematological: Negative  Psychiatric/Behavioral: Negative  All other systems reviewed and are negative  Objective:      /60 (BP Location: Left arm, Patient Position: Sitting, Cuff Size: Adult)   Pulse 80   Temp (!) 96 8 °F (36 °C) (Tympanic)   Resp 16   Ht 6' 2" (1 88 m)   Wt 90 3 kg (199 lb)   SpO2 98%   BMI 25 55 kg/m²          Physical Exam  Constitutional:       Appearance: Normal appearance  Cardiovascular:      Rate and Rhythm: Normal rate and regular rhythm  Pulmonary:      Effort: Pulmonary effort is normal       Breath sounds: Normal breath sounds  Musculoskeletal:      Comments: Left footdrop   Neurological:      General: No focal deficit present  Mental Status: He is alert and oriented to person, place, and time  Comments: Left footdrop   Psychiatric:         Mood and Affect: Mood normal          Behavior: Behavior normal          Thought Content:  Thought content normal          Judgment: Judgment normal

## 2022-06-02 NOTE — PATIENT INSTRUCTIONS
Medicare Preventive Visit Patient Instructions  Thank you for completing your Welcome to Medicare Visit or Medicare Annual Wellness Visit today  Your next wellness visit will be due in one year (6/3/2023)  The screening/preventive services that you may require over the next 5-10 years are detailed below  Some tests may not apply to you based off risk factors and/or age  Screening tests ordered at today's visit but not completed yet may show as past due  Also, please note that scanned in results may not display below  Preventive Screenings:  Service Recommendations Previous Testing/Comments   Colorectal Cancer Screening  Colonoscopy    Fecal Occult Blood Test (FOBT)/Fecal Immunochemical Test (FIT)  Fecal DNA/Cologuard Test  Flexible Sigmoidoscopy Age: 54-65 years old   Colonoscopy: every 10 years (May be performed more frequently if at higher risk)  OR  FOBT/FIT: every 1 year  OR  Cologuard: every 3 years  OR  Sigmoidoscopy: every 5 years  Screening may be recommended earlier than age 48 if at higher risk for colorectal cancer  Also, an individualized decision between you and your healthcare provider will decide whether screening between the ages of 74-80 would be appropriate   Colonoscopy: 11/09/2020  FOBT/FIT: Not on file  Cologuard: Not on file  Sigmoidoscopy: Not on file    Screening Current     Prostate Cancer Screening Individualized decision between patient and health care provider in men between ages of 53-78   Medicare will cover every 12 months beginning on the day after your 50th birthday PSA: No results in last 5 years     Screening Not Indicated     Hepatitis C Screening Once for adults born between [birth year] and 1965  More frequently in patients at high risk for Hepatitis C Hep C Antibody: Not on file        Diabetes Screening 1-2 times per year if you're at risk for diabetes or have pre-diabetes Fasting glucose: 102 mg/dL   A1C: 6 3 %    Screening Current   Cholesterol Screening Once every 5 years if you don't have a lipid disorder  May order more often based on risk factors  Lipid panel: 08/18/2021    Screening Current      Other Preventive Screenings Covered by Medicare:  Abdominal Aortic Aneurysm (AAA) Screening: covered once if your at risk  You're considered to be at risk if you have a family history of AAA or a male between the age of 73-68 who smoking at least 100 cigarettes in your lifetime  Lung Cancer Screening: covers low dose CT scan once per year if you meet all of the following conditions: (1) Age 50-69; (2) No signs or symptoms of lung cancer; (3) Current smoker or have quit smoking within the last 15 years; (4) You have a tobacco smoking history of at least 30 pack years (packs per day x number of years you smoked); (5) You get a written order from a healthcare provider  Glaucoma Screening: covered annually if you're considered high risk: (1) You have diabetes OR (2) Family history of glaucoma OR (3)  aged 48 and older OR (3)  American aged 72 and older  Osteoporosis Screening: covered every 2 years if you meet one of the following conditions: (1) Have a vertebral abnormality; (2) On glucocorticoid therapy for more than 3 months; (3) Have primary hyperparathyroidism; (4) On osteoporosis medications and need to assess response to drug therapy  HIV Screening: covered annually if you're between the age of 12-76  Also covered annually if you are younger than 13 and older than 72 with risk factors for HIV infection  For pregnant patients, it is covered up to 3 times per pregnancy      Immunizations:  Immunization Recommendations   Influenza Vaccine Annual influenza vaccination during flu season is recommended for all persons aged >= 6 months who do not have contraindications   Pneumococcal Vaccine (Prevnar and Pneumovax)  * Prevnar = PCV13  * Pneumovax = PPSV23 Adults 25-60 years old: 1-3 doses may be recommended based on certain risk factors  Adults 72 years old: Prevnar (PCV13) vaccine recommended followed by Pneumovax (PPSV23) vaccine  If already received PPSV23 since turning 65, then PCV13 recommended at least one year after PPSV23 dose  Hepatitis B Vaccine 3 dose series if at intermediate or high risk (ex: diabetes, end stage renal disease, liver disease)   Tetanus (Td) Vaccine - COST NOT COVERED BY MEDICARE PART B Following completion of primary series, a booster dose should be given every 10 years to maintain immunity against tetanus  Td may also be given as tetanus wound prophylaxis  Tdap Vaccine - COST NOT COVERED BY MEDICARE PART B Recommended at least once for all adults  For pregnant patients, recommended with each pregnancy  Shingles Vaccine (Shingrix) - COST NOT COVERED BY MEDICARE PART B  2 shot series recommended in those aged 48 and above     Health Maintenance Due:      Topic Date Due    Hepatitis C Screening  Never done    Colorectal Cancer Screening  11/09/2027     Immunizations Due:      Topic Date Due    COVID-19 Vaccine (1) Never done    Pneumococcal Vaccine: 65+ Years (2 - PPSV23 or PCV20) 11/07/2019    Influenza Vaccine (Season Ended) 09/01/2022     Advance Directives   What are advance directives? Advance directives are legal documents that state your wishes and plans for medical care  These plans are made ahead of time in case you lose your ability to make decisions for yourself  Advance directives can apply to any medical decision, such as the treatments you want, and if you want to donate organs  What are the types of advance directives? There are many types of advance directives, and each state has rules about how to use them  You may choose a combination of any of the following:  Living will: This is a written record of the treatment you want  You can also choose which treatments you do not want, which to limit, and which to stop at a certain time  This includes surgery, medicine, IV fluid, and tube feedings     Durable power of  for [facility]): This is a written record that states who you want to make healthcare choices for you when you are unable to make them for yourself  This person, called a proxy, is usually a family member or a friend  You may choose more than 1 proxy  Do not resuscitate (DNR) order:  A DNR order is used in case your heart stops beating or you stop breathing  It is a request not to have certain forms of treatment, such as CPR  A DNR order may be included in other types of advance directives  Medical directive: This covers the care that you want if you are in a coma, near death, or unable to make decisions for yourself  You can list the treatments you want for each condition  Treatment may include pain medicine, surgery, blood transfusions, dialysis, IV or tube feedings, and a ventilator (breathing machine)  Values history: This document has questions about your views, beliefs, and how you feel and think about life  This information can help others choose the care that you would choose  Why are advance directives important? An advance directive helps you control your care  Although spoken wishes may be used, it is better to have your wishes written down  Spoken wishes can be misunderstood, or not followed  Treatments may be given even if you do not want them  An advance directive may make it easier for your family to make difficult choices about your care  Weight Management   Why it is important to manage your weight:  Being overweight increases your risk of health conditions such as heart disease, high blood pressure, type 2 diabetes, and certain types of cancer  It can also increase your risk for osteoarthritis, sleep apnea, and other respiratory problems  Aim for a slow, steady weight loss  Even a small amount of weight loss can lower your risk of health problems  How to lose weight safely:  A safe and healthy way to lose weight is to eat fewer calories and get regular exercise   You can lose up about 1 pound a week by decreasing the number of calories you eat by 500 calories each day  Healthy meal plan for weight management:  A healthy meal plan includes a variety of foods, contains fewer calories, and helps you stay healthy  A healthy meal plan includes the following:  Eat whole-grain foods more often  A healthy meal plan should contain fiber  Fiber is the part of grains, fruits, and vegetables that is not broken down by your body  Whole-grain foods are healthy and provide extra fiber in your diet  Some examples of whole-grain foods are whole-wheat breads and pastas, oatmeal, brown rice, and bulgur  Eat a variety of vegetables every day  Include dark, leafy greens such as spinach, kale, elza greens, and mustard greens  Eat yellow and orange vegetables such as carrots, sweet potatoes, and winter squash  Eat a variety of fruits every day  Choose fresh or canned fruit (canned in its own juice or light syrup) instead of juice  Fruit juice has very little or no fiber  Eat low-fat dairy foods  Drink fat-free (skim) milk or 1% milk  Eat fat-free yogurt and low-fat cottage cheese  Try low-fat cheeses such as mozzarella and other reduced-fat cheeses  Choose meat and other protein foods that are low in fat  Choose beans or other legumes such as split peas or lentils  Choose fish, skinless poultry (chicken or turkey), or lean cuts of red meat (beef or pork)  Before you cook meat or poultry, cut off any visible fat  Use less fat and oil  Try baking foods instead of frying them  Add less fat, such as margarine, sour cream, regular salad dressing and mayonnaise to foods  Eat fewer high-fat foods  Some examples of high-fat foods include french fries, doughnuts, ice cream, and cakes  Eat fewer sweets  Limit foods and drinks that are high in sugar  This includes candy, cookies, regular soda, and sweetened drinks  Exercise:  Exercise at least 30 minutes per day on most days of the week   Some examples of exercise include walking, biking, dancing, and swimming  You can also fit in more physical activity by taking the stairs instead of the elevator or parking farther away from stores  Ask your healthcare provider about the best exercise plan for you  © Copyright [address] 2018 Information is for End User's use only and may not be sold, redistributed or otherwise used for commercial purposes  All illustrations and images included in CareNotes® are the copyrighted property of [employer]  or [address] [place] [address]  Patient is doing very well medically  He has normal sinus rhythm and no longer in atrial fibrillation  He is off Eliquis  He continues to do active exercise to work with his left footdrop and wears a brace to help him walk  He continues to take a statin to control his dyslipidemia  He does have a follow-up appointment with his cardiologist in August when he will get a repeat lipid panel  He has recovered well since his recent COVID infection  He is up-to-date with his COVID shots and boosters  He will be eligible for booster shot in the fall    It is recommended that he get a yearly wellness exam

## 2022-06-20 NOTE — CONSULTS
Pulmonary Consultation   Sherwin Martin 68 y o  male MRN: [id number]  Unit/Bed#: -01 Encounter: [id number]      Reason for consultation:   Pneumonia    Requesting physician:   Hospitalist    Impressions:   LLL Pneumonia/ with L pleural effusion ,  Hx of 4 weeks  ? complicated pneummonia R/O empyema  New onset Paroxysmal atrial fib   Herniated discs , back    Recommendations:  Agree with proceeding with thoracentesis  IR already consulted  Will send orders for specimens  ID on consult and antibiotics on     Maintain O2 sat over 92%  Duo neb treatments Q 6 h    Will follow with you        History of Present Illness   HPI:  Sherwin Martin is a 68 y o  male who has history of 4 weeks of on and off fever, cough then sharp chest pains and recureenet fever, sweating, was admitted   CT chest was consistent with LLL pneumonia/ infiltrate vs mass and pleural effusion    Symptoms lasted about 4 weeks     NON smoker  NO wt lose  NO hemoptysis  Review of systems:  12 point review of systems was completed and was otherwise negative except as listed in HPI        Historical Information   Past Medical History:   Diagnosis Date    Anemia     Closed nondisplaced fracture of second metatarsal bone of left foot 11/11/2018    Foot drop     Left    Left inguinal hernia     Managed By Danial Brennan / last assessed 3/27/15     Skin lesion      Past Surgical History:   Procedure Laterality Date    HERNIA REPAIR      LUMBAR LAMINECTOMY Left 11/9/2018    Procedure: Metrx L4-5 left hemilaminectomy and bilateral foraminotomy, Metrx L5-S1 left hemilaminectomy and microdiskectomy;  Surgeon: Bre Silva MD;  Location: [facility];  Service: Neurosurgery     Family History   Problem Relation Age of Onset    Heart disease Mother     No Known Problems Father     No Known Problems Sister     No Known Problems Brother     No Known Problems Family        Occupational History: none  Social History: never smoked    Meds/Allergies   Current [de-identified] : none Facility-Administered Medications   Medication Dose Route Frequency    acetaminophen (TYLENOL) tablet 650 mg  650 mg Oral Q6H PRN    benzonatate (TESSALON PERLES) capsule 100 mg  100 mg Oral TID PRN    cefTRIAXone (ROCEPHIN) 2,000 mg in dextrose 5 % 50 mL IVPB  2,000 mg Intravenous Q24H    enoxaparin (LOVENOX) subcutaneous injection 40 mg  40 mg Subcutaneous Daily    guaiFENesin (MUCINEX) 12 hr tablet 1,200 mg  1,200 mg Oral Q12H ROSEANN    ibuprofen (MOTRIN) tablet 400 mg  400 mg Oral Q8H [address]    metoprolol tartrate (LOPRESSOR) tablet 25 mg  25 mg Oral Q6H [address]    morphine injection 1 mg  1 mg Intravenous Q4H PRN    ondansetron (ZOFRAN) injection 4 mg  4 mg Intravenous Q8H PRN    sodium chloride (PF) 0 9 % injection 3 mL  3 mL Intravenous PRN     Medications Prior to Admission   Medication    Calcium-Magnesium-Vitamin D (CALCIUM MAGNESIUM PO)    chlorhexidine (PERIDEX) 0 12 % solution    Cholecalciferol (VITAMIN D PO)    Cyanocobalamin (VITAMIN B12 PO)    doxycycline (PERIOSTAT) 20 MG tablet    ibuprofen (MOTRIN) 200 mg tablet     No Known Allergies    Vitals: Blood pressure 101/53, pulse 68, temperature 99 1 °F (37 3 °C), temperature source Oral, resp  rate 18, height 6' 2 5" (1 892 m), weight 99 5 kg (219 lb 6 4 oz), SpO2 91 % , NC, Body mass index is 27 79 kg/m²        Intake/Output Summary (Last 24 hours) at 3/1/2019 0915  Last data filed at 3/1/2019 0401  Gross per 24 hour   Intake 480 ml   Output 1700 ml   Net -1220 ml       Physical Exam  General:Awake alert and oriented x 3, conversant without conversational dyspnea, NAD, normal affect  HEENT:  PERRL, Sclera noninjected, nonicteric OU, Nares patent, no nasal flaring, no nasal drainage, Mucous membranes, moist, no oral lesions, normal dentition  NECK: Trachea midline, no accessory muscle use, no stridor, no cervical or supraclavicular adenopathy, JVP not elevated  CARDIAC: RR  PULM: Decrease BS L side  CHEST: No gross deformities, equal chest [de-identified] : regular diet, poor appetite expansion on inspiration bilaterally  ABD: Normoactive bowel sounds, soft nontender, nondistended, no rebound, no rigidity, no guarding    EXT: No cyanosis, no clubbing, no edema, normal capillary refill  SKIN:  No rashes, no lesions  NEURO: no focal neurologic deficits, AAOx3, moving all extremities appropriately    Labs: I have personally reviewed pertinent lab results  , ABG: No results found for: PHART, TSP0MJU, PO2ART, HDO6BBF, H5MMRBGD, BEART, SOURCE, BNP: No results found for: BNP, CBC: No results found for: WBC, HGB, HCT, MCV, PLT, ADJUSTEDWBC, MCH, MCHC, RDW, MPV, NRBC, CMP: No results found for: SODIUM, K, CL, CO2, ANIONGAP, BUN, CREATININE, GLUCOSE, CALCIUM, AST, ALT, ALKPHOS, PROT, BILITOT, EGFR, PT/INR: No results found for: PT, INR, Troponin: No results found for: TROPONINI  Results from last 7 days   Lab Units 02/28/19  0242 02/27/19  0531 02/26/19  0843 02/26/19  0353   WBC Thousand/uL 10 19* 11 13*  --  16 13*   HEMOGLOBIN g/dL 11 5* 11 2*  --  13 0   HEMATOCRIT % 34 4* 34 7*  --  40 0   PLATELETS Thousands/uL 351 319 328 364   NEUTROS PCT %  --  73  --  76*   MONOS PCT %  --  13*  --  13*      Results from last 7 days   Lab Units 02/28/19  0242 02/27/19  0531 02/26/19  0350   POTASSIUM mmol/L 3 4* 3 7 3 7   CHLORIDE mmol/L 104 104 99*   CO2 mmol/L 24 24 25   BUN mg/dL 8 7 9   CREATININE mg/dL 0 92 0 97 1 09   CALCIUM mg/dL 8 7 8 4 8 7   ALK PHOS U/L  --   --  108   ALT U/L  --   --  58   AST U/L  --   --  53*     Results from last 7 days   Lab Units 02/28/19  0242 02/26/19  0350   MAGNESIUM mg/dL 1 9 1 8          Results from last 7 days   Lab Units 02/26/19  0350   INR  1 11   PTT seconds 38     Results from last 7 days   Lab Units 02/26/19  0350   LACTIC ACID mmol/L 1 3     0   Lab Value Date/Time    TROPONINI <0 02 02/28/2019 0757    TROPONINI 0 04 02/26/2019 1204    TROPONINI 0 04 02/26/2019 0843    TROPONINI 0 05 (H) 02/26/2019 0425       Imaging and other studies: I have personally reviewed pertinent reports  and I have personally reviewed pertinent films in PACS  LUNGS:  Mild dependent atelectasis suspected on the right  Some patchy groundglass opacities are seen posteriorly in the left upper lobe  There are scattered groundglass and alveolar opacities in the mid and lower portions of the left lower lobe  There is a large ill-defined dense consolidation in the left lower lobe  There is an associated small to moderate-sized left pleural effusion  A portion of which in the medial left lower lobe appears to be loculated  Some pleural fluid also appears to   track within the major fissure      Some interstitial thickening is also seen in the left mid and lower lobes, probably edema      PLEURA:  Otherwise grossly unremarkable  Pulmonary function testing: none    EKG, Pathology, and Other Studies: I have personally reviewed pertinent reports     and I have personally reviewed pertinent films in PACS      Code Status: Level 1 - Full Code    MD Catherine Bermeo

## 2022-07-06 ENCOUNTER — APPOINTMENT (OUTPATIENT)
Dept: LAB | Facility: CLINIC | Age: 77
End: 2022-07-06
Payer: COMMERCIAL

## 2022-07-06 ENCOUNTER — OFFICE VISIT (OUTPATIENT)
Dept: CARDIOLOGY CLINIC | Facility: CLINIC | Age: 77
End: 2022-07-06
Payer: COMMERCIAL

## 2022-07-06 VITALS
OXYGEN SATURATION: 98 % | SYSTOLIC BLOOD PRESSURE: 136 MMHG | WEIGHT: 202.8 LBS | BODY MASS INDEX: 26.03 KG/M2 | HEART RATE: 56 BPM | HEIGHT: 74 IN | DIASTOLIC BLOOD PRESSURE: 68 MMHG

## 2022-07-06 DIAGNOSIS — E78.5 DYSLIPIDEMIA: ICD-10-CM

## 2022-07-06 DIAGNOSIS — I48.0 PAROXYSMAL ATRIAL FIBRILLATION (HCC): Primary | ICD-10-CM

## 2022-07-06 DIAGNOSIS — I35.1 MODERATE AORTIC INSUFFICIENCY: ICD-10-CM

## 2022-07-06 DIAGNOSIS — I48.92 PAROXYSMAL ATRIAL FLUTTER (HCC): ICD-10-CM

## 2022-07-06 DIAGNOSIS — I25.10 CORONARY ARTERY CALCIFICATION SEEN ON CAT SCAN: ICD-10-CM

## 2022-07-06 LAB
ALBUMIN SERPL BCP-MCNC: 4 G/DL (ref 3.5–5)
ALP SERPL-CCNC: 53 U/L (ref 34–104)
ALT SERPL W P-5'-P-CCNC: 31 U/L (ref 7–52)
ANION GAP SERPL CALCULATED.3IONS-SCNC: 4 MMOL/L (ref 4–13)
AST SERPL W P-5'-P-CCNC: 32 U/L (ref 13–39)
BILIRUB SERPL-MCNC: 1.09 MG/DL (ref 0.2–1)
BUN SERPL-MCNC: 14 MG/DL (ref 5–25)
CALCIUM SERPL-MCNC: 9 MG/DL (ref 8.4–10.2)
CHLORIDE SERPL-SCNC: 106 MMOL/L (ref 96–108)
CHOLEST SERPL-MCNC: 93 MG/DL
CO2 SERPL-SCNC: 28 MMOL/L (ref 21–32)
CREAT SERPL-MCNC: 0.96 MG/DL (ref 0.6–1.3)
GFR SERPL CREATININE-BSD FRML MDRD: 77 ML/MIN/1.73SQ M
GLUCOSE P FAST SERPL-MCNC: 95 MG/DL (ref 65–99)
HDLC SERPL-MCNC: 39 MG/DL
LDLC SERPL CALC-MCNC: 43 MG/DL (ref 0–100)
POTASSIUM SERPL-SCNC: 4.2 MMOL/L (ref 3.5–5.3)
PROT SERPL-MCNC: 6.5 G/DL (ref 6.4–8.4)
SODIUM SERPL-SCNC: 138 MMOL/L (ref 135–147)
TRIGL SERPL-MCNC: 57 MG/DL

## 2022-07-06 PROCEDURE — 36415 COLL VENOUS BLD VENIPUNCTURE: CPT | Performed by: INTERNAL MEDICINE

## 2022-07-06 PROCEDURE — 93000 ELECTROCARDIOGRAM COMPLETE: CPT | Performed by: INTERNAL MEDICINE

## 2022-07-06 PROCEDURE — 80053 COMPREHEN METABOLIC PANEL: CPT | Performed by: INTERNAL MEDICINE

## 2022-07-06 PROCEDURE — 1160F RVW MEDS BY RX/DR IN RCRD: CPT | Performed by: INTERNAL MEDICINE

## 2022-07-06 PROCEDURE — 80061 LIPID PANEL: CPT

## 2022-07-06 PROCEDURE — 99214 OFFICE O/P EST MOD 30 MIN: CPT | Performed by: INTERNAL MEDICINE

## 2022-07-06 NOTE — PROGRESS NOTES
Cardiology Follow Up    Angelita Gardner  1945  [id number]  [address] [facility] [place]  [address]  [phone]    1  Paroxysmal atrial fibrillation (HCC)     2  Paroxysmal atrial flutter (HCC)  POCT ECG   3  Moderate aortic insufficiency  Echo complete w/ contrast if indicated   4  Dyslipidemia  Lipid Panel with Direct LDL reflex    Comprehensive metabolic panel   5  Coronary artery calcification seen on CAT scan         Discussion/Summary:  Mr El Cohn is a pleasant 52-year-old gentleman who presents to the office today for routine follow-up  He continues to maintain normal sinus rhythm after his ablation without signs or symptoms of recurrent atrial fibrillation/flutter  He is now off all medication  His blood pressure did come down upon my recheck  He checks it faithfully at home with normotensive readings  No changes were made to his regimen  A low-salt diet was reinforced  He is maintained on aspirin and statin therapy given coronary artery calcifications noted on CT scan  His most recent lipids from last year reveal acceptable numbers  A prescription for reassessment was provided  He has moderate aortic insufficiency and moderate mitral insufficiency on his most recent echocardiogram from last year  I have asked that he undergo a repeat echocardiogram     I will see him back in the office in six months or sooner if deemed necessary  Interval History:  Mr El Cohn is a pleasant 52-year-old gentleman who presents to the office today for routine follow-up  Since his last visit he feels very well  He walks on a regular basis  He walks daily for about 4 miles which includes ascending hills  He also swims regularly  He denies any exertional chest pain or shortness of breath in doing so    He denies any signs or symptoms of congestive heart failure including lower extremity edema, paroxysmal nocturnal dyspnea, orthopnea, acute weight gain or increasing abdominal girth  He denies lightheadedness, syncope or presyncope  He denies any sensation of palpitations or symptoms suggestive of recurrent atrial fibrillation  He denies symptoms of claudication  He checks his blood pressure regularly at home  His blood pressure readings are consistently under 120/80 mmHg      Problem List     Hamstring tightness of both lower extremities    Weakness of left foot    Left foot drop    Overview Signed 11/9/2018 11:26 AM by Stevie Mejia MD     Added automatically from request for surgery [id number]         Closed nondisplaced fracture of second metatarsal bone of left foot    Well adult exam    History of lumbar laminectomy    Pneumonia    NSTEMI (non-ST elevated myocardial infarction) [facility])    Atrial fibrillation ([facility] [address] )    Pleural effusion, left    Acquired deformity of foot    Pain in both feet    Peripheral arteriosclerosis ([facility] [address] )    Onychomycosis    Tinea pedis of both feet        Past Medical History:   Diagnosis Date    Anemia     Internal Hemorroids    Closed nondisplaced fracture of second metatarsal bone of left foot 11/11/2018    COVID-19 05/2022    Foot drop     Left    Heart murmur     MVP    Hyperlipidemia     Irregular heart beat     Left foot pain 07/11/2019    Left inguinal hernia     Managed By Arik Paz / last assessed 3/27/15     Pain in both feet 04/02/2019    Paroxysmal atrial fibrillation (HCC) 02/27/2019    Pleural effusion, left 02/27/2019    Pneumonia 02/26/2019    Skin lesion     Subclinical hypothyroidism 01/15/2020    Weakness of left foot 11/07/2018     Social History     Socioeconomic History    Marital status: /Civil Union     Spouse name: Not on file    Number of children: Not on file    Years of education: Not on file    Highest education level: Not on file   Occupational History    Not on file   Tobacco Use    Smoking status: Never Smoker    Smokeless tobacco: Never Used   Vaping Use    Vaping Use: Never used   Substance and Sexual Activity    Alcohol use: Yes     Comment: Rare socially    Drug use: No    Sexual activity: Not on file   Other Topics Concern    Not on file   Social History Narrative    Not on file     Social Determinants of Health     Financial Resource Strain: Not on file   Food Insecurity: Not on file   Transportation Needs: Not on file   Physical Activity: Not on file   Stress: Not on file   Social Connections: Not on file   Intimate Partner Violence: Not on file   Housing Stability: Not on file      Family History   Problem Relation Age of Onset    Heart disease Mother     No Known Problems Father     No Known Problems Sister     No Known Problems Brother     No Known Problems Family     Anemia Neg Hx     Arrhythmia Neg Hx     Clotting disorder Neg Hx     Heart attack Neg Hx     Heart failure Neg Hx     Hyperlipidemia Neg Hx     Hypertension Neg Hx     Fainting Neg Hx     Asthma Neg Hx      Past Surgical History:   Procedure Laterality Date    BACK SURGERY      COLONOSCOPY      HEMORROIDECTOMY      HERNIA REPAIR      IR IMAGE GUIDED ASPIRATION / DRAINAGE W TUBE  11/19/2019    IR THORACENTESIS  3/1/2019    LUMBAR LAMINECTOMY Left 11/9/2018    Procedure: Metrx L4-5 left hemilaminectomy and bilateral foraminotomy, Metrx L5-S1 left hemilaminectomy and microdiskectomy;  Surgeon: Kierra Daniel MD;  [facility];  Service: Neurosurgery    MO LAP,APPENDECTOMY N/A 2/20/2020    Procedure: LAPAROSCOPIC APPENDECTOMY;  Surgeon: Phill Lopez DO;  [facility];  Service: General       Current Outpatient Medications:     aspirin (ECOTRIN LOW STRENGTH) 81 mg EC tablet, Take 81 mg by mouth daily, Disp: , Rfl:     atorvastatin (LIPITOR) 40 mg tablet, Take 1 tablet (40 mg total) by mouth daily, Disp: 90 tablet, Rfl: 3    Calcium-Magnesium-Vitamin D (CALCIUM MAGNESIUM PO), Take by mouth, Disp: , Rfl:   chlorhexidine (PERIDEX) 0 12 % solution, 15 mL daily at bedtime , Disp: , Rfl:     Cyanocobalamin (VITAMIN B12 PO), Take by mouth, Disp: , Rfl:     doxycycline (PERIOSTAT) 20 MG tablet, Take 20 mg by mouth daily in the early morning Takes for Gums, Disp: , Rfl: 2    saccharomyces boulardii (FLORASTOR) 250 mg capsule, Take 250 mg by mouth 2 (two) times a day, Disp: , Rfl:   No Known Allergies    Labs:     Chemistry        Component Value Date/Time    K 4 4 08/18/2021 0930     08/18/2021 0930    CO2 29 08/18/2021 0930    CO2 29 11/07/2018 0601    BUN 15 08/18/2021 0930    CREATININE 1 05 08/18/2021 0930        Component Value Date/Time    CALCIUM 9 0 08/18/2021 0930    ALKPHOS 69 08/18/2021 0930    AST 40 08/18/2021 0930    ALT 59 08/18/2021 0930            No results found for: CHOL  Lab Results   Component Value Date    HDL 47 08/18/2021    HDL 40 10/19/2020    HDL 44 02/27/2020     Lab Results   Component Value Date    LDLCALC 45 08/18/2021    LDLCALC 35 10/19/2020    LDLCALC 63 02/27/2020     Lab Results   Component Value Date    TRIG 49 08/18/2021    TRIG 52 10/19/2020    TRIG 76 02/27/2020     No results found for: CHOLHDL    Imaging: Xr Chest Pa & Lateral    Result Date: 4/17/2019  Narrative: CHEST INDICATION:   J90: Pleural effusion, not elsewhere classified J18 1: Lobar pneumonia, unspecified organism  COMPARISON:  Two-view chest 3/2/2019 EXAM PERFORMED/VIEWS:  XR CHEST PA & LATERAL  The frontal view was performed utilizing dual energy radiographic technique  FINDINGS: Normal cardiac silhouette  Aortic calcification is present  Unfolded aorta  Markedly improved left lower lobe airspace consolidation with minimal residual opacification  Markedly improved left pleural effusion  No pneumothorax or pulmonary edema  Multilevel thoracic spondylosis  Impression: Markedly improved left lower lobe airspace consolidation and left pleural effusion   Workstation performed: [id number]         Review of Systems   Cardiovascular: Negative for chest pain, claudication, cyanosis, dyspnea on exertion, irregular heartbeat and leg swelling  All other systems reviewed and are negative  Vitals:    07/06/22 0835   BP: 136/68   Pulse:    SpO2:      Vitals:    07/06/22 0807   Weight: 92 kg (202 lb 12 8 oz)     Height: 6' 2" (188 cm)   Body mass index is 26 04 kg/m²      Physical Exam:  General:  Alert and cooperative, appears stated age  HEENT:  PERRLA, EOMI, no scleral icterus, no conjunctival pallor  Neck:  No lymphadenopathy, no thyromegaly, no carotid bruits, no elevated JVP  Heart:  Regular rate and rhythm, normal S1/S2, no X8/B8, early diastolic murmur RUSB  Lungs:  Clear to auscultation bilaterally   Abdomen:  Soft, non-tender, positive bowel sounds, no rebound or guarding,   no organomegaly   Extremities:  No clubbing, cyanosis or edema   Vascular:  2+ pedal pulses  Skin:  No rashes or lesions on exposed skin  Neurologic:  Cranial nerves II-XII grossly intact without focal deficits

## 2022-08-19 NOTE — PROGRESS NOTES
Physical Therapy Daily Note     Name: Flora Madrid  Clinic Number: [id number]  Diagnosis: S/P ACL reconstruction R on 7/20/22   Physician: DELONTE Garcia MD    Precautions: see complete protocol in .   Per op report:   She will be NWB for 2 weeks, followed by 2 weeks followed by 50% weight bearing for 2 weeks followed by weightbearing as tolerated. For the 1st 6 weeks she will be in a T-scope brace. This will be locked in full extension during all mobilization during this time, otherwise she can range from 0-90 degrees. She will follow the ACL reconstruction protocol with meniscus repair. Return to clinic in 2 weeks for postoperative wound check and suture removal.   Visit #: 7 of 24  PTA Visit #: 1  Time In: 0816  Time Out: 0859  RTMD: 8/30/22    Subjective     Pt reports: Pain medial aspect (R) knee. Patient reports falling a couple of times yesterday while walking across bricks/rocks while fishing.    Pain Scale: Flora rates pain on a scale of 0-10 to be 5 prior to treatment. Decreased discomfort after treatment.     Objective     GT instructing pt on sequencing and technique w/ use of 1 crutch. Patient ambulated into clinic holding both crutches in one hand.     The patient received the following supervised modalities after being cleared for contradictions: MFAC to (R) quads x 15 minutes with 2 second ramp up and down and 10 second on and off with illiciting strong muscle contraction with patient performing quad set.      Flora received individual therapeutic exercises to develop strength, endurance, ROM, flexibility, posture and core stabilization for 28 minutes including:  3x10 SLR 4 ways AROM 0.5#  SAQ 1.5#-deferred today  supine heel slides to 90 degrees flexion 3x10 using (L) LE for guide to observe precautions-deferred today  Mini squats with brace open to 90 degrees 3x10 with verbal cues for  Pt reports a perceived decrease in urination compared to PO intake  He states that he does not feel as though he is retaining, but that the frequency and volume of urination is less than normal  A PVR was assessed and found to be 0 ml  Will continue to monitor  maintaining WBAT (R) LE & midline position-deferred  Standing heel raises cueing to avoid momentum 3x10-deferred    Measurements per Evette Hernandez, PT 8/15/22  Range of Motion: Knee    Left Right   Flexion: 140 90   Extension 0 -5    ROM:  Ankle Left Right   Dorsiflexion WNL  (-5) degrees   Plantarflexion WNL  (48) degrees   Inversion WNL  (25) degrees   Eversion WNL  (20) degrees        Written Home Exercises Provided: provided initial evaluation.     Education provided re: using one axillary crutch to ambulate.  Flora verbalized understanding of education provided.   No spiritual or educational barriers to learning provided      Assessment     Patient late for appt and session abbreviated. Patient tolerated treatment well. Will continue to progress per protocol and POC.     This is a 37 y.o. female referred to outpatient physical therapy and presents with a medical diagnosis of s/p (R) ACL reconstruction with meniscus repair and demonstrates limitations as described in the problem list. Pt prognosis is Good. Pt will continue to benefit from skilled outpatient physical therapy to address the deficits listed in the problem list, provide pt/family education and maximize pt's level of independence in the home and community environment.     Goals as follows:  Short Term GOALS: 4 weeks. Pt agrees with goals set.  1. Patient demonstrates independence with HEP. Met CB 8/15/2022  2. Patient will follow all precautions per protocol.. Progress CB 8/15/2022  3. Patient will perform SLR without extension lag. Progress CB 8/15/2022       Long Term GOALS: 8  weeks. Pt agrees with goals set.  1. Patient will ambulate with least restrictive device with no gait deviations in accordance with protocol. Progress CB 8/15/2022    2. Patient will demonstrate (R) knee ROM WNL. Progress CB 8/15/2022    3. Patient will improve (R) knee strength to 4/5 or greater.Progress CB 8/15/2022    4. Patient will improve LEFS score to 60 or  greater.Progress CB 8/15/2022    5. Patient will report (R) knee pain 2/10 or less. Progress CB 8/15/2022       Plan     Continue with established Plan of Care towards PT goals.    Therapist: Evette Hernandez, PT  8/19/2022   none

## 2022-10-03 ENCOUNTER — HOSPITAL ENCOUNTER (OUTPATIENT)
Dept: NON INVASIVE DIAGNOSTICS | Facility: CLINIC | Age: 77
Discharge: HOME | End: 2022-10-03
Payer: COMMERCIAL

## 2022-10-03 VITALS
HEIGHT: 74 IN | SYSTOLIC BLOOD PRESSURE: 136 MMHG | HEART RATE: 56 BPM | BODY MASS INDEX: 25.93 KG/M2 | WEIGHT: 202 LBS | DIASTOLIC BLOOD PRESSURE: 68 MMHG

## 2022-10-03 DIAGNOSIS — I35.1 MODERATE AORTIC INSUFFICIENCY: ICD-10-CM

## 2022-10-03 LAB
AORTIC ROOT: 3.8 CM
AORTIC VALVE MEAN VELOCITY: 8.5 M/S
APICAL FOUR CHAMBER EJECTION FRACTION: 56 %
ASCENDING AORTA: 4 CM
AV LVOT MEAN GRADIENT: 2 MMHG
AV LVOT PEAK GRADIENT: 3 MMHG
AV MEAN GRADIENT: 3 MMHG
AV PEAK GRADIENT: 6 MMHG
AV REGURGITATION PRESSURE HALF TIME: 411 MS
DOP CALC AO PEAK VEL: 1.25 M/S
DOP CALC AO VTI: 30.57 CM
DOP CALC LVOT PEAK VEL VTI: 25.22 CM
DOP CALC LVOT PEAK VEL: 0.8 M/S
E WAVE DECELERATION TIME: 326 MS
E/A RATIO: 0.96
FRACTIONAL SHORTENING: 35 (ref 28–44)
INTERVENTRICULAR SEPTUM IN DIASTOLE (PARASTERNAL SHORT AXIS VIEW): 1.3 CM
INTERVENTRICULAR SEPTUM: 1.3 CM (ref 0.6–1.1)
LAAS-AP2: 33.7 CM2
LAAS-AP4: 21.5 CM2
LEFT ATRIUM AREA SYSTOLE SINGLE PLANE A4C: 22.2 CM2
LEFT ATRIUM SIZE: 5.8 CM
LEFT ATRIUM VOLUME INDEX (MOD BIPLANE): 26.5
LEFT INTERNAL DIMENSION IN SYSTOLE: 3.3 CM (ref 2.1–4)
LEFT VENTRICULAR INTERNAL DIMENSION IN DIASTOLE: 5.1 CM (ref 3.5–6)
LEFT VENTRICULAR POSTERIOR WALL IN END DIASTOLE: 1.2 CM
LEFT VENTRICULAR STROKE VOLUME: 81 ML
LVSV (TEICH): 81 ML
MV E'TISSUE VEL-SEP: 6 CM/S
MV EROA: 0.3 CM2
MV PEAK A VEL: 0.7 M/S
MV PEAK E VEL: 67 CM/S
MV REGURGITANT VOLUME: 82
MV STENOSIS PRESSURE HALF TIME: 95 MS
MV VALVE AREA P 1/2 METHOD: 2.3
PISA MRMAX VEL: 0.42 M/S
PISA RADIUS: 0.9 CM
RIGHT ATRIAL 2D VOLUME: 57 ML
RIGHT ATRIUM AREA SYSTOLE A4C: 18.8 CM2
RIGHT VENTRICLE ID DIMENSION: 5.1 CM
SL CV AV DECELERATION TIME RETROGRADE: 1418 MS
SL CV AV PEAK GRADIENT RETROGRADE: 80 MMHG
SL CV LEFT ATRIUM LENGTH A2C: 6 CM
SL CV LV EF: 60
SL CV PED ECHO LEFT VENTRICLE DIASTOLIC VOLUME (MOD BIPLANE) 2D: 124 ML
SL CV PED ECHO LEFT VENTRICLE SYSTOLIC VOLUME (MOD BIPLANE) 2D: 43 ML
TR MAX PG: 21 MMHG
TR PEAK VELOCITY: 2.3 M/S
TRICUSPID VALVE PEAK REGURGITATION VELOCITY: 2.31 M/S

## 2022-10-03 PROCEDURE — 93306 TTE W/DOPPLER COMPLETE: CPT | Performed by: INTERNAL MEDICINE

## 2022-10-03 PROCEDURE — 93306 TTE W/DOPPLER COMPLETE: CPT

## 2022-12-27 DIAGNOSIS — E78.5 DYSLIPIDEMIA: ICD-10-CM

## 2022-12-27 RX ORDER — ATORVASTATIN CALCIUM 40 MG/1
40 TABLET, FILM COATED ORAL DAILY
Qty: 90 TABLET | Refills: 3 | Status: SHIPPED | OUTPATIENT
Start: 2022-12-27

## 2023-01-01 NOTE — ASSESSMENT & PLAN NOTE
· POA: Fever leukocytosis and tachycardia  Secondary to appendicitis with appendiceal abscess  · White blood cell count normal   Patient now in normal sinus rhythm  2 more weeks of antibiotics prescribed by surgery  91

## 2023-01-13 NOTE — ASSESSMENT & PLAN NOTE
· Presents with BNP of 3566  · No known history of heart failure  · CT chest showed    suspected interstitial edema in the left lower lobe, and an associated small to moderate-sized left pleural effusion, a portion of which in the medial left lower lobe appears to be loculated"  · Cardiomegaly appreciable on CT, evidence to suggest elevated right heart pressure  · Echo Cardiogram with normal ejection fraction  patient

## 2023-02-14 NOTE — PROGRESS NOTES
Cardiology Follow Up    Darlene Crum  1945  [id number]  [address] [facility] [place]  [address]  [place] [address]  [phone]    1  Dyslipidemia  Lipid Panel With Direct LDL    Comprehensive metabolic panel      2  Paroxysmal atrial fibrillation (HCC)  POCT ECG      3  Paroxysmal atrial flutter (HCC)  POCT ECG      4  Moderate aortic insufficiency        5  Coronary artery calcification seen on CAT scan            Discussion/Summary:  Mr Enrico Umana is a pleasant 69-year-old gentleman who presents to the office today for routine follow-up  He continues to maintain normal sinus rhythm after his ablation without signs or symptoms of recurrent atrial fibrillation/flutter  He is now off all medication  His blood pressure is well controlled on no medication  He is maintained on aspirin and statin therapy given coronary artery calcifications noted on CT scan  His most recent lipids from last year reveal acceptable numbers on his current statin regimen to which no changes were advised  I have asked that he have these reassessed prior to his next visit  After his last visit with me he did undergo repeat echocardiogram revealing stable valvular heart disease with moderate aortic insufficiency and moderate mitral insufficiency in the setting of mitral valve prolapse  He will require repeat echocardiogram after his next visit  I will see him back in the office in six months or sooner if deemed necessary  Interval History:  Mr Enrico Umana is a pleasant 69-year-old gentleman who presents to the office today for routine follow-up  Since his last visit he feels very well  He walks on a regular basis  He walks daily for about 4 miles which includes ascending hills  He also swims regularly in the summer  He denies any exertional chest pain or shortness of breath in doing so    He denies any signs or symptoms of congestive heart failure including lower extremity edema, paroxysmal nocturnal dyspnea, orthopnea, acute weight gain or increasing abdominal girth  He denies lightheadedness, syncope or presyncope  He denies any sensation of palpitations or symptoms suggestive of recurrent atrial fibrillation  He denies symptoms of claudication        Problem List     Hamstring tightness of both lower extremities    Weakness of left foot    Left foot drop    Overview Signed 11/9/2018 11:26 AM by Dilma River MD     Added automatically from request for surgery [id number]         Closed nondisplaced fracture of second metatarsal bone of left foot    Well adult exam    History of lumbar laminectomy    Pneumonia    NSTEMI (non-ST elevated myocardial infarction) [facility])    Atrial fibrillation ([facility] [address] )    Pleural effusion, left    Acquired deformity of foot    Pain in both feet    Peripheral arteriosclerosis ([facility] [address] )    Onychomycosis    Tinea pedis of both feet        Past Medical History:   Diagnosis Date   • Anemia     Internal Hemorroids   • Closed nondisplaced fracture of second metatarsal bone of left foot 11/11/2018   • COVID-19 05/2022   • Foot drop     Left   • Heart murmur     MVP   • Hyperlipidemia    • Irregular heart beat    • Left foot pain 07/11/2019   • Left inguinal hernia     Managed By Rodolfo Mathews / last assessed 3/27/15    • Pain in both feet 04/02/2019   • Paroxysmal atrial fibrillation ([facility] [address] ) 02/27/2019   • Pleural effusion, left 02/27/2019   • Pneumonia 02/26/2019   • Skin lesion    • Subclinical hypothyroidism 01/15/2020   • Weakness of left foot 11/07/2018     Social History     Socioeconomic History   • Marital status: /Civil Union     Spouse name: Not on file   • Number of children: Not on file   • Years of education: Not on file   • Highest education level: Not on file   Occupational History   • Not on file   Tobacco Use   • Smoking status: Never   • Smokeless tobacco: Never   Vaping Use   • Vaping Use: Never used Substance and Sexual Activity   • Alcohol use: Yes     Comment: Rare socially   • Drug use: No   • Sexual activity: Not on file   Other Topics Concern   • Not on file   Social History Narrative   • Not on file     Social Determinants of Health     Financial Resource Strain: Not on file   Food Insecurity: Not on file   Transportation Needs: Not on file   Physical Activity: Not on file   Stress: Not on file   Social Connections: Not on file   Intimate Partner Violence: Not on file   Housing Stability: Not on file      Family History   Problem Relation Age of Onset   • Heart disease Mother    • No Known Problems Father    • No Known Problems Sister    • No Known Problems Brother    • No Known Problems Family    • Anemia Neg Hx    • Arrhythmia Neg Hx    • Clotting disorder Neg Hx    • Heart attack Neg Hx    • Heart failure Neg Hx    • Hyperlipidemia Neg Hx    • Hypertension Neg Hx    • Fainting Neg Hx    • Asthma Neg Hx      Past Surgical History:   Procedure Laterality Date   • BACK SURGERY     • COLONOSCOPY     • HEMORROIDECTOMY     • HERNIA REPAIR     • IR IMAGE GUIDED ASPIRATION / DRAINAGE W TUBE  11/19/2019   • IR THORACENTESIS  3/1/2019   • LUMBAR LAMINECTOMY Left 11/9/2018    Procedure: Metrx L4-5 left hemilaminectomy and bilateral foraminotomy, Metrx L5-S1 left hemilaminectomy and microdiskectomy;  Surgeon: Jessica Monroe MD;  [facility];  Service: Neurosurgery   • [place] LAPAROSCOPIC APPENDECTOMY N/A 2/20/2020    Procedure: LAPAROSCOPIC APPENDECTOMY;  Surgeon: Reena Grider DO;  [facility];  Service: General       Current Outpatient Medications:   •  aspirin (ECOTRIN LOW STRENGTH) 81 mg EC tablet, Take 81 mg by mouth daily, Disp: , Rfl:   •  atorvastatin (LIPITOR) 40 mg tablet, Take 1 tablet (40 mg total) by mouth daily, Disp: 90 tablet, Rfl: 3  •  Calcium-Magnesium-Vitamin D (CALCIUM MAGNESIUM PO), Take by mouth, Disp: , Rfl:   •  chlorhexidine (PERIDEX) 0 12 % solution, 15 mL daily at bedtime , Disp: , Rfl:   •  Cyanocobalamin (VITAMIN B12 PO), Take by mouth, Disp: , Rfl:   •  doxycycline (PERIOSTAT) 20 MG tablet, Take 20 mg by mouth daily in the early morning Takes for Gums, Disp: , Rfl: 2  •  saccharomyces boulardii (FLORASTOR) 250 mg capsule, Take 250 mg by mouth 2 (two) times a day, Disp: , Rfl:   No Known Allergies    Labs:     Chemistry        Component Value Date/Time    K 4 2 07/06/2022 0918     07/06/2022 0918    CO2 28 07/06/2022 0918    CO2 29 11/07/2018 0601    BUN 14 07/06/2022 0918    CREATININE 0 96 07/06/2022 0918        Component Value Date/Time    CALCIUM 9 0 07/06/2022 0918    ALKPHOS 53 07/06/2022 0918    AST 32 07/06/2022 0918    ALT 31 07/06/2022 0918            No results found for: CHOL  Lab Results   Component Value Date    HDL 39 (L) 07/06/2022    HDL 47 08/18/2021    HDL 40 10/19/2020     Lab Results   Component Value Date    LDLCALC 43 07/06/2022    LDLCALC 45 08/18/2021    LDLCALC 35 10/19/2020     Lab Results   Component Value Date    TRIG 57 07/06/2022    TRIG 49 08/18/2021    TRIG 52 10/19/2020     No results found for: CHOLHDL    Imaging: Xr Chest Pa & Lateral    Result Date: 4/17/2019  Narrative: CHEST INDICATION:   J90: Pleural effusion, not elsewhere classified J18 1: Lobar pneumonia, unspecified organism  COMPARISON:  Two-view chest 3/2/2019 EXAM PERFORMED/VIEWS:  XR CHEST PA & LATERAL  The frontal view was performed utilizing dual energy radiographic technique  FINDINGS: Normal cardiac silhouette  Aortic calcification is present  Unfolded aorta  Markedly improved left lower lobe airspace consolidation with minimal residual opacification  Markedly improved left pleural effusion  No pneumothorax or pulmonary edema  Multilevel thoracic spondylosis  Impression: Markedly improved left lower lobe airspace consolidation and left pleural effusion   Workstation performed: [id number]         Review of Systems   Cardiovascular: Negative for chest pain, claudication, cyanosis, dyspnea on exertion, irregular heartbeat, leg swelling and near-syncope  All other systems reviewed and are negative  Vitals:    02/15/23 0834   BP: 110/64   Pulse:    SpO2:      Vitals:    02/15/23 0805   Weight: 91 6 kg (202 lb)     Height: 6' 2" (188 cm)   Body mass index is 25 94 kg/m²      Physical Exam:  General:  Alert and cooperative, appears stated age  HEENT:  PERRLA, EOMI, no scleral icterus, no conjunctival pallor  Neck:  No lymphadenopathy, no thyromegaly, no carotid bruits, no elevated JVP  Heart:  Regular rate and rhythm, normal S1/S2, no S3/S4, no murmur  Lungs:  Clear to auscultation bilaterally   Abdomen:  Soft, non-tender, positive bowel sounds, no rebound or guarding,   no organomegaly   Extremities:  No clubbing, cyanosis or edema   Vascular:  2+ pedal pulses  Skin:  No rashes or lesions on exposed skin  Neurologic:  Cranial nerves II-XII grossly intact without focal deficits

## 2023-02-15 ENCOUNTER — APPOINTMENT (OUTPATIENT)
Dept: LAB | Facility: CLINIC | Age: 78
End: 2023-02-15

## 2023-02-15 ENCOUNTER — OFFICE VISIT (OUTPATIENT)
Dept: CARDIOLOGY CLINIC | Facility: CLINIC | Age: 78
End: 2023-02-15

## 2023-02-15 VITALS
BODY MASS INDEX: 25.93 KG/M2 | SYSTOLIC BLOOD PRESSURE: 110 MMHG | HEART RATE: 60 BPM | WEIGHT: 202 LBS | HEIGHT: 74 IN | OXYGEN SATURATION: 98 % | DIASTOLIC BLOOD PRESSURE: 64 MMHG

## 2023-02-15 DIAGNOSIS — I25.10 CORONARY ARTERY CALCIFICATION SEEN ON CAT SCAN: ICD-10-CM

## 2023-02-15 DIAGNOSIS — E78.5 DYSLIPIDEMIA: ICD-10-CM

## 2023-02-15 DIAGNOSIS — E78.5 DYSLIPIDEMIA: Primary | ICD-10-CM

## 2023-02-15 DIAGNOSIS — I48.92 PAROXYSMAL ATRIAL FLUTTER (HCC): ICD-10-CM

## 2023-02-15 DIAGNOSIS — I48.0 PAROXYSMAL ATRIAL FIBRILLATION (HCC): ICD-10-CM

## 2023-02-15 DIAGNOSIS — I35.1 MODERATE AORTIC INSUFFICIENCY: ICD-10-CM

## 2023-02-15 LAB
ALBUMIN SERPL BCP-MCNC: 4.1 G/DL (ref 3.5–5)
ALP SERPL-CCNC: 54 U/L (ref 34–104)
ALT SERPL W P-5'-P-CCNC: 32 U/L (ref 7–52)
ANION GAP SERPL CALCULATED.3IONS-SCNC: 6 MMOL/L (ref 4–13)
AST SERPL W P-5'-P-CCNC: 34 U/L (ref 13–39)
BILIRUB SERPL-MCNC: 0.96 MG/DL (ref 0.2–1)
BUN SERPL-MCNC: 17 MG/DL (ref 5–25)
CALCIUM SERPL-MCNC: 9.2 MG/DL (ref 8.4–10.2)
CHLORIDE SERPL-SCNC: 104 MMOL/L (ref 96–108)
CHOLEST SERPL-MCNC: 90 MG/DL
CO2 SERPL-SCNC: 28 MMOL/L (ref 21–32)
CREAT SERPL-MCNC: 1.01 MG/DL (ref 0.6–1.3)
GFR SERPL CREATININE-BSD FRML MDRD: 71 ML/MIN/1.73SQ M
GLUCOSE P FAST SERPL-MCNC: 96 MG/DL (ref 65–99)
HDLC SERPL-MCNC: 37 MG/DL
LDLC SERPL CALC-MCNC: 42 MG/DL (ref 0–100)
POTASSIUM SERPL-SCNC: 4.8 MMOL/L (ref 3.5–5.3)
PROT SERPL-MCNC: 6.5 G/DL (ref 6.4–8.4)
SODIUM SERPL-SCNC: 138 MMOL/L (ref 135–147)
TRIGL SERPL-MCNC: 57 MG/DL

## 2023-08-15 ENCOUNTER — TELEPHONE (OUTPATIENT)
Dept: FAMILY MEDICINE CLINIC | Facility: CLINIC | Age: 78
End: 2023-08-15

## 2023-08-15 NOTE — TELEPHONE ENCOUNTER
VM on appt line: This is Jojo Briseno. I would like to make an appointment for our physicals for myself and for my . I'm Ananth. Grazyna Burch. Our doctor is Doctor Nico Bond. But the last time I was examined by Doctor Pasha Kasper. If it's possible to make an appointment for the two of us, I will accept Doctor Pasha Kasper. But I know Mendoza Lawson. My  would like Doctor Nico Bond home. Phone [phone]. Cell phone number six. No, I'm sorry. Cell phone number give you Mendoza Smith Cell phone, [phone]. This message is taken care of.

## 2023-09-19 NOTE — PROGRESS NOTES
Cardiology Follow Up    Salena Rivera  1945  [id number]  [address] [place] [address] [facility] [address]  [phone]    1. Paroxysmal atrial fibrillation (HCC)  POCT ECG      2. Paroxysmal atrial flutter (HCC)        3. Moderate aortic insufficiency  Echo complete w/ contrast if indicated      4. Mitral valve prolapse        5. Coronary artery calcification seen on CAT scan        6. Dyslipidemia  Lipid Panel With Direct LDL    Comprehensive metabolic panel          Discussion/Summary:  Mr. Francisco Ortez is a pleasant 16-year-old gentleman who presents to the office today for routine follow-up. He continues to maintain normal sinus rhythm after his ablation without signs or symptoms of recurrent atrial fibrillation/flutter. He is now off all medication. His blood pressure is well controlled on no medication. A low-salt diet was reinforced. He is maintained on aspirin and statin therapy given coronary artery calcifications noted on CT scan. His most recent lipids from earlier this year reveal acceptable numbers on his current statin regimen with the exception of a low HDL for which therapeutic lifestyle modifications were recommended. I will reassess these prior to his next visit. After his last visit with me he did undergo repeat echocardiogram revealing stable valvular heart disease with moderate aortic insufficiency and moderate mitral insufficiency in the setting of mitral valve prolapse. I have requested a repeat echocardiogram.    I will see him back in the office in six months or sooner if deemed necessary. Interval History:  Mr. Francisco Ortez is a pleasant 16-year-old gentleman who presents to the office today for routine follow-up. Since his last visit he feels very well. He walks on a regular basis. He walks daily for about 4 miles which includes ascending hills.   He also swims regularly in the summer. He denies any exertional chest pain or shortness of breath in doing so. He denies any signs or symptoms of congestive heart failure including lower extremity edema, paroxysmal nocturnal dyspnea, orthopnea, acute weight gain or increasing abdominal girth. He denies lightheadedness, syncope or presyncope. He denies any sensation of palpitations or symptoms suggestive of recurrent atrial fibrillation. He denies symptoms of claudication.       Problem List       Hamstring tightness of both lower extremities    Weakness of left foot    Left foot drop    Overview Signed 11/9/2018 11:26 AM by Klaus Gutiérrez MD     Added automatically from request for surgery [id number]         Closed nondisplaced fracture of second metatarsal bone of left foot    Well adult exam    History of lumbar laminectomy    Pneumonia    NSTEMI (non-ST elevated myocardial infarction) [facility])    Atrial fibrillation ([address])    Pleural effusion, left    Acquired deformity of foot    Pain in both feet    Peripheral arteriosclerosis ([address])    Onychomycosis    Tinea pedis of both feet          Past Medical History:   Diagnosis Date   • Anemia     Internal Hemorroids   • Closed nondisplaced fracture of second metatarsal bone of left foot 11/11/2018   • COVID-19 05/2022   • Foot drop     Left   • Heart murmur     MVP   • Hyperlipidemia    • Irregular heart beat    • Left foot pain 07/11/2019   • Left inguinal hernia     Managed By Lorena Truong / last assessed 3/27/15    • Pain in both feet 04/02/2019   • Paroxysmal atrial fibrillation ([address]) 02/27/2019   • Pleural effusion, left 02/27/2019   • Pneumonia 02/26/2019   • Skin lesion    • Subclinical hypothyroidism 01/15/2020   • Weakness of left foot 11/07/2018     Social History     Socioeconomic History   • Marital status: /Civil Union     Spouse name: Not on file   • Number of children: Not on file   • Years of education: Not on file   • Highest education level: Not on file   Occupational History • Not on file   Tobacco Use   • Smoking status: Never   • Smokeless tobacco: Never   Vaping Use   • Vaping Use: Never used   Substance and Sexual Activity   • Alcohol use:  Yes     Alcohol/week: 1.0 standard drink of alcohol     Types: 1 Cans of beer per week     Comment: Rare socially   • Drug use: No   • Sexual activity: Not on file   Other Topics Concern   • Not on file   Social History Narrative   • Not on file     Social Determinants of Health     Financial Resource Strain: Not on file   Food Insecurity: Not on file   Transportation Needs: Not on file   Physical Activity: Not on file   Stress: Not on file   Social Connections: Not on file   Intimate Partner Violence: Not on file   Housing Stability: Not on file      Family History   Problem Relation Age of Onset   • Heart disease Mother    • No Known Problems Father    • No Known Problems Sister    • No Known Problems Brother    • No Known Problems Family    • Anemia Neg Hx    • Arrhythmia Neg Hx    • Clotting disorder Neg Hx    • Heart attack Neg Hx    • Heart failure Neg Hx    • Hyperlipidemia Neg Hx    • Hypertension Neg Hx    • Fainting Neg Hx    • Asthma Neg Hx      Past Surgical History:   Procedure Laterality Date   • BACK SURGERY     • COLONOSCOPY     • HEMORROIDECTOMY     • HERNIA REPAIR     • IR IMAGE GUIDED ASPIRATION / DRAINAGE W TUBE  11/19/2019   • IR THORACENTESIS  3/1/2019   • LUMBAR LAMINECTOMY Left 11/9/2018    Procedure: Metrx L4-5 left hemilaminectomy and bilateral foraminotomy, Metrx L5-S1 left hemilaminectomy and microdiskectomy;  Surgeon: Mima Holman MD;  Location: [facility];  Service: Neurosurgery   • [place] LAPAROSCOPIC APPENDECTOMY N/A 2/20/2020    Procedure: LAPAROSCOPIC APPENDECTOMY;  Surgeon: Richard Nair DO;  Location: [facility];  Service: General       Current Outpatient Medications:   •  aspirin (ECOTRIN LOW STRENGTH) 81 mg EC tablet, Take 81 mg by mouth daily, Disp: , Rfl:   •  atorvastatin (LIPITOR) 40 mg tablet, Take 1 tablet (40 mg total) by mouth daily, Disp: 90 tablet, Rfl: 3  •  Calcium-Magnesium-Vitamin D (CALCIUM MAGNESIUM PO), Take by mouth, Disp: , Rfl:   •  chlorhexidine (PERIDEX) 0.12 % solution, 15 mL daily at bedtime , Disp: , Rfl:   •  Cyanocobalamin (VITAMIN B12 PO), Take by mouth, Disp: , Rfl:   •  doxycycline (PERIOSTAT) 20 MG tablet, Take 20 mg by mouth daily in the early morning Takes for Gums, Disp: , Rfl: 2  •  saccharomyces boulardii (FLORASTOR) 250 mg capsule, Take 250 mg by mouth 2 (two) times a day, Disp: , Rfl:   No Known Allergies    Labs:     Chemistry        Component Value Date/Time    K 4.8 02/15/2023 0859     02/15/2023 0859    CO2 28 02/15/2023 0859    CO2 29 11/07/2018 0601    BUN 17 02/15/2023 0859    CREATININE 1.01 02/15/2023 0859        Component Value Date/Time    CALCIUM 9.2 02/15/2023 0859    ALKPHOS 54 02/15/2023 0859    AST 34 02/15/2023 0859    ALT 32 02/15/2023 0859            No results found for: "CHOL"  Lab Results   Component Value Date    HDL 37 (L) 02/15/2023    HDL 39 (L) 07/06/2022    HDL 47 08/18/2021     Lab Results   Component Value Date    LDLCALC 42 02/15/2023    LDLCALC 43 07/06/2022    LDLCALC 45 08/18/2021     Lab Results   Component Value Date    TRIG 57 02/15/2023    TRIG 57 07/06/2022    TRIG 49 08/18/2021     No results found for: "CHOLHDL"    Imaging: Xr Chest Pa & Lateral    Result Date: 4/17/2019  Narrative: CHEST INDICATION:   J90: Pleural effusion, not elsewhere classified J18.1: Lobar pneumonia, unspecified organism. COMPARISON:  Two-view chest 3/2/2019 EXAM PERFORMED/VIEWS:  XR CHEST PA & LATERAL  The frontal view was performed utilizing dual energy radiographic technique. FINDINGS: Normal cardiac silhouette. Aortic calcification is present. Unfolded aorta. Markedly improved left lower lobe airspace consolidation with minimal residual opacification. Markedly improved left pleural effusion. No pneumothorax or pulmonary edema.  Multilevel thoracic spondylosis. Impression: Markedly improved left lower lobe airspace consolidation and left pleural effusion. Workstation performed: [id number]         Review of Systems   Cardiovascular: Negative for chest pain, claudication, dyspnea on exertion, leg swelling, near-syncope and orthopnea. Musculoskeletal: Positive for joint swelling. All other systems reviewed and are negative. Vitals:    09/20/23 0856   BP: 110/60   Pulse:    Temp:    SpO2:      Vitals:    09/20/23 0825   Weight: 92.8 kg (204 lb 9.6 oz)     Height: 6' 2" (188 cm)   Body mass index is 26.27 kg/m².     Physical Exam:  General:  Alert and cooperative, appears stated age  HEENT:  PERRLA, EOMI, no scleral icterus, no conjunctival pallor  Neck:  No lymphadenopathy, no thyromegaly, no carotid bruits, no elevated JVP  Heart:  Regular rate and rhythm, normal S1/S2, no S3/S4, no murmur  Lungs:  Clear to auscultation bilaterally   Abdomen:  Soft, non-tender, positive bowel sounds, no rebound or guarding,   no organomegaly   Extremities:  No clubbing, cyanosis or edema   Vascular:  2+ pedal pulses  Skin:  No rashes or lesions on exposed skin  Neurologic:  Cranial nerves II-XII grossly intact without focal deficits

## 2023-09-20 ENCOUNTER — ATHLETIC TRAINING (OUTPATIENT)
Dept: SPORTS MEDICINE | Facility: OTHER | Age: 78
End: 2023-09-20

## 2023-09-20 ENCOUNTER — OFFICE VISIT (OUTPATIENT)
Dept: CARDIOLOGY CLINIC | Facility: CLINIC | Age: 78
End: 2023-09-20
Payer: COMMERCIAL

## 2023-09-20 ENCOUNTER — APPOINTMENT (OUTPATIENT)
Dept: LAB | Facility: CLINIC | Age: 78
End: 2023-09-20
Payer: COMMERCIAL

## 2023-09-20 VITALS
WEIGHT: 204.6 LBS | BODY MASS INDEX: 26.26 KG/M2 | HEIGHT: 74 IN | DIASTOLIC BLOOD PRESSURE: 60 MMHG | SYSTOLIC BLOOD PRESSURE: 110 MMHG | OXYGEN SATURATION: 100 % | TEMPERATURE: 97.8 F | HEART RATE: 63 BPM

## 2023-09-20 DIAGNOSIS — M25.561 PAIN AND SWELLING OF RIGHT KNEE: Primary | ICD-10-CM

## 2023-09-20 DIAGNOSIS — M25.461 PAIN AND SWELLING OF RIGHT KNEE: Primary | ICD-10-CM

## 2023-09-20 DIAGNOSIS — I35.1 MODERATE AORTIC INSUFFICIENCY: ICD-10-CM

## 2023-09-20 DIAGNOSIS — I25.10 CORONARY ARTERY CALCIFICATION SEEN ON CAT SCAN: ICD-10-CM

## 2023-09-20 DIAGNOSIS — E78.5 DYSLIPIDEMIA: ICD-10-CM

## 2023-09-20 DIAGNOSIS — I34.1 MITRAL VALVE PROLAPSE: ICD-10-CM

## 2023-09-20 DIAGNOSIS — I48.0 PAROXYSMAL ATRIAL FIBRILLATION (HCC): ICD-10-CM

## 2023-09-20 DIAGNOSIS — I48.92 PAROXYSMAL ATRIAL FLUTTER (HCC): ICD-10-CM

## 2023-09-20 LAB
ALBUMIN SERPL BCP-MCNC: 4.1 G/DL (ref 3.5–5)
ALP SERPL-CCNC: 53 U/L (ref 34–104)
ALT SERPL W P-5'-P-CCNC: 27 U/L (ref 7–52)
ANION GAP SERPL CALCULATED.3IONS-SCNC: 5 MMOL/L
AST SERPL W P-5'-P-CCNC: 29 U/L (ref 13–39)
BILIRUB SERPL-MCNC: 1.25 MG/DL (ref 0.2–1)
BUN SERPL-MCNC: 16 MG/DL (ref 5–25)
CALCIUM SERPL-MCNC: 9.1 MG/DL (ref 8.4–10.2)
CHLORIDE SERPL-SCNC: 104 MMOL/L (ref 96–108)
CHOLEST SERPL-MCNC: 91 MG/DL
CO2 SERPL-SCNC: 29 MMOL/L (ref 21–32)
CREAT SERPL-MCNC: 0.96 MG/DL (ref 0.6–1.3)
GFR SERPL CREATININE-BSD FRML MDRD: 75 ML/MIN/1.73SQ M
GLUCOSE P FAST SERPL-MCNC: 97 MG/DL (ref 65–99)
HDLC SERPL-MCNC: 40 MG/DL
LDLC SERPL CALC-MCNC: 41 MG/DL (ref 0–100)
LDLC SERPL DIRECT ASSAY-MCNC: 48 MG/DL (ref 0–100)
NONHDLC SERPL-MCNC: 51 MG/DL
POTASSIUM SERPL-SCNC: 4.5 MMOL/L (ref 3.5–5.3)
PROT SERPL-MCNC: 6.6 G/DL (ref 6.4–8.4)
SODIUM SERPL-SCNC: 138 MMOL/L (ref 135–147)
TRIGL SERPL-MCNC: 50 MG/DL

## 2023-09-20 PROCEDURE — 99214 OFFICE O/P EST MOD 30 MIN: CPT | Performed by: INTERNAL MEDICINE

## 2023-09-20 PROCEDURE — 80061 LIPID PANEL: CPT

## 2023-09-20 PROCEDURE — 36415 COLL VENOUS BLD VENIPUNCTURE: CPT | Performed by: INTERNAL MEDICINE

## 2023-09-20 PROCEDURE — 83721 ASSAY OF BLOOD LIPOPROTEIN: CPT

## 2023-09-20 PROCEDURE — 80053 COMPREHEN METABOLIC PANEL: CPT | Performed by: INTERNAL MEDICINE

## 2023-09-20 PROCEDURE — 93000 ELECTROCARDIOGRAM COMPLETE: CPT | Performed by: INTERNAL MEDICINE

## 2023-09-22 ENCOUNTER — HOSPITAL ENCOUNTER (EMERGENCY)
Facility: HOSPITAL | Age: 78
Discharge: HOME | End: 2023-09-22
Attending: EMERGENCY MEDICINE
Payer: COMMERCIAL

## 2023-09-22 ENCOUNTER — APPOINTMENT (EMERGENCY)
Dept: RADIOLOGY | Facility: HOSPITAL | Age: 78
End: 2023-09-22
Payer: COMMERCIAL

## 2023-09-22 VITALS
BODY MASS INDEX: 26.41 KG/M2 | WEIGHT: 205.69 LBS | HEART RATE: 70 BPM | RESPIRATION RATE: 16 BRPM | TEMPERATURE: 98.7 F | SYSTOLIC BLOOD PRESSURE: 130 MMHG | OXYGEN SATURATION: 99 % | DIASTOLIC BLOOD PRESSURE: 61 MMHG

## 2023-09-22 DIAGNOSIS — M25.561 RIGHT KNEE PAIN: ICD-10-CM

## 2023-09-22 DIAGNOSIS — M25.561 PAIN AND SWELLING OF RIGHT KNEE: ICD-10-CM

## 2023-09-22 DIAGNOSIS — M25.461 PAIN AND SWELLING OF RIGHT KNEE: ICD-10-CM

## 2023-09-22 DIAGNOSIS — M70.41 PREPATELLAR BURSITIS OF RIGHT KNEE: Primary | ICD-10-CM

## 2023-09-22 PROCEDURE — 73564 X-RAY EXAM KNEE 4 OR MORE: CPT

## 2023-09-22 PROCEDURE — 99284 EMERGENCY DEPT VISIT MOD MDM: CPT | Performed by: EMERGENCY MEDICINE

## 2023-09-22 PROCEDURE — 20610 DRAIN/INJ JOINT/BURSA W/O US: CPT | Performed by: EMERGENCY MEDICINE

## 2023-09-22 PROCEDURE — 99283 EMERGENCY DEPT VISIT LOW MDM: CPT

## 2023-09-22 RX ORDER — LIDOCAINE HYDROCHLORIDE 10 MG/ML
5 INJECTION, SOLUTION EPIDURAL; INFILTRATION; INTRACAUDAL; PERINEURAL ONCE
Status: COMPLETED | OUTPATIENT
Start: 2023-09-22 | End: 2023-09-22

## 2023-09-22 RX ORDER — NAPROXEN 500 MG/1
500 TABLET ORAL 2 TIMES DAILY WITH MEALS
Qty: 30 TABLET | Refills: 0 | Status: SHIPPED | OUTPATIENT
Start: 2023-09-22

## 2023-09-22 RX ORDER — CEPHALEXIN 500 MG/1
500 CAPSULE ORAL EVERY 6 HOURS SCHEDULED
Qty: 28 CAPSULE | Refills: 0 | Status: SHIPPED | OUTPATIENT
Start: 2023-09-22 | End: 2023-09-29

## 2023-09-22 RX ORDER — ACETAMINOPHEN 325 MG/1
650 TABLET ORAL ONCE
Status: COMPLETED | OUTPATIENT
Start: 2023-09-22 | End: 2023-09-22

## 2023-09-22 RX ADMIN — ACETAMINOPHEN 650 MG: 325 TABLET, FILM COATED ORAL at 06:38

## 2023-09-22 RX ADMIN — LIDOCAINE HYDROCHLORIDE 5 ML: 10 INJECTION, SOLUTION EPIDURAL; INFILTRATION; INTRACAUDAL; PERINEURAL at 06:39

## 2023-09-22 NOTE — Clinical Note
Melody Cadena was seen and treated in our emergency department on 9/22/2023. Diagnosis:     Emelyn Baker  may return to work on return date. He may return on this date: 09/25/2023    Patient was seen and evaluated in the emergency department on 9/22/2023. If you have any questions or concerns, please don't hesitate to call.       Katie Hurley, DO    ______________________________           _______________          _______________  Hospital Representative                              Date                                Time

## 2023-09-22 NOTE — ED ATTENDING ATTESTATION
9/22/2023  IGrady DO, saw and evaluated the patient. I have discussed the patient with the resident/non-physician practitioner and agree with the resident's/non-physician practitioner's findings, Plan of Care, and MDM as documented in the resident's/non-physician practitioner's note, except where noted. All available labs and Radiology studies were reviewed. I was present for key portions of any procedure(s) performed by the resident/non-physician practitioner and I was immediately available to provide assistance. At this point I agree with the current assessment done in the Emergency Department. I have conducted an independent evaluation of this patient a history and physical is as follows:        23-year-old male, presents with pain and swelling of his right knee. Loman Phi on his knee 10 days ago, since then he has been having waxing and waning swelling of the knee.,  Pain is dull constant nonradiating worse with movement better with rest, currently a 4/10. Review of Systems   Constitutional: Negative for fever. Respiratory: Negative for chest tightness and shortness of breath. Cardiovascular: Negative for chest pain. Skin: Negative for rash. Neurological: Negative for dizziness, light-headedness and headaches. Physical Exam  Vitals reviewed. Constitutional:       Appearance: He is well-developed. HENT:      Head: Atraumatic. Eyes:      General: No scleral icterus. Right eye: No discharge. Left eye: No discharge. Conjunctiva/sclera: Conjunctivae normal.   Neck:      Trachea: No tracheal deviation. Pulmonary:      Effort: Pulmonary effort is normal. No respiratory distress. Breath sounds: No stridor. Musculoskeletal:         General: No deformity. Cervical back: Neck supple. Comments: Right knee: Anterior drawer negative, mild joint effusion but large amount of swelling in the suprapatellar region. , Lockman's negative, no pain with varus or valgus force, Augusto's negative,  no patellar tenderness, slight decrease in range of motion can Comstock to suprapatellar swelling, no overlying skin changes, able to flex the knee normally no signs of quadriceps or patellar tendon injury       Skin:     General: Skin is warm and dry. Coloration: Skin is not pale. Findings: No erythema or rash. Neurological:      Mental Status: He is alert. Motor: No abnormal muscle tone. Coordination: Coordination normal.            XR knee 4+ views RIGHT   ED Interpretation   No obvious osseous fracture or malalignment noted per my interpretation. ED Course         Critical Care Time  Procedures        MDM  Number of Diagnoses or Management Options  Pain and swelling of right knee: new, needed workup  Prepatellar bursitis of right knee: new, needed workup  Right knee pain: new, needed workup  Diagnosis management comments:             Initial ED assessment:   67-year-old male, swelling of the right knee, on exam, swollen suprapatellar bursa    Initial DDx includes but is not limited to:   Suprapatellar bursitis is less likely fracture but possible as he did have a fall    Initial ED plan:   X-ray knee        Final ED summary/disposition:   After evaluation and workup in the emergency department, x-ray unremarkable, attempted aspiration by resident physician unsuccessful, again I do think this is prepatellar bursitis, will treat with compression ice anti-inflammatories, it is warm but no sign of infection at this time, given a prescription for Keflex instructed if he gets worse he can start this.          Time reflects when diagnosis was documented in both MDM as applicable and the Disposition within this note     Time User Action Codes Description Comment    9/22/2023  7:00 AM River Madden Add [M25.561] Right knee pain     9/22/2023  7:00 AM Teofilo Goodwin Add [M25.561,  M25.461] Pain and swelling of right knee 9/22/2023  7:15 AM Chau UREÑA Add [M70.41] Prepatellar bursitis of right knee     9/22/2023  7:15 AM Skeet Hunger Modify [Q22.567] Right knee pain     9/22/2023  7:15 AM Skeet Hunger Modify [M70.41] Prepatellar bursitis of right knee       ED Disposition     ED Disposition   Discharge    Condition   Stable    Date/Time   Fri Sep 22, 2023  7:01 AM    Comment Elba Osler discharge to home/self care.                Follow-up Information     Follow up With Specialties Details Why Contact Info Additional [address] [facility]) Orthopedic Surgery Schedule an appointment as soon as possible for a visit  For knee pain and swelling [address] [place] [phone]  [facility] [address] [place], [place], [address] [place] [address] Emergency Department Emergency Medicine  As needed [address] [facility] [phone] [facility], [place], [place], [address]

## 2023-09-22 NOTE — ED PROVIDER NOTES
History  Chief Complaint   Patient presents with   • Fall     Patient fell 10 days ago. Patient present to the ED with R knee pain and swelling. Patient has been managing at home but swelling has not gone down despite resting and ice. The patient is a 73-year-old male with a past medical history of paroxysmal atrial fibrillation currently only taking aspirin as well as hyperlipidemia who presents to the emergency department with a complaint of right knee pain. The patient reports that he fell while walking and landed directly on his knee approximately 10 days ago. He reports intermittent swelling and tenderness. He states some days the swelling goes away while other days as well as "like a balloon". He rates his pain a 7 out of 10 and says that it fluctuates. He is "hoping we will drain it". He denied any head strike or loss of consciousness with the fall. 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 He denies any lightheadedness, headache, change in vision, chest pain, shortness of breath, abdominal pain, nausea, vomiting, numbness, tingling, weakness, hematuria, dysuria, rash or fever. Prior to Admission Medications   Prescriptions Last Dose Informant Patient Reported? Taking?    Calcium-Magnesium-Vitamin D (CALCIUM MAGNESIUM PO) 9/22/2023 Self Yes Yes   Sig: Take by mouth   Cyanocobalamin (VITAMIN B12 PO) 9/22/2023 Self Yes Yes   Sig: Take by mouth   aspirin (ECOTRIN LOW STRENGTH) 81 mg EC tablet 9/22/2023 Self Yes Yes   Sig: Take 81 mg by mouth daily   atorvastatin (LIPITOR) 40 mg tablet 9/22/2023 Self No Yes   Sig: Take 1 tablet (40 mg total) by mouth daily   chlorhexidine (PERIDEX) 0.12 % solution 9/21/2023 Self Yes Yes   Sig: 15 mL daily at bedtime    doxycycline (PERIOSTAT) 20 MG tablet 9/22/2023 Self Yes Yes   Sig: Take 20 mg by mouth daily in the early morning Takes for Gums   saccharomyces boulardii (FLORASTOR) 250 mg capsule 9/22/2023 Self Yes Yes   Sig: Take 250 mg by mouth 2 (two) times a day      Facility-Administered Medications: None       Past Medical History:   Diagnosis Date   • Anemia     Internal Hemorroids   • Closed nondisplaced fracture of second metatarsal bone of left foot 11/11/2018   • COVID-19 05/2022   • Foot drop     Left   • Heart murmur     MVP   • Hyperlipidemia    • Irregular heart beat    • Left foot pain 07/11/2019   • Left inguinal hernia     Managed By Fco Cardona / last assessed 3/27/15    • Pain in both feet 04/02/2019   • Paroxysmal atrial fibrillation ([address]) 02/27/2019   • Pleural effusion, left 02/27/2019   • Pneumonia 02/26/2019   • Skin lesion    • Subclinical hypothyroidism 01/15/2020   • Weakness of left foot 11/07/2018       Past Surgical History:   Procedure Laterality Date   • BACK SURGERY     • COLONOSCOPY     • HEMORROIDECTOMY     • HERNIA REPAIR     • IR IMAGE GUIDED ASPIRATION / DRAINAGE W TUBE  11/19/2019   • IR THORACENTESIS  3/1/2019   • LUMBAR LAMINECTOMY Left 11/9/2018    Procedure: Metrx L4-5 left hemilaminectomy and bilateral foraminotomy, Metrx L5-S1 left hemilaminectomy and microdiskectomy;  Surgeon: Britta Herron MD;  [facility];  Service: Neurosurgery   • SD LAPAROSCOPIC APPENDECTOMY N/A 2/20/2020    Procedure: [facility];  Surgeon: Harris Le DO;  [facility];  Service: General       Family History   Problem Relation Age of Onset   • Heart disease Mother    • No Known Problems Father    • No Known Problems Sister    • No Known Problems Brother    • No Known Problems Family    • Anemia Neg Hx    • Arrhythmia Neg Hx    • Clotting disorder Neg Hx    • Heart attack Neg Hx    • Heart failure Neg Hx    • Hyperlipidemia Neg Hx    • Hypertension Neg Hx    • Fainting Neg Hx    • Asthma Neg Hx      I have reviewed and agree with the history as documented.     E-Cigarette/Vaping   • E-Cigarette Use Never User E-Cigarette/Vaping Substances   • Nicotine No    • THC No    • CBD No    • Flavoring No    • Other No    • Unknown No      Social History     Tobacco Use   • Smoking status: Never   • Smokeless tobacco: Never   Vaping Use   • Vaping Use: Never used   Substance Use Topics   • Alcohol use: Yes     Alcohol/week: 1.0 standard drink of alcohol     Types: 1 Cans of beer per week     Comment: Rare socially   • Drug use: No        Review of Systems   Constitutional: Negative for chills, fatigue and fever. HENT: Negative for congestion, ear pain and sore throat. Eyes: Negative for photophobia, pain and visual disturbance. Respiratory: Negative for cough, shortness of breath, wheezing and stridor. Cardiovascular: Negative for chest pain, palpitations and leg swelling. Gastrointestinal: Negative for abdominal distention, abdominal pain, constipation, diarrhea, nausea and vomiting. Endocrine: Negative. Genitourinary: Negative for dysuria and hematuria. Musculoskeletal: Positive for arthralgias and joint swelling. Negative for back pain, neck pain and neck stiffness. Skin: Negative for color change and rash. Allergic/Immunologic: Negative. Neurological: Negative for dizziness, seizures, syncope, weakness, light-headedness, numbness and headaches. Hematological: Negative. Psychiatric/Behavioral: Negative. All other systems reviewed and are negative. Physical Exam  ED Triage Vitals [09/22/23 0619]   Temperature Pulse Respirations Blood Pressure SpO2   98.7 °F (37.1 °C) 70 16 130/61 99 %      Temp Source Heart Rate Source Patient Position - Orthostatic VS BP Location FiO2 (%)   Oral Monitor Sitting Right arm --      Pain Score       --             Orthostatic Vital Signs  Vitals:    09/22/23 0619   BP: 130/61   Pulse: 70   Patient Position - Orthostatic VS: Sitting       Physical Exam  Vitals and nursing note reviewed. Constitutional:       General: He is not in acute distress. Appearance: Normal appearance. He is well-developed and normal weight. He is not ill-appearing. HENT:      Head: Normocephalic and atraumatic. Nose: Nose normal.      Mouth/Throat:      Mouth: Mucous membranes are moist.      Pharynx: Oropharynx is clear. Eyes:      Extraocular Movements: Extraocular movements intact. Conjunctiva/sclera: Conjunctivae normal.      Pupils: Pupils are equal, round, and reactive to light. Cardiovascular:      Rate and Rhythm: Normal rate and regular rhythm. Pulses: Normal pulses. Heart sounds: Normal heart sounds. No murmur heard. No friction rub. Pulmonary:      Effort: Pulmonary effort is normal. No respiratory distress. Breath sounds: Normal breath sounds. No stridor. No wheezing, rhonchi or rales. Abdominal:      General: Abdomen is flat. Bowel sounds are normal. There is no distension. Palpations: Abdomen is soft. Tenderness: There is no abdominal tenderness. There is no right CVA tenderness, left CVA tenderness, guarding or rebound. Musculoskeletal:         General: Swelling and tenderness present. Normal range of motion. Cervical back: Normal range of motion and neck supple. No rigidity or tenderness. Right lower leg: No edema. Left lower leg: No edema. Lymphadenopathy:      Cervical: No cervical adenopathy. Skin:     General: Skin is warm and dry. Capillary Refill: Capillary refill takes less than 2 seconds. Coloration: Skin is not pale. Findings: No bruising, erythema or rash. Neurological:      General: No focal deficit present. Mental Status: He is alert and oriented to person, place, and time. Mental status is at baseline. Cranial Nerves: No cranial nerve deficit. Sensory: No sensory deficit. Motor: No weakness.    Psychiatric:         Mood and Affect: Mood normal.         ED Medications  Medications   lidocaine (PF) (XYLOCAINE-MPF) 1 % injection 5 mL (5 mL Infiltration Given 9/22/23 0639)   acetaminophen (TYLENOL) tablet 650 mg (650 mg Oral Given 9/22/23 5230)       Diagnostic Studies  Results Reviewed     None                 XR knee 4+ views RIGHT   ED Interpretation by Nelly Glass DO (09/22 7500)   No obvious osseous fracture or malalignment noted per my interpretation. Procedures  Arthrocentesis    Date/Time: 9/22/2023 6:56 AM    Performed by: Nelly Glass DO  Authorized by: Nelly Glass DO  Universal Protocol:  Consent: Verbal consent obtained. Risks and benefits: risks, benefits and alternatives were discussed  Consent given by: patient  Time out: Immediately prior to procedure a "time out" was called to verify the correct patient, procedure, equipment, support staff and site/side marked as required. Patient understanding: patient states understanding of the procedure being performed  Patient consent: the patient's understanding of the procedure matches consent given  Patient identity confirmed: verbally with patient and arm band      Location:  ED  Indications:  Joint swelling, pain and therapeutic  Body area:  Knee  Joint:  Right knee  Local anesthesia used?: Yes    Anesthesia:  Local infiltration  Local anesthetic:  Lidocaine 1% without epinephrine  Anesthetic total (ml):  1  Preparation: Patient was prepped and draped in usual sterile fashion    Needle size:  18 G  Ultrasound guidance: No    Approach:  Medial  Aspirate amount (ml):  0  Patient tolerance:  Patient tolerated the procedure well with no immediate complications   No aspirate was removed with aspiration. ED Course  ED Course as of 09/22/23 0708   Fri Sep 22, 2023   3708 Aspiration attempt did not produce aspirate. We will attempt x-ray and pain control at this time. 8773 The patient will be given referral to orthopedics and a prescription for Keflex with instructions to fill the prescription and start taking it should he develop increasing pain or fever. Return precautions will be discussed. Further instructions per discharge orders. 0707 XR knee 4+ views RIGHT  No obvious osseous fracture or malalignment noted per my interpretation. Medical Decision Making  The patient is a 60-year-old male with a past medical history of paroxysmal atrial fibrillation currently only taking aspirin as well as hyperlipidemia who presents to the emergency department with a complaint of right knee pain. Upon initial presentation the patient was alert and oriented x4 and in no acute distress. On physical exam there was a large fluctuating mass on the superior right medial aspect of the knee. It was mildly tender to palpation and hot to the touch. The patient was able to ambulate with discomfort. The remainder of his physical exam was grossly unremarkable. The differential includes but is not limited to prepatellar bursitis, joint infection or hematoma. I will attempt to aspirate the joint as well as a therapeutic treatment and to gain cultures to rule out infection. I will give Tylenol for pain management and x-ray of the joint as well. Results pending. Disposition  Final diagnoses:   Right knee pain   Pain and swelling of right knee     Time reflects when diagnosis was documented in both MDM as applicable and the Disposition within this note     Time User Action Codes Description Comment    9/22/2023  7:00 AM Clint Garcia Add [M25.561] Right knee pain     9/22/2023  7:00 AM Clint Garcia Add [M25.561,  M25.461] Pain and swelling of right knee       ED Disposition     ED Disposition   Discharge    Condition   Stable    Date/Time   Fri Sep 22, 2023  7:01 AM    Comment   Gamaliel Caraballo discharge to home/self care.                Follow-up Information     Follow up With Specialties Details Why Contact Info Additional [address] [facility] Orthopedic Surgery Schedule an appointment as soon as possible for a visit  For knee pain and swelling [address] [address]  [facility] [address] [place], [place], [address] [place] [address] Street    300 [facility] Emergency Medicine  As needed [address] 0[address] [facility], [place], [place], [address]          Patient's Medications   Discharge Prescriptions    CEPHALEXIN (KEFLEX) 500 MG CAPSULE    Take 1 capsule (500 mg total) by mouth every 6 (six) hours for 7 days       Start Date: 9/22/2023 End Date: 9/29/2023       Order Dose: 500 mg       Quantity: 28 capsule    Refills: 0    NAPROXEN (NAPROSYN) 500 MG TABLET    Take 1 tablet (500 mg total) by mouth 2 (two) times a day with meals       Start Date: 9/22/2023 End Date: --       Order Dose: 500 mg       Quantity: 30 tablet    Refills: 0         PDMP Review       Value Time User    PDMP Reviewed  Yes 1/8/2020  1:21 AM Ayad Dent MD           ED Provider  Attending physically available and evaluated Monty Main. I managed the patient along with the ED Attending.     Electronically Signed by         Pati Gale DO  09/22/23 4491

## 2023-09-22 NOTE — DISCHARGE INSTRUCTIONS
Your work-up in the emergency department was unremarkable. You have been given a prescription for Keflex with instructions to fill that prescription should you develop worsening pain or fever. You have also been given an ambulatory referral to orthopedics with instructions to call them to make an appointment as soon as possible in regards to continuing evaluation and management of knee pain. You may use ice packs, Ace wrap and compression devices to help decrease swelling. You have also been given a prescription for naproxen with instructions to take 1 tablet by mouth twice a day as needed for pain. Please take this medication with small meals to avoid upset stomach. Should you develop worsening pain, worsening swelling, numbness, discoloration, fever or weakness in the extremity or any other symptoms that you find concerning please return to the emergency department immediately.

## 2023-09-23 NOTE — PROGRESS NOTES
AT Treatment               9/20/23    Subjective:    reported to the [facility] athletic training room for treatment of a hematoma on his right medial knee (VMO). Objective:   Pt had fifteen minutes of game ready at medium compression and max cooling. Post treatment there was not a significant change in the hemotoma and it actually felt more frim. Pt was given a felt "U" to place under the hematoma and a compression wrap to help with swelling.       9/21/23    Subjective:   reported to the [facility] athletic training room for treatment of a hematoma on his right medial knee (VMO). Objective:   Pt had fifteen minutes of game ready at max compression and max cooling. Post treatment there was not a significant change in the hemotoma and it actually felt more firm. Pt was reminded that the felt "U" is to place under the hematoma and a compression wrap to help with swelling (Pt came in with the "U" upside down). 9/22/23    Subjective:   reported to the [facility] athletic training room for treatment of a hematoma on his right medial knee (VMO). Objective:   Pt reports he did follow his PCP advice and report to [facility] to see if they would drain his knee. Pt reports they attempted to drian his knee, but not much came out. Pt had fifteen minutes of game ready at max compression and max cooling. Post treatment there was not a significant change in the hemotoma which has been consistant with day to day treatment so far. Pt does report that he has little to no pain with daily activities and overall he is not in much pain. Pt finshed with a felt "U" under the hematoma and a compression wrap to help with swelling. Assessment: Tolerated treatment well. Patient would benefit from continued AT      Plan: Continue per plan of care.

## 2023-09-25 ENCOUNTER — OFFICE VISIT (OUTPATIENT)
Dept: OBGYN CLINIC | Facility: CLINIC | Age: 78
End: 2023-09-25
Payer: COMMERCIAL

## 2023-09-25 VITALS — WEIGHT: 205 LBS | HEIGHT: 74 IN | BODY MASS INDEX: 26.31 KG/M2

## 2023-09-25 DIAGNOSIS — M25.561 PAIN AND SWELLING OF RIGHT KNEE: ICD-10-CM

## 2023-09-25 DIAGNOSIS — S80.01XA HEMATOMA OF RIGHT KNEE REGION: Primary | ICD-10-CM

## 2023-09-25 DIAGNOSIS — M25.561 RIGHT KNEE PAIN: ICD-10-CM

## 2023-09-25 DIAGNOSIS — M25.461 PAIN AND SWELLING OF RIGHT KNEE: ICD-10-CM

## 2023-09-25 PROCEDURE — 20610 DRAIN/INJ JOINT/BURSA W/O US: CPT | Performed by: PHYSICIAN ASSISTANT

## 2023-09-25 PROCEDURE — 99214 OFFICE O/P EST MOD 30 MIN: CPT | Performed by: PHYSICIAN ASSISTANT

## 2023-09-25 RX ORDER — LIDOCAINE HYDROCHLORIDE 10 MG/ML
4 INJECTION, SOLUTION INFILTRATION; PERINEURAL
Status: COMPLETED | OUTPATIENT
Start: 2023-09-25 | End: 2023-09-25

## 2023-09-25 RX ADMIN — LIDOCAINE HYDROCHLORIDE 4 ML: 10 INJECTION, SOLUTION INFILTRATION; PERINEURAL at 12:45

## 2023-09-25 NOTE — PROGRESS NOTES
Assessment:  1. Hematoma of right knee region  Large joint arthrocentesis: R knee      2. Right knee pain  Ambulatory Referral to Orthopedic Surgery      3. Pain and swelling of right knee  Ambulatory Referral to Orthopedic Surgery        Patient Active Problem List   Diagnosis   • Hamstring tightness of both lower extremities   • Left foot drop   • Closed nondisplaced fracture of second metatarsal bone of left foot   • History of lumbar laminectomy   • Paroxysmal atrial fibrillation (HCC)   • Onychomycosis   • Tinea pedis of both feet   • Moderate aortic insufficiency   • Right ventricular enlargement   • Dyslipidemia   • Coronary artery calcification seen on CAT scan   • Closed nondisplaced fracture of distal phalanx of left great toe   • Closed nondisplaced fracture of fifth left metatarsal bone   • Left foot pain   • Appendicitis   • Perforated appendicitis   • Subclinical hypothyroidism   • Paroxysmal atrial flutter (HCC)   • Mitral valve prolapse   • Hematoma of right knee region       Plan:    69 y.o. male with right distal medial thigh hematoma over the VMO    • Aspiration attempted unsuccessful - dark coagulated blood indicating hematoma  • Advised patient to use heat to help with reabsorption of the hematoma, advised this will be self-limited and take time to resolve on its own  • No indication for any further orthopedic intervention  • Follow-up as needed  • Finish taking medications as prescribed by the emergency department    The patient was seen and examined by Dr. Cesar Llanos and myself. The assessment and plan were formulated by Dr. Cesar Llanos and I assisted in carrying it out. Subjective:   Patient ID: Dian Hermosillo is a 69 y.o. male . HPI    Patient comes in today with regards to right knee pain. Patient is referred to us by Catarina Malave DO for further evaluation. The patient reports that the pain been going on for 2 weeks ago. Patient rates their pain as 3-4/10.  Injury or trauma prior to onset of pain: fell onto knees directly. Pain is located in the medial knee. It is worsened with nothing, and is made better with nothing. Treatments tried: icing, keflex from ED . The pain does not radiate . Old injuries or prior surgeries: none. Numbness or tingling: none . The following portions of the patient's history were reviewed and updated as appropriate: allergies, current medications, past family history, past social history, past surgical history and problem list.    Social History     Socioeconomic History   • Marital status: /Civil Union     Spouse name: Not on file   • Number of children: Not on file   • Years of education: Not on file   • Highest education level: Not on file   Occupational History   • Not on file   Tobacco Use   • Smoking status: Never   • Smokeless tobacco: Never   Vaping Use   • Vaping Use: Never used   Substance and Sexual Activity   • Alcohol use:  Yes     Alcohol/week: 1.0 standard drink of alcohol     Types: 1 Cans of beer per week     Comment: Rare socially   • Drug use: No   • Sexual activity: Not on file   Other Topics Concern   • Not on file   Social History Narrative   • Not on file     Social Determinants of Health     Financial Resource Strain: Not on file   Food Insecurity: Not on file   Transportation Needs: Not on file   Physical Activity: Not on file   Stress: Not on file   Social Connections: Not on file   Intimate Partner Violence: Not on file   Housing Stability: Not on file     Past Medical History:   Diagnosis Date   • Anemia     Internal Hemorroids   • Closed nondisplaced fracture of second metatarsal bone of left foot 11/11/2018   • COVID-19 05/2022   • Foot drop     Left   • Heart murmur     MVP   • Hyperlipidemia    • Irregular heart beat    • Left foot pain 07/11/2019   • Left inguinal hernia     Managed By Serena Domingo / last assessed 3/27/15    • Pain in both feet 04/02/2019   • Paroxysmal atrial fibrillation ([address]) 02/27/2019   • Pleural effusion, left 02/27/2019   • Pneumonia 02/26/2019   • Skin lesion    • Subclinical hypothyroidism 01/15/2020   • Weakness of left foot 11/07/2018     Past Surgical History:   Procedure Laterality Date   • BACK SURGERY     • COLONOSCOPY     • HEMORROIDECTOMY     • HERNIA REPAIR     • IR IMAGE GUIDED ASPIRATION / DRAINAGE W TUBE  11/19/2019   • IR THORACENTESIS  3/1/2019   • LUMBAR LAMINECTOMY Left 11/9/2018    Procedure: Metrx L4-5 left hemilaminectomy and bilateral foraminotomy, Metrx L5-S1 left hemilaminectomy and microdiskectomy;  Surgeon: Dinh Arzola MD;  [facility];  Service: Neurosurgery   • [place] LAPAROSCOPIC APPENDECTOMY N/A 2/20/2020    Procedure: LAPAROSCOPIC APPENDECTOMY;  Surgeon: Jameel Jones DO;  [facility];  Service: General     No Known Allergies  Current Outpatient Medications on File Prior to Visit   Medication Sig Dispense Refill   • aspirin (ECOTRIN LOW STRENGTH) 81 mg EC tablet Take 81 mg by mouth daily     • atorvastatin (LIPITOR) 40 mg tablet Take 1 tablet (40 mg total) by mouth daily 90 tablet 3   • Calcium-Magnesium-Vitamin D (CALCIUM MAGNESIUM PO) Take by mouth     • cephalexin (KEFLEX) 500 mg capsule Take 1 capsule (500 mg total) by mouth every 6 (six) hours for 7 days 28 capsule 0   • chlorhexidine (PERIDEX) 0.12 % solution 15 mL daily at bedtime      • Cyanocobalamin (VITAMIN B12 PO) Take by mouth     • doxycycline (PERIOSTAT) 20 MG tablet Take 20 mg by mouth daily in the early morning Takes for Gums  2   • naproxen (Naprosyn) 500 mg tablet Take 1 tablet (500 mg total) by mouth 2 (two) times a day with meals 30 tablet 0   • saccharomyces boulardii (FLORASTOR) 250 mg capsule Take 250 mg by mouth 2 (two) times a day       No current facility-administered medications on file prior to visit. Review of Systems   Constitutional: Negative. HENT: Negative. Eyes: Negative. Respiratory: Negative. Cardiovascular: Negative. Gastrointestinal: Negative. Negative for vomiting. Genitourinary: Negative. Musculoskeletal:        Please refer to HPI   Skin: Negative. Neurological: Negative. Hematological: Negative. Psychiatric/Behavioral: Negative. All other systems reviewed and are negative. Objective: There were no vitals filed for this visit. Physical Exam  Musculoskeletal:      Right knee: No effusion. Right Knee Exam     Muscle Strength   The patient has normal right knee strength. Tenderness   The patient is experiencing no tenderness. Range of Motion   The patient has normal right knee ROM. Other   Erythema: absent  Scars: absent  Sensation: normal  Pulse: present  Right knee swelling: Moderate swelling with a subcutaneous hematoma directly over the VMO medial aspect right knee no erythema or warmth, there is some mild amount of prepatellar bursal fluid without any erythema or warmth or pain to palpation. Effusion: no effusion present            I have personally reviewed pertinent films in PACS and my interpretation is Stray of the right knee from 9/22/2023 demonstrates no acute fracture, soft tissue swelling in the medial prepatellar region no joint effusion. Large joint arthrocentesis: R knee  Universal Protocol:  Consent given by: patient  Time out: Immediately prior to procedure a "time out" was called to verify the correct patient, procedure, equipment, support staff and site/side marked as required.   Site marked: the operative site was marked  Supporting Documentation  Indications: pain   Procedure Details  Location: knee - R knee  Preparation: Patient was prepped and draped in the usual sterile fashion  Needle size: 22 G  Approach: anterolateral  Medications administered: 4 mL lidocaine 1 %    Patient tolerance: patient tolerated the procedure well with no immediate complications  Dressing:  Sterile dressing applied    Scant amount of dark bloody drainage was aspirated indicating coagulated hematoma                Scribe Attestation    I,:  Delfina Hernandez PA-C am acting as a scribe while in the presence of the attending physician.:       I,:  Cynthia Perez DO personally performed the services described in this documentation    as scribed in my presence.:             Portions of the record may have been created with voice recognition software. Occasional wrong word or "sound a like" substitutions may have occurred due to the inherent limitations of voice recognition software. Read the chart carefully and recognize, using context, where substitutions have occurred.

## 2023-09-28 ENCOUNTER — ATHLETIC TRAINING (OUTPATIENT)
Dept: SPORTS MEDICINE | Facility: OTHER | Age: 78
End: 2023-09-28

## 2023-09-28 DIAGNOSIS — S80.02XA HEMATOMA OF LEFT KNEE REGION: Primary | ICD-10-CM

## 2023-09-28 NOTE — PROGRESS NOTES
9/27/23    - reported to the [facility] athletic training room for treatment of a hematoma on his right medial knee (VMO). -Objective: Pt reports he did follow with Dr Conchita Villanueva in the office who also was unable to drain much fluid out of his knee. Compared to last week the hemotoma or   bursitis has gone down significantly. Pt had fifteen minutes of game ready at max compression and max cooling    9/28/23    - reported to the [facility] athletic training room for treatment of a hematoma on his right medial knee (VMO).   -Objective:Pt had fifteen minutes of game ready at max compression and max cooling

## 2023-09-30 NOTE — PROGRESS NOTES
AT Treatment               9/29/23    Subjective:    reported to the [facility] athletic training room for treatment of a hematoma on his right medial knee (VMO). Objective:  Pt had fifteen minutes of game ready at max compression and max cooling      Assessment: Tolerated treatment well. Patient would benefit from continued AT      Plan: Continue per plan of care.

## 2023-10-13 ENCOUNTER — OFFICE VISIT (OUTPATIENT)
Dept: FAMILY MEDICINE CLINIC | Facility: CLINIC | Age: 78
End: 2023-10-13
Payer: COMMERCIAL

## 2023-10-13 VITALS
BODY MASS INDEX: 26.27 KG/M2 | OXYGEN SATURATION: 96 % | DIASTOLIC BLOOD PRESSURE: 60 MMHG | SYSTOLIC BLOOD PRESSURE: 140 MMHG | WEIGHT: 204.7 LBS | HEIGHT: 74 IN | RESPIRATION RATE: 11 BRPM | HEART RATE: 70 BPM

## 2023-10-13 DIAGNOSIS — I48.0 PAROXYSMAL ATRIAL FIBRILLATION (HCC): ICD-10-CM

## 2023-10-13 DIAGNOSIS — S80.01XA HEMATOMA OF RIGHT KNEE REGION: ICD-10-CM

## 2023-10-13 DIAGNOSIS — Z00.00 MEDICARE ANNUAL WELLNESS VISIT, SUBSEQUENT: Primary | ICD-10-CM

## 2023-10-13 DIAGNOSIS — E78.5 DYSLIPIDEMIA: ICD-10-CM

## 2023-10-13 DIAGNOSIS — M21.372 LEFT FOOT DROP: ICD-10-CM

## 2023-10-13 PROCEDURE — G0439 PPPS, SUBSEQ VISIT: HCPCS | Performed by: FAMILY MEDICINE

## 2023-10-13 NOTE — PROGRESS NOTES
Assessment and Plan:     Problem List Items Addressed This Visit    None      Depression Screening and Follow-up Plan: Patient was screened for depression during today's encounter. They screened negative with a PHQ-2 score of 0. Preventive health issues were discussed with patient, and age appropriate screening tests were ordered as noted in patient's After Visit Summary. Personalized health advice and appropriate referrals for health education or preventive services given if needed, as noted in patient's After Visit Summary.      History of Present Illness:     Patient presents for a Medicare Wellness Visit    HPI   Patient Care Team:  Skye Patel DO as PCP - General (Family Medicine)     Review of Systems:     Review of Systems     Problem List:     Patient Active Problem List   Diagnosis    Hamstring tightness of both lower extremities    Left foot drop    Closed nondisplaced fracture of second metatarsal bone of left foot    History of lumbar laminectomy    Paroxysmal atrial fibrillation (HCC)    Onychomycosis    Tinea pedis of both feet    Moderate aortic insufficiency    Right ventricular enlargement    Dyslipidemia    Coronary artery calcification seen on CAT scan    Closed nondisplaced fracture of distal phalanx of left great toe    Closed nondisplaced fracture of fifth left metatarsal bone    Left foot pain    Appendicitis    Perforated appendicitis    Subclinical hypothyroidism    Paroxysmal atrial flutter (HCC)    Mitral valve prolapse    Hematoma of right knee region      Past Medical and Surgical History:     Past Medical History:   Diagnosis Date    Anemia     Internal Hemorroids    Closed nondisplaced fracture of second metatarsal bone of left foot 11/11/2018    COVID-19 05/2022    Foot drop     Left    Heart murmur     MVP    Hyperlipidemia     Irregular heart beat     Left foot pain 07/11/2019    Left inguinal hernia     Managed By [facility] / last assessed 3/27/15     Pain in both feet 04/02/2019    Paroxysmal atrial fibrillation ([address]) 02/27/2019    Pleural effusion, left 02/27/2019    Pneumonia 02/26/2019    Skin lesion     Subclinical hypothyroidism 01/15/2020    Weakness of left foot 11/07/2018     Past Surgical History:   Procedure Laterality Date    BACK SURGERY      COLONOSCOPY      HEMORROIDECTOMY      HERNIA REPAIR      IR IMAGE GUIDED ASPIRATION / DRAINAGE W TUBE  11/19/2019    IR THORACENTESIS  3/1/2019    LUMBAR LAMINECTOMY Left 11/9/2018    Procedure: Metrx L4-5 left hemilaminectomy and bilateral foraminotomy, Metrx L5-S1 left hemilaminectomy and microdiskectomy;  Surgeon: Chapis Dodge MD;  Location: [facility];  Service: Neurosurgery    OH LAPAROSCOPIC APPENDECTOMY N/A 2/20/2020    Procedure: Kojo Sallis;  Surgeon: Willian Salmon DO;  Location: [facility];  Service: General      Family History:     Family History   Problem Relation Age of Onset    Heart disease Mother     No Known Problems Father     No Known Problems Sister     No Known Problems Brother     No Known Problems Family     Anemia Neg Hx     Arrhythmia Neg Hx     Clotting disorder Neg Hx     Heart attack Neg Hx     Heart failure Neg Hx     Hyperlipidemia Neg Hx     Hypertension Neg Hx     Fainting Neg Hx     Asthma Neg Hx       Social History:     Social History     Socioeconomic History    Marital status: /Civil Union     Spouse name: None    Number of children: None    Years of education: None    Highest education level: None   Occupational History    None   Tobacco Use    Smoking status: Never    Smokeless tobacco: Never   Vaping Use    Vaping Use: Never used   Substance and Sexual Activity    Alcohol use:  Yes     Alcohol/week: 1.0 standard drink of alcohol     Types: 1 Cans of beer per week     Comment: Rare socially    Drug use: No    Sexual activity: None   Other Topics Concern    None   Social History Narrative    None     Social Determinants of Health     Financial Resource Strain: Low Risk  (10/12/2023)    Overall Financial Resource Strain (CARDIA)     Difficulty of Paying Living Expenses: Not hard at all   Food Insecurity: Not on file   Transportation Needs: No Transportation Needs (10/12/2023)    PRAPARE - Transportation     Lack of Transportation (Medical): No     Lack of Transportation (Non-Medical): No   Physical Activity: Not on file   Stress: Not on file   Social Connections: Not on file   Intimate Partner Violence: Not on file   Housing Stability: Not on file      Medications and Allergies:     Current Outpatient Medications   Medication Sig Dispense Refill    aspirin (ECOTRIN LOW STRENGTH) 81 mg EC tablet Take 81 mg by mouth daily      atorvastatin (LIPITOR) 40 mg tablet Take 1 tablet (40 mg total) by mouth daily 90 tablet 3    Calcium-Magnesium-Vitamin D (CALCIUM MAGNESIUM PO) Take by mouth      chlorhexidine (PERIDEX) 0.12 % solution 15 mL daily at bedtime       Cyanocobalamin (VITAMIN B12 PO) Take by mouth      doxycycline (PERIOSTAT) 20 MG tablet Take 20 mg by mouth daily in the early morning Takes for Gums  2    naproxen (Naprosyn) 500 mg tablet Take 1 tablet (500 mg total) by mouth 2 (two) times a day with meals 30 tablet 0    saccharomyces boulardii (FLORASTOR) 250 mg capsule Take 250 mg by mouth 2 (two) times a day       No current facility-administered medications for this visit.      No Known Allergies   Immunizations:     Immunization History   Administered Date(s) Administered    COVID-19 MODERNA VACC 0.5 ML IM 02/16/2021, 03/16/2021, 10/29/2021, 05/02/2022    INFLUENZA 11/07/2018    Influenza Quadrivalent Preservative Free 3 years and older IM 10/02/2014    Influenza, high dose seasonal 0.7 mL 11/07/2018, 11/14/2019    Pneumococcal Conjugate 13-Valent 11/07/2018    Pneumococcal Conjugate Vaccine 20-valent (Pcv20), Polysace 06/02/2022    Tdap 06/02/2009, 03/07/2022    Zoster 07/12/2012    Zoster Vaccine Recombinant 02/04/2020, 07/03/2020      Health Maintenance:         Topic Date Due    Hepatitis C Screening  Never done    Colorectal Cancer Screening  11/09/2027         Topic Date Due    Influenza Vaccine (1) 09/01/2023      Medicare Screening Tests and Risk Assessments:         Health Risk Assessment:   Patient rates overall health as very good. Patient feels that their physical health rating is same. Patient is very satisfied with their life. Eyesight was rated as same. Hearing was rated as same. Patient feels that their emotional and mental health rating is same. Patients states they are never, rarely angry. Patient states they are never, rarely unusually tired/fatigued. Pain experienced in the last 7 days has been none. Patient states that he has experienced no weight loss or gain in last 6 months. Depression Screening:   PHQ-2 Score: 0      Fall Risk Screening: In the past year, patient has experienced: history of falling in past year      Home Safety:  Patient does not have trouble with stairs inside or outside of their home. Patient has working smoke alarms and has working carbon monoxide detector. Home safety hazards include: none. Due to  my dropped foot I have sometimes fallen when the ground surface us uneven. Nutrition:   Current diet is Regular. Medications:   Patient is currently taking over-the-counter supplements. OTC medications include: see medication list. Patient is able to manage medications. Activities of Daily Living (ADLs)/Instrumental Activities of Daily Living (IADLs):   Walk and transfer into and out of bed and chair?: Yes  Dress and groom yourself?: Yes    Bathe or shower yourself?: Yes    Feed yourself?  Yes  Do your laundry/housekeeping?: Yes  Manage your money, pay your bills and track your expenses?: Yes  Make your own meals?: Yes    Do your own shopping?: Yes    Previous Hospitalizations:   Any hospitalizations or ED visits within the last 12 months?: Yes    How many hospitalizations have you had in the last year?: 1-2    Advance Care Planning:   Living will: Yes    Durable POA for healthcare: Yes    Advanced directive: Yes      PREVENTIVE SCREENINGS      Cardiovascular Screening:    General: Screening Current      Diabetes Screening:     General: Screening Current      Colorectal Cancer Screening:     General: Screening Current      Prostate Cancer Screening:    General: Screening Not Indicated      Lung Cancer Screening:     General: Screening Not Indicated    Screening, Brief Intervention, and Referral to Treatment (SBIRT)    Screening  Typical number of drinks in a day: 0  Typical number of drinks in a week: 0  Interpretation: Low risk drinking behavior. AUDIT-C Screenin) How often did you have a drink containing alcohol in the past year? monthly or less  2) How many drinks did you have on a typical day when you were drinking in the past year? 0  3) How often did you have 6 or more drinks on one occasion in the past year? never    AUDIT-C Score: 1  Interpretation: Score 0-3 (male): Negative screen for alcohol misuse    Single Item Drug Screening:  How often have you used an illegal drug (including marijuana) or a prescription medication for non-medical reasons in the past year? never    Single Item Drug Screen Score: 0  Interpretation: Negative screen for possible drug use disorder    No results found.      Physical Exam:     /60 (BP Location: Left arm, Patient Position: Sitting, Cuff Size: Standard)   Pulse 70   Resp (!) 11   Ht 6' 2" (1.88 m)   Wt 92.9 kg (204 lb 11.2 oz)   SpO2 96%   BMI 26.28 kg/m²     Physical Exam     Sia Kellogg DO

## 2023-10-13 NOTE — PATIENT INSTRUCTIONS
Medicare Preventive Visit Patient Instructions  Thank you for completing your Welcome to Medicare Visit or Medicare Annual Wellness Visit today. Your next wellness visit will be due in one year (10/13/2024). The screening/preventive services that you may require over the next 5-10 years are detailed below. Some tests may not apply to you based off risk factors and/or age. Screening tests ordered at today's visit but not completed yet may show as past due. Also, please note that scanned in results may not display below. Preventive Screenings:  Service Recommendations Previous Testing/Comments   Colorectal Cancer Screening  Colonoscopy    Fecal Occult Blood Test (FOBT)/Fecal Immunochemical Test (FIT)  Fecal DNA/Cologuard Test  Flexible Sigmoidoscopy Age: 43-73 years old   Colonoscopy: every 10 years (May be performed more frequently if at higher risk)  OR  FOBT/FIT: every 1 year  OR  Cologuard: every 3 years  OR  Sigmoidoscopy: every 5 years  Screening may be recommended earlier than age 39 if at higher risk for colorectal cancer. Also, an individualized decision between you and your healthcare provider will decide whether screening between the ages of 77-80 would be appropriate.  Colonoscopy: 11/09/2020  FOBT/FIT: Not on file  Cologuard: Not on file  Sigmoidoscopy: Not on file    Screening Current     Prostate Cancer Screening Individualized decision between patient and health care provider in men between ages of 53-66   Medicare will cover every 12 months beginning on the day after your 50th birthday PSA: No results in last 5 years     Screening Not Indicated     Hepatitis C Screening Once for adults born between 1945 and 1965  More frequently in patients at high risk for Hepatitis C Hep C Antibody: Not on file        Diabetes Screening 1-2 times per year if you're at risk for diabetes or have pre-diabetes Fasting glucose: 97 mg/dL (9/20/2023)  A1C: 6.3 % (11/8/2018)  Screening Current   Cholesterol Screening Once every 5 years if you don't have a lipid disorder. May order more often based on risk factors. Lipid panel: 09/20/2023  Screening Current      Other Preventive Screenings Covered by Medicare:  Abdominal Aortic Aneurysm (AAA) Screening: covered once if your at risk. You're considered to be at risk if you have a family history of AAA or a male between the age of 70-76 who smoking at least 100 cigarettes in your lifetime. Lung Cancer Screening: covers low dose CT scan once per year if you meet all of the following conditions: (1) Age 48-67; (2) No signs or symptoms of lung cancer; (3) Current smoker or have quit smoking within the last 15 years; (4) You have a tobacco smoking history of at least 20 pack years (packs per day x number of years you smoked); (5) You get a written order from a healthcare provider. Glaucoma Screening: covered annually if you're considered high risk: (1) You have diabetes OR (2) Family history of glaucoma OR (3)  aged 48 and older OR (3)  American aged 72 and older  Osteoporosis Screening: covered every 2 years if you meet one of the following conditions: (1) Have a vertebral abnormality; (2) On glucocorticoid therapy for more than 3 months; (3) Have primary hyperparathyroidism; (4) On osteoporosis medications and need to assess response to drug therapy. HIV Screening: covered annually if you're between the age of 14-79. Also covered annually if you are younger than 13 and older than 72 with risk factors for HIV infection. For pregnant patients, it is covered up to 3 times per pregnancy.     Immunizations:  Immunization Recommendations   Influenza Vaccine Annual influenza vaccination during flu season is recommended for all persons aged >= 6 months who do not have contraindications   Pneumococcal Vaccine   * Pneumococcal conjugate vaccine = PCV13 (Prevnar 13), PCV15 (Vaxneuvance), PCV20 (Prevnar 20)  * Pneumococcal polysaccharide vaccine = PPSV23 (Pneumovax) Adults 65-60 yo with certain risk factors or if 69+ yo  If never received any pneumonia vaccine: recommend Prevnar 21 (PCV20)  Give PCV20 if previously received 1 dose of PCV13 or PPSV23   Hepatitis B Vaccine 3 dose series if at intermediate or high risk (ex: diabetes, end stage renal disease, liver disease)   Respiratory syncytial virus (RSV) Vaccine - COVERED BY MEDICARE PART D  * RSVPreF3 (Arexvy) CDC recommends that adults 61years of age and older may receive a single dose of RSV vaccine using shared clinical decision-making (SCDM)   Tetanus (Td) Vaccine - COST NOT COVERED BY MEDICARE PART B Following completion of primary series, a booster dose should be given every 10 years to maintain immunity against tetanus. Td may also be given as tetanus wound prophylaxis. Tdap Vaccine - COST NOT COVERED BY MEDICARE PART B Recommended at least once for all adults. For pregnant patients, recommended with each pregnancy. Shingles Vaccine (Shingrix) - COST NOT COVERED BY MEDICARE PART B  2 shot series recommended in those 19 years and older who have or will have weakened immune systems or those 50 years and older     Health Maintenance Due:      Topic Date Due    Hepatitis C Screening  Never done    Colorectal Cancer Screening  11/09/2027     Immunizations Due:      Topic Date Due    Influenza Vaccine (1) 09/01/2023     Advance Directives   What are advance directives? Advance directives are legal documents that state your wishes and plans for medical care. These plans are made ahead of time in case you lose your ability to make decisions for yourself. Advance directives can apply to any medical decision, such as the treatments you want, and if you want to donate organs. What are the types of advance directives? There are many types of advance directives, and each state has rules about how to use them. You may choose a combination of any of the following:  Living will: This is a written record of the treatment you want. You can also choose which treatments you do not want, which to limit, and which to stop at a certain time. This includes surgery, medicine, IV fluid, and tube feedings. Durable power of  for healthcare [facility]): This is a written record that states who you want to make healthcare choices for you when you are unable to make them for yourself. This person, called a proxy, is usually a family member or a friend. You may choose more than 1 proxy. Do not resuscitate (DNR) order:  A DNR order is used in case your heart stops beating or you stop breathing. It is a request not to have certain forms of treatment, such as CPR. A DNR order may be included in other types of advance directives. Medical directive: This covers the care that you want if you are in a coma, near death, or unable to make decisions for yourself. You can list the treatments you want for each condition. Treatment may include pain medicine, surgery, blood transfusions, dialysis, IV or tube feedings, and a ventilator (breathing machine). Values history: This document has questions about your views, beliefs, and how you feel and think about life. This information can help others choose the care that you would choose. Why are advance directives important? An advance directive helps you control your care. Although spoken wishes may be used, it is better to have your wishes written down. Spoken wishes can be misunderstood, or not followed. Treatments may be given even if you do not want them. An advance directive may make it easier for your family to make difficult choices about your care. Fall Prevention    Fall prevention  includes ways to make your home and other areas safer. It also includes ways you can move more carefully to prevent a fall. Health conditions that cause changes in your blood pressure, vision, or muscle strength and coordination may increase your risk for falls.  Medicines may also increase your risk for falls if they make you dizzy, weak, or sleepy. Fall prevention tips:   Stand or sit up slowly. Use assistive devices as directed. Wear shoes that fit well and have soles that . Wear a personal alarm. Stay active. Manage your medical conditions. Home Safety Tips:  Add items to prevent falls in the bathroom. Keep paths clear. Install bright lights in your home. Keep items you use often on shelves within reach. Paint or place reflective tape on the edges of your stairs. Weight Management   Why it is important to manage your weight:  Being overweight increases your risk of health conditions such as heart disease, high blood pressure, type 2 diabetes, and certain types of cancer. It can also increase your risk for osteoarthritis, sleep apnea, and other respiratory problems. Aim for a slow, steady weight loss. Even a small amount of weight loss can lower your risk of health problems. How to lose weight safely:  A safe and healthy way to lose weight is to eat fewer calories and get regular exercise. You can lose up about 1 pound a week by decreasing the number of calories you eat by 500 calories each day. Healthy meal plan for weight management:  A healthy meal plan includes a variety of foods, contains fewer calories, and helps you stay healthy. A healthy meal plan includes the following:  Eat whole-grain foods more often. A healthy meal plan should contain fiber. Fiber is the part of grains, fruits, and vegetables that is not broken down by your body. Whole-grain foods are healthy and provide extra fiber in your diet. Some examples of whole-grain foods are whole-wheat breads and pastas, oatmeal, brown rice, and bulgur. Eat a variety of vegetables every day. Include dark, leafy greens such as spinach, kale, elza greens, and mustard greens. Eat yellow and orange vegetables such as carrots, sweet potatoes, and winter squash. Eat a variety of fruits every day.   Choose fresh or canned fruit (canned in its own juice or light syrup) instead of juice. Fruit juice has very little or no fiber. Eat low-fat dairy foods. Drink fat-free (skim) milk or 1% milk. Eat fat-free yogurt and low-fat cottage cheese. Try low-fat cheeses such as mozzarella and other reduced-fat cheeses. Choose meat and other protein foods that are low in fat. Choose beans or other legumes such as split peas or lentils. Choose fish, skinless poultry (chicken or turkey), or lean cuts of red meat (beef or pork). Before you cook meat or poultry, cut off any visible fat. Use less fat and oil. Try baking foods instead of frying them. Add less fat, such as margarine, sour cream, regular salad dressing and mayonnaise to foods. Eat fewer high-fat foods. Some examples of high-fat foods include french fries, doughnuts, ice cream, and cakes. Eat fewer sweets. Limit foods and drinks that are high in sugar. This includes candy, cookies, regular soda, and sweetened drinks. Exercise:  Exercise at least 30 minutes per day on most days of the week. Some examples of exercise include walking, biking, dancing, and swimming. You can also fit in more physical activity by taking the stairs instead of the elevator or parking farther away from stores. Ask your healthcare provider about the best exercise plan for you. © Copyright [employer] 2018 Information is for End User's use only and may not be sold, redistributed or otherwise used for commercial purposes. All illustrations and images included in CareNotes® are the copyrighted property of [employer] or [address] [place] [address]   The patient will get a copy of his advance directive for the office. He should schedule a yearly Medicare wellness exam.  He will continue to use of ibuprofen until the swelling is gone in his right knee. He will schedule COVID-vaccine nation with his local pharmacy in 2 weeks. He will also get an RSV vaccination when it is available.

## 2023-10-14 NOTE — PROGRESS NOTES
Assessment/Plan: Medicare wellness exam subsequent visit. Patient will return for a yearly Medicare wellness exam.  Left foot drop patient will continue to wear his left leg brace. Pt will continue his present meds for hx of paroxsymal A. Fib        No problem-specific Assessment & Plan notes found for this encounter. Subjective:      Patient ID: Kaelyn Frost is a 69 y.o. male. This is a Medicare wellness exam subsequent visit for this 64-year-old male. He denies any new complaints today. The patient did fall approximately 2-1/2 weeks ago and sustained a right knee hematoma. He has been using ibuprofen twice a day with a reduced amount of swelling in his right knee. He also saw an orthopod who attempted to drain but it had already clotted. The swelling should gradually reduce with ibuprofen use. He denies any other falls in the last year. The patient does wear a brace on his left foot secondary to a foot drop. He denies any problems with urinary incontinence. He denies any complaints of depression. He is up-to-date with all his shots and received a flu shot. He will get a COVID shot in 2 weeks at his local pharmacy. He also will get a RSV vaccination when it is available. Review of Systems   HENT: Negative. Eyes: Negative. Respiratory: Negative. Cardiovascular: Negative. Gastrointestinal: Negative. Endocrine: Negative. Musculoskeletal: Negative. Allergic/Immunologic: Negative. Neurological: Negative. Hematological: Negative. Psychiatric/Behavioral: Negative. All other systems reviewed and are negative. Objective:      /60 (BP Location: Left arm, Patient Position: Sitting, Cuff Size: Standard)   Pulse 70   Resp (!) 11   Ht 6' 2" (1.88 m)   Wt 92.9 kg (204 lb 11.2 oz)   SpO2 96%   BMI 26.28 kg/m²          Physical Exam  Constitutional:       Appearance: Normal appearance.    Cardiovascular:      Rate and Rhythm: Normal rate and regular rhythm. Pulmonary:      Effort: Pulmonary effort is normal.      Breath sounds: Normal breath sounds. Neurological:      Mental Status: He is alert. Comments: Left foot drop   Psychiatric:         Mood and Affect: Mood normal.         Behavior: Behavior normal.         Thought Content: Thought content normal.         Judgment: Judgment normal.           Answers submitted by the patient for this visit:  Medicare Annual Wellness Visit (Submitted on 10/12/2023)  How would you rate your overall health?: very good  Compared to last year, how is your physical health?: same  In general, how satisfied are you with your life?: very satisfied  Compared to last year, how is your eyesight?: same  Compared to last year, how is your hearing?: same  Compared to last year, how is your emotional/mental health?: same  How often is anger a problem for you?: never, rarely  How often do you feel unusually tired/fatigued?: never, rarely  In the past 7 days, how much pain have you experienced?: none  In the past 6 months, have you lost or gained 10 pounds without trying?: No  One or more falls in the last year: Yes  Do you have trouble with the stairs inside or outside your home?: No  Does your home have working smoke alarms?: Yes  Does your home have a carbon monoxide monitor?: Yes  Which safety hazards (if any) have you experienced in your home? Please select all that apply.: none  Additional Comments: Due to  my dropped foot I have sometimes fallen when the ground surface us uneven. How would you describe your current diet? Please select all that apply.: Regular  In addition to prescription medications, are you taking any over-the-counter supplements?: Yes  If yes, what supplements are you taking?: Vitamins already listed.   Can you manage your medications?: Yes  Are you currently taking any opioid medications?: No  Can you walk and transfer into and out of your bed and chair?: Yes  Can you dress and groom yourself?: Yes  Can you bathe or shower yourself?: Yes  Can you feed yourself?: Yes  Can you do your laundry/ housekeeping?: Yes  Can you manage your money, pay your bills, and track your expenses?: Yes  Can you make your own meals?: Yes  Can you do your own shopping?: Yes  Within the last 12 months, have you had any hospitalizations or Emergency Department visits?: Yes  If yes, how many times have you been hospitalized within the past year?: 1-2  Do you have a living will?: Yes  Do you have a Durable POA (Power of ) for healthcare decisions?: Yes  Do you have an Advanced Directive for end of life decisions?: Yes  How often have you used an illegal drug (including marijuana) or a prescription medication for non-medical reasons in the past year?: never  What is the typical number of drinks you consume in a day?: 0  What is the typical number of drinks you consume in a week?: 0  How often did you have a drink containing alcohol in the past year?: monthly or less  How many drinks did you have on a typical day  when you were drinking in the past year?: 0  How often did you have 6 or more drinks on one occasion in the past year?: never   The patient will get a copy of his advance directive for the office. He should schedule a yearly Medicare wellness exam.  He will continue to use of ibuprofen until the swelling is gone in his right knee. He will schedule COVID-vaccine nation with his local pharmacy in 2 weeks. He will also get an RSV vaccination when it is available.

## 2023-10-26 ENCOUNTER — HOSPITAL ENCOUNTER (OUTPATIENT)
Dept: NON INVASIVE DIAGNOSTICS | Facility: CLINIC | Age: 78
Discharge: HOME | End: 2023-10-26
Payer: COMMERCIAL

## 2023-10-26 VITALS
BODY MASS INDEX: 26.18 KG/M2 | DIASTOLIC BLOOD PRESSURE: 60 MMHG | WEIGHT: 204 LBS | SYSTOLIC BLOOD PRESSURE: 140 MMHG | HEIGHT: 74 IN | HEART RATE: 70 BPM

## 2023-10-26 DIAGNOSIS — I35.1 MODERATE AORTIC INSUFFICIENCY: ICD-10-CM

## 2023-10-26 LAB
AORTIC ROOT: 3.3 CM
ASCENDING AORTA: 3.9 CM
E WAVE DECELERATION TIME: 161 MS
E/A RATIO: 0.92
FRACTIONAL SHORTENING: 37 (ref 28–44)
INTERVENTRICULAR SEPTUM IN DIASTOLE (PARASTERNAL SHORT AXIS VIEW): 1.3 CM
INTERVENTRICULAR SEPTUM: 1.3 CM (ref 0.6–1.1)
LAAS-AP2: 24.3 CM2
LAAS-AP4: 27.2 CM2
LEFT ATRIUM SIZE: 4.7 CM
LEFT ATRIUM VOLUME (MOD BIPLANE): 84 ML
LEFT ATRIUM VOLUME INDEX (MOD BIPLANE): 38.2 ML/M2
LEFT INTERNAL DIMENSION IN SYSTOLE: 3.1 CM (ref 2.1–4)
LEFT VENTRICULAR INTERNAL DIMENSION IN DIASTOLE: 4.9 CM (ref 3.5–6)
LEFT VENTRICULAR POSTERIOR WALL IN END DIASTOLE: 1.2 CM
LEFT VENTRICULAR STROKE VOLUME: 79 ML
LVSV (TEICH): 79 ML
MV E'TISSUE VEL-LAT: 11 CM/S
MV E'TISSUE VEL-SEP: 8 CM/S
MV PEAK A VEL: 0.6 M/S
MV PEAK E VEL: 55 CM/S
MV STENOSIS PRESSURE HALF TIME: 47 MS
MV VALVE AREA P 1/2 METHOD: 4.68
RA PRESSURE ESTIMATED: 5 MMHG
RIGHT ATRIUM AREA SYSTOLE A4C: 18.5 CM2
RIGHT VENTRICLE ID DIMENSION: 4.4 CM
RV PSP: 17 MMHG
SL CV LEFT ATRIUM LENGTH A2C: 6.6 CM
SL CV LV EF: 70
SL CV PED ECHO LEFT VENTRICLE DIASTOLIC VOLUME (MOD BIPLANE) 2D: 115 ML
SL CV PED ECHO LEFT VENTRICLE SYSTOLIC VOLUME (MOD BIPLANE) 2D: 37 ML
TR MAX PG: 12 MMHG
TR PEAK VELOCITY: 1.7 M/S
TRICUSPID ANNULAR PLANE SYSTOLIC EXCURSION: 3.1 CM
TRICUSPID VALVE PEAK REGURGITATION VELOCITY: 1.73 M/S

## 2023-10-26 PROCEDURE — 93306 TTE W/DOPPLER COMPLETE: CPT | Performed by: INTERNAL MEDICINE

## 2023-10-26 PROCEDURE — 93306 TTE W/DOPPLER COMPLETE: CPT

## 2023-10-27 ENCOUNTER — TELEPHONE (OUTPATIENT)
Dept: CARDIOLOGY CLINIC | Facility: CLINIC | Age: 78
End: 2023-10-27

## 2023-10-27 NOTE — TELEPHONE ENCOUNTER
Patient called asking for if you can please call him regarding his echo results. He has some questions pertaining to what he read on [facility] about his echo results.

## 2023-11-14 ENCOUNTER — TELEPHONE (OUTPATIENT)
Dept: FAMILY MEDICINE CLINIC | Facility: CLINIC | Age: 78
End: 2023-11-14

## 2023-11-27 ENCOUNTER — TELEPHONE (OUTPATIENT)
Dept: FAMILY MEDICINE CLINIC | Facility: CLINIC | Age: 78
End: 2023-11-27

## 2023-11-27 NOTE — TELEPHONE ENCOUNTER
Pt recently received a health visit by one of [address] clinicians.    Summary scanned into encounter for review

## 2023-12-28 ENCOUNTER — TELEPHONE (OUTPATIENT)
Dept: CARDIOLOGY CLINIC | Facility: CLINIC | Age: 78
End: 2023-12-28

## 2023-12-28 DIAGNOSIS — E78.5 DYSLIPIDEMIA: ICD-10-CM

## 2023-12-28 RX ORDER — ATORVASTATIN CALCIUM 40 MG/1
40 TABLET, FILM COATED ORAL DAILY
Qty: 90 TABLET | Refills: 3 | Status: SHIPPED | OUTPATIENT
Start: 2023-12-28 | End: 2023-12-28 | Stop reason: SDUPTHER

## 2023-12-28 RX ORDER — ATORVASTATIN CALCIUM 40 MG/1
40 TABLET, FILM COATED ORAL DAILY
Qty: 90 TABLET | Refills: 3 | Status: SHIPPED | OUTPATIENT
Start: 2023-12-28

## 2023-12-28 NOTE — TELEPHONE ENCOUNTER
Patient is requesting a refill on atorvastatin to go to [facility]

## 2024-03-21 ENCOUNTER — TELEPHONE (OUTPATIENT)
Age: 79
End: 2024-03-21

## 2024-03-21 NOTE — TELEPHONE ENCOUNTER
Alphonso on provider line:     cassie Carrillo is Tianna calling from [facility] about a patient named Ananth Graham, it's spelled Ananth. Last name is Deepak SON and his YOB: 1945. We are his DME provider for his ankle foot orthosis and wanted to know if you had clinical notes for this patient. We received the script from Doctor Corbett and that script is dated 318 today. We can't use Foot drop as a primary diagnosis though, so I was looking for clinical notes. If you could call us back, our number is [phone] or you could fax a note, if you have them, that our fax number is [phone]. Thank you very much. Bye, bye.    Printed only visit note in last year and faxed to  [phone].

## 2024-03-21 NOTE — PROGRESS NOTES
Cardiology Follow Up    Ananth Urbano  1945  [id number]  [facility]  [address]  [address]  [facility] [address] [phone]    1. Paroxysmal atrial fibrillation (HCC)        2. Paroxysmal atrial flutter (HCC)        3. Moderate aortic insufficiency        4. Mitral valve prolapse        5. Coronary artery calcification seen on CAT scan        6. Dyslipidemia              Discussion/Summary:  Mr. Urbano is a pleasant 78-year-old gentleman who presents to the office today for routine follow-up.    He continues to maintain normal sinus rhythm after his ablation without signs or symptoms of recurrent atrial fibrillation/flutter.  He is now off all medication.    His blood pressure did come down upon my recheck and is acceptable on no medication.  A low-salt diet was reinforced.    He is maintained on aspirin and statin therapy given coronary artery calcifications noted on CT scan.  His lipids from last year reveal acceptable numbers on his current statin regimen.    After his last visit with me he did undergo repeat echocardiogram revealing stable valvular heart disease with moderate aortic insufficiency and no significant mitral insufficiency in the setting of mitral valve prolapse.  I will request a repeat echo after his next visit.     I will see him back in the office in six months or sooner if deemed necessary.    Interval History:  Mr. Urbano is a pleasant 78-year-old gentleman who presents to the office today for routine follow-up.      Since his last visit he feels very well.  He walks on a regular basis.  He walks daily for about four miles which includes ascending hills.  He also swims regularly in the summer.  He denies any exertional chest pain or shortness of breath in doing so.  He denies any signs or symptoms of congestive heart failure including lower extremity edema, paroxysmal nocturnal dyspnea, orthopnea,  acute weight gain or increasing abdominal girth.  He denies lightheadedness, syncope or presyncope.  He denies any sensation of palpitations or symptoms suggestive of recurrent atrial fibrillation.  He denies symptoms of claudication.      Problem List       Hamstring tightness of both lower extremities    Weakness of left foot    Left foot drop    Overview Signed 11/9/2018 11:26 AM by Murtaza Castillo MD     Added automatically from request for surgery [id number]         Closed nondisplaced fracture of second metatarsal bone of left foot    Well adult exam    History of lumbar laminectomy    Pneumonia    NSTEMI (non-ST elevated myocardial infarction) (HCC)    Atrial fibrillation (HCC)    Pleural effusion, left    Acquired deformity of foot    Pain in both feet    Peripheral arteriosclerosis (HCC)    Onychomycosis    Tinea pedis of both feet          Past Medical History:   Diagnosis Date    Anemia     Internal Hemorroids    Closed nondisplaced fracture of second metatarsal bone of left foot 11/11/2018    COVID-19 05/2022    Foot drop     Left    Heart murmur     MVP    Hyperlipidemia     Irregular heart beat     Left foot pain 07/11/2019    Left inguinal hernia     Managed By Milton Badillo / last assessed 3/27/15     Pain in both feet 04/02/2019    Paroxysmal atrial fibrillation (HCC) 02/27/2019    Pleural effusion, left 02/27/2019    Pneumonia 02/26/2019    Skin lesion     Subclinical hypothyroidism 01/15/2020    Weakness of left foot 11/07/2018     Social History     Socioeconomic History    Marital status: /Civil Union     Spouse name: Not on file    Number of children: Not on file    Years of education: Not on file    Highest education level: Not on file   Occupational History    Not on file   Tobacco Use    Smoking status: Never    Smokeless tobacco: Never   Vaping Use    Vaping status: Never Used   Substance and Sexual Activity    Alcohol use: Yes     Alcohol/week: 1.0 standard drink of alcohol     Types: 1  Cans of beer per week     Comment: Rare socially    Drug use: No    Sexual activity: Not on file   Other Topics Concern    Not on file   Social History Narrative    Not on file     Social Determinants of Health     Financial Resource Strain: Low Risk  (10/12/2023)    Overall Financial Resource Strain (CARDIA)     Difficulty of Paying Living Expenses: Not hard at all   Food Insecurity: Not on file   Transportation Needs: No Transportation Needs (10/12/2023)    PRAPARE - Transportation     Lack of Transportation (Medical): No     Lack of Transportation (Non-Medical): No   Physical Activity: Not on file   Stress: Not on file   Social Connections: Not on file   Intimate Partner Violence: Not on file   Housing Stability: Not on file      Family History   Problem Relation Age of Onset    Heart disease Mother     No Known Problems Father     No Known Problems Sister     No Known Problems Brother     No Known Problems Family     Anemia Neg Hx     Arrhythmia Neg Hx     Clotting disorder Neg Hx     Heart attack Neg Hx     Heart failure Neg Hx     Hyperlipidemia Neg Hx     Hypertension Neg Hx     Fainting Neg Hx     Asthma Neg Hx      Past Surgical History:   Procedure Laterality Date    BACK SURGERY      COLONOSCOPY      HEMORROIDECTOMY      HERNIA REPAIR      IR IMAGE GUIDED ASPIRATION / DRAINAGE W TUBE  11/19/2019    IR THORACENTESIS  3/1/2019    LUMBAR LAMINECTOMY Left 11/9/2018    Procedure: Metrx L4-5 left hemilaminectomy and bilateral foraminotomy, Metrx L5-S1 left hemilaminectomy and microdiskectomy;  Surgeon: Murtaza Castillo MD;  Location: [facility];  Service: Neurosurgery    VT LAPAROSCOPIC APPENDECTOMY N/A 2/20/2020    Procedure: LAPAROSCOPIC APPENDECTOMY;  Surgeon: Milan Green DO;  Location: [facility];  Service: General       Current Outpatient Medications:     aspirin (ECOTRIN LOW STRENGTH) 81 mg EC tablet, Take 81 mg by mouth daily, Disp: , Rfl:     atorvastatin (LIPITOR) 40 mg tablet, Take 1 tablet (40 mg  "total) by mouth daily, Disp: 90 tablet, Rfl: 3    Calcium-Magnesium-Vitamin D (CALCIUM MAGNESIUM PO), Take by mouth, Disp: , Rfl:     chlorhexidine (PERIDEX) 0.12 % solution, 15 mL daily at bedtime , Disp: , Rfl:     Cyanocobalamin (VITAMIN B12 PO), Take by mouth, Disp: , Rfl:     doxycycline (PERIOSTAT) 20 MG tablet, Take 20 mg by mouth daily in the early morning Takes for Gums, Disp: , Rfl: 2    saccharomyces boulardii (FLORASTOR) 250 mg capsule, Take 250 mg by mouth 2 (two) times a day, Disp: , Rfl:     naproxen (Naprosyn) 500 mg tablet, Take 1 tablet (500 mg total) by mouth 2 (two) times a day with meals (Patient not taking: Reported on 3/22/2024), Disp: 30 tablet, Rfl: 0  No Known Allergies    Labs:     Chemistry        Component Value Date/Time    K 4.5 09/20/2023 0929     09/20/2023 0929    CO2 29 09/20/2023 0929    CO2 29 11/07/2018 0601    BUN 16 09/20/2023 0929    CREATININE 0.96 09/20/2023 0929        Component Value Date/Time    CALCIUM 9.1 09/20/2023 0929    ALKPHOS 53 09/20/2023 0929    AST 29 09/20/2023 0929    ALT 27 09/20/2023 0929            No results found for: \"CHOL\"  Lab Results   Component Value Date    HDL 40 09/20/2023    HDL 37 (L) 02/15/2023    HDL 39 (L) 07/06/2022     Lab Results   Component Value Date    LDLCALC 41 09/20/2023    LDLCALC 42 02/15/2023    LDLCALC 43 07/06/2022     Lab Results   Component Value Date    TRIG 50 09/20/2023    TRIG 57 02/15/2023    TRIG 57 07/06/2022     No results found for: \"CHOLHDL\"    Imaging: Xr Chest Pa & Lateral    Result Date: 4/17/2019  Narrative: CHEST INDICATION:   J90: Pleural effusion, not elsewhere classified J18.1: Lobar pneumonia, unspecified organism. COMPARISON:  Two-view chest 3/2/2019 EXAM PERFORMED/VIEWS:  XR CHEST PA & LATERAL  The frontal view was performed utilizing dual energy radiographic technique. FINDINGS: Normal cardiac silhouette.  Aortic calcification is present.  Unfolded aorta. Markedly improved left lower lobe airspace " "consolidation with minimal residual opacification. Markedly improved left pleural effusion. No pneumothorax or pulmonary edema. Multilevel thoracic spondylosis.     Impression: Markedly improved left lower lobe airspace consolidation and left pleural effusion. Workstation performed: [id number]         Review of Systems   Cardiovascular:  Negative for chest pain, dyspnea on exertion, leg swelling, near-syncope, orthopnea and paroxysmal nocturnal dyspnea.   All other systems reviewed and are negative.      Vitals:    03/22/24 0812   BP: 114/60   Pulse:    SpO2:        Vitals:    03/22/24 0751   Weight: 95.1 kg (209 lb 9.6 oz)       Height: 6' 2\" (188 cm)   Body mass index is 26.91 kg/m².    Physical Exam:  General:  Alert and cooperative, appears stated age  HEENT:  PERRLA, EOMI, no scleral icterus, no conjunctival pallor  Neck:  No lymphadenopathy, no thyromegaly, no carotid bruits, no elevated JVP  Heart:  Regular rate and rhythm, normal S1/S2, no S3/S4, no murmur  Lungs:  Clear to auscultation bilaterally   Abdomen:  Soft, non-tender, positive bowel sounds, no rebound or guarding,   no organomegaly   Extremities:  No clubbing, cyanosis or edema   Vascular:  2+ pedal pulses  Skin:  No rashes or lesions on exposed skin  Neurologic:  Cranial nerves II-XII grossly intact without focal deficits  "

## 2024-03-22 ENCOUNTER — OFFICE VISIT (OUTPATIENT)
Dept: CARDIOLOGY CLINIC | Facility: CLINIC | Age: 79
End: 2024-03-22
Payer: COMMERCIAL

## 2024-03-22 VITALS
SYSTOLIC BLOOD PRESSURE: 114 MMHG | DIASTOLIC BLOOD PRESSURE: 60 MMHG | BODY MASS INDEX: 26.9 KG/M2 | HEIGHT: 74 IN | HEART RATE: 63 BPM | OXYGEN SATURATION: 99 % | WEIGHT: 209.6 LBS

## 2024-03-22 DIAGNOSIS — I35.1 MODERATE AORTIC INSUFFICIENCY: ICD-10-CM

## 2024-03-22 DIAGNOSIS — E78.5 DYSLIPIDEMIA: ICD-10-CM

## 2024-03-22 DIAGNOSIS — I25.10 CORONARY ARTERY CALCIFICATION SEEN ON CAT SCAN: ICD-10-CM

## 2024-03-22 DIAGNOSIS — I34.1 MITRAL VALVE PROLAPSE: ICD-10-CM

## 2024-03-22 DIAGNOSIS — I48.0 PAROXYSMAL ATRIAL FIBRILLATION (HCC): Primary | ICD-10-CM

## 2024-03-22 DIAGNOSIS — I48.92 PAROXYSMAL ATRIAL FLUTTER (HCC): ICD-10-CM

## 2024-03-22 PROCEDURE — 99214 OFFICE O/P EST MOD 30 MIN: CPT | Performed by: INTERNAL MEDICINE

## 2024-04-01 ENCOUNTER — TELEPHONE (OUTPATIENT)
Age: 79
End: 2024-04-01

## 2024-04-01 NOTE — TELEPHONE ENCOUNTER
"Completed form has been faxed to the number listed below; and placed in \"TO BE SCANNED BIN\"     [phone]  "

## 2024-09-18 ENCOUNTER — OFFICE VISIT (OUTPATIENT)
Dept: CARDIOLOGY CLINIC | Facility: CLINIC | Age: 79
End: 2024-09-18
Payer: COMMERCIAL

## 2024-09-18 VITALS
WEIGHT: 201 LBS | DIASTOLIC BLOOD PRESSURE: 60 MMHG | BODY MASS INDEX: 25.81 KG/M2 | OXYGEN SATURATION: 97 % | SYSTOLIC BLOOD PRESSURE: 112 MMHG | HEART RATE: 68 BPM

## 2024-09-18 DIAGNOSIS — I48.0 PAROXYSMAL ATRIAL FIBRILLATION (HCC): Primary | ICD-10-CM

## 2024-09-18 DIAGNOSIS — I34.1 MITRAL VALVE PROLAPSE: ICD-10-CM

## 2024-09-18 DIAGNOSIS — I35.1 MODERATE AORTIC INSUFFICIENCY: ICD-10-CM

## 2024-09-18 DIAGNOSIS — I25.10 CORONARY ARTERY CALCIFICATION SEEN ON CAT SCAN: ICD-10-CM

## 2024-09-18 DIAGNOSIS — I48.92 PAROXYSMAL ATRIAL FLUTTER (HCC): ICD-10-CM

## 2024-09-18 DIAGNOSIS — E78.5 DYSLIPIDEMIA: ICD-10-CM

## 2024-09-18 PROCEDURE — 99214 OFFICE O/P EST MOD 30 MIN: CPT | Performed by: INTERNAL MEDICINE

## 2024-09-18 PROCEDURE — 93000 ELECTROCARDIOGRAM COMPLETE: CPT | Performed by: INTERNAL MEDICINE

## 2024-09-18 NOTE — PROGRESS NOTES
Cardiology Follow Up    Ananth Urbano  1945  [id number]  [facility]  [address]  [address]  [facility] [address] [phone]    1. Paroxysmal atrial fibrillation (HCC)  Echo complete w/ contrast if indicated    POCT ECG    CANCELED: POCT ECG      2. Paroxysmal atrial flutter (HCC)  POCT ECG      3. Moderate aortic insufficiency  Echo complete w/ contrast if indicated      4. Mitral valve prolapse  Echo complete w/ contrast if indicated      5. Coronary artery calcification seen on CAT scan        6. Dyslipidemia  Comprehensive metabolic panel    Lipid Panel With Direct LDL          Discussion/Summary:  Mr. Urbano is a pleasant 79-year-old gentleman who presents to the office today for routine follow-up.    He continues to maintain normal sinus rhythm after his ablation without signs or symptoms of recurrent atrial fibrillation/flutter.  He is now off all medication per electrophysiology.    His blood pressure is well-controlled in the office today on no antihypertensive medication.  A low-salt diet was reinforced.    He is maintained on aspirin and statin therapy given coronary artery calcifications noted on CT scan.  His lipids from last year reveal acceptable numbers on his current statin regimen.  An updated lipid panel was requested.    After his last visit with me he did undergo repeat echocardiogram revealing stable valvular heart disease with moderate aortic insufficiency and no significant mitral insufficiency in the setting of mitral valve prolapse.  An updated echocardiogram has been requested.    I will see him back in the office in six months or sooner if deemed necessary.    Interval History:  Mr. Urbano is a pleasant 79-year-old gentleman who presents to the office today for routine follow-up.      Since his last visit he feels very well.  He walks on a regular basis.  He walks daily for about four miles  which includes ascending hills.  He also swims regularly in the summer.  He denies any exertional chest pain or shortness of breath in doing so.  He denies any signs or symptoms of congestive heart failure including lower extremity edema, paroxysmal nocturnal dyspnea, orthopnea, acute weight gain or increasing abdominal girth.  He denies lightheadedness, syncope or presyncope.  He denies any sensation of palpitations or symptoms suggestive of recurrent atrial fibrillation.  He denies symptoms of claudication.      Problem List       Hamstring tightness of both lower extremities    Weakness of left foot    Left foot drop    Overview Signed 11/9/2018 11:26 AM by Murtaza Castillo MD     Added automatically from request for surgery [id number]         Closed nondisplaced fracture of second metatarsal bone of left foot    Well adult exam    History of lumbar laminectomy    Pneumonia    NSTEMI (non-ST elevated myocardial infarction) (HCC)    Atrial fibrillation (HCC)    Pleural effusion, left    Acquired deformity of foot    Pain in both feet    Peripheral arteriosclerosis (HCC)    Onychomycosis    Tinea pedis of both feet          Past Medical History:   Diagnosis Date    Anemia     Internal Hemorroids    Closed nondisplaced fracture of second metatarsal bone of left foot 11/11/2018    COVID-19 05/2022    Foot drop     Left    Heart murmur     MVP    Hyperlipidemia     Irregular heart beat     Left foot pain 07/11/2019    Left inguinal hernia     Managed By Milton Badillo / last assessed 3/27/15     Pain in both feet 04/02/2019    Paroxysmal atrial fibrillation (HCC) 02/27/2019    Pleural effusion, left 02/27/2019    Pneumonia 02/26/2019    Skin lesion     Subclinical hypothyroidism 01/15/2020    Weakness of left foot 11/07/2018     Social History     Socioeconomic History    Marital status: /Civil Union     Spouse name: Not on file    Number of children: Not on file    Years of education: Not on file    Highest education  level: Not on file   Occupational History    Not on file   Tobacco Use    Smoking status: Never    Smokeless tobacco: Never   Vaping Use    Vaping status: Never Used   Substance and Sexual Activity    Alcohol use: Yes     Alcohol/week: 1.0 standard drink of alcohol     Types: 1 Cans of beer per week     Comment: Rare socially    Drug use: No    Sexual activity: Not on file   Other Topics Concern    Not on file   Social History Narrative    Not on file     Social Determinants of Health     Financial Resource Strain: Low Risk  (10/12/2023)    Overall Financial Resource Strain (CARDIA)     Difficulty of Paying Living Expenses: Not hard at all   Food Insecurity: Not on file   Transportation Needs: No Transportation Needs (10/12/2023)    PRAPARE - Transportation     Lack of Transportation (Medical): No     Lack of Transportation (Non-Medical): No   Physical Activity: Not on file   Stress: Not on file   Social Connections: Not on file   Intimate Partner Violence: Not on file   Housing Stability: Not on file      Family History   Problem Relation Age of Onset    Heart disease Mother     No Known Problems Father     No Known Problems Sister     No Known Problems Brother     No Known Problems Family     Anemia Neg Hx     Arrhythmia Neg Hx     Clotting disorder Neg Hx     Heart attack Neg Hx     Heart failure Neg Hx     Hyperlipidemia Neg Hx     Hypertension Neg Hx     Fainting Neg Hx     Asthma Neg Hx      Past Surgical History:   Procedure Laterality Date    BACK SURGERY      COLONOSCOPY      HEMORROIDECTOMY      HERNIA REPAIR      IR IMAGE GUIDED ASPIRATION / DRAINAGE W TUBE  11/19/2019    IR THORACENTESIS  3/1/2019    LUMBAR LAMINECTOMY Left 11/9/2018    Procedure: Metrx L4-5 left hemilaminectomy and bilateral foraminotomy, Metrx L5-S1 left hemilaminectomy and microdiskectomy;  Surgeon: Murtaza Castillo MD;  Location: [facility];  Service: Neurosurgery    HI LAPAROSCOPIC APPENDECTOMY N/A 2/20/2020    Procedure:  "LAPAROSCOPIC APPENDECTOMY;  Surgeon: Milan Green DO;  Location: [facility];  Service: General       Current Outpatient Medications:     aspirin (ECOTRIN LOW STRENGTH) 81 mg EC tablet, Take 81 mg by mouth daily, Disp: , Rfl:     atorvastatin (LIPITOR) 40 mg tablet, Take 1 tablet (40 mg total) by mouth daily, Disp: 90 tablet, Rfl: 3    Calcium-Magnesium-Vitamin D (CALCIUM MAGNESIUM PO), Take by mouth, Disp: , Rfl:     chlorhexidine (PERIDEX) 0.12 % solution, 15 mL daily at bedtime , Disp: , Rfl:     Cyanocobalamin (VITAMIN B12 PO), Take by mouth, Disp: , Rfl:     doxycycline (PERIOSTAT) 20 MG tablet, Take 20 mg by mouth daily in the early morning Takes for Gums, Disp: , Rfl: 2    saccharomyces boulardii (FLORASTOR) 250 mg capsule, Take 250 mg by mouth 2 (two) times a day, Disp: , Rfl:     naproxen (Naprosyn) 500 mg tablet, Take 1 tablet (500 mg total) by mouth 2 (two) times a day with meals (Patient not taking: Reported on 3/22/2024), Disp: 30 tablet, Rfl: 0  No Known Allergies    Labs:     Chemistry        Component Value Date/Time    K 4.5 09/20/2023 0929     09/20/2023 0929    CO2 29 09/20/2023 0929    CO2 29 11/07/2018 0601    BUN 16 09/20/2023 0929    CREATININE 0.96 09/20/2023 0929        Component Value Date/Time    CALCIUM 9.1 09/20/2023 0929    ALKPHOS 53 09/20/2023 0929    AST 29 09/20/2023 0929    ALT 27 09/20/2023 0929            No results found for: \"CHOL\"  Lab Results   Component Value Date    HDL 40 09/20/2023    HDL 37 (L) 02/15/2023    HDL 39 (L) 07/06/2022     Lab Results   Component Value Date    LDLCALC 41 09/20/2023    LDLCALC 42 02/15/2023    LDLCALC 43 07/06/2022     Lab Results   Component Value Date    TRIG 50 09/20/2023    TRIG 57 02/15/2023    TRIG 57 07/06/2022     No results found for: \"CHOLHDL\"    Imaging: Xr Chest Pa & Lateral    Result Date: 4/17/2019  Narrative: CHEST INDICATION:   J90: Pleural effusion, not elsewhere classified J18.1: Lobar pneumonia, unspecified organism. " COMPARISON:  Two-view chest 3/2/2019 EXAM PERFORMED/VIEWS:  XR CHEST PA & LATERAL  The frontal view was performed utilizing dual energy radiographic technique. FINDINGS: Normal cardiac silhouette.  Aortic calcification is present.  Unfolded aorta. Markedly improved left lower lobe airspace consolidation with minimal residual opacification. Markedly improved left pleural effusion. No pneumothorax or pulmonary edema. Multilevel thoracic spondylosis.     Impression: Markedly improved left lower lobe airspace consolidation and left pleural effusion. Workstation performed: [id number]         Review of Systems   Cardiovascular:  Negative for chest pain, claudication, dyspnea on exertion, irregular heartbeat, orthopnea and palpitations.   All other systems reviewed and are negative.      Vitals:    09/18/24 0809   BP: 112/60   Pulse:    SpO2:          Vitals:    09/18/24 0751   Weight: 91.2 kg (201 lb)             Body mass index is 25.81 kg/m².    Physical Exam:  General:  Alert and cooperative, appears stated age  HEENT:  PERRLA, EOMI, no scleral icterus, no conjunctival pallor  Neck:  No lymphadenopathy, no thyromegaly, no carotid bruits, no elevated JVP  Heart:  Regular rate and rhythm, normal S1/S2, no S3/S4, no murmur  Lungs:  Clear to auscultation bilaterally   Abdomen:  Soft, non-tender, positive bowel sounds, no rebound or guarding,   no organomegaly   Extremities:  No clubbing, cyanosis or edema   Vascular:  2+ pedal pulses  Skin:  No rashes or lesions on exposed skin  Neurologic:  Cranial nerves II-XII grossly intact without focal deficits

## 2024-09-24 ENCOUNTER — HOSPITAL ENCOUNTER (OUTPATIENT)
Dept: NON INVASIVE DIAGNOSTICS | Facility: CLINIC | Age: 79
Discharge: HOME | End: 2024-09-24
Payer: COMMERCIAL

## 2024-09-24 VITALS
HEIGHT: 74 IN | DIASTOLIC BLOOD PRESSURE: 60 MMHG | WEIGHT: 201 LBS | SYSTOLIC BLOOD PRESSURE: 112 MMHG | BODY MASS INDEX: 25.8 KG/M2 | HEART RATE: 68 BPM

## 2024-09-24 DIAGNOSIS — I48.0 PAROXYSMAL ATRIAL FIBRILLATION (HCC): ICD-10-CM

## 2024-09-24 DIAGNOSIS — I34.1 MITRAL VALVE PROLAPSE: ICD-10-CM

## 2024-09-24 DIAGNOSIS — I35.1 MODERATE AORTIC INSUFFICIENCY: ICD-10-CM

## 2024-09-24 LAB
AORTIC ROOT: 3.3 CM
APICAL FOUR CHAMBER EJECTION FRACTION: 58 %
AV LVOT MEAN GRADIENT: 3 MMHG
AV LVOT PEAK GRADIENT: 6 MMHG
AV REGURGITATION PRESSURE HALF TIME: 779 MS
BSA FOR ECHO PROCEDURE: 2.18 M2
DOP CALC LVOT PEAK VEL VTI: 26.74 CM
DOP CALC LVOT PEAK VEL: 1.23 M/S
E WAVE DECELERATION TIME: 220 MS
E/A RATIO: 1.53
FRACTIONAL SHORTENING: 29 (ref 28–44)
INTERVENTRICULAR SEPTUM IN DIASTOLE (PARASTERNAL SHORT AXIS VIEW): 1.2 CM
INTERVENTRICULAR SEPTUM: 1.2 CM (ref 0.6–1.1)
LAAS-AP2: 26.7 CM2
LAAS-AP4: 26.6 CM2
LEFT ATRIUM SIZE: 4.4 CM
LEFT ATRIUM VOLUME (MOD BIPLANE): 106 ML
LEFT ATRIUM VOLUME INDEX (MOD BIPLANE): 48.6 ML/M2
LEFT INTERNAL DIMENSION IN SYSTOLE: 3.6 CM (ref 2.1–4)
LEFT VENTRICULAR INTERNAL DIMENSION IN DIASTOLE: 5.1 CM (ref 3.5–6)
LEFT VENTRICULAR POSTERIOR WALL IN END DIASTOLE: 1.5 CM
LEFT VENTRICULAR STROKE VOLUME: 69 ML
LVSV (TEICH): 69 ML
MV E'TISSUE VEL-SEP: 8 CM/S
MV PEAK A VEL: 0.7 M/S
MV PEAK E VEL: 107 CM/S
MV STENOSIS PRESSURE HALF TIME: 64 MS
MV VALVE AREA P 1/2 METHOD: 3.44
RA PRESSURE ESTIMATED: 8 MMHG
RIGHT ATRIUM AREA SYSTOLE A4C: 19.7 CM2
RIGHT VENTRICLE ID DIMENSION: 4.6 CM
SL CV AV DECELERATION TIME RETROGRADE: 2685 MS
SL CV AV PEAK GRADIENT RETROGRADE: 61 MMHG
SL CV LEFT ATRIUM LENGTH A2C: 5.2 CM
SL CV LV EF: 60
SL CV PED ECHO LEFT VENTRICLE DIASTOLIC VOLUME (MOD BIPLANE) 2D: 125 ML
SL CV PED ECHO LEFT VENTRICLE SYSTOLIC VOLUME (MOD BIPLANE) 2D: 56 ML
TRICUSPID ANNULAR PLANE SYSTOLIC EXCURSION: 2.6 CM

## 2024-09-24 PROCEDURE — 93306 TTE W/DOPPLER COMPLETE: CPT | Performed by: INTERNAL MEDICINE

## 2024-09-24 PROCEDURE — 93306 TTE W/DOPPLER COMPLETE: CPT

## 2024-10-16 ENCOUNTER — RA CDI HCC (OUTPATIENT)
Dept: OTHER | Facility: HOSPITAL | Age: 79
End: 2024-10-16

## 2024-10-23 ENCOUNTER — OFFICE VISIT (OUTPATIENT)
Age: 79
End: 2024-10-23

## 2024-10-23 VITALS
HEIGHT: 74 IN | OXYGEN SATURATION: 100 % | DIASTOLIC BLOOD PRESSURE: 72 MMHG | HEART RATE: 77 BPM | WEIGHT: 200.4 LBS | BODY MASS INDEX: 25.72 KG/M2 | SYSTOLIC BLOOD PRESSURE: 136 MMHG | TEMPERATURE: 98.4 F

## 2024-10-23 DIAGNOSIS — Z00.00 MEDICARE ANNUAL WELLNESS VISIT, SUBSEQUENT: Primary | ICD-10-CM

## 2024-10-23 DIAGNOSIS — M21.372 LEFT FOOT DROP: ICD-10-CM

## 2024-10-23 DIAGNOSIS — E78.5 DYSLIPIDEMIA: ICD-10-CM

## 2024-10-23 DIAGNOSIS — I48.0 PAROXYSMAL ATRIAL FIBRILLATION (HCC): ICD-10-CM

## 2024-10-23 PROCEDURE — G0439 PPPS, SUBSEQ VISIT: HCPCS | Performed by: FAMILY MEDICINE

## 2024-10-23 NOTE — PATIENT INSTRUCTIONS
Medicare Preventive Visit Patient Instructions  Thank you for completing your Welcome to Medicare Visit or Medicare Annual Wellness Visit today. Your next wellness visit will be due in one year (10/24/2025).  The screening/preventive services that you may require over the next 5-10 years are detailed below. Some tests may not apply to you based off risk factors and/or age. Screening tests ordered at today's visit but not completed yet may show as past due. Also, please note that scanned in results may not display below.  Preventive Screenings:  Service Recommendations Previous Testing/Comments   Colorectal Cancer Screening  Colonoscopy    Fecal Occult Blood Test (FOBT)/Fecal Immunochemical Test (FIT)  Fecal DNA/Cologuard Test  Flexible Sigmoidoscopy Age: 45-75 years old   Colonoscopy: every 10 years (May be performed more frequently if at higher risk)  OR  FOBT/FIT: every 1 year  OR  Cologuard: every 3 years  OR  Sigmoidoscopy: every 5 years  Screening may be recommended earlier than age 45 if at higher risk for colorectal cancer. Also, an individualized decision between you and your healthcare provider will decide whether screening between the ages of 76-85 would be appropriate. Colonoscopy: 11/09/2020  FOBT/FIT: Not on file  Cologuard: Not on file  Sigmoidoscopy: Not on file    Screening Current  Screening Current     Prostate Cancer Screening Individualized decision between patient and health care provider in men between ages of 55-69   Medicare will cover every 12 months beginning on the day after your 50th birthday PSA: No results in last 5 years     Screening Not Indicated  Screening Not Indicated     Hepatitis C Screening Once for adults born between 1945 and 1965  More frequently in patients at high risk for Hepatitis C Hep C Antibody: Not on file        Diabetes Screening 1-2 times per year if you're at risk for diabetes or have pre-diabetes Fasting glucose: 97 mg/dL (9/20/2023)  A1C: No results in last 5  years (No results in last 5 years)      Cholesterol Screening Once every 5 years if you don't have a lipid disorder. May order more often based on risk factors. Lipid panel: 09/20/2023  Screening Current  Screening Current      Other Preventive Screenings Covered by Medicare:  Abdominal Aortic Aneurysm (AAA) Screening: covered once if your at risk. You're considered to be at risk if you have a family history of AAA or a male between the age of 65-75 who smoking at least 100 cigarettes in your lifetime.  Lung Cancer Screening: covers low dose CT scan once per year if you meet all of the following conditions: (1) Age 55-77; (2) No signs or symptoms of lung cancer; (3) Current smoker or have quit smoking within the last 15 years; (4) You have a tobacco smoking history of at least 20 pack years (packs per day x number of years you smoked); (5) You get a written order from a healthcare provider.  Glaucoma Screening: covered annually if you're considered high risk: (1) You have diabetes OR (2) Family history of glaucoma OR (3)  aged 50 and older OR (4)  American aged 65 and older  Osteoporosis Screening: covered every 2 years if you meet one of the following conditions: (1) Have a vertebral abnormality; (2) On glucocorticoid therapy for more than 3 months; (3) Have primary hyperparathyroidism; (4) On osteoporosis medications and need to assess response to drug therapy.  HIV Screening: covered annually if you're between the age of 15-65. Also covered annually if you are younger than 15 and older than 65 with risk factors for HIV infection. For pregnant patients, it is covered up to 3 times per pregnancy.    Immunizations:  Immunization Recommendations   Influenza Vaccine Annual influenza vaccination during flu season is recommended for all persons aged >= 6 months who do not have contraindications   Pneumococcal Vaccine   * Pneumococcal conjugate vaccine = PCV13 (Prevnar 13), PCV15 (Vaxneuvance),  PCV20 (Prevnar 20)  * Pneumococcal polysaccharide vaccine = PPSV23 (Pneumovax) Adults 19-63 yo with certain risk factors or if 65+ yo  If never received any pneumonia vaccine: recommend Prevnar 20 (PCV20)  Give PCV20 if previously received 1 dose of PCV13 or PPSV23   Hepatitis B Vaccine 3 dose series if at intermediate or high risk (ex: diabetes, end stage renal disease, liver disease)   Respiratory syncytial virus (RSV) Vaccine - COVERED BY MEDICARE PART D  * RSVPreF3 (Arexvy) CDC recommends that adults 60 years of age and older may receive a single dose of RSV vaccine using shared clinical decision-making (SCDM)   Tetanus (Td) Vaccine - COST NOT COVERED BY MEDICARE PART B Following completion of primary series, a booster dose should be given every 10 years to maintain immunity against tetanus. Td may also be given as tetanus wound prophylaxis.   Tdap Vaccine - COST NOT COVERED BY MEDICARE PART B Recommended at least once for all adults. For pregnant patients, recommended with each pregnancy.   Shingles Vaccine (Shingrix) - COST NOT COVERED BY MEDICARE PART B  2 shot series recommended in those 19 years and older who have or will have weakened immune systems or those 50 years and older     Health Maintenance Due:      Topic Date Due   • Hepatitis C Screening  Never done   • Colorectal Cancer Screening  11/09/2027     Immunizations Due:      Topic Date Due   • Influenza Vaccine (1) 09/01/2024     Advance Directives   What are advance directives?  Advance directives are legal documents that state your wishes and plans for medical care. These plans are made ahead of time in case you lose your ability to make decisions for yourself. Advance directives can apply to any medical decision, such as the treatments you want, and if you want to donate organs.   What are the types of advance directives?  There are many types of advance directives, and each state has rules about how to use them. You may choose a combination of  any of the following:  Living will:  This is a written record of the treatment you want. You can also choose which treatments you do not want, which to limit, and which to stop at a certain time. This includes surgery, medicine, IV fluid, and tube feedings.   Durable power of  for healthcare (DPAHC):  This is a written record that states who you want to make healthcare choices for you when you are unable to make them for yourself. This person, called a proxy, is usually a family member or a friend. You may choose more than 1 proxy.  Do not resuscitate (DNR) order:  A DNR order is used in case your heart stops beating or you stop breathing. It is a request not to have certain forms of treatment, such as CPR. A DNR order may be included in other types of advance directives.  Medical directive:  This covers the care that you want if you are in a coma, near death, or unable to make decisions for yourself. You can list the treatments you want for each condition. Treatment may include pain medicine, surgery, blood transfusions, dialysis, IV or tube feedings, and a ventilator (breathing machine).  Values history:  This document has questions about your views, beliefs, and how you feel and think about life. This information can help others choose the care that you would choose.  Why are advance directives important?  An advance directive helps you control your care. Although spoken wishes may be used, it is better to have your wishes written down. Spoken wishes can be misunderstood, or not followed. Treatments may be given even if you do not want them. An advance directive may make it easier for your family to make difficult choices about your care.   Fall Prevention    Fall prevention  includes ways to make your home and other areas safer. It also includes ways you can move more carefully to prevent a fall. Health conditions that cause changes in your blood pressure, vision, or muscle strength and coordination may  increase your risk for falls. Medicines may also increase your risk for falls if they make you dizzy, weak, or sleepy.   Fall prevention tips:   Stand or sit up slowly.    Use assistive devices as directed.    Wear shoes that fit well and have soles that .    Wear a personal alarm.    Stay active.    Manage your medical conditions.    Home Safety Tips:  Add items to prevent falls in the bathroom.    Keep paths clear.    Install bright lights in your home.    Keep items you use often on shelves within reach.    Paint or place reflective tape on the edges of your stairs.    Weight Management   Why it is important to manage your weight:  Being overweight increases your risk of health conditions such as heart disease, high blood pressure, type 2 diabetes, and certain types of cancer. It can also increase your risk for osteoarthritis, sleep apnea, and other respiratory problems. Aim for a slow, steady weight loss. Even a small amount of weight loss can lower your risk of health problems.  How to lose weight safely:  A safe and healthy way to lose weight is to eat fewer calories and get regular exercise. You can lose up about 1 pound a week by decreasing the number of calories you eat by 500 calories each day.   Healthy meal plan for weight management:  A healthy meal plan includes a variety of foods, contains fewer calories, and helps you stay healthy. A healthy meal plan includes the following:  Eat whole-grain foods more often.  A healthy meal plan should contain fiber. Fiber is the part of grains, fruits, and vegetables that is not broken down by your body. Whole-grain foods are healthy and provide extra fiber in your diet. Some examples of whole-grain foods are whole-wheat breads and pastas, oatmeal, brown rice, and bulgur.  Eat a variety of vegetables every day.  Include dark, leafy greens such as spinach, kale, ezla greens, and mustard greens. Eat yellow and orange vegetables such as carrots, sweet potatoes,  and winter squash.   Eat a variety of fruits every day.  Choose fresh or canned fruit (canned in its own juice or light syrup) instead of juice. Fruit juice has very little or no fiber.  Eat low-fat dairy foods.  Drink fat-free (skim) milk or 1% milk. Eat fat-free yogurt and low-fat cottage cheese. Try low-fat cheeses such as mozzarella and other reduced-fat cheeses.  Choose meat and other protein foods that are low in fat.  Choose beans or other legumes such as split peas or lentils. Choose fish, skinless poultry (chicken or turkey), or lean cuts of red meat (beef or pork). Before you cook meat or poultry, cut off any visible fat.   Use less fat and oil.  Try baking foods instead of frying them. Add less fat, such as margarine, sour cream, regular salad dressing and mayonnaise to foods. Eat fewer high-fat foods. Some examples of high-fat foods include french fries, doughnuts, ice cream, and cakes.  Eat fewer sweets.  Limit foods and drinks that are high in sugar. This includes candy, cookies, regular soda, and sweetened drinks.  Exercise:  Exercise at least 30 minutes per day on most days of the week. Some examples of exercise include walking, biking, dancing, and swimming. You can also fit in more physical activity by taking the stairs instead of the elevator or parking farther away from stores. Ask your healthcare provider about the best exercise plan for you.      © Copyright [employer] 2018 Information is for End User's use only and may not be sold, redistributed or otherwise used for commercial purposes. All illustrations and images included in CareNotes® are the copyrighted property of [employer] or [employer]    Medicare Preventive Visit Patient Instructions  Thank you for completing your Welcome to Medicare Visit or Medicare Annual Wellness Visit today. Your next wellness visit will be due in one year (10/24/2025).  The screening/preventive services that you may require over the next 5-10  years are detailed below. Some tests may not apply to you based off risk factors and/or age. Screening tests ordered at today's visit but not completed yet may show as past due. Also, please note that scanned in results may not display below.  Preventive Screenings:  Service Recommendations Previous Testing/Comments   Colorectal Cancer Screening  Colonoscopy    Fecal Occult Blood Test (FOBT)/Fecal Immunochemical Test (FIT)  Fecal DNA/Cologuard Test  Flexible Sigmoidoscopy Age: 45-75 years old   Colonoscopy: every 10 years (May be performed more frequently if at higher risk)  OR  FOBT/FIT: every 1 year  OR  Cologuard: every 3 years  OR  Sigmoidoscopy: every 5 years  Screening may be recommended earlier than age 45 if at higher risk for colorectal cancer. Also, an individualized decision between you and your healthcare provider will decide whether screening between the ages of 76-85 would be appropriate. Colonoscopy: 11/09/2020  FOBT/FIT: Not on file  Cologuard: Not on file  Sigmoidoscopy: Not on file    Screening Current  Screening Current     Prostate Cancer Screening Individualized decision between patient and health care provider in men between ages of 55-69   Medicare will cover every 12 months beginning on the day after your 50th birthday PSA: No results in last 5 years     Screening Not Indicated  Screening Not Indicated     Hepatitis C Screening Once for adults born between 1945 and 1965  More frequently in patients at high risk for Hepatitis C Hep C Antibody: Not on file        Diabetes Screening 1-2 times per year if you're at risk for diabetes or have pre-diabetes Fasting glucose: 97 mg/dL (9/20/2023)  A1C: No results in last 5 years (No results in last 5 years)      Cholesterol Screening Once every 5 years if you don't have a lipid disorder. May order more often based on risk factors. Lipid panel: 09/20/2023  Screening Current  Screening Current      Other Preventive Screenings Covered by  Medicare:  Abdominal Aortic Aneurysm (AAA) Screening: covered once if your at risk. You're considered to be at risk if you have a family history of AAA or a male between the age of 65-75 who smoking at least 100 cigarettes in your lifetime.  Lung Cancer Screening: covers low dose CT scan once per year if you meet all of the following conditions: (1) Age 55-77; (2) No signs or symptoms of lung cancer; (3) Current smoker or have quit smoking within the last 15 years; (4) You have a tobacco smoking history of at least 20 pack years (packs per day x number of years you smoked); (5) You get a written order from a healthcare provider.  Glaucoma Screening: covered annually if you're considered high risk: (1) You have diabetes OR (2) Family history of glaucoma OR (3)  aged 50 and older OR (4)  American aged 65 and older  Osteoporosis Screening: covered every 2 years if you meet one of the following conditions: (1) Have a vertebral abnormality; (2) On glucocorticoid therapy for more than 3 months; (3) Have primary hyperparathyroidism; (4) On osteoporosis medications and need to assess response to drug therapy.  HIV Screening: covered annually if you're between the age of 15-65. Also covered annually if you are younger than 15 and older than 65 with risk factors for HIV infection. For pregnant patients, it is covered up to 3 times per pregnancy.    Immunizations:  Immunization Recommendations   Influenza Vaccine Annual influenza vaccination during flu season is recommended for all persons aged >= 6 months who do not have contraindications   Pneumococcal Vaccine   * Pneumococcal conjugate vaccine = PCV13 (Prevnar 13), PCV15 (Vaxneuvance), PCV20 (Prevnar 20)  * Pneumococcal polysaccharide vaccine = PPSV23 (Pneumovax) Adults 19-65 yo with certain risk factors or if 65+ yo  If never received any pneumonia vaccine: recommend Prevnar 20 (PCV20)  Give PCV20 if previously received 1 dose of PCV13 or PPSV23    Hepatitis B Vaccine 3 dose series if at intermediate or high risk (ex: diabetes, end stage renal disease, liver disease)   Respiratory syncytial virus (RSV) Vaccine - COVERED BY MEDICARE PART D  * RSVPreF3 (Arexvy) CDC recommends that adults 60 years of age and older may receive a single dose of RSV vaccine using shared clinical decision-making (SCDM)   Tetanus (Td) Vaccine - COST NOT COVERED BY MEDICARE PART B Following completion of primary series, a booster dose should be given every 10 years to maintain immunity against tetanus. Td may also be given as tetanus wound prophylaxis.   Tdap Vaccine - COST NOT COVERED BY MEDICARE PART B Recommended at least once for all adults. For pregnant patients, recommended with each pregnancy.   Shingles Vaccine (Shingrix) - COST NOT COVERED BY MEDICARE PART B  2 shot series recommended in those 19 years and older who have or will have weakened immune systems or those 50 years and older     Health Maintenance Due:      Topic Date Due   • Hepatitis C Screening  Never done   • Colorectal Cancer Screening  11/09/2027     Immunizations Due:      Topic Date Due   • Influenza Vaccine (1) 09/01/2024     Advance Directives   What are advance directives?  Advance directives are legal documents that state your wishes and plans for medical care. These plans are made ahead of time in case you lose your ability to make decisions for yourself. Advance directives can apply to any medical decision, such as the treatments you want, and if you want to donate organs.   What are the types of advance directives?  There are many types of advance directives, and each state has rules about how to use them. You may choose a combination of any of the following:  Living will:  This is a written record of the treatment you want. You can also choose which treatments you do not want, which to limit, and which to stop at a certain time. This includes surgery, medicine, IV fluid, and tube feedings.   Durable  power of  for healthcare (DPA):  This is a written record that states who you want to make healthcare choices for you when you are unable to make them for yourself. This person, called a proxy, is usually a family member or a friend. You may choose more than 1 proxy.  Do not resuscitate (DNR) order:  A DNR order is used in case your heart stops beating or you stop breathing. It is a request not to have certain forms of treatment, such as CPR. A DNR order may be included in other types of advance directives.  Medical directive:  This covers the care that you want if you are in a coma, near death, or unable to make decisions for yourself. You can list the treatments you want for each condition. Treatment may include pain medicine, surgery, blood transfusions, dialysis, IV or tube feedings, and a ventilator (breathing machine).  Values history:  This document has questions about your views, beliefs, and how you feel and think about life. This information can help others choose the care that you would choose.  Why are advance directives important?  An advance directive helps you control your care. Although spoken wishes may be used, it is better to have your wishes written down. Spoken wishes can be misunderstood, or not followed. Treatments may be given even if you do not want them. An advance directive may make it easier for your family to make difficult choices about your care.   Fall Prevention    Fall prevention  includes ways to make your home and other areas safer. It also includes ways you can move more carefully to prevent a fall. Health conditions that cause changes in your blood pressure, vision, or muscle strength and coordination may increase your risk for falls. Medicines may also increase your risk for falls if they make you dizzy, weak, or sleepy.   Fall prevention tips:   Stand or sit up slowly.    Use assistive devices as directed.    Wear shoes that fit well and have soles that .    Wear  a personal alarm.    Stay active.    Manage your medical conditions.    Home Safety Tips:  Add items to prevent falls in the bathroom.    Keep paths clear.    Install bright lights in your home.    Keep items you use often on shelves within reach.    Paint or place reflective tape on the edges of your stairs.    Weight Management   Why it is important to manage your weight:  Being overweight increases your risk of health conditions such as heart disease, high blood pressure, type 2 diabetes, and certain types of cancer. It can also increase your risk for osteoarthritis, sleep apnea, and other respiratory problems. Aim for a slow, steady weight loss. Even a small amount of weight loss can lower your risk of health problems.  How to lose weight safely:  A safe and healthy way to lose weight is to eat fewer calories and get regular exercise. You can lose up about 1 pound a week by decreasing the number of calories you eat by 500 calories each day.   Healthy meal plan for weight management:  A healthy meal plan includes a variety of foods, contains fewer calories, and helps you stay healthy. A healthy meal plan includes the following:  Eat whole-grain foods more often.  A healthy meal plan should contain fiber. Fiber is the part of grains, fruits, and vegetables that is not broken down by your body. Whole-grain foods are healthy and provide extra fiber in your diet. Some examples of whole-grain foods are whole-wheat breads and pastas, oatmeal, brown rice, and bulgur.  Eat a variety of vegetables every day.  Include dark, leafy greens such as spinach, kale, elza greens, and mustard greens. Eat yellow and orange vegetables such as carrots, sweet potatoes, and winter squash.   Eat a variety of fruits every day.  Choose fresh or canned fruit (canned in its own juice or light syrup) instead of juice. Fruit juice has very little or no fiber.  Eat low-fat dairy foods.  Drink fat-free (skim) milk or 1% milk. Eat fat-free  yogurt and low-fat cottage cheese. Try low-fat cheeses such as mozzarella and other reduced-fat cheeses.  Choose meat and other protein foods that are low in fat.  Choose beans or other legumes such as split peas or lentils. Choose fish, skinless poultry (chicken or turkey), or lean cuts of red meat (beef or pork). Before you cook meat or poultry, cut off any visible fat.   Use less fat and oil.  Try baking foods instead of frying them. Add less fat, such as margarine, sour cream, regular salad dressing and mayonnaise to foods. Eat fewer high-fat foods. Some examples of high-fat foods include french fries, doughnuts, ice cream, and cakes.  Eat fewer sweets.  Limit foods and drinks that are high in sugar. This includes candy, cookies, regular soda, and sweetened drinks.  Exercise:  Exercise at least 30 minutes per day on most days of the week. Some examples of exercise include walking, biking, dancing, and swimming. You can also fit in more physical activity by taking the stairs instead of the elevator or parking farther away from stores. Ask your healthcare provider about the best exercise plan for you.      © Copyright [employer] 2018 Information is for End User's use only and may not be sold, redistributed or otherwise used for commercial purposes. All illustrations and images included in CareNotes® are the copyrighted property of [employer] or [employer]

## 2024-10-23 NOTE — ASSESSMENT & PLAN NOTE
The patient has a history of lumbar disc disease and a laminectomy.  He does have a history of a left foot drop and wears a brace to aid in his walking.  Offered

## 2024-10-23 NOTE — ASSESSMENT & PLAN NOTE
The patient has a history of atrial fibrillation but had an ablation and is in normal sinus rhythm at this point.  He is no longer on any anticoagulation.

## 2024-10-23 NOTE — PROGRESS NOTES
Ambulatory Visit  Name: Ananth Urbano      : 1945      MRN: [id number]  Encounter Provider: Farhan Corbett DO  Encounter Date: 10/23/2024   Encounter department: [facility]    Assessment & Plan       Preventive health issues were discussed with patient, and age appropriate screening tests were ordered as noted in patient's After Visit Summary. Personalized health advice and appropriate referrals for health education or preventive services given if needed, as noted in patient's After Visit Summary.    History of Present Illness     HPI   Patient Care Team:  Farhan Corbett DO as PCP - General (Family Medicine)    Review of Systems  Medical History Reviewed by provider this encounter:       Annual Wellness Visit Questionnaire       Health Risk Assessment:   Patient rates overall health as very good. Patient feels that their physical health rating is same. Patient is very satisfied with their life. Eyesight was rated as same. Hearing was rated as same. Patient feels that their emotional and mental health rating is same. Patients states they are never, rarely angry. Patient states they are never, rarely unusually tired/fatigued. Pain experienced in the last 7 days has been none. Patient states that he has experienced no weight loss or gain in last 6 months.     Depression Screening:   PHQ-2 Score: 0      Fall Risk Screening:   In the past year, patient has experienced: history of falling in past year      Home Safety:  Patient does not have trouble with stairs inside or outside of their home. Patient has working smoke alarms and has working carbon monoxide detector. Home safety hazards include: none. In reference to falling.  I must wear a brace because of my dropped foot.  However, inside my house I don't wear the brace.  As a result I sometimes stumble or even fall.    Nutrition:   Current diet is Regular.     Medications:   Patient is currently taking over-the-counter  supplements. OTC medications include: see medication list. Patient is able to manage medications.     Activities of Daily Living (ADLs)/Instrumental Activities of Daily Living (IADLs):   Walk and transfer into and out of bed and chair?: Yes  Dress and groom yourself?: Yes    Bathe or shower yourself?: Yes    Feed yourself? Yes  Do your laundry/housekeeping?: Yes  Manage your money, pay your bills and track your expenses?: Yes  Make your own meals?: Yes    Do your own shopping?: Yes    ADL comments: Actually my wife does the cooking.    Previous Hospitalizations:   Any hospitalizations or ED visits within the last 12 months?: No      Hospitalization Comments: I have had a beer on only a couple of times in the last year.    Advance Care Planning:   Living will: Yes    Durable POA for healthcare: Yes    Advanced directive: Yes      PREVENTIVE SCREENINGS      Cardiovascular Screening:    General: Screening Current      Colorectal Cancer Screening:     General: Screening Current      Prostate Cancer Screening:    General: Screening Not Indicated      Lung Cancer Screening:     General: Screening Not Indicated    Screening, Brief Intervention, and Referral to Treatment (SBIRT)    Screening  Typical number of drinks in a day: 0  Typical number of drinks in a week: 0  Interpretation: Low risk drinking behavior.    AUDIT-C Screenin) How often did you have a drink containing alcohol in the past year? monthly or less  2) How many drinks did you have on a typical day when you were drinking in the past year? 0  3) How often did you have 6 or more drinks on one occasion in the past year? never    AUDIT-C Score: 1  Interpretation: Score 0-3 (male): Negative screen for alcohol misuse    Single Item Drug Screening:  How often have you used an illegal drug (including marijuana) or a prescription medication for non-medical reasons in the past year? never    Single Item Drug Screen Score: 0  Interpretation: Negative screen for  possible drug use disorder    Social Determinants of Health     Financial Resource Strain: Low Risk  (10/23/2024)    Overall Financial Resource Strain (CARDIA)    • Difficulty of Paying Living Expenses: Not very hard   Food Insecurity: No Food Insecurity (10/23/2024)    Hunger Vital Sign    • Worried About Running Out of Food in the Last Year: Never true    • Ran Out of Food in the Last Year: Never true   Transportation Needs: No Transportation Needs (10/23/2024)    PRAPARE - Transportation    • Lack of Transportation (Medical): No    • Lack of Transportation (Non-Medical): No   Housing Stability: Low Risk  (10/23/2024)    Housing Stability Vital Sign    • Unable to Pay for Housing in the Last Year: No    • Number of Times Moved in the Last Year: 0    • Homeless in the Last Year: No   Utilities: Not At Risk (10/23/2024)    [facility] Utilities    • Threatened with loss of utilities: No     No results found.    Objective     There were no vitals taken for this visit.    Physical Exam

## 2024-10-23 NOTE — PROGRESS NOTES
Ambulatory Visit  Name: Ananth Urbano      : 1945      MRN: [id number]  Encounter Provider: Farhan Corbett DO  Encounter Date: 10/23/2024   Encounter department: [facility]    Assessment & Plan       Preventive health issues were discussed with patient, and age appropriate screening tests were ordered as noted in patient's After Visit Summary. Personalized health advice and appropriate referrals for health education or preventive services given if needed, as noted in patient's After Visit Summary.    History of Present Illness     HPI   Patient Care Team:  Farhan Corbett DO as PCP - General (Family Medicine)    Review of Systems  Medical History Reviewed by provider this encounter:       Annual Wellness Visit Questionnaire   Ananth is here for his Subsequent Wellness visit.     Health Risk Assessment:   Patient rates overall health as very good. Patient feels that their physical health rating is same. Patient is very satisfied with their life. Eyesight was rated as same. Hearing was rated as same. Patient feels that their emotional and mental health rating is same. Patients states they are never, rarely angry. Patient states they are never, rarely unusually tired/fatigued. Pain experienced in the last 7 days has been none. Patient states that he has experienced no weight loss or gain in last 6 months.     Depression Screening:   PHQ-2 Score: 0      Fall Risk Screening:   In the past year, patient has experienced: history of falling in past year    Number of falls: 1  Injured during fall?: Yes    Feels unsteady when standing or walking?: No    Worried about falling?: No      Home Safety:  Patient does not have trouble with stairs inside or outside of their home. Patient has working smoke alarms and has working carbon monoxide detector. Home safety hazards include: none. In reference to falling.  I must wear a brace because of my dropped foot.  However, inside my house I  don't wear the brace.  As a result I sometimes stumble or even fall.    Nutrition:   Current diet is Regular. I have had a beer on only a couple of times in the last year.    Medications:   Patient is currently taking over-the-counter supplements. OTC medications include: see medication list. Patient is able to manage medications.     Activities of Daily Living (ADLs)/Instrumental Activities of Daily Living (IADLs):   Walk and transfer into and out of bed and chair?: Yes  Dress and groom yourself?: Yes    Bathe or shower yourself?: Yes    Feed yourself? Yes  Do your laundry/housekeeping?: Yes  Manage your money, pay your bills and track your expenses?: Yes  Make your own meals?: Yes    Do your own shopping?: Yes    ADL comments: Actually my wife does the cooking.    Previous Hospitalizations:   Any hospitalizations or ED visits within the last 12 months?: No      Advance Care Planning:   Living will: Yes    Durable POA for healthcare: Yes    Advanced directive: Yes      PREVENTIVE SCREENINGS      Cardiovascular Screening:    General: Screening Current      Colorectal Cancer Screening:     General: Screening Current      Prostate Cancer Screening:    General: Screening Not Indicated      Lung Cancer Screening:     General: Screening Not Indicated    Screening, Brief Intervention, and Referral to Treatment (SBIRT)    Screening  Typical number of drinks in a day: 0  Typical number of drinks in a week: 0  Interpretation: Low risk drinking behavior.    AUDIT-C Screenin) How often did you have a drink containing alcohol in the past year? monthly or less  2) How many drinks did you have on a typical day when you were drinking in the past year? 0  3) How often did you have 6 or more drinks on one occasion in the past year? never    AUDIT-C Score: 1  Interpretation: Score 0-3 (male): Negative screen for alcohol misuse    Single Item Drug Screening:  How often have you used an illegal drug (including marijuana) or a  prescription medication for non-medical reasons in the past year? never    Single Item Drug Screen Score: 0  Interpretation: Negative screen for possible drug use disorder    Social Determinants of Health     Financial Resource Strain: Low Risk  (10/22/2024)    Overall Financial Resource Strain (CARDIA)     Difficulty of Paying Living Expenses: Not hard at all   Food Insecurity: No Food Insecurity (10/22/2024)    Hunger Vital Sign     Worried About Running Out of Food in the Last Year: Never true     Ran Out of Food in the Last Year: Never true   Transportation Needs: No Transportation Needs (10/22/2024)    PRAPARE - Transportation     Lack of Transportation (Medical): No     Lack of Transportation (Non-Medical): No   Housing Stability: Unknown (10/22/2024)    Housing Stability Vital Sign     Unable to Pay for Housing in the Last Year: No     Homeless in the Last Year: No   Utilities: Not At Risk (10/22/2024)    [facility] Utilities     Threatened with loss of utilities: No     No results found.    Objective     There were no vitals taken for this visit.    Physical Exam

## 2024-10-23 NOTE — PROGRESS NOTES
Her this is a yearly Medicare wellness exam for this 79-year-old male.    This is a yearly Medicare wellness exam for this 79-year-old male.  Ambulatory Visit  Name: Ananth Urbano      : 1945      MRN: [id number]  Encounter Provider: Farhan Corbett DO  Encounter Date: 10/23/2024   Encounter department: [facility]    Assessment & Plan  Medicare annual wellness visit, subsequent  This was this patient's annual Medicare wellness exam.  He denies any problems with falls, urinary incontinence, or depression.  He was given a copy of advance directives form to fill out and return to the office.  He is up-to-date with all his immunizations.  He does have an order for lipid panel ordered by his cardiologist.  Yearly Medicare wellness exam suggested.       Paroxysmal atrial fibrillation (HCC)  The patient has a history of atrial fibrillation but had an ablation and is in normal sinus rhythm at this point.  He is no longer on any anticoagulation.         Left foot drop  The patient has a history of lumbar disc disease and a laminectomy.  He does have a history of a left foot drop and wears a brace to aid in his walking.  Offered          History of Present Illness     This is a yearly Medicare wellness exam with this 79-year-old male.  He denies any new problems today.  He denies any problems with depression, urinary incontinence, falls, but does need a advance directive filled out and he was given a copy today to be returned to the office.  He does have a past history of paroxysmal atrial fibrillation but has been in normal sinus rhythm after an ablation.  He recently saw his cardiologist in September and sees him every 6 months.  The patient is no longer on medications for the past A-fib.  His blood pressure is controlled without medication.  He does have a past history of a left foot drop and wears a brace.  The patient is active with walking and swimming.  He was given an  "order for a lipid panel by his cardiologist because of his history of dyslipidemia and calcification of the coronary calcium CT score in the past.        Review of Systems   Constitutional: Negative.    HENT: Negative.     Eyes: Negative.    Respiratory: Negative.          Mild wheezes   Cardiovascular: Negative.    Gastrointestinal: Negative.    Endocrine: Negative.    Genitourinary: Negative.    Musculoskeletal: Negative.    Skin: Negative.    Allergic/Immunologic: Negative.    Neurological: Negative.    Hematological: Negative.    Psychiatric/Behavioral: Negative.             Objective     /72 (BP Location: Left arm, Patient Position: Sitting, Cuff Size: Standard)   Pulse 77   Temp 98.4 °F (36.9 °C) (Tympanic)   Ht 6' 2\" (1.88 m)   Wt 90.9 kg (200 lb 6.4 oz)   SpO2 100%   BMI 25.73 kg/m²     Physical Exam  Constitutional:       Appearance: Normal appearance.   Cardiovascular:      Rate and Rhythm: Normal rate and regular rhythm.      Heart sounds: Normal heart sounds.   Pulmonary:      Effort: Pulmonary effort is normal.      Breath sounds: Normal breath sounds.   Musculoskeletal:      Comments: Left foot drop   Neurological:      Mental Status: He is alert.   Psychiatric:         Mood and Affect: Mood normal.         Behavior: Behavior normal.         Thought Content: Thought content normal.         Judgment: Judgment normal.       Answers submitted by the patient for this visit:  Medicare Annual Wellness Visit (Submitted on 10/22/2024)  How would you rate your overall health?: very good  Compared to last year, how is your physical health?: same  In general, how satisfied are you with your life?: very satisfied  Compared to last year, how is your eyesight?: same  Compared to last year, how is your hearing?: same  Compared to last year, how is your emotional/mental health?: same  How often is anger a problem for you?: never, rarely  How often do you feel unusually tired/fatigued?: never, rarely  In " the past 7 days, how much pain have you experienced?: none  In the past 6 months, have you lost or gained 10 pounds without trying?: No  One or more falls in the last year: Yes  Do you have trouble with the stairs inside or outside your home?: No  Does your home have working smoke alarms?: Yes  Does your home have a carbon monoxide monitor?: Yes  Which safety hazards (if any) have you experienced in your home? Please select all that apply.: none  Additional Comments: In reference to falling.  I must wear a brace because of my dropped foot.  However, inside my house I don't wear the brace.  As a result I sometimes stumble or even fall.  How would you describe your current diet? Please select all that apply.: Regular  In addition to prescription medications, are you taking any over-the-counter supplements?: Yes  If yes, what supplements are you taking?: various over the counter pills.  earlier in this program they are identified.  Can you manage your medications?: Yes  Are you currently taking any opioid medications?: No  Can you walk and transfer into and out of your bed and chair?: Yes  Can you dress and groom yourself?: Yes  Can you bathe or shower yourself?: Yes  Can you feed yourself?: Yes  Can you do your laundry/ housekeeping?: Yes  Can you manage your money, pay your bills, and track your expenses?: Yes  Can you make your own meals?: Yes  Can you do your own shopping?: Yes  Additional Comments: Actually my wife does the cooking.  Within the last 12 months, have you had any hospitalizations or Emergency Department visits?: No  Additional Comments: I have had a beer on only a couple of times in the last year.  Do you have a living will?: Yes  Do you have a Durable POA (Power of ) for healthcare decisions?: Yes  Do you have an Advanced Directive for end of life decisions?: Yes  How often have you used an illegal drug (including marijuana) or a prescription medication for non-medical reasons in the past  year?: never  What is the typical number of drinks you consume in a day?: 0  What is the typical number of drinks you consume in a week?: 0  How often did you have a drink containing alcohol in the past year?: monthly or less  How many drinks did you have on a typical day  when you were drinking in the past year?: 0  How often did you have 6 or more drinks on one occasion in the past year?: never

## 2024-11-25 ENCOUNTER — APPOINTMENT (OUTPATIENT)
Dept: LAB | Facility: MEDICAL CENTER | Age: 79
End: 2024-11-25
Payer: COMMERCIAL

## 2024-11-25 DIAGNOSIS — E78.5 DYSLIPIDEMIA: ICD-10-CM

## 2024-11-25 LAB
ALBUMIN SERPL BCG-MCNC: 4.3 G/DL (ref 3.5–5)
ALP SERPL-CCNC: 51 U/L (ref 34–104)
ALT SERPL W P-5'-P-CCNC: 31 U/L (ref 7–52)
ANION GAP SERPL CALCULATED.3IONS-SCNC: 4 MMOL/L (ref 4–13)
AST SERPL W P-5'-P-CCNC: 38 U/L (ref 13–39)
BILIRUB SERPL-MCNC: 0.86 MG/DL (ref 0.2–1)
BUN SERPL-MCNC: 16 MG/DL (ref 5–25)
CALCIUM SERPL-MCNC: 9.5 MG/DL (ref 8.4–10.2)
CHLORIDE SERPL-SCNC: 104 MMOL/L (ref 96–108)
CHOLEST SERPL-MCNC: 100 MG/DL (ref ?–200)
CO2 SERPL-SCNC: 30 MMOL/L (ref 21–32)
CREAT SERPL-MCNC: 0.93 MG/DL (ref 0.6–1.3)
GFR SERPL CREATININE-BSD FRML MDRD: 78 ML/MIN/1.73SQ M
GLUCOSE P FAST SERPL-MCNC: 92 MG/DL (ref 65–99)
HDLC SERPL-MCNC: 47 MG/DL
LDLC SERPL CALC-MCNC: 44 MG/DL (ref 0–100)
LDLC SERPL DIRECT ASSAY-MCNC: 56 MG/DL (ref 0–100)
NONHDLC SERPL-MCNC: 53 MG/DL
POTASSIUM SERPL-SCNC: 4.6 MMOL/L (ref 3.5–5.3)
PROT SERPL-MCNC: 6.8 G/DL (ref 6.4–8.4)
SODIUM SERPL-SCNC: 138 MMOL/L (ref 135–147)
TRIGL SERPL-MCNC: 47 MG/DL (ref ?–150)

## 2024-11-25 PROCEDURE — 36415 COLL VENOUS BLD VENIPUNCTURE: CPT

## 2024-11-25 PROCEDURE — 80061 LIPID PANEL: CPT

## 2024-11-25 PROCEDURE — 83721 ASSAY OF BLOOD LIPOPROTEIN: CPT

## 2024-11-25 PROCEDURE — 80053 COMPREHEN METABOLIC PANEL: CPT

## 2024-11-26 ENCOUNTER — RESULTS FOLLOW-UP (OUTPATIENT)
Dept: CARDIOLOGY CLINIC | Facility: HOSPITAL | Age: 79
End: 2024-11-26

## 2024-12-16 DIAGNOSIS — E78.5 DYSLIPIDEMIA: ICD-10-CM

## 2024-12-17 RX ORDER — ATORVASTATIN CALCIUM 40 MG/1
40 TABLET, FILM COATED ORAL DAILY
Qty: 90 TABLET | Refills: 1 | Status: SHIPPED | OUTPATIENT
Start: 2024-12-17 | End: 2024-12-20 | Stop reason: SDUPTHER

## 2024-12-20 DIAGNOSIS — E78.5 DYSLIPIDEMIA: ICD-10-CM

## 2024-12-20 RX ORDER — ATORVASTATIN CALCIUM 40 MG/1
40 TABLET, FILM COATED ORAL DAILY
Qty: 90 TABLET | Refills: 1 | Status: SHIPPED | OUTPATIENT
Start: 2024-12-20

## 2024-12-20 NOTE — TELEPHONE ENCOUNTER
Patient said the pharmacy did not receive the script from 12/17    Reason for call:   [x] Refill   [] Prior Auth  [] Other:     Office:   [] PCP/Provider -   [x] Specialty/Provider - Cardiology    Medication: atorvastatin (LIPITOR) 40 mg tablet     Dose/Frequency: TAKE 1 TABLET BY MOUTH EVERY DAY     Quantity: 90    Pharmacy: [id number] - [place], [place] - [address]      Does the patient have enough for 3 days?   [] Yes   [x] No - Send as HP to POD

## 2025-04-23 NOTE — PROGRESS NOTES
Cardiology Follow Up    Ananth Urbano  1945  [id number]  [facility]  [address]  [address]  [facility] [address] [phone]    1. Coronary artery calcification seen on CAT scan        2. Paroxysmal atrial fibrillation (HCC)  POCT ECG      3. Paroxysmal atrial flutter (HCC)        4. Mitral valve prolapse  Echo complete w/ contrast if indicated      5. Moderate aortic insufficiency  Echo complete w/ contrast if indicated      6. Dyslipidemia  Lipid Panel With Direct LDL    Comprehensive metabolic panel    atorvastatin (LIPITOR) 40 mg tablet          Discussion/Summary:  Mr. Urbano is a pleasant 79-year-old gentleman who presents to the office today for routine follow-up.    He continues to maintain normal sinus rhythm after his ablation without signs or symptoms of recurrent atrial fibrillation/flutter.  He is now off all medication per electrophysiology.    His blood pressure is well-controlled in the office today on no antihypertensive medication.  A low-salt diet was reinforced.    He is maintained on aspirin and statin therapy given coronary artery calcifications noted on CT scan.  His lipids from last year reveal acceptable numbers on his current statin regimen.  An updated lipid panel was requested to be done prior to his next visit.    After his last visit with me he did undergo repeat echocardiogram revealing stable valvular heart disease with mild to moderate aortic insufficiency and mild mitral insufficiency in the setting of mitral valve prolapse.  An updated echocardiogram has been requested to be done later this year.    I will see him back in the office in six months or sooner if deemed necessary.    Interval History:  Mr. Urbano is a pleasant 79-year-old gentleman who presents to the office today for routine follow-up.      Since his last visit he feels very well.  He walks on a regular basis.  He walks  daily for about three to four miles which includes ascending hills.  He also swims regularly in the summer.  He denies any exertional chest pain or shortness of breath in doing so.  He denies any signs or symptoms of congestive heart failure including lower extremity edema, paroxysmal nocturnal dyspnea, orthopnea, acute weight gain or increasing abdominal girth.  He denies lightheadedness, syncope or presyncope.  He denies any sensation of palpitations or symptoms suggestive of recurrent atrial fibrillation.  He denies symptoms of claudication.      Problem List       Hamstring tightness of both lower extremities    Weakness of left foot    Left foot drop    Overview Signed 11/9/2018 11:26 AM by Murtaza Castillo MD     Added automatically from request for surgery [id number]         Closed nondisplaced fracture of second metatarsal bone of left foot    Well adult exam    History of lumbar laminectomy    Pneumonia    NSTEMI (non-ST elevated myocardial infarction) (HCC)    Atrial fibrillation (HCC)    Pleural effusion, left    Acquired deformity of foot    Pain in both feet    Peripheral arteriosclerosis (HCC)    Onychomycosis    Tinea pedis of both feet          Past Medical History:   Diagnosis Date    Anemia     Internal Hemorroids    Closed nondisplaced fracture of second metatarsal bone of left foot 11/11/2018    COVID-19 05/2022    Foot drop     Left    Heart murmur     MVP    Hyperlipidemia     Irregular heart beat     Left foot pain 07/11/2019    Left inguinal hernia     Managed By Milton Badillo / last assessed 3/27/15     Pain in both feet 04/02/2019    Paroxysmal atrial fibrillation (HCC) 02/27/2019    Pleural effusion, left 02/27/2019    Pneumonia 02/26/2019    Skin lesion     Subclinical hypothyroidism 01/15/2020    Weakness of left foot 11/07/2018     Social History     Socioeconomic History    Marital status: /Civil Union     Spouse name: Not on file    Number of children: Not on file    Years of  education: Not on file    Highest education level: Not on file   Occupational History    Not on file   Tobacco Use    Smoking status: Never    Smokeless tobacco: Never   Vaping Use    Vaping status: Never Used   Substance and Sexual Activity    Alcohol use: Yes     Alcohol/week: 1.0 standard drink of alcohol     Types: 1 Cans of beer per week     Comment: Rare socially    Drug use: No    Sexual activity: Not on file   Other Topics Concern    Not on file   Social History Narrative    Not on file     Social Drivers of Health     Financial Resource Strain: Low Risk  (10/23/2024)    Overall Financial Resource Strain (CARDIA)     Difficulty of Paying Living Expenses: Not very hard   Food Insecurity: No Food Insecurity (10/23/2024)    Hunger Vital Sign     Worried About Running Out of Food in the Last Year: Never true     Ran Out of Food in the Last Year: Never true   Transportation Needs: No Transportation Needs (10/23/2024)    PRAPARE - Transportation     Lack of Transportation (Medical): No     Lack of Transportation (Non-Medical): No   Physical Activity: Not on file   Stress: Not on file   Social Connections: Not on file   Intimate Partner Violence: Not on file   Housing Stability: Low Risk  (10/23/2024)    Housing Stability Vital Sign     Unable to Pay for Housing in the Last Year: No     Number of Times Moved in the Last Year: 0     Homeless in the Last Year: No      Family History   Problem Relation Age of Onset    Heart disease Mother     No Known Problems Father     No Known Problems Sister     No Known Problems Family     Anemia Neg Hx     Arrhythmia Neg Hx     Clotting disorder Neg Hx     Heart attack Neg Hx     Heart failure Neg Hx     Hyperlipidemia Neg Hx     Hypertension Neg Hx     Fainting Neg Hx     Asthma Neg Hx      Past Surgical History:   Procedure Laterality Date    BACK SURGERY      COLONOSCOPY      HEMORROIDECTOMY      HERNIA REPAIR      IR IMAGE GUIDED ASPIRATION / DRAINAGE W TUBE  11/19/2019     "IR THORACENTESIS  3/1/2019    LUMBAR LAMINECTOMY Left 11/9/2018    Procedure: Metrx L4-5 left hemilaminectomy and bilateral foraminotomy, Metrx L5-S1 left hemilaminectomy and microdiskectomy;  Surgeon: Murtaza Castillo MD;  Location: [facility];  Service: Neurosurgery    KS LAPAROSCOPIC APPENDECTOMY N/A 2/20/2020    Procedure: LAPAROSCOPIC APPENDECTOMY;  Surgeon: Milan Green DO;  Location: [facility];  Service: General       Current Outpatient Medications:     aspirin (ECOTRIN LOW STRENGTH) 81 mg EC tablet, Take 81 mg by mouth daily, Disp: , Rfl:     atorvastatin (LIPITOR) 40 mg tablet, Take 1 tablet (40 mg total) by mouth daily, Disp: 90 tablet, Rfl: 3    Calcium-Magnesium-Vitamin D (CALCIUM MAGNESIUM PO), Take by mouth, Disp: , Rfl:     chlorhexidine (PERIDEX) 0.12 % solution, 15 mL daily at bedtime , Disp: , Rfl:     Cyanocobalamin (VITAMIN B12 PO), Take by mouth, Disp: , Rfl:     saccharomyces boulardii (FLORASTOR) 250 mg capsule, Take 250 mg by mouth 2 (two) times a day, Disp: , Rfl:     doxycycline (PERIOSTAT) 20 MG tablet, Take 20 mg by mouth daily in the early morning Takes for Gums (Patient not taking: Reported on 4/24/2025), Disp: , Rfl: 2  No Known Allergies    Labs:     Chemistry        Component Value Date/Time    K 4.6 11/25/2024 0810     11/25/2024 0810    CO2 30 11/25/2024 0810    CO2 29 11/07/2018 0601    BUN 16 11/25/2024 0810    CREATININE 0.93 11/25/2024 0810        Component Value Date/Time    CALCIUM 9.5 11/25/2024 0810    ALKPHOS 51 11/25/2024 0810    AST 38 11/25/2024 0810    ALT 31 11/25/2024 0810            No results found for: \"CHOL\"  Lab Results   Component Value Date    HDL 47 11/25/2024    HDL 40 09/20/2023    HDL 37 (L) 02/15/2023     Lab Results   Component Value Date    LDLCALC 44 11/25/2024    LDLCALC 41 09/20/2023    LDLCALC 42 02/15/2023     Lab Results   Component Value Date    TRIG 47 11/25/2024    TRIG 50 09/20/2023    TRIG 57 02/15/2023     No results found for: " "\"CHOLHDL\"    Imaging: Xr Chest Pa & Lateral    Result Date: 4/17/2019  Narrative: CHEST INDICATION:   J90: Pleural effusion, not elsewhere classified J18.1: Lobar pneumonia, unspecified organism. COMPARISON:  Two-view chest 3/2/2019 EXAM PERFORMED/VIEWS:  XR CHEST PA & LATERAL  The frontal view was performed utilizing dual energy radiographic technique. FINDINGS: Normal cardiac silhouette.  Aortic calcification is present.  Unfolded aorta. Markedly improved left lower lobe airspace consolidation with minimal residual opacification. Markedly improved left pleural effusion. No pneumothorax or pulmonary edema. Multilevel thoracic spondylosis.     Impression: Markedly improved left lower lobe airspace consolidation and left pleural effusion. Workstation performed: [id number]         Review of Systems   Cardiovascular:  Negative for chest pain, irregular heartbeat, leg swelling, near-syncope and syncope.   All other systems reviewed and are negative.      Vitals:    04/24/25 1359   BP: 110/54   Pulse: 70   SpO2: 99%           Vitals:    04/24/25 1359   Weight: 92.6 kg (204 lb 3.2 oz)           Height: 6' 2\" (188 cm)   Body mass index is 26.22 kg/m².    Physical Exam:  General:  Alert and cooperative, appears stated age  HEENT:  PERRLA, EOMI, no scleral icterus, no conjunctival pallor  Neck:  No lymphadenopathy, no thyromegaly, no carotid bruits, no elevated JVP  Heart:  Regular rate and rhythm, normal S1/S2, no S3/S4, no murmur  Lungs:  Clear to auscultation bilaterally   Abdomen:  Soft, non-tender, positive bowel sounds, no rebound or guarding,   no organomegaly   Extremities:  No clubbing, cyanosis or edema   Vascular:  2+ pedal pulses  Skin:  No rashes or lesions on exposed skin  Neurologic:  Cranial nerves II-XII grossly intact without focal deficits   "

## 2025-04-24 ENCOUNTER — OFFICE VISIT (OUTPATIENT)
Dept: CARDIOLOGY CLINIC | Facility: CLINIC | Age: 80
End: 2025-04-24
Payer: COMMERCIAL

## 2025-04-24 VITALS
HEART RATE: 70 BPM | OXYGEN SATURATION: 99 % | SYSTOLIC BLOOD PRESSURE: 110 MMHG | HEIGHT: 74 IN | WEIGHT: 204.2 LBS | DIASTOLIC BLOOD PRESSURE: 54 MMHG | BODY MASS INDEX: 26.21 KG/M2

## 2025-04-24 DIAGNOSIS — E78.5 DYSLIPIDEMIA: ICD-10-CM

## 2025-04-24 DIAGNOSIS — I48.92 PAROXYSMAL ATRIAL FLUTTER (HCC): ICD-10-CM

## 2025-04-24 DIAGNOSIS — I35.1 MODERATE AORTIC INSUFFICIENCY: ICD-10-CM

## 2025-04-24 DIAGNOSIS — I25.10 CORONARY ARTERY CALCIFICATION SEEN ON CAT SCAN: Primary | ICD-10-CM

## 2025-04-24 DIAGNOSIS — I34.1 MITRAL VALVE PROLAPSE: ICD-10-CM

## 2025-04-24 DIAGNOSIS — I48.0 PAROXYSMAL ATRIAL FIBRILLATION (HCC): ICD-10-CM

## 2025-04-24 PROCEDURE — 99214 OFFICE O/P EST MOD 30 MIN: CPT | Performed by: INTERNAL MEDICINE

## 2025-04-24 PROCEDURE — 93000 ELECTROCARDIOGRAM COMPLETE: CPT | Performed by: INTERNAL MEDICINE

## 2025-04-24 RX ORDER — ATORVASTATIN CALCIUM 40 MG/1
40 TABLET, FILM COATED ORAL DAILY
Qty: 90 TABLET | Refills: 3 | Status: SHIPPED | OUTPATIENT
Start: 2025-04-24

## 2025-06-16 DIAGNOSIS — E78.5 DYSLIPIDEMIA: ICD-10-CM

## 2025-06-16 RX ORDER — ATORVASTATIN CALCIUM 40 MG/1
40 TABLET, FILM COATED ORAL DAILY
Qty: 90 TABLET | Refills: 1 | Status: SHIPPED | OUTPATIENT
Start: 2025-06-16

## 2025-06-16 NOTE — TELEPHONE ENCOUNTER
Reason for call:   [x] Refill   [] Prior Auth  [] Other:     Office:   [] PCP/Provider -   [x] Specialty/Provider -  Augustina Villalta [facility]     Medication: Lipitor    Dose/Frequency: 40 mg     Quantity: #90    Pharmacy: [facility]   Does the patient have enough for 3 days?   [] Yes   [x] No - Send as HP to POD    Mail Away Pharmacy   Does the patient have enough for 10 days?   [] Yes   [] No - Send as HP to POD

## 2025-07-15 ENCOUNTER — HOSPITAL ENCOUNTER (OUTPATIENT)
Dept: NON INVASIVE DIAGNOSTICS | Facility: CLINIC | Age: 80
Discharge: HOME | End: 2025-07-15
Attending: INTERNAL MEDICINE
Payer: COMMERCIAL

## 2025-07-15 ENCOUNTER — APPOINTMENT (OUTPATIENT)
Dept: LAB | Facility: CLINIC | Age: 80
End: 2025-07-15
Attending: INTERNAL MEDICINE
Payer: COMMERCIAL

## 2025-07-15 VITALS
HEART RATE: 70 BPM | SYSTOLIC BLOOD PRESSURE: 110 MMHG | WEIGHT: 204.15 LBS | BODY MASS INDEX: 26.2 KG/M2 | DIASTOLIC BLOOD PRESSURE: 54 MMHG | HEIGHT: 74 IN

## 2025-07-15 DIAGNOSIS — I35.1 MODERATE AORTIC INSUFFICIENCY: ICD-10-CM

## 2025-07-15 DIAGNOSIS — I34.1 MITRAL VALVE PROLAPSE: ICD-10-CM

## 2025-07-15 DIAGNOSIS — E78.5 DYSLIPIDEMIA: ICD-10-CM

## 2025-07-15 LAB
ALBUMIN SERPL BCG-MCNC: 4.2 G/DL (ref 3.5–5)
ALP SERPL-CCNC: 51 U/L (ref 34–104)
ALT SERPL W P-5'-P-CCNC: 29 U/L (ref 7–52)
ANION GAP SERPL CALCULATED.3IONS-SCNC: 4 MMOL/L (ref 4–13)
AORTIC ROOT: 3.5 CM
ASCENDING AORTA: 3.4 CM
AST SERPL W P-5'-P-CCNC: 37 U/L (ref 13–39)
AV LVOT MEAN GRADIENT: 2 MMHG
AV LVOT PEAK GRADIENT: 5 MMHG
AV REGURGITATION PRESSURE HALF TIME: 1006 MS
BILIRUB SERPL-MCNC: 1.18 MG/DL (ref 0.2–1)
BSA FOR ECHO PROCEDURE: 2.19 M2
BUN SERPL-MCNC: 13 MG/DL (ref 5–25)
CALCIUM SERPL-MCNC: 9.4 MG/DL (ref 8.4–10.2)
CHLORIDE SERPL-SCNC: 106 MMOL/L (ref 96–108)
CHOLEST SERPL-MCNC: 92 MG/DL (ref ?–200)
CO2 SERPL-SCNC: 30 MMOL/L (ref 21–32)
CREAT SERPL-MCNC: 0.95 MG/DL (ref 0.6–1.3)
DOP CALC LVOT PEAK VEL VTI: 23.38 CM
DOP CALC LVOT PEAK VEL: 1.08 M/S
E WAVE DECELERATION TIME: 306 MS
E/A RATIO: 1.26
FRACTIONAL SHORTENING: 47 (ref 28–44)
GFR SERPL CREATININE-BSD FRML MDRD: 76 ML/MIN/1.73SQ M
GLUCOSE P FAST SERPL-MCNC: 97 MG/DL (ref 65–99)
HDLC SERPL-MCNC: 42 MG/DL
INTERVENTRICULAR SEPTUM IN DIASTOLE (PARASTERNAL SHORT AXIS VIEW): 1.3 CM
INTERVENTRICULAR SEPTUM: 1.3 CM (ref 0.6–1.1)
LAAS-AP2: 25.4 CM2
LAAS-AP4: 20.6 CM2
LDLC SERPL CALC-MCNC: 37 MG/DL (ref 0–100)
LDLC SERPL DIRECT ASSAY-MCNC: 42 MG/DL (ref 0–100)
LEFT ATRIUM SIZE: 5 CM
LEFT ATRIUM VOLUME (MOD BIPLANE): 77 ML
LEFT ATRIUM VOLUME INDEX (MOD BIPLANE): 35.2 ML/M2
LEFT INTERNAL DIMENSION IN SYSTOLE: 2.9 CM (ref 2.1–4)
LEFT VENTRICULAR INTERNAL DIMENSION IN DIASTOLE: 5.5 CM (ref 3.5–6)
LEFT VENTRICULAR POSTERIOR WALL IN END DIASTOLE: 1.4 CM
LEFT VENTRICULAR STROKE VOLUME: 116 ML
LV EF US.2D.A4C+ESTIMATED: 60 %
LVSV (TEICH): 116 ML
MV E'TISSUE VEL-SEP: 7 CM/S
MV PEAK A VEL: 0.5 M/S
MV PEAK E VEL: 63 CM/S
MV STENOSIS PRESSURE HALF TIME: 89 MS
MV VALVE AREA P 1/2 METHOD: 2.47
NONHDLC SERPL-MCNC: 50 MG/DL
POTASSIUM SERPL-SCNC: 4.1 MMOL/L (ref 3.5–5.3)
PROT SERPL-MCNC: 6.5 G/DL (ref 6.4–8.4)
RA PRESSURE ESTIMATED: 8 MMHG
RIGHT ATRIUM AREA SYSTOLE A4C: 15.2 CM2
RIGHT VENTRICLE ID DIMENSION: 4.7 CM
RV PSP: 22 MMHG
SL CV AV DECELERATION TIME RETROGRADE: 3468 MS
SL CV AV PEAK GRADIENT RETROGRADE: 43 MMHG
SL CV LEFT ATRIUM LENGTH A2C: 5.5 CM
SL CV LV EF: 65
SL CV PED ECHO LEFT VENTRICLE DIASTOLIC VOLUME (MOD BIPLANE) 2D: 147 ML
SL CV PED ECHO LEFT VENTRICLE SYSTOLIC VOLUME (MOD BIPLANE) 2D: 31 ML
SODIUM SERPL-SCNC: 140 MMOL/L (ref 135–147)
TR MAX PG: 14 MMHG
TR PEAK VELOCITY: 1.9 M/S
TRICUSPID ANNULAR PLANE SYSTOLIC EXCURSION: 3.4 CM
TRICUSPID VALVE PEAK REGURGITATION VELOCITY: 1.86 M/S
TRIGL SERPL-MCNC: 64 MG/DL (ref ?–150)

## 2025-07-15 PROCEDURE — 93306 TTE W/DOPPLER COMPLETE: CPT

## 2025-07-15 PROCEDURE — 83721 ASSAY OF BLOOD LIPOPROTEIN: CPT

## 2025-07-15 PROCEDURE — 80053 COMPREHEN METABOLIC PANEL: CPT

## 2025-07-15 PROCEDURE — 93306 TTE W/DOPPLER COMPLETE: CPT | Performed by: INTERNAL MEDICINE

## 2025-07-15 PROCEDURE — 36415 COLL VENOUS BLD VENIPUNCTURE: CPT

## 2025-07-15 PROCEDURE — 80061 LIPID PANEL: CPT

## (undated) DEVICE — IRRIG ENDO FLO TUBING

## (undated) DEVICE — SNAP KOVER: Brand: UNBRANDED

## (undated) DEVICE — DRAPE EQUIPMENT RF WAND

## (undated) DEVICE — SYRINGE 3ML LL

## (undated) DEVICE — ETS45 RELOAD STANDARD 45MM: Brand: ENDOPATH

## (undated) DEVICE — BETHLEHEM UNIVERSAL SPINE, KIT: Brand: CARDINAL HEALTH

## (undated) DEVICE — FLOSEAL HEMOSTATIC MATRIX, 5 ML: Brand: FLOSEAL

## (undated) DEVICE — TOOL 15BA50 LEGEND 15CM 5MM BA: Brand: MIDAS REX™

## (undated) DEVICE — HARMONIC 1100 SHEARS, 36CM SHAFT LENGTH: Brand: HARMONIC

## (undated) DEVICE — TROCAR: Brand: KII FIOS FIRST ENTRY

## (undated) DEVICE — PENCIL ELECTROSURG E-Z CLEAN -0035H

## (undated) DEVICE — TISSUE RETRIEVAL SYSTEM: Brand: INZII RETRIEVAL SYSTEM

## (undated) DEVICE — ALLENTOWN LAP CHOLE APP PACK: Brand: CARDINAL HEALTH

## (undated) DEVICE — ENDOPATH ETS-FLEX45 ARTICULATING ENDOSCOPIC LINEAR CUTTER, NO RELOAD: Brand: ENDOPATH

## (undated) DEVICE — 3M™ STERI-STRIP™ REINFORCED ADHESIVE SKIN CLOSURES, R1547, 1/2 IN X 4 IN (12 MM X 100 MM), 6 STRIPS/ENVELOPE: Brand: 3M™ STERI-STRIP™

## (undated) DEVICE — SPONGE PVP SCRUB WING STERILE

## (undated) DEVICE — LIGHT HANDLE COVER SLEEVE DISP BLUE STELLAR

## (undated) DEVICE — VIAL DECANTER

## (undated) DEVICE — NEEDLE 18 G X 1 1/2

## (undated) DEVICE — DRAPE SHEET X-LG

## (undated) DEVICE — SUT MONOCRYL 4-0 PS-2 27 IN Y426H

## (undated) DEVICE — TROCAR: Brand: KII® SLEEVE

## (undated) DEVICE — GLOVE SRG BIOGEL ORTHOPEDIC 8

## (undated) DEVICE — ELECTRODE BLADE MOD  E-Z CLEAN 6.5IN -0014M

## (undated) DEVICE — SURGIFOAM 7 X 12 SPONGE ABS

## (undated) DEVICE — PLASTIC ADHESIVE BANDAGE: Brand: CURITY

## (undated) DEVICE — ADHESIVE SKIN HIGH VISCOSITY EXOFIN 1ML

## (undated) DEVICE — SUT VICRYL PLUS 3-0 RB-1 CR/8 18 IN VCP713D

## (undated) DEVICE — FLEXIBLE ADHESIVE BANDAGE,X-LARGE: Brand: CURITY

## (undated) DEVICE — TIBURON SPLIT SHEET: Brand: CONVERTORS

## (undated) DEVICE — DRAPE MICROSCOPE OPMI PENTERO

## (undated) DEVICE — INTENDED FOR TISSUE SEPARATION, AND OTHER PROCEDURES THAT REQUIRE A SHARP SURGICAL BLADE TO PUNCTURE OR CUT.: Brand: BARD-PARKER SAFETY BLADES SIZE 15, STERILE

## (undated) DEVICE — GLOVE INDICATOR PI UNDERGLOVE SZ 8.5 BLUE

## (undated) DEVICE — DISPOSABLE EQUIPMENT COVER: Brand: SMALL TOWEL DRAPE

## (undated) DEVICE — INTENDED FOR TISSUE SEPARATION, AND OTHER PROCEDURES THAT REQUIRE A SHARP SURGICAL BLADE TO PUNCTURE OR CUT.: Brand: BARD-PARKER SAFETY BLADES SIZE 11, STERILE

## (undated) DEVICE — GLOVE SRG BIOGEL 8

## (undated) DEVICE — SUT VICRYL 0 UR-6 27 IN J603H

## (undated) DEVICE — CHLORAPREP HI-LITE 26ML ORANGE

## (undated) DEVICE — TRAY FOLEY 16FR URIMETER SURESTEP

## (undated) DEVICE — NEEDLE 22 G X 1 1/2 SAFETY

## (undated) DEVICE — PREP SURGICAL PURPREP 26ML

## (undated) DEVICE — THE FLOSEAL MALLEABLE TIP AND TRIMMABLE TIP ARE INTENDED FOR DELIVERY OF FLOSEAL HEMOSTATIC MATRIX.: Brand: FLOSEAL SPECIAL APPLICATOR TIPS

## (undated) DEVICE — INTENDED FOR TISSUE SEPARATION, AND OTHER PROCEDURES THAT REQUIRE A SHARP SURGICAL BLADE TO PUNCTURE OR CUT.: Brand: BARD-PARKER ® CARBON RIB-BACK BLADES